# Patient Record
Sex: MALE | Race: WHITE | NOT HISPANIC OR LATINO
[De-identification: names, ages, dates, MRNs, and addresses within clinical notes are randomized per-mention and may not be internally consistent; named-entity substitution may affect disease eponyms.]

---

## 2017-01-09 ENCOUNTER — APPOINTMENT (OUTPATIENT)
Dept: CARDIOLOGY | Facility: CLINIC | Age: 67
End: 2017-01-09

## 2017-01-09 VITALS
SYSTOLIC BLOOD PRESSURE: 138 MMHG | HEIGHT: 67 IN | DIASTOLIC BLOOD PRESSURE: 70 MMHG | BODY MASS INDEX: 33.12 KG/M2 | HEART RATE: 50 BPM | WEIGHT: 211 LBS

## 2017-01-09 DIAGNOSIS — G47.33 OBSTRUCTIVE SLEEP APNEA (ADULT) (PEDIATRIC): ICD-10-CM

## 2017-01-09 RX ORDER — DOCUSATE SODIUM 100 MG/1
100 CAPSULE, LIQUID FILLED ORAL TWICE DAILY
Refills: 0 | Status: ACTIVE | COMMUNITY

## 2017-01-09 RX ORDER — PANTOPRAZOLE 40 MG/1
40 TABLET, DELAYED RELEASE ORAL
Qty: 30 | Refills: 0 | Status: ACTIVE | COMMUNITY
Start: 2016-08-26

## 2017-01-12 ENCOUNTER — APPOINTMENT (OUTPATIENT)
Dept: CARDIOLOGY | Facility: CLINIC | Age: 67
End: 2017-01-12

## 2017-03-03 ENCOUNTER — APPOINTMENT (OUTPATIENT)
Dept: CARDIOLOGY | Facility: CLINIC | Age: 67
End: 2017-03-03

## 2017-04-13 ENCOUNTER — INPATIENT (INPATIENT)
Facility: HOSPITAL | Age: 67
LOS: 13 days | Discharge: HOME | End: 2017-04-27
Attending: INTERNAL MEDICINE | Admitting: INTERNAL MEDICINE

## 2017-04-25 ENCOUNTER — APPOINTMENT (OUTPATIENT)
Dept: VASCULAR SURGERY | Facility: HOSPITAL | Age: 67
End: 2017-04-25

## 2017-05-10 ENCOUNTER — APPOINTMENT (OUTPATIENT)
Dept: VASCULAR SURGERY | Facility: CLINIC | Age: 67
End: 2017-05-10

## 2017-05-10 VITALS
DIASTOLIC BLOOD PRESSURE: 62 MMHG | SYSTOLIC BLOOD PRESSURE: 124 MMHG | WEIGHT: 194 LBS | HEIGHT: 67 IN | BODY MASS INDEX: 30.45 KG/M2

## 2017-06-07 ENCOUNTER — APPOINTMENT (OUTPATIENT)
Dept: VASCULAR SURGERY | Facility: CLINIC | Age: 67
End: 2017-06-07

## 2017-06-14 ENCOUNTER — APPOINTMENT (OUTPATIENT)
Dept: VASCULAR SURGERY | Facility: CLINIC | Age: 67
End: 2017-06-14

## 2017-06-14 VITALS
HEIGHT: 67 IN | BODY MASS INDEX: 32.02 KG/M2 | DIASTOLIC BLOOD PRESSURE: 70 MMHG | WEIGHT: 204 LBS | SYSTOLIC BLOOD PRESSURE: 120 MMHG

## 2017-06-15 ENCOUNTER — INPATIENT (INPATIENT)
Facility: HOSPITAL | Age: 67
LOS: 7 days | Discharge: HOME | End: 2017-06-23
Attending: INTERNAL MEDICINE | Admitting: INTERNAL MEDICINE

## 2017-06-15 DIAGNOSIS — I13.10 HYPERTENSIVE HEART AND CHRONIC KIDNEY DISEASE WITHOUT HEART FAILURE, WITH STAGE 1 THROUGH STAGE 4 CHRONIC KIDNEY DISEASE, OR UNSPECIFIED CHRONIC KIDNEY DISEASE: ICD-10-CM

## 2017-06-15 DIAGNOSIS — I10 ESSENTIAL (PRIMARY) HYPERTENSION: ICD-10-CM

## 2017-06-15 DIAGNOSIS — I31.3 PERICARDIAL EFFUSION (NONINFLAMMATORY): ICD-10-CM

## 2017-06-15 DIAGNOSIS — N18.4 CHRONIC KIDNEY DISEASE, STAGE 4 (SEVERE): ICD-10-CM

## 2017-06-15 DIAGNOSIS — I24.9 ACUTE ISCHEMIC HEART DISEASE, UNSPECIFIED: ICD-10-CM

## 2017-06-15 DIAGNOSIS — E11.9 TYPE 2 DIABETES MELLITUS WITHOUT COMPLICATIONS: ICD-10-CM

## 2017-06-15 DIAGNOSIS — I50.9 HEART FAILURE, UNSPECIFIED: ICD-10-CM

## 2017-06-15 DIAGNOSIS — J90 PLEURAL EFFUSION, NOT ELSEWHERE CLASSIFIED: ICD-10-CM

## 2017-06-15 DIAGNOSIS — R00.1 BRADYCARDIA, UNSPECIFIED: ICD-10-CM

## 2017-06-27 ENCOUNTER — APPOINTMENT (OUTPATIENT)
Dept: UROLOGY | Facility: CLINIC | Age: 67
End: 2017-06-27

## 2017-06-27 VITALS
BODY MASS INDEX: 32.33 KG/M2 | DIASTOLIC BLOOD PRESSURE: 70 MMHG | WEIGHT: 206 LBS | HEIGHT: 67 IN | SYSTOLIC BLOOD PRESSURE: 133 MMHG | HEART RATE: 68 BPM

## 2017-06-28 DIAGNOSIS — I50.21 ACUTE SYSTOLIC (CONGESTIVE) HEART FAILURE: ICD-10-CM

## 2017-06-28 DIAGNOSIS — E78.5 HYPERLIPIDEMIA, UNSPECIFIED: ICD-10-CM

## 2017-06-28 DIAGNOSIS — I25.10 ATHEROSCLEROTIC HEART DISEASE OF NATIVE CORONARY ARTERY WITHOUT ANGINA PECTORIS: ICD-10-CM

## 2017-06-28 DIAGNOSIS — Z79.4 LONG TERM (CURRENT) USE OF INSULIN: ICD-10-CM

## 2017-06-28 DIAGNOSIS — N30.80 OTHER CYSTITIS WITHOUT HEMATURIA: ICD-10-CM

## 2017-06-28 DIAGNOSIS — N18.4 CHRONIC KIDNEY DISEASE, STAGE 4 (SEVERE): ICD-10-CM

## 2017-06-28 DIAGNOSIS — F17.210 NICOTINE DEPENDENCE, CIGARETTES, UNCOMPLICATED: ICD-10-CM

## 2017-06-28 DIAGNOSIS — G47.33 OBSTRUCTIVE SLEEP APNEA (ADULT) (PEDIATRIC): ICD-10-CM

## 2017-06-28 DIAGNOSIS — I13.0 HYPERTENSIVE HEART AND CHRONIC KIDNEY DISEASE WITH HEART FAILURE AND STAGE 1 THROUGH STAGE 4 CHRONIC KIDNEY DISEASE, OR UNSPECIFIED CHRONIC KIDNEY DISEASE: ICD-10-CM

## 2017-06-28 DIAGNOSIS — N40.0 BENIGN PROSTATIC HYPERPLASIA WITHOUT LOWER URINARY TRACT SYMPTOMS: ICD-10-CM

## 2017-06-28 DIAGNOSIS — N31.9 NEUROMUSCULAR DYSFUNCTION OF BLADDER, UNSPECIFIED: ICD-10-CM

## 2017-06-28 DIAGNOSIS — E87.6 HYPOKALEMIA: ICD-10-CM

## 2017-06-28 DIAGNOSIS — E83.41 HYPERMAGNESEMIA: ICD-10-CM

## 2017-06-28 DIAGNOSIS — E11.22 TYPE 2 DIABETES MELLITUS WITH DIABETIC CHRONIC KIDNEY DISEASE: ICD-10-CM

## 2017-07-05 ENCOUNTER — INPATIENT (INPATIENT)
Facility: HOSPITAL | Age: 67
LOS: 13 days | Discharge: HOME | End: 2017-07-19
Attending: INTERNAL MEDICINE | Admitting: INTERNAL MEDICINE

## 2017-07-05 DIAGNOSIS — I13.10 HYPERTENSIVE HEART AND CHRONIC KIDNEY DISEASE WITHOUT HEART FAILURE, WITH STAGE 1 THROUGH STAGE 4 CHRONIC KIDNEY DISEASE, OR UNSPECIFIED CHRONIC KIDNEY DISEASE: ICD-10-CM

## 2017-07-05 DIAGNOSIS — I24.9 ACUTE ISCHEMIC HEART DISEASE, UNSPECIFIED: ICD-10-CM

## 2017-07-05 DIAGNOSIS — I10 ESSENTIAL (PRIMARY) HYPERTENSION: ICD-10-CM

## 2017-07-05 DIAGNOSIS — I50.9 HEART FAILURE, UNSPECIFIED: ICD-10-CM

## 2017-07-05 DIAGNOSIS — N18.4 CHRONIC KIDNEY DISEASE, STAGE 4 (SEVERE): ICD-10-CM

## 2017-07-05 DIAGNOSIS — I31.3 PERICARDIAL EFFUSION (NONINFLAMMATORY): ICD-10-CM

## 2017-07-05 DIAGNOSIS — E11.9 TYPE 2 DIABETES MELLITUS WITHOUT COMPLICATIONS: ICD-10-CM

## 2017-07-05 DIAGNOSIS — J90 PLEURAL EFFUSION, NOT ELSEWHERE CLASSIFIED: ICD-10-CM

## 2017-07-05 DIAGNOSIS — R00.1 BRADYCARDIA, UNSPECIFIED: ICD-10-CM

## 2017-07-24 DIAGNOSIS — I25.10 ATHEROSCLEROTIC HEART DISEASE OF NATIVE CORONARY ARTERY WITHOUT ANGINA PECTORIS: ICD-10-CM

## 2017-07-24 DIAGNOSIS — I10 ESSENTIAL (PRIMARY) HYPERTENSION: ICD-10-CM

## 2017-07-24 DIAGNOSIS — E11.9 TYPE 2 DIABETES MELLITUS WITHOUT COMPLICATIONS: ICD-10-CM

## 2017-07-24 DIAGNOSIS — E78.5 HYPERLIPIDEMIA, UNSPECIFIED: ICD-10-CM

## 2017-07-24 DIAGNOSIS — N17.9 ACUTE KIDNEY FAILURE, UNSPECIFIED: ICD-10-CM

## 2017-07-24 DIAGNOSIS — N18.4 CHRONIC KIDNEY DISEASE, STAGE 4 (SEVERE): ICD-10-CM

## 2017-07-24 DIAGNOSIS — E87.6 HYPOKALEMIA: ICD-10-CM

## 2017-07-24 DIAGNOSIS — I13.0 HYPERTENSIVE HEART AND CHRONIC KIDNEY DISEASE WITH HEART FAILURE AND STAGE 1 THROUGH STAGE 4 CHRONIC KIDNEY DISEASE, OR UNSPECIFIED CHRONIC KIDNEY DISEASE: ICD-10-CM

## 2017-07-24 DIAGNOSIS — N40.0 BENIGN PROSTATIC HYPERPLASIA WITHOUT LOWER URINARY TRACT SYMPTOMS: ICD-10-CM

## 2017-07-24 DIAGNOSIS — N31.9 NEUROMUSCULAR DYSFUNCTION OF BLADDER, UNSPECIFIED: ICD-10-CM

## 2017-07-24 DIAGNOSIS — Z87.891 PERSONAL HISTORY OF NICOTINE DEPENDENCE: ICD-10-CM

## 2017-07-24 DIAGNOSIS — E11.22 TYPE 2 DIABETES MELLITUS WITH DIABETIC CHRONIC KIDNEY DISEASE: ICD-10-CM

## 2017-07-24 DIAGNOSIS — I25.5 ISCHEMIC CARDIOMYOPATHY: ICD-10-CM

## 2017-07-24 DIAGNOSIS — I51.7 CARDIOMEGALY: ICD-10-CM

## 2017-07-24 DIAGNOSIS — I50.21 ACUTE SYSTOLIC (CONGESTIVE) HEART FAILURE: ICD-10-CM

## 2017-07-25 DIAGNOSIS — M13.861 OTHER SPECIFIED ARTHRITIS, RIGHT KNEE: ICD-10-CM

## 2017-07-25 DIAGNOSIS — G47.33 OBSTRUCTIVE SLEEP APNEA (ADULT) (PEDIATRIC): ICD-10-CM

## 2017-07-25 DIAGNOSIS — I50.23 ACUTE ON CHRONIC SYSTOLIC (CONGESTIVE) HEART FAILURE: ICD-10-CM

## 2017-07-25 DIAGNOSIS — M79.89 OTHER SPECIFIED SOFT TISSUE DISORDERS: ICD-10-CM

## 2017-07-25 DIAGNOSIS — Z99.2 DEPENDENCE ON RENAL DIALYSIS: ICD-10-CM

## 2017-07-25 DIAGNOSIS — M13.862 OTHER SPECIFIED ARTHRITIS, LEFT KNEE: ICD-10-CM

## 2017-07-25 DIAGNOSIS — Z95.5 PRESENCE OF CORONARY ANGIOPLASTY IMPLANT AND GRAFT: ICD-10-CM

## 2017-07-26 ENCOUNTER — OUTPATIENT (OUTPATIENT)
Dept: OUTPATIENT SERVICES | Facility: HOSPITAL | Age: 67
LOS: 1 days | Discharge: HOME | End: 2017-07-26

## 2017-07-26 DIAGNOSIS — I13.10 HYPERTENSIVE HEART AND CHRONIC KIDNEY DISEASE WITHOUT HEART FAILURE, WITH STAGE 1 THROUGH STAGE 4 CHRONIC KIDNEY DISEASE, OR UNSPECIFIED CHRONIC KIDNEY DISEASE: ICD-10-CM

## 2017-07-26 DIAGNOSIS — N18.6 END STAGE RENAL DISEASE: ICD-10-CM

## 2017-07-26 DIAGNOSIS — N18.4 CHRONIC KIDNEY DISEASE, STAGE 4 (SEVERE): ICD-10-CM

## 2017-07-26 DIAGNOSIS — J90 PLEURAL EFFUSION, NOT ELSEWHERE CLASSIFIED: ICD-10-CM

## 2017-07-26 DIAGNOSIS — I50.9 HEART FAILURE, UNSPECIFIED: ICD-10-CM

## 2017-07-26 DIAGNOSIS — I10 ESSENTIAL (PRIMARY) HYPERTENSION: ICD-10-CM

## 2017-07-26 DIAGNOSIS — I24.9 ACUTE ISCHEMIC HEART DISEASE, UNSPECIFIED: ICD-10-CM

## 2017-07-26 DIAGNOSIS — E11.9 TYPE 2 DIABETES MELLITUS WITHOUT COMPLICATIONS: ICD-10-CM

## 2017-07-26 DIAGNOSIS — R00.1 BRADYCARDIA, UNSPECIFIED: ICD-10-CM

## 2017-07-26 DIAGNOSIS — I31.3 PERICARDIAL EFFUSION (NONINFLAMMATORY): ICD-10-CM

## 2017-07-27 DIAGNOSIS — I13.0 HYPERTENSIVE HEART AND CHRONIC KIDNEY DISEASE WITH HEART FAILURE AND STAGE 1 THROUGH STAGE 4 CHRONIC KIDNEY DISEASE, OR UNSPECIFIED CHRONIC KIDNEY DISEASE: ICD-10-CM

## 2017-07-31 ENCOUNTER — APPOINTMENT (OUTPATIENT)
Dept: CARDIOLOGY | Facility: CLINIC | Age: 67
End: 2017-07-31

## 2017-08-30 ENCOUNTER — OTHER (OUTPATIENT)
Age: 67
End: 2017-08-30

## 2017-08-31 ENCOUNTER — OUTPATIENT (OUTPATIENT)
Dept: OUTPATIENT SERVICES | Facility: HOSPITAL | Age: 67
LOS: 1 days | Discharge: HOME | End: 2017-08-31

## 2017-08-31 DIAGNOSIS — I24.9 ACUTE ISCHEMIC HEART DISEASE, UNSPECIFIED: ICD-10-CM

## 2017-08-31 DIAGNOSIS — I10 ESSENTIAL (PRIMARY) HYPERTENSION: ICD-10-CM

## 2017-08-31 DIAGNOSIS — J90 PLEURAL EFFUSION, NOT ELSEWHERE CLASSIFIED: ICD-10-CM

## 2017-08-31 DIAGNOSIS — E11.9 TYPE 2 DIABETES MELLITUS WITHOUT COMPLICATIONS: ICD-10-CM

## 2017-08-31 DIAGNOSIS — N18.4 CHRONIC KIDNEY DISEASE, STAGE 4 (SEVERE): ICD-10-CM

## 2017-08-31 DIAGNOSIS — I13.10 HYPERTENSIVE HEART AND CHRONIC KIDNEY DISEASE WITHOUT HEART FAILURE, WITH STAGE 1 THROUGH STAGE 4 CHRONIC KIDNEY DISEASE, OR UNSPECIFIED CHRONIC KIDNEY DISEASE: ICD-10-CM

## 2017-08-31 DIAGNOSIS — I31.3 PERICARDIAL EFFUSION (NONINFLAMMATORY): ICD-10-CM

## 2017-08-31 DIAGNOSIS — R00.1 BRADYCARDIA, UNSPECIFIED: ICD-10-CM

## 2017-08-31 DIAGNOSIS — I50.9 HEART FAILURE, UNSPECIFIED: ICD-10-CM

## 2017-09-07 DIAGNOSIS — N18.6 END STAGE RENAL DISEASE: ICD-10-CM

## 2017-09-07 DIAGNOSIS — Z98.61 CORONARY ANGIOPLASTY STATUS: ICD-10-CM

## 2017-09-07 DIAGNOSIS — I50.9 HEART FAILURE, UNSPECIFIED: ICD-10-CM

## 2017-09-07 DIAGNOSIS — I12.0 HYPERTENSIVE CHRONIC KIDNEY DISEASE WITH STAGE 5 CHRONIC KIDNEY DISEASE OR END STAGE RENAL DISEASE: ICD-10-CM

## 2017-09-07 DIAGNOSIS — E11.9 TYPE 2 DIABETES MELLITUS WITHOUT COMPLICATIONS: ICD-10-CM

## 2017-09-07 DIAGNOSIS — I25.2 OLD MYOCARDIAL INFARCTION: ICD-10-CM

## 2017-09-07 DIAGNOSIS — Z99.2 DEPENDENCE ON RENAL DIALYSIS: ICD-10-CM

## 2017-09-07 DIAGNOSIS — Z79.4 LONG TERM (CURRENT) USE OF INSULIN: ICD-10-CM

## 2017-09-07 DIAGNOSIS — E78.4 OTHER HYPERLIPIDEMIA: ICD-10-CM

## 2017-09-07 DIAGNOSIS — I25.82 CHRONIC TOTAL OCCLUSION OF CORONARY ARTERY: ICD-10-CM

## 2017-09-07 DIAGNOSIS — R06.02 SHORTNESS OF BREATH: ICD-10-CM

## 2017-09-07 DIAGNOSIS — I25.10 ATHEROSCLEROTIC HEART DISEASE OF NATIVE CORONARY ARTERY WITHOUT ANGINA PECTORIS: ICD-10-CM

## 2017-09-25 ENCOUNTER — APPOINTMENT (OUTPATIENT)
Dept: CARDIOLOGY | Facility: CLINIC | Age: 67
End: 2017-09-25

## 2017-09-25 VITALS
DIASTOLIC BLOOD PRESSURE: 64 MMHG | WEIGHT: 195 LBS | BODY MASS INDEX: 30.61 KG/M2 | HEIGHT: 67 IN | SYSTOLIC BLOOD PRESSURE: 110 MMHG | HEART RATE: 59 BPM

## 2017-09-25 DIAGNOSIS — Z87.09 PERSONAL HISTORY OF OTHER DISEASES OF THE RESPIRATORY SYSTEM: ICD-10-CM

## 2017-09-27 ENCOUNTER — APPOINTMENT (OUTPATIENT)
Dept: VASCULAR SURGERY | Facility: CLINIC | Age: 67
End: 2017-09-27
Payer: MEDICARE

## 2017-09-27 PROCEDURE — 99213 OFFICE O/P EST LOW 20 MIN: CPT

## 2017-09-27 PROCEDURE — 93990 DOPPLER FLOW TESTING: CPT

## 2017-10-02 ENCOUNTER — OUTPATIENT (OUTPATIENT)
Dept: OUTPATIENT SERVICES | Facility: HOSPITAL | Age: 67
LOS: 1 days | Discharge: HOME | End: 2017-10-02

## 2017-10-02 DIAGNOSIS — Z01.818 ENCOUNTER FOR OTHER PREPROCEDURAL EXAMINATION: ICD-10-CM

## 2017-10-02 DIAGNOSIS — I24.9 ACUTE ISCHEMIC HEART DISEASE, UNSPECIFIED: ICD-10-CM

## 2017-10-02 DIAGNOSIS — N18.9 CHRONIC KIDNEY DISEASE, UNSPECIFIED: ICD-10-CM

## 2017-10-02 DIAGNOSIS — N18.4 CHRONIC KIDNEY DISEASE, STAGE 4 (SEVERE): ICD-10-CM

## 2017-10-02 DIAGNOSIS — I10 ESSENTIAL (PRIMARY) HYPERTENSION: ICD-10-CM

## 2017-10-02 DIAGNOSIS — I50.9 HEART FAILURE, UNSPECIFIED: ICD-10-CM

## 2017-10-02 DIAGNOSIS — J90 PLEURAL EFFUSION, NOT ELSEWHERE CLASSIFIED: ICD-10-CM

## 2017-10-02 DIAGNOSIS — I13.10 HYPERTENSIVE HEART AND CHRONIC KIDNEY DISEASE WITHOUT HEART FAILURE, WITH STAGE 1 THROUGH STAGE 4 CHRONIC KIDNEY DISEASE, OR UNSPECIFIED CHRONIC KIDNEY DISEASE: ICD-10-CM

## 2017-10-02 DIAGNOSIS — E11.9 TYPE 2 DIABETES MELLITUS WITHOUT COMPLICATIONS: ICD-10-CM

## 2017-10-02 DIAGNOSIS — I31.3 PERICARDIAL EFFUSION (NONINFLAMMATORY): ICD-10-CM

## 2017-10-02 DIAGNOSIS — R00.1 BRADYCARDIA, UNSPECIFIED: ICD-10-CM

## 2017-10-03 DIAGNOSIS — E11.9 TYPE 2 DIABETES MELLITUS WITHOUT COMPLICATIONS: ICD-10-CM

## 2017-10-03 DIAGNOSIS — I10 ESSENTIAL (PRIMARY) HYPERTENSION: ICD-10-CM

## 2017-10-03 DIAGNOSIS — I25.10 ATHEROSCLEROTIC HEART DISEASE OF NATIVE CORONARY ARTERY WITHOUT ANGINA PECTORIS: ICD-10-CM

## 2017-10-05 ENCOUNTER — OUTPATIENT (OUTPATIENT)
Dept: OUTPATIENT SERVICES | Facility: HOSPITAL | Age: 67
LOS: 1 days | Discharge: HOME | End: 2017-10-05

## 2017-10-05 ENCOUNTER — APPOINTMENT (OUTPATIENT)
Dept: VASCULAR SURGERY | Facility: HOSPITAL | Age: 67
End: 2017-10-05
Payer: MEDICARE

## 2017-10-05 DIAGNOSIS — N18.4 CHRONIC KIDNEY DISEASE, STAGE 4 (SEVERE): ICD-10-CM

## 2017-10-05 DIAGNOSIS — I31.3 PERICARDIAL EFFUSION (NONINFLAMMATORY): ICD-10-CM

## 2017-10-05 DIAGNOSIS — J90 PLEURAL EFFUSION, NOT ELSEWHERE CLASSIFIED: ICD-10-CM

## 2017-10-05 DIAGNOSIS — E11.9 TYPE 2 DIABETES MELLITUS WITHOUT COMPLICATIONS: ICD-10-CM

## 2017-10-05 DIAGNOSIS — I50.9 HEART FAILURE, UNSPECIFIED: ICD-10-CM

## 2017-10-05 DIAGNOSIS — I13.10 HYPERTENSIVE HEART AND CHRONIC KIDNEY DISEASE WITHOUT HEART FAILURE, WITH STAGE 1 THROUGH STAGE 4 CHRONIC KIDNEY DISEASE, OR UNSPECIFIED CHRONIC KIDNEY DISEASE: ICD-10-CM

## 2017-10-05 DIAGNOSIS — I10 ESSENTIAL (PRIMARY) HYPERTENSION: ICD-10-CM

## 2017-10-05 DIAGNOSIS — I24.9 ACUTE ISCHEMIC HEART DISEASE, UNSPECIFIED: ICD-10-CM

## 2017-10-05 DIAGNOSIS — R00.1 BRADYCARDIA, UNSPECIFIED: ICD-10-CM

## 2017-10-05 PROCEDURE — 36903 INTRO CATH DIALYSIS CIRCUIT: CPT

## 2017-10-16 DIAGNOSIS — T82.858A STENOSIS OF OTHER VASCULAR PROSTHETIC DEVICES, IMPLANTS AND GRAFTS, INITIAL ENCOUNTER: ICD-10-CM

## 2017-10-16 DIAGNOSIS — E11.9 TYPE 2 DIABETES MELLITUS WITHOUT COMPLICATIONS: ICD-10-CM
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
## 2017-10-16 DIAGNOSIS — Z99.2 DEPENDENCE ON RENAL DIALYSIS: ICD-10-CM
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
## 2017-10-16 DIAGNOSIS — N18.6 END STAGE RENAL DISEASE: ICD-10-CM

## 2017-10-16 DIAGNOSIS — I12.0 HYPERTENSIVE CHRONIC KIDNEY DISEASE WITH STAGE 5 CHRONIC KIDNEY DISEASE OR END STAGE RENAL DISEASE: ICD-10-CM

## 2017-10-16 DIAGNOSIS — Z79.4 LONG TERM (CURRENT) USE OF INSULIN: ICD-10-CM

## 2017-10-19 DIAGNOSIS — I25.2 OLD MYOCARDIAL INFARCTION: ICD-10-CM

## 2017-10-19 DIAGNOSIS — I21.4 NON-ST ELEVATION (NSTEMI) MYOCARDIAL INFARCTION: ICD-10-CM

## 2017-10-19 DIAGNOSIS — E11.22 TYPE 2 DIABETES MELLITUS WITH DIABETIC CHRONIC KIDNEY DISEASE: ICD-10-CM

## 2017-10-19 DIAGNOSIS — I25.10 ATHEROSCLEROTIC HEART DISEASE OF NATIVE CORONARY ARTERY WITHOUT ANGINA PECTORIS: ICD-10-CM

## 2017-10-19 DIAGNOSIS — Z87.891 PERSONAL HISTORY OF NICOTINE DEPENDENCE: ICD-10-CM

## 2017-10-19 DIAGNOSIS — Z79.84 LONG TERM (CURRENT) USE OF ORAL HYPOGLYCEMIC DRUGS: ICD-10-CM

## 2017-10-19 DIAGNOSIS — E78.5 HYPERLIPIDEMIA, UNSPECIFIED: ICD-10-CM

## 2017-10-19 DIAGNOSIS — G47.33 OBSTRUCTIVE SLEEP APNEA (ADULT) (PEDIATRIC): ICD-10-CM

## 2017-10-19 DIAGNOSIS — B96.1 KLEBSIELLA PNEUMONIAE [K. PNEUMONIAE] AS THE CAUSE OF DISEASES CLASSIFIED ELSEWHERE: ICD-10-CM

## 2017-10-19 DIAGNOSIS — N17.9 ACUTE KIDNEY FAILURE, UNSPECIFIED: ICD-10-CM

## 2017-10-19 DIAGNOSIS — I50.23 ACUTE ON CHRONIC SYSTOLIC (CONGESTIVE) HEART FAILURE: ICD-10-CM

## 2017-10-19 DIAGNOSIS — I72.4 ANEURYSM OF ARTERY OF LOWER EXTREMITY: ICD-10-CM

## 2017-10-19 DIAGNOSIS — N18.6 END STAGE RENAL DISEASE: ICD-10-CM

## 2017-10-19 DIAGNOSIS — N17.0 ACUTE KIDNEY FAILURE WITH TUBULAR NECROSIS: ICD-10-CM

## 2017-10-19 DIAGNOSIS — I44.0 ATRIOVENTRICULAR BLOCK, FIRST DEGREE: ICD-10-CM

## 2017-10-19 DIAGNOSIS — E87.5 HYPERKALEMIA: ICD-10-CM

## 2017-10-19 DIAGNOSIS — Z95.5 PRESENCE OF CORONARY ANGIOPLASTY IMPLANT AND GRAFT: ICD-10-CM

## 2017-10-19 DIAGNOSIS — I13.2 HYPERTENSIVE HEART AND CHRONIC KIDNEY DISEASE WITH HEART FAILURE AND WITH STAGE 5 CHRONIC KIDNEY DISEASE, OR END STAGE RENAL DISEASE: ICD-10-CM

## 2017-10-19 DIAGNOSIS — N39.0 URINARY TRACT INFECTION, SITE NOT SPECIFIED: ICD-10-CM

## 2017-10-19 DIAGNOSIS — N40.0 BENIGN PROSTATIC HYPERPLASIA WITHOUT LOWER URINARY TRACT SYMPTOMS: ICD-10-CM

## 2017-10-25 ENCOUNTER — APPOINTMENT (OUTPATIENT)
Dept: VASCULAR SURGERY | Facility: CLINIC | Age: 67
End: 2017-10-25
Payer: MEDICARE

## 2017-10-25 VITALS
DIASTOLIC BLOOD PRESSURE: 75 MMHG | HEIGHT: 67 IN | HEART RATE: 78 BPM | BODY MASS INDEX: 13.94 KG/M2 | SYSTOLIC BLOOD PRESSURE: 135 MMHG | WEIGHT: 88.8 LBS | OXYGEN SATURATION: 95 %

## 2017-10-25 PROCEDURE — 93990 DOPPLER FLOW TESTING: CPT

## 2017-10-25 PROCEDURE — 99213 OFFICE O/P EST LOW 20 MIN: CPT

## 2017-10-25 RX ORDER — CEPHALEXIN 500 MG/1
500 CAPSULE ORAL EVERY 8 HOURS
Qty: 21 | Refills: 0 | Status: DISCONTINUED | COMMUNITY
Start: 2017-09-27 | End: 2017-10-25

## 2017-11-20 ENCOUNTER — APPOINTMENT (OUTPATIENT)
Dept: CARDIOLOGY | Facility: CLINIC | Age: 67
End: 2017-11-20

## 2017-11-20 VITALS
HEART RATE: 64 BPM | BODY MASS INDEX: 31.86 KG/M2 | HEIGHT: 67 IN | SYSTOLIC BLOOD PRESSURE: 100 MMHG | WEIGHT: 203 LBS | DIASTOLIC BLOOD PRESSURE: 62 MMHG

## 2018-01-24 ENCOUNTER — APPOINTMENT (OUTPATIENT)
Dept: VASCULAR SURGERY | Facility: CLINIC | Age: 68
End: 2018-01-24
Payer: MEDICARE

## 2018-01-24 VITALS
OXYGEN SATURATION: 96 % | SYSTOLIC BLOOD PRESSURE: 130 MMHG | HEART RATE: 67 BPM | WEIGHT: 205.94 LBS | DIASTOLIC BLOOD PRESSURE: 70 MMHG | BODY MASS INDEX: 32.32 KG/M2 | HEIGHT: 67 IN

## 2018-01-24 PROCEDURE — 99213 OFFICE O/P EST LOW 20 MIN: CPT

## 2018-01-24 PROCEDURE — 93970 EXTREMITY STUDY: CPT

## 2018-01-25 ENCOUNTER — APPOINTMENT (OUTPATIENT)
Dept: UROLOGY | Facility: CLINIC | Age: 68
End: 2018-01-25
Payer: MEDICARE

## 2018-01-25 VITALS
SYSTOLIC BLOOD PRESSURE: 119 MMHG | BODY MASS INDEX: 32.18 KG/M2 | DIASTOLIC BLOOD PRESSURE: 69 MMHG | HEIGHT: 67 IN | WEIGHT: 205 LBS | HEART RATE: 65 BPM

## 2018-01-25 PROCEDURE — 99213 OFFICE O/P EST LOW 20 MIN: CPT

## 2018-02-17 ENCOUNTER — OUTPATIENT (OUTPATIENT)
Dept: OUTPATIENT SERVICES | Facility: HOSPITAL | Age: 68
LOS: 1 days | Discharge: HOME | End: 2018-02-17

## 2018-02-17 DIAGNOSIS — N31.9 NEUROMUSCULAR DYSFUNCTION OF BLADDER, UNSPECIFIED: ICD-10-CM

## 2018-02-17 DIAGNOSIS — N39.0 URINARY TRACT INFECTION, SITE NOT SPECIFIED: ICD-10-CM

## 2018-02-21 ENCOUNTER — APPOINTMENT (OUTPATIENT)
Dept: CARDIOLOGY | Facility: CLINIC | Age: 68
End: 2018-02-21

## 2018-03-08 ENCOUNTER — APPOINTMENT (OUTPATIENT)
Dept: UROLOGY | Facility: CLINIC | Age: 68
End: 2018-03-08
Payer: MEDICARE

## 2018-03-08 VITALS
WEIGHT: 205.03 LBS | SYSTOLIC BLOOD PRESSURE: 108 MMHG | BODY MASS INDEX: 32.18 KG/M2 | HEART RATE: 56 BPM | HEIGHT: 67 IN | DIASTOLIC BLOOD PRESSURE: 54 MMHG

## 2018-03-08 DIAGNOSIS — N31.9 NEUROMUSCULAR DYSFUNCTION OF BLADDER, UNSPECIFIED: ICD-10-CM

## 2018-03-08 PROCEDURE — 99213 OFFICE O/P EST LOW 20 MIN: CPT

## 2018-03-08 NOTE — PHYSICAL EXAM
[General Appearance - Well Developed] : well developed [General Appearance - Well Nourished] : well nourished [Normal Appearance] : normal appearance [Well Groomed] : well groomed [General Appearance - In No Acute Distress] : no acute distress [Abdomen Soft] : soft [Abdomen Tenderness] : non-tender [Costovertebral Angle Tenderness] : no ~M costovertebral angle tenderness [Edema] : no peripheral edema [] : no respiratory distress [Respiration, Rhythm And Depth] : normal respiratory rhythm and effort [Exaggerated Use Of Accessory Muscles For Inspiration] : no accessory muscle use [Oriented To Time, Place, And Person] : oriented to person, place, and time [Affect] : the affect was normal [Mood] : the mood was normal [Not Anxious] : not anxious [No Focal Deficits] : no focal deficits [No Palpable Adenopathy] : no palpable adenopathy [FreeTextEntry1] : ambulates with cane

## 2018-03-08 NOTE — LETTER BODY
[Dear  ___] : Dear  [unfilled], [I had the pleasure of evaluating your patient, [unfilled]. Thank you for referring this patient for consultation for ___] : I had the pleasure of evaluating your patient, [unfilled]. Thank you for referring this patient for consultation for [unfilled]. [Attached please find my note.] : Attached please find my note. [Thank you very much for allowing me to participate in the care of this patient. If you have any questions, please do not hesitate to contact me] : Thank you very much for allowing me to participate in the care of this patient. If you have any questions, please do not hesitate to contact me. [FreeTextEntry2] : Dr. Alcazar

## 2018-03-08 NOTE — ASSESSMENT
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

## 2018-03-08 NOTE — LETTER HEADER
[Ivan Foote MD] : Ivan Foote MD [Director of Urologic Oncology] : Director of Urologic Oncology [Cancer Services] : Cancer Services [Tel [phone]] : Tel [phone] [Fax [phone]] : Fax [phone]

## 2018-03-08 NOTE — HISTORY OF PRESENT ILLNESS
[Currently Experiencing ___] :  [unfilled] [Urinary Retention] : urinary retention [None] : None [FreeTextEntry1] : ADRIANNA GAINES is a 67 year old male with a past medical history of urinary retention. Presents to the office today for a follow up, last seen on 1/25/2018.  The patient performs intermittent self catheterizations 1-2 a day for the last year.Patient is a dialysis patient 3 x a week and has chronic bilateral lower extremity edema. Denies urological symptoms, including dysuria, hematuria,and flank pain. LAst UTI in 1/2018. \par \par US 2/17/2018.\par -bilateral paratesticular fluid collection, Right 236 cc, Left 25 cc (spermatocele vs hydrocele)\par -left varicocele 0.32 valsalva in upright position [Dysuria] : no dysuria [Hematuria - Gross] : no gross hematuria

## 2018-03-15 ENCOUNTER — APPOINTMENT (OUTPATIENT)
Dept: CARDIOLOGY | Facility: CLINIC | Age: 68
End: 2018-03-15

## 2018-03-15 VITALS
SYSTOLIC BLOOD PRESSURE: 126 MMHG | WEIGHT: 207 LBS | DIASTOLIC BLOOD PRESSURE: 64 MMHG | BODY MASS INDEX: 32.49 KG/M2 | HEIGHT: 67 IN | HEART RATE: 77 BPM

## 2018-03-29 ENCOUNTER — APPOINTMENT (OUTPATIENT)
Dept: UROLOGY | Facility: CLINIC | Age: 68
End: 2018-03-29

## 2018-05-15 ENCOUNTER — APPOINTMENT (OUTPATIENT)
Dept: CARDIOLOGY | Facility: CLINIC | Age: 68
End: 2018-05-15

## 2018-06-07 ENCOUNTER — APPOINTMENT (OUTPATIENT)
Dept: CARDIOLOGY | Facility: CLINIC | Age: 68
End: 2018-06-07

## 2018-09-10 ENCOUNTER — APPOINTMENT (OUTPATIENT)
Dept: CARDIOLOGY | Facility: CLINIC | Age: 68
End: 2018-09-10

## 2018-09-10 VITALS
DIASTOLIC BLOOD PRESSURE: 54 MMHG | BODY MASS INDEX: 31.23 KG/M2 | SYSTOLIC BLOOD PRESSURE: 120 MMHG | HEIGHT: 67 IN | HEART RATE: 71 BPM | WEIGHT: 199 LBS

## 2018-09-13 ENCOUNTER — APPOINTMENT (OUTPATIENT)
Dept: UROLOGY | Facility: CLINIC | Age: 68
End: 2018-09-13
Payer: MEDICARE

## 2018-09-13 VITALS
HEIGHT: 67 IN | SYSTOLIC BLOOD PRESSURE: 108 MMHG | BODY MASS INDEX: 31.23 KG/M2 | WEIGHT: 199 LBS | DIASTOLIC BLOOD PRESSURE: 54 MMHG | HEART RATE: 55 BPM

## 2018-09-13 PROCEDURE — 99214 OFFICE O/P EST MOD 30 MIN: CPT

## 2018-09-13 NOTE — LETTER BODY
[Dear  ___] : Dear  [unfilled], [Courtesy Letter:] : I had the pleasure of seeing your patient, [unfilled], in my office today. [Please see my note below.] : Please see my note below. [Consult Closing:] : Thank you very much for allowing me to participate in the care of this patient.  If you have any questions, please do not hesitate to contact me. [Sincerely,] : Sincerely, [FreeTextEntry2] : Dr. Isidoro Alcazar

## 2018-09-13 NOTE — END OF VISIT
[>50% of Time Spent on Counseling for ____] : Greater than 50% of the encounter time was spent on counseling for [unfilled] [FreeTextEntry3] : I have seen and Evaluated the patient with NP Bertha Pablo\par I agree with the content of her progress note and the plan of care outlined\par  [Time Spent: ___ minutes] : I have spent [unfilled] minutes of face to face time with the patient

## 2018-09-13 NOTE — LETTER HEADER
[FreeTextEntry3] : Ivan Foote MD \par Director of Urologic Oncology \par Cancer Services \par Tel [phone] \par Fax [phone] \par  \par

## 2018-09-13 NOTE — HISTORY OF PRESENT ILLNESS
[Currently Experiencing ___] :  [unfilled] [Urinary Retention] : urinary retention [None] : None [FreeTextEntry1] : ADRIANNA GAINES is a 67 year old male with a past medical history of urinary retention. Presents to the office today for a follow up, last seen on 3/8/2018. The patient performs intermittent self catheterizations 1 x a day for the last year.Patient is a dialysis patient 3 x a week and has chronic bilateral lower extremity edema. Denies urological symptoms, including dysuria, hematuria,and flank pain. LAst UTI in 1/2018.

## 2018-09-13 NOTE — ASSESSMENT
[FreeTextEntry1] : 69 y/o with history ESRD and urinary retention\par \par -CIC 1 x a day with output of 50 cc\par -No issues at this time\par -F/U in 6 months, instructed to call if any issue arise before scheduled appointment

## 2018-10-12 ENCOUNTER — INPATIENT (INPATIENT)
Facility: HOSPITAL | Age: 68
LOS: 10 days | Discharge: HOME HEALTH | End: 2018-10-23
Attending: INTERNAL MEDICINE | Admitting: INTERNAL MEDICINE

## 2018-10-12 VITALS
SYSTOLIC BLOOD PRESSURE: 113 MMHG | RESPIRATION RATE: 18 BRPM | HEART RATE: 41 BPM | DIASTOLIC BLOOD PRESSURE: 52 MMHG | TEMPERATURE: 99 F | OXYGEN SATURATION: 94 %

## 2018-10-12 DIAGNOSIS — D64.9 ANEMIA, UNSPECIFIED: ICD-10-CM

## 2018-10-12 DIAGNOSIS — I44.2 ATRIOVENTRICULAR BLOCK, COMPLETE: ICD-10-CM

## 2018-10-12 DIAGNOSIS — E78.5 HYPERLIPIDEMIA, UNSPECIFIED: ICD-10-CM

## 2018-10-12 DIAGNOSIS — E11.22 TYPE 2 DIABETES MELLITUS WITH DIABETIC CHRONIC KIDNEY DISEASE: ICD-10-CM

## 2018-10-12 DIAGNOSIS — Z99.2 DEPENDENCE ON RENAL DIALYSIS: ICD-10-CM

## 2018-10-12 DIAGNOSIS — K59.00 CONSTIPATION, UNSPECIFIED: ICD-10-CM

## 2018-10-12 DIAGNOSIS — Z95.5 PRESENCE OF CORONARY ANGIOPLASTY IMPLANT AND GRAFT: ICD-10-CM

## 2018-10-12 DIAGNOSIS — Z79.82 LONG TERM (CURRENT) USE OF ASPIRIN: ICD-10-CM

## 2018-10-12 DIAGNOSIS — I25.10 ATHEROSCLEROTIC HEART DISEASE OF NATIVE CORONARY ARTERY WITHOUT ANGINA PECTORIS: ICD-10-CM

## 2018-10-12 DIAGNOSIS — I13.2 HYPERTENSIVE HEART AND CHRONIC KIDNEY DISEASE WITH HEART FAILURE AND WITH STAGE 5 CHRONIC KIDNEY DISEASE, OR END STAGE RENAL DISEASE: ICD-10-CM

## 2018-10-12 DIAGNOSIS — R31.9 HEMATURIA, UNSPECIFIED: ICD-10-CM

## 2018-10-12 DIAGNOSIS — R14.0 ABDOMINAL DISTENSION (GASEOUS): ICD-10-CM

## 2018-10-12 DIAGNOSIS — E83.39 OTHER DISORDERS OF PHOSPHORUS METABOLISM: ICD-10-CM

## 2018-10-12 DIAGNOSIS — I50.20 UNSPECIFIED SYSTOLIC (CONGESTIVE) HEART FAILURE: ICD-10-CM

## 2018-10-12 DIAGNOSIS — N39.0 URINARY TRACT INFECTION, SITE NOT SPECIFIED: ICD-10-CM

## 2018-10-12 DIAGNOSIS — N18.6 END STAGE RENAL DISEASE: ICD-10-CM

## 2018-10-12 DIAGNOSIS — B96.20 UNSPECIFIED ESCHERICHIA COLI [E. COLI] AS THE CAUSE OF DISEASES CLASSIFIED ELSEWHERE: ICD-10-CM

## 2018-10-12 DIAGNOSIS — N40.0 BENIGN PROSTATIC HYPERPLASIA WITHOUT LOWER URINARY TRACT SYMPTOMS: ICD-10-CM

## 2018-10-12 LAB
ALBUMIN SERPL ELPH-MCNC: 4.1 G/DL — SIGNIFICANT CHANGE UP (ref 3.5–5.2)
ALP SERPL-CCNC: 140 U/L — HIGH (ref 30–115)
ALT FLD-CCNC: 23 U/L — SIGNIFICANT CHANGE UP (ref 0–41)
ANION GAP SERPL CALC-SCNC: 18 MMOL/L — HIGH (ref 7–14)
AST SERPL-CCNC: 20 U/L — SIGNIFICANT CHANGE UP (ref 0–41)
BASE EXCESS BLDV CALC-SCNC: 7.4 MMOL/L — HIGH (ref -2–2)
BILIRUB SERPL-MCNC: 0.4 MG/DL — SIGNIFICANT CHANGE UP (ref 0.2–1.2)
BUN SERPL-MCNC: 34 MG/DL — HIGH (ref 10–20)
CA-I SERPL-SCNC: 1.14 MMOL/L — SIGNIFICANT CHANGE UP (ref 1.12–1.3)
CALCIUM SERPL-MCNC: 9.6 MG/DL — SIGNIFICANT CHANGE UP (ref 8.5–10.1)
CHLORIDE SERPL-SCNC: 96 MMOL/L — LOW (ref 98–110)
CO2 SERPL-SCNC: 28 MMOL/L — SIGNIFICANT CHANGE UP (ref 17–32)
CREAT SERPL-MCNC: 3.3 MG/DL — HIGH (ref 0.7–1.5)
GAS PNL BLDV: 140 MMOL/L — SIGNIFICANT CHANGE UP (ref 136–145)
GAS PNL BLDV: SIGNIFICANT CHANGE UP
GLUCOSE BLDC GLUCOMTR-MCNC: 244 MG/DL — HIGH (ref 70–99)
GLUCOSE SERPL-MCNC: 178 MG/DL — HIGH (ref 70–99)
HCO3 BLDV-SCNC: 32 MMOL/L — HIGH (ref 22–29)
HCT VFR BLD CALC: 33.3 % — LOW (ref 42–52)
HCT VFR BLDA CALC: 37.3 % — SIGNIFICANT CHANGE UP (ref 34–44)
HGB BLD CALC-MCNC: 12.2 G/DL — LOW (ref 14–18)
HGB BLD-MCNC: 10.6 G/DL — LOW (ref 14–18)
INR BLD: 1.26 RATIO — SIGNIFICANT CHANGE UP (ref 0.65–1.3)
LACTATE BLDV-MCNC: 1.5 MMOL/L — SIGNIFICANT CHANGE UP (ref 0.5–1.6)
MAGNESIUM SERPL-MCNC: 2 MG/DL — SIGNIFICANT CHANGE UP (ref 1.8–2.4)
MCHC RBC-ENTMCNC: 29.9 PG — SIGNIFICANT CHANGE UP (ref 27–31)
MCHC RBC-ENTMCNC: 31.8 G/DL — LOW (ref 32–37)
MCV RBC AUTO: 93.8 FL — SIGNIFICANT CHANGE UP (ref 80–94)
NRBC # BLD: 0 /100 WBCS — SIGNIFICANT CHANGE UP (ref 0–0)
NT-PROBNP SERPL-SCNC: HIGH PG/ML (ref 0–300)
PCO2 BLDV: 45 MMHG — SIGNIFICANT CHANGE UP (ref 41–51)
PH BLDV: 7.46 — HIGH (ref 7.26–7.43)
PLATELET # BLD AUTO: 135 K/UL — SIGNIFICANT CHANGE UP (ref 130–400)
PO2 BLDV: 51 MMHG — HIGH (ref 20–40)
POTASSIUM BLDV-SCNC: 3.6 MMOL/L — SIGNIFICANT CHANGE UP (ref 3.3–5.6)
POTASSIUM SERPL-MCNC: 4 MMOL/L — SIGNIFICANT CHANGE UP (ref 3.5–5)
POTASSIUM SERPL-SCNC: 4 MMOL/L — SIGNIFICANT CHANGE UP (ref 3.5–5)
PROT SERPL-MCNC: 7.2 G/DL — SIGNIFICANT CHANGE UP (ref 6–8)
PROTHROM AB SERPL-ACNC: 13.5 SEC — HIGH (ref 9.95–12.87)
RBC # BLD: 3.55 M/UL — LOW (ref 4.7–6.1)
RBC # FLD: 14.7 % — HIGH (ref 11.5–14.5)
SAO2 % BLDV: 85 % — SIGNIFICANT CHANGE UP
SODIUM SERPL-SCNC: 142 MMOL/L — SIGNIFICANT CHANGE UP (ref 135–146)
TROPONIN T SERPL-MCNC: 0.11 NG/ML — CRITICAL HIGH
WBC # BLD: 5.85 K/UL — SIGNIFICANT CHANGE UP (ref 4.8–10.8)
WBC # FLD AUTO: 5.85 K/UL — SIGNIFICANT CHANGE UP (ref 4.8–10.8)

## 2018-10-12 RX ORDER — INSULIN LISPRO 100/ML
VIAL (ML) SUBCUTANEOUS
Qty: 0 | Refills: 0 | Status: DISCONTINUED | OUTPATIENT
Start: 2018-10-12 | End: 2018-10-23

## 2018-10-12 RX ORDER — DOCUSATE SODIUM 100 MG
100 CAPSULE ORAL
Qty: 0 | Refills: 0 | Status: DISCONTINUED | OUTPATIENT
Start: 2018-10-12 | End: 2018-10-23

## 2018-10-12 RX ORDER — ATORVASTATIN CALCIUM 80 MG/1
80 TABLET, FILM COATED ORAL AT BEDTIME
Qty: 0 | Refills: 0 | Status: DISCONTINUED | OUTPATIENT
Start: 2018-10-12 | End: 2018-10-23

## 2018-10-12 RX ORDER — CLOPIDOGREL BISULFATE 75 MG/1
75 TABLET, FILM COATED ORAL DAILY
Qty: 0 | Refills: 0 | Status: DISCONTINUED | OUTPATIENT
Start: 2018-10-12 | End: 2018-10-14

## 2018-10-12 RX ORDER — FINASTERIDE 5 MG/1
5 TABLET, FILM COATED ORAL DAILY
Qty: 0 | Refills: 0 | Status: DISCONTINUED | OUTPATIENT
Start: 2018-10-12 | End: 2018-10-23

## 2018-10-12 RX ORDER — ISOSORBIDE MONONITRATE 60 MG/1
30 TABLET, EXTENDED RELEASE ORAL DAILY
Qty: 0 | Refills: 0 | Status: DISCONTINUED | OUTPATIENT
Start: 2018-10-12 | End: 2018-10-23

## 2018-10-12 RX ORDER — INFLUENZA VIRUS VACCINE 15; 15; 15; 15 UG/.5ML; UG/.5ML; UG/.5ML; UG/.5ML
0.5 SUSPENSION INTRAMUSCULAR ONCE
Qty: 0 | Refills: 0 | Status: DISCONTINUED | OUTPATIENT
Start: 2018-10-12 | End: 2018-10-23

## 2018-10-12 RX ORDER — ASPIRIN/CALCIUM CARB/MAGNESIUM 324 MG
81 TABLET ORAL DAILY
Qty: 0 | Refills: 0 | Status: DISCONTINUED | OUTPATIENT
Start: 2018-10-12 | End: 2018-10-23

## 2018-10-12 RX ORDER — CALCIUM GLUCONATE 100 MG/ML
1 VIAL (ML) INTRAVENOUS ONCE
Qty: 0 | Refills: 0 | Status: COMPLETED | OUTPATIENT
Start: 2018-10-12 | End: 2018-10-12

## 2018-10-12 RX ORDER — MAGNESIUM SULFATE 500 MG/ML
2 VIAL (ML) INJECTION ONCE
Qty: 0 | Refills: 0 | Status: COMPLETED | OUTPATIENT
Start: 2018-10-12 | End: 2018-10-12

## 2018-10-12 RX ORDER — INSULIN GLARGINE 100 [IU]/ML
16 INJECTION, SOLUTION SUBCUTANEOUS AT BEDTIME
Qty: 0 | Refills: 0 | Status: DISCONTINUED | OUTPATIENT
Start: 2018-10-12 | End: 2018-10-23

## 2018-10-12 RX ORDER — ATROPINE SULFATE 0.1 MG/ML
0.5 SYRINGE (ML) INJECTION ONCE
Qty: 0 | Refills: 0 | Status: COMPLETED | OUTPATIENT
Start: 2018-10-12 | End: 2018-10-12

## 2018-10-12 RX ORDER — GLUCAGON INJECTION, SOLUTION 0.5 MG/.1ML
1 INJECTION, SOLUTION SUBCUTANEOUS ONCE
Qty: 0 | Refills: 0 | Status: DISCONTINUED | OUTPATIENT
Start: 2018-10-12 | End: 2018-10-23

## 2018-10-12 RX ORDER — DEXTROSE 50 % IN WATER 50 %
25 SYRINGE (ML) INTRAVENOUS ONCE
Qty: 0 | Refills: 0 | Status: DISCONTINUED | OUTPATIENT
Start: 2018-10-12 | End: 2018-10-23

## 2018-10-12 RX ORDER — TAMSULOSIN HYDROCHLORIDE 0.4 MG/1
0.4 CAPSULE ORAL AT BEDTIME
Qty: 0 | Refills: 0 | Status: DISCONTINUED | OUTPATIENT
Start: 2018-10-12 | End: 2018-10-23

## 2018-10-12 RX ORDER — PANTOPRAZOLE SODIUM 20 MG/1
40 TABLET, DELAYED RELEASE ORAL
Qty: 0 | Refills: 0 | Status: DISCONTINUED | OUTPATIENT
Start: 2018-10-12 | End: 2018-10-23

## 2018-10-12 RX ORDER — HEPARIN SODIUM 5000 [USP'U]/ML
5000 INJECTION INTRAVENOUS; SUBCUTANEOUS EVERY 8 HOURS
Qty: 0 | Refills: 0 | Status: COMPLETED | OUTPATIENT
Start: 2018-10-12 | End: 2018-10-14

## 2018-10-12 RX ORDER — INSULIN LISPRO 100/ML
6 VIAL (ML) SUBCUTANEOUS
Qty: 0 | Refills: 0 | Status: DISCONTINUED | OUTPATIENT
Start: 2018-10-12 | End: 2018-10-23

## 2018-10-12 RX ORDER — METOPROLOL TARTRATE 50 MG
25 TABLET ORAL
Qty: 0 | Refills: 0 | Status: DISCONTINUED | OUTPATIENT
Start: 2018-10-12 | End: 2018-10-13

## 2018-10-12 RX ORDER — FUROSEMIDE 40 MG
80 TABLET ORAL EVERY 12 HOURS
Qty: 0 | Refills: 0 | Status: DISCONTINUED | OUTPATIENT
Start: 2018-10-12 | End: 2018-10-23

## 2018-10-12 RX ADMIN — Medication 200 GRAM(S): at 18:30

## 2018-10-12 RX ADMIN — INSULIN GLARGINE 16 UNIT(S): 100 INJECTION, SOLUTION SUBCUTANEOUS at 22:13

## 2018-10-12 RX ADMIN — Medication 50 GRAM(S): at 16:48

## 2018-10-12 RX ADMIN — Medication 0.5 MILLIGRAM(S): at 16:48

## 2018-10-12 RX ADMIN — Medication 30 MILLILITER(S): at 23:55

## 2018-10-12 RX ADMIN — ATORVASTATIN CALCIUM 80 MILLIGRAM(S): 80 TABLET, FILM COATED ORAL at 22:13

## 2018-10-12 RX ADMIN — TAMSULOSIN HYDROCHLORIDE 0.4 MILLIGRAM(S): 0.4 CAPSULE ORAL at 22:13

## 2018-10-12 RX ADMIN — HEPARIN SODIUM 5000 UNIT(S): 5000 INJECTION INTRAVENOUS; SUBCUTANEOUS at 22:15

## 2018-10-12 NOTE — ED PROVIDER NOTE - PROGRESS NOTE DETAILS
Case s/o to Dr Mitchell.  WIll dw cardio and admit. Pt remains asx. Patient will be admitted to CCU approved by Dr. Vallejo Spoke with Dr. Ordonez will be admitting to his service

## 2018-10-12 NOTE — H&P ADULT - HISTORY OF PRESENT ILLNESS
67 y/o M with PMH of ESRD on HD, CAD s/p pci with stent in LAD and RCA, Heart failure with reduced EF (unknown %), DLD, DM II, and BPH was sent in from hemodialysis after he was noted to be bradycardic. During dialysis he had no symptoms and he completed dialysis without issue. He was told however, that his heart rate was "very low" (exact rate not known), and that he should go to the hospital immediately. Last night he had an episode of dizziness after standing up from the dinner table associated with a feeling of warmth and some diaphoresis. He had a similar episode approximately one week ago, but otherwise has not had these symptoms before. He denies chest pain, SOB, headache, blurry vision, lightheadedness, and abdominal pain. No recent illnesses or sick contacts.

## 2018-10-12 NOTE — ED ADULT NURSE NOTE - NSIMPLEMENTINTERV_GEN_ALL_ED
Implemented All Fall Risk Interventions:  [place] to call system. Call bell, personal items and telephone within reach. Instruct patient to call for assistance. Room bathroom lighting operational. Non-slip footwear when patient is off stretcher. Physically safe environment: no spills, clutter or unnecessary equipment. Stretcher in lowest position, wheels locked, appropriate side rails in place. Provide visual cue, wrist band, yellow gown, etc. Monitor gait and stability. Monitor for mental status changes and reorient to person, place, and time. Review medications for side effects contributing to fall risk. Reinforce activity limits and safety measures with patient and family.

## 2018-10-12 NOTE — ED PROVIDER NOTE - MEDICAL DECISION MAKING DETAILS
I personally evaluated the patient. I reviewed the Resident’s or Physician Assistant’s note (as assigned above), and agree with the findings and plan except as documented in my note. Transvenus pacemaker placed with DR. Vallejo of cardiology bedside, patient doing well, no cp/sob, symptoms much improved, accepted to the CCU.

## 2018-10-12 NOTE — ED ADULT TRIAGE NOTE - CHIEF COMPLAINT QUOTE
patient sent in from dialysis for martin cardia. patient reports intermittent dizziness - on metoprolol

## 2018-10-12 NOTE — ED PROVIDER NOTE - OBJECTIVE STATEMENT
68 year old male with a pmh of htn dm esrd on hd m/w/f was sent in from hd after completing a full course because patient was found to be bradycardic. Patient notes yesterday he was having dizziness but denies any symptoms today. Patient currently asymptomatic denies fever chills n/v abdominal pain.

## 2018-10-12 NOTE — PATIENT PROFILE ADULT - PRIMARY SOURCE OF SUPPORT/COMFORT
Next appt None  Last appt 3/16/18    Refill request for  Last refilled info;   Disp Refills Start End    testosterone cypionate (DEPO-TESTOSTERONE) 200 MG/ML injectable solution 10 mL 5 8/29/2017 8/29/2018    Sig: Inject 0.20 mL's in the muscle once a week.    Class: Normal    Notes to Pharmacy: Refill called in on 8/29/17 to [facility].    Cosign for Ordering: Accepted by Shazia Crawford MD on 8/29/2017 12:02 PM        Refill unable to be completed per standing protocol due to; appointment ? Next lab  Orders pended, and routed to provider for approval.   child(jennifer)

## 2018-10-12 NOTE — CONSULT NOTE ADULT - SUBJECTIVE AND OBJECTIVE BOX
Chief complaint:  Bradycardia    HPI:  67 yo M h/o CAD s/p PCI to LAD, RCA, HFrEF, ESRD on HD, was sent to ER after HD episode and found to be bradycardic. Pt has been c/o dizziness last night soon after his dinner, since this morning he was feeling tired, denies any syncopal epsidoe. on arrival to ER he was found to be in high degree AV block.     ROS:  Constitutional: No fever, chill, sweats  Eye: No recent visual problem  ENMT: No ear pain, nasal congestion, throat pain  Respiratoty: No SOB, cough  Cardiovascular: No chest pain, palpitation syncope  Gastrointestinal: No nausea, vomitting, diarhea  Genitourinary: No dysuria, hematuria  Heam/Lymp: No brusing tendency, no swollen glands  Endocrine: Negative for excessive hunger, thirst  Musculoskeletal: No neck pain, back pain, joint pain  Intergumentory: No rash, skin lesions  Neurologic: alert and oriented    PAST MEDICAL & SURGICAL HISTORY  CAD  ESRD on HD    FAMILY HISTORY:  FAMILY HISTORY:  NC    SOCIAL HISTORY:  Denies smoking, alcohol    ALLERGIES:  No Known Allergies    MEDICATIONS:  MEDICATIONS  (STANDING):    MEDICATIONS  (PRN):      HOME MEDICATIONS:  Home Medications:      VITALS:   T(F): 98.8 (10-12 @ 15:07), Max: 98.8 (10-12 @ 15:07)  HR: 40 (10-12 @ 16:18) (40 - 41)  BP: 108/54 (10-12 @ 16:18) (108/54 - 113/52)  BP(mean): --  RR: 20 (10-12 @ 16:18) (18 - 20)  SpO2: 98% (10-12 @ 16:18) (94% - 98%)      PHYSICAL EXAM:  GEN: Alert and oriented X 3, Well nourished, No acute distress  NECK: Supple, non tender, NO JVD, No carotid bruit,   LUNGS: Clear to auscultation bilaterally, non labored respiration  CARDIOVASCULAR: S1/S2 present, bradycardic , no murmus or rubs,   ABD: Soft, non-tender, non-distended,   EXT: No Lower extremity edema, no tenderness  NEURO: Non focal  SKIN: Intact    LABS:                        10.6   5.85  )-----------( 135      ( 12 Oct 2018 15:52 )             33.3     10-12    142  |  96<L>  |  34<H>  ----------------------------<  178<H>  4.0   |  28  |  3.3<H>    Ca    9.6      12 Oct 2018 15:52  Mg     2.0     10-12    TPro  7.2  /  Alb  4.1  /  TBili  0.4  /  DBili  x   /  AST  20  /  ALT  23  /  AlkPhos  140<H>  10-12    Troponin T, Serum: 0.11 ng/mL <HH> (10-12-18 @ 15:52)    CARDIAC MARKERS ( 12 Oct 2018 15:52 )  x     / 0.11 ng/mL / x     / x     / x        Serum Pro-Brain Natriuretic Peptide: 71017 pg/mL (10-12-18 @ 15:52)    RADIOLOGY:  -CXR:  -TTE:  -CCTA:  -STRESS TEST:  -CATHETERIZATION    ECG:  3rd degree AV block    TELEMETRY EVENTS:

## 2018-10-12 NOTE — ED PROVIDER NOTE - CRITICAL CARE PROVIDED
direct patient care (not related to procedure)/consultation with other physicians/interpretation of diagnostic studies/additional history taking/documentation/consult w/ pt's family directly relating to pts condition

## 2018-10-12 NOTE — H&P ADULT - PMH
BPH (benign prostatic hyperplasia)    CAD (coronary artery disease)    Dyslipidemia    End stage renal disease    Heart failure with reduced ejection fraction    Hypertension    Type 2 diabetes mellitus

## 2018-10-12 NOTE — ED PROVIDER NOTE - NS ED ROS FT
Constitutional: See HPI.  Eyes: No visual changes  ENMT: No neck pain or stiffness.   Cardiac: No cp  Respiratory: No cough or respiratory distress.  GI: No nausea, vomiting, diarrhea or abdominal pain.  MS: No myalgia, muscle weakness, joint pain or back pain.  Psych: No suicidal or homicidal ideations.  Neuro: h/o dizziness yesterday. No loc  Skin: No skin rash.

## 2018-10-12 NOTE — H&P ADULT - NSHPLABSRESULTS_GEN_ALL_CORE
CBC Full  -  ( 12 Oct 2018 15:52 )  WBC Count : 5.85 K/uL  Hemoglobin : 10.6 g/dL  Hematocrit : 33.3 %  Platelet Count - Automated : 135 K/uL  Mean Cell Volume : 93.8 fL  Mean Cell Hemoglobin : 29.9 pg  Mean Cell Hemoglobin Concentration : 31.8 g/dL    BMP: 10-12-18 @ 15:52  142 | 96 | 34   -----------------< 178  4.0  | 28 | 3.3  eGFR(AA): 21, eGFR (non-AA): 18  Ca 9.6, Mg 2.0, P --    LFTs: 10-12-18 @ 15:52  TP  7.2  | 4.1 Alb   ---------------  TB  0.4  | --  DB   ---------------  ALT 23  | 20  AST            ^          140 ALK    PT/INR/PTT: 10-12-18 @ 15:52  13.50 | --        ^      1.26    Cardiac Enzymes: 10-12-18 @ 15:52  Trop T: 0.11<HH>    Serum Pro-Brain Natriuretic Peptide (10.12.18 @ 15:52)    Serum Pro-Brain Natriuretic Peptide: 57831 pg/mL    Blood Gas Venous - Lactate (10.12.18 @ 15:57)    Blood Gas Venous - Lactate: 1.5 mmoL/L    ECG: 3rd degree heart block

## 2018-10-12 NOTE — ED PROVIDER NOTE - INTERPRETATION
sinus martin; first deg block. RBBB, t wave changes,unchanged from prior
I will STOP taking the medications listed below when I get home from the hospital:  None

## 2018-10-12 NOTE — CONSULT NOTE ADULT - ATTENDING COMMENTS
Patient seen and examined.     67 yo M with history of CAD s/p PCI of RCA and LAD  (2017), last EF 40% by echo (June 2018). Patient admitted after he was noted to have bradycardia at dialysis on Friday. He states he was not feeling well for two days. In ER, patient noted to have high grade AV block. TVP placed.     No cardiovascular complaints. No recent trips, travel, or tick bites.     Tele reviewed and TVP checked at 30 bpm, patient with high grade AV block.      Plan  - Check 2D Echo  - Check TSH    - Keep pt NPO after midnight on Sun and hold all anticoagulation including SQ Heparin Sun night. Patient seen and examined.     69 yo M with history of CAD s/p PCI of RCA and LAD  (2017), last EF 40% by echo (June 2018). Patient admitted after he was noted to have bradycardia at dialysis on Friday. He states he was not feeling well for two days. No chest pain, palpitations, or history of syncope. In ER, patient noted to have high grade AV block. TVP placed.     No cardiovascular complaints. No recent trips, travel, or tick bites. Left sided fistula intact.     Tele reviewed and TVP checked at 30 bpm, patient with high grade AV block.      Plan  - Check 2D Echo  - Check TSH    - Keep pt NPO after midnight on Sun and hold all anticoagulation including SQ Heparin Sun night.  - Please schedule patient for dialysis at 6AM on Mon.  - Discussed procedure, risks, benefits, and answered all questions to his satisfaction. Discussed the possibility of a BiV ICD depending on EF vs a BiV PPM with the patient.

## 2018-10-12 NOTE — H&P ADULT - NSHPPHYSICALEXAM_GEN_ALL_CORE
T(F): 98.8 (10-12-18 @ 15:07), Max: 98.8 (10-12-18 @ 15:07)  HR: 40 (10-12-18 @ 16:18) (40 - 41)  BP: 108/54 (10-12-18 @ 16:18) (108/54 - 113/52)  RR: 20 (10-12-18 @ 16:18) (18 - 20)  SpO2: 98% (10-12-18 @ 16:18) (94% - 98%) T(F): 98.8 (10-12-18 @ 15:07), Max: 98.8 (10-12-18 @ 15:07)  HR: 40 (10-12-18 @ 16:18) (40 - 41)  BP: 108/54 (10-12-18 @ 16:18) (108/54 - 113/52)  RR: 20 (10-12-18 @ 16:18) (18 - 20)  SpO2: 98% (10-12-18 @ 16:18) (94% - 98%)    Physical Exam:  General: Not in distress.   HEENT: Moist mucus membranes. PERRLA.  Cardio: Regular rate and rhythm, S1, S2, no murmur, rub, or gallop.  Pulm: Clear to auscultation bilaterally. No wheezing, rales, or rhonchi.  Abdomen: Soft, non-tender, non-distended. Normoactive bowel sounds.  Extremities: No cyanosis bilaterally. No calf tenderness to palpation. Bilateral LE chronic venous stasis changes. 1+ pitting edema bilaterally to knee.  Neuro: A&O x3. No focal deficits. CN II - XII grossly intact.

## 2018-10-12 NOTE — ED PROVIDER NOTE - PHYSICAL EXAMINATION
CONSTITUTIONAL: WA / WN / NAD  HEAD: NCAT  EYES: PERRL; EOMI; anicteric.  ENT: Normal pharynx; mucous membranes pink/moist, no erythema.  NECK: Supple; no meningeal signs  CARD: bradtycardic nl S1/S2; no M/R/G  RESP: Respiratory rate and effort are normal; breath sounds clear and equal bilaterally.  ABD: Soft, NT ND  MSK/EXT: No gross deformities; full range of motion.  SKIN: Warm and dry;   NEURO: AAOx3,  PSYCH: Memory Intact, Normal Affect

## 2018-10-12 NOTE — ED ADULT NURSE NOTE - OBJECTIVE STATEMENT
pt presents to er was sent in from dialysis for bradycardia during dialysis. pt denies chest pain or discomfort, pt denies weakness, dizziness, n/v. at dialysis they removed 3 liters. pt a&o x3. pt on continious cardiac monitor. pacing pads placed on patient.

## 2018-10-12 NOTE — ED PROVIDER NOTE - ATTENDING CONTRIBUTION TO CARE
67 yo M with medical hx as above reports an episode of dizziness last night that resolved, today felt well, had his full HD but after HD the RN there noted the pt to have a slow HR. Pt is asx in the ED. Pacer pads on, HR 40. On repeat EKG and review of initial there is concern for 3rd deg heart block.  WIll call cardio and admit.

## 2018-10-12 NOTE — H&P ADULT - ASSESSMENT
69 y/o M with PMH of ESRD on HD, CAD s/p pci with stent in LAD and RCA, Heart failure with reduced EF (unknown %), DLD, DM II, and BPH was sent in from hemodialysis after he was noted to be bradycardic. Found to be complete heart block.    1.) 3rd Degree Heart Block:    - Admit to CCU.    - s/p transvenous pacemaker placement in the ED.    - Continue telemetry monitoring.    - F/u TTE.    - Trend cardiac enzymes.    - f/u TSH.    - Cardiology is following.    2.) Heart Failure with reduced ejection fraction / CAD / DLD:    - Continue aspirin, plavix, atorvastatin, imdur, lasix, and metoprolol.    - f/u TTE.    3.) ESRD on HD:    - s/p HD today.    - Nephrology consult pending.    - Continue lasix and metolazone.    - Renal diet.    - Monitor BMP, Mg, and P.    4.) DM II:    - Continue basal / bolus insulin.    - Carbohydrate consistent diet.    - Check hemoglobin A1c.    - Monitor blood glucose.    5.) BPH:    - Continue tamsulosin and finasteride.    6.) GI / DVT PPx: protonix / heparin    7.) Disposition:    - Full Code 67 y/o M with PMH of ESRD on HD, CAD s/p pci with stent in LAD and RCA, Heart failure with reduced EF (unknown %), DLD, DM II, and BPH was sent in from hemodialysis after he was noted to be bradycardic. Found to be complete heart block.    1.) 3rd Degree Heart Block:    - Admit to CCU.    - s/p transvenous pacemaker placement in the ED.    - Continue telemetry monitoring.    - F/u TTE.    - Trend cardiac enzymes.    - f/u TSH.    - Electrophysiology is following.    - Cardiology consult pending.    2.) Heart Failure with reduced ejection fraction / CAD / DLD:    - Continue aspirin, plavix, atorvastatin, imdur, lasix, and metoprolol.    - f/u TTE.    3.) ESRD on HD:    - s/p HD today.    - Nephrology consult pending.    - Continue lasix and metolazone.    - Renal diet.    - Monitor BMP, Mg, and P.    4.) DM II:    - Continue basal / bolus insulin.    - Carbohydrate consistent diet.    - Check hemoglobin A1c.    - Monitor blood glucose.    5.) BPH:    - Continue tamsulosin and finasteride.    6.) GI / DVT PPx: protonix / heparin    7.) Disposition:    - Full Code

## 2018-10-12 NOTE — H&P ADULT - ATTENDING COMMENTS
Pt seen and examined independently of resident, agree with above history, physical exam, assessment and plan    Addendum    Third degree heart block  s/p transvenous pacemaker  admit to CCU  EP/Cardio eval  hold Metoprolol    2- ESRD on HD  cont HD as scheduled  f/u with renal    3- CAD/CHF  stable  cont Home meds    4- DM  Insulin coverage    5- DVT/GI prophylaxis  s/c heparin/PPIs

## 2018-10-13 LAB
ANION GAP SERPL CALC-SCNC: 14 MMOL/L — SIGNIFICANT CHANGE UP (ref 7–14)
BUN SERPL-MCNC: 46 MG/DL — HIGH (ref 10–20)
CALCIUM SERPL-MCNC: 9.2 MG/DL — SIGNIFICANT CHANGE UP (ref 8.5–10.1)
CHLORIDE SERPL-SCNC: 98 MMOL/L — SIGNIFICANT CHANGE UP (ref 98–110)
CK MB CFR SERPL CALC: 2.9 NG/ML — SIGNIFICANT CHANGE UP (ref 0.6–6.3)
CK SERPL-CCNC: 47 U/L — SIGNIFICANT CHANGE UP (ref 0–225)
CO2 SERPL-SCNC: 30 MMOL/L — SIGNIFICANT CHANGE UP (ref 17–32)
CREAT SERPL-MCNC: 4.2 MG/DL — CRITICAL HIGH (ref 0.7–1.5)
ESTIMATED AVERAGE GLUCOSE: 157 MG/DL — HIGH (ref 68–114)
GLUCOSE BLDC GLUCOMTR-MCNC: 143 MG/DL — HIGH (ref 70–99)
GLUCOSE BLDC GLUCOMTR-MCNC: 161 MG/DL — HIGH (ref 70–99)
GLUCOSE BLDC GLUCOMTR-MCNC: 162 MG/DL — HIGH (ref 70–99)
GLUCOSE BLDC GLUCOMTR-MCNC: 172 MG/DL — HIGH (ref 70–99)
GLUCOSE SERPL-MCNC: 161 MG/DL — HIGH (ref 70–99)
HBA1C BLD-MCNC: 7.1 % — HIGH (ref 4–5.6)
MAGNESIUM SERPL-MCNC: 2.6 MG/DL — HIGH (ref 1.8–2.4)
PHOSPHATE SERPL-MCNC: 4.3 MG/DL — SIGNIFICANT CHANGE UP (ref 2.1–4.9)
POTASSIUM SERPL-MCNC: 4.1 MMOL/L — SIGNIFICANT CHANGE UP (ref 3.5–5)
POTASSIUM SERPL-SCNC: 4.1 MMOL/L — SIGNIFICANT CHANGE UP (ref 3.5–5)
SODIUM SERPL-SCNC: 142 MMOL/L — SIGNIFICANT CHANGE UP (ref 135–146)
TROPONIN T SERPL-MCNC: 0.15 NG/ML — CRITICAL HIGH
TSH SERPL-MCNC: 1.69 UIU/ML — SIGNIFICANT CHANGE UP (ref 0.27–4.2)

## 2018-10-13 RX ADMIN — Medication 81 MILLIGRAM(S): at 12:18

## 2018-10-13 RX ADMIN — HEPARIN SODIUM 5000 UNIT(S): 5000 INJECTION INTRAVENOUS; SUBCUTANEOUS at 05:50

## 2018-10-13 RX ADMIN — Medication 25 MILLIGRAM(S): at 05:49

## 2018-10-13 RX ADMIN — Medication 1: at 08:36

## 2018-10-13 RX ADMIN — Medication 80 MILLIGRAM(S): at 17:12

## 2018-10-13 RX ADMIN — INSULIN GLARGINE 16 UNIT(S): 100 INJECTION, SOLUTION SUBCUTANEOUS at 21:54

## 2018-10-13 RX ADMIN — Medication 6 UNIT(S): at 17:05

## 2018-10-13 RX ADMIN — TAMSULOSIN HYDROCHLORIDE 0.4 MILLIGRAM(S): 0.4 CAPSULE ORAL at 21:55

## 2018-10-13 RX ADMIN — Medication 100 MILLIGRAM(S): at 05:49

## 2018-10-13 RX ADMIN — CLOPIDOGREL BISULFATE 75 MILLIGRAM(S): 75 TABLET, FILM COATED ORAL at 12:18

## 2018-10-13 RX ADMIN — Medication 1: at 12:11

## 2018-10-13 RX ADMIN — Medication 6 UNIT(S): at 12:11

## 2018-10-13 RX ADMIN — PANTOPRAZOLE SODIUM 40 MILLIGRAM(S): 20 TABLET, DELAYED RELEASE ORAL at 07:11

## 2018-10-13 RX ADMIN — ISOSORBIDE MONONITRATE 30 MILLIGRAM(S): 60 TABLET, EXTENDED RELEASE ORAL at 12:18

## 2018-10-13 RX ADMIN — FINASTERIDE 5 MILLIGRAM(S): 5 TABLET, FILM COATED ORAL at 12:18

## 2018-10-13 RX ADMIN — HEPARIN SODIUM 5000 UNIT(S): 5000 INJECTION INTRAVENOUS; SUBCUTANEOUS at 13:50

## 2018-10-13 RX ADMIN — ATORVASTATIN CALCIUM 80 MILLIGRAM(S): 80 TABLET, FILM COATED ORAL at 21:54

## 2018-10-13 RX ADMIN — HEPARIN SODIUM 5000 UNIT(S): 5000 INJECTION INTRAVENOUS; SUBCUTANEOUS at 21:54

## 2018-10-13 RX ADMIN — Medication 100 MILLIGRAM(S): at 17:12

## 2018-10-13 RX ADMIN — Medication 6 UNIT(S): at 08:36

## 2018-10-13 RX ADMIN — Medication 80 MILLIGRAM(S): at 05:50

## 2018-10-13 NOTE — PROGRESS NOTE ADULT - ASSESSMENT
69 y/o M with PMH of ESRD on HD, CAD s/p pci with stent in LAD and RCA, Heart failure with reduced EF (unknown %), DLD, DM II, and BPH was sent in from hemodialysis after he was noted to be bradycardic. Found to be complete heart block.    1.) 3rd Degree Heart Block:    - Admit to CCU.    - s/p transvenous pacemaker placement in the ED.    - Continue telemetry monitoring.    - F/u TTE.    - Trend cardiac enzymes.    - f/u TSH.    - Electrophysiology note appreciated --> Keep pt NPO after midnight on Sun and hold all anticoagulation including SQ Heparin Sun night. Pacemaker to be put on monday    - Cardiology consult pending.    2.) Heart Failure with reduced ejection fraction / CAD / DLD:    - Continue aspirin, plavix, atorvastatin, imdur, lasix, and metoprolol.    - f/u TTE.    3.) ESRD on HD:    - s/p HD today. For HD on Monday scheduled at 6 AM    - Nephrology consult appreciated    - Continue lasix and metolazone.    - Renal diet.    - Monitor BMP, Mg, and P.    4.) DM II:    - Continue basal / bolus insulin.    - Carbohydrate consistent diet.    - Check hemoglobin A1c.    - Monitor blood glucose.    5.) BPH:    - Continue tamsulosin and finasteride.    6.) GI / DVT PPx: protonix / heparin    7.) Disposition:    - Full Code

## 2018-10-13 NOTE — PROGRESS NOTE ADULT - SUBJECTIVE AND OBJECTIVE BOX
SUBJECTIVE:    Patient is a 68y old Male who presents with a chief complaint of Dizziness and bradycardia (13 Oct 2018 10:36)    Currently admitted to medicine with the primary diagnosis of Third degree AV block     Today is hospital day 1d. This morning he is resting comfortably in bed and reports no new issues or overnight events.     PAST MEDICAL & SURGICAL HISTORY  Heart failure with reduced ejection fraction  CAD (coronary artery disease)  BPH (benign prostatic hyperplasia)  Type 2 diabetes mellitus  End stage renal disease  Dyslipidemia  Hypertension    SOCIAL HISTORY:  Negative for smoking/alcohol/drug use.     ALLERGIES:  No Known Allergies    MEDICATIONS:  STANDING MEDICATIONS  aspirin  chewable 81 milliGRAM(s) Oral daily  atorvastatin 80 milliGRAM(s) Oral at bedtime  clopidogrel Tablet 75 milliGRAM(s) Oral daily  dextrose 50% Injectable 25 Gram(s) IV Push once  dextrose 50% Injectable 25 Gram(s) IV Push once  docusate sodium 100 milliGRAM(s) Oral two times a day  finasteride 5 milliGRAM(s) Oral daily  furosemide    Tablet 80 milliGRAM(s) Oral every 12 hours  heparin  Injectable 5000 Unit(s) SubCutaneous every 8 hours  influenza   Vaccine 0.5 milliLiter(s) IntraMuscular once  insulin glargine Injectable (LANTUS) 16 Unit(s) SubCutaneous at bedtime  insulin lispro (HumaLOG) corrective regimen sliding scale   SubCutaneous three times a day before meals  insulin lispro Injectable (HumaLOG) 6 Unit(s) SubCutaneous three times a day before meals  isosorbide   mononitrate ER Tablet (IMDUR) 30 milliGRAM(s) Oral daily  metolazone 5 milliGRAM(s) Oral daily  pantoprazole    Tablet 40 milliGRAM(s) Oral before breakfast  tamsulosin 0.4 milliGRAM(s) Oral at bedtime    PRN MEDICATIONS  aluminum hydroxide/magnesium hydroxide/simethicone Suspension 30 milliLiter(s) Oral every 6 hours PRN  glucagon  Injectable 1 milliGRAM(s) IntraMuscular once PRN    VITALS:   T(F): 98  HR: 80  BP: 117/58  RR: 21  SpO2: 97%    LABS:                        10.6   5.85  )-----------( 135      ( 12 Oct 2018 15:52 )             33.3     10-13    142  |  98  |  46<H>  ----------------------------<  161<H>  4.1   |  30  |  4.2<HH>    Ca    9.2      13 Oct 2018 05:46  Phos  4.3     10-13  Mg     2.6     10-13    TPro  7.2  /  Alb  4.1  /  TBili  0.4  /  DBili  x   /  AST  20  /  ALT  23  /  AlkPhos  140<H>  10-12    PT/INR - ( 12 Oct 2018 15:52 )   PT: 13.50 sec;   INR: 1.26 ratio               Creatine Kinase, Serum: 47 U/L (10-13-18 @ 05:46)  Troponin T, Serum: 0.15 ng/mL <HH> (10-13-18 @ 05:46)  Troponin T, Serum: 0.11 ng/mL <HH> (10-12-18 @ 15:52)      CARDIAC MARKERS ( 13 Oct 2018 05:46 )  x     / 0.15 ng/mL / 47 U/L / x     / 2.9 ng/mL  CARDIAC MARKERS ( 12 Oct 2018 15:52 )  x     / 0.11 ng/mL / x     / x     / x          RADIOLOGY:    PHYSICAL EXAM:  GEN: No acute distress  LUNGS: Clear to auscultation bilaterally   HEART: S1/S2 present. RRR.   ABD: Soft, non-tender, non-distended. Bowel sounds present  EXT: NC/NC/NE/2+PP/BENNETT/Skin Intact.   NEURO: AAOX3    Intravenous access:   NG tube:   Guillen Catheter:

## 2018-10-14 LAB
ALBUMIN SERPL ELPH-MCNC: 3.9 G/DL — SIGNIFICANT CHANGE UP (ref 3.5–5.2)
ALP SERPL-CCNC: 138 U/L — HIGH (ref 30–115)
ALT FLD-CCNC: 21 U/L — SIGNIFICANT CHANGE UP (ref 0–41)
ANION GAP SERPL CALC-SCNC: 16 MMOL/L — HIGH (ref 7–14)
AST SERPL-CCNC: 13 U/L — SIGNIFICANT CHANGE UP (ref 0–41)
BASOPHILS # BLD AUTO: 0.02 K/UL — SIGNIFICANT CHANGE UP (ref 0–0.2)
BASOPHILS NFR BLD AUTO: 0.3 % — SIGNIFICANT CHANGE UP (ref 0–1)
BILIRUB SERPL-MCNC: 0.6 MG/DL — SIGNIFICANT CHANGE UP (ref 0.2–1.2)
BUN SERPL-MCNC: 67 MG/DL — CRITICAL HIGH (ref 10–20)
CALCIUM SERPL-MCNC: 9.4 MG/DL — SIGNIFICANT CHANGE UP (ref 8.5–10.1)
CHLORIDE SERPL-SCNC: 95 MMOL/L — LOW (ref 98–110)
CK SERPL-CCNC: 45 U/L — SIGNIFICANT CHANGE UP (ref 0–225)
CO2 SERPL-SCNC: 29 MMOL/L — SIGNIFICANT CHANGE UP (ref 17–32)
CREAT SERPL-MCNC: 5.5 MG/DL — CRITICAL HIGH (ref 0.7–1.5)
EOSINOPHIL # BLD AUTO: 0.42 K/UL — SIGNIFICANT CHANGE UP (ref 0–0.7)
EOSINOPHIL NFR BLD AUTO: 6.1 % — SIGNIFICANT CHANGE UP (ref 0–8)
GLUCOSE BLDC GLUCOMTR-MCNC: 164 MG/DL — HIGH (ref 70–99)
GLUCOSE BLDC GLUCOMTR-MCNC: 187 MG/DL — HIGH (ref 70–99)
GLUCOSE BLDC GLUCOMTR-MCNC: 204 MG/DL — HIGH (ref 70–99)
GLUCOSE BLDC GLUCOMTR-MCNC: 205 MG/DL — HIGH (ref 70–99)
GLUCOSE SERPL-MCNC: 145 MG/DL — HIGH (ref 70–99)
HCT VFR BLD CALC: 33.1 % — LOW (ref 42–52)
HGB BLD-MCNC: 10.4 G/DL — LOW (ref 14–18)
IMM GRANULOCYTES NFR BLD AUTO: 0.3 % — SIGNIFICANT CHANGE UP (ref 0.1–0.3)
LYMPHOCYTES # BLD AUTO: 0.82 K/UL — LOW (ref 1.2–3.4)
LYMPHOCYTES # BLD AUTO: 11.9 % — LOW (ref 20.5–51.1)
MAGNESIUM SERPL-MCNC: 2.4 MG/DL — SIGNIFICANT CHANGE UP (ref 1.8–2.4)
MCHC RBC-ENTMCNC: 30.2 PG — SIGNIFICANT CHANGE UP (ref 27–31)
MCHC RBC-ENTMCNC: 31.4 G/DL — LOW (ref 32–37)
MCV RBC AUTO: 96.2 FL — HIGH (ref 80–94)
MONOCYTES # BLD AUTO: 0.53 K/UL — SIGNIFICANT CHANGE UP (ref 0.1–0.6)
MONOCYTES NFR BLD AUTO: 7.7 % — SIGNIFICANT CHANGE UP (ref 1.7–9.3)
NEUTROPHILS # BLD AUTO: 5.09 K/UL — SIGNIFICANT CHANGE UP (ref 1.4–6.5)
NEUTROPHILS NFR BLD AUTO: 73.7 % — SIGNIFICANT CHANGE UP (ref 42.2–75.2)
NRBC # BLD: 0 /100 WBCS — SIGNIFICANT CHANGE UP (ref 0–0)
PHOSPHATE SERPL-MCNC: 5 MG/DL — HIGH (ref 2.1–4.9)
PLATELET # BLD AUTO: 119 K/UL — LOW (ref 130–400)
POTASSIUM SERPL-MCNC: 4.5 MMOL/L — SIGNIFICANT CHANGE UP (ref 3.5–5)
POTASSIUM SERPL-SCNC: 4.5 MMOL/L — SIGNIFICANT CHANGE UP (ref 3.5–5)
PROT SERPL-MCNC: 7 G/DL — SIGNIFICANT CHANGE UP (ref 6–8)
RBC # BLD: 3.44 M/UL — LOW (ref 4.7–6.1)
RBC # FLD: 14.6 % — HIGH (ref 11.5–14.5)
SODIUM SERPL-SCNC: 140 MMOL/L — SIGNIFICANT CHANGE UP (ref 135–146)
WBC # BLD: 6.9 K/UL — SIGNIFICANT CHANGE UP (ref 4.8–10.8)
WBC # FLD AUTO: 6.9 K/UL — SIGNIFICANT CHANGE UP (ref 4.8–10.8)

## 2018-10-14 RX ADMIN — Medication 6 UNIT(S): at 16:49

## 2018-10-14 RX ADMIN — INSULIN GLARGINE 16 UNIT(S): 100 INJECTION, SOLUTION SUBCUTANEOUS at 21:08

## 2018-10-14 RX ADMIN — ISOSORBIDE MONONITRATE 30 MILLIGRAM(S): 60 TABLET, EXTENDED RELEASE ORAL at 12:27

## 2018-10-14 RX ADMIN — Medication 100 MILLIGRAM(S): at 17:09

## 2018-10-14 RX ADMIN — Medication 80 MILLIGRAM(S): at 05:31

## 2018-10-14 RX ADMIN — HEPARIN SODIUM 5000 UNIT(S): 5000 INJECTION INTRAVENOUS; SUBCUTANEOUS at 05:31

## 2018-10-14 RX ADMIN — Medication 2: at 07:45

## 2018-10-14 RX ADMIN — HEPARIN SODIUM 5000 UNIT(S): 5000 INJECTION INTRAVENOUS; SUBCUTANEOUS at 13:00

## 2018-10-14 RX ADMIN — Medication 81 MILLIGRAM(S): at 12:27

## 2018-10-14 RX ADMIN — Medication 1: at 16:49

## 2018-10-14 RX ADMIN — FINASTERIDE 5 MILLIGRAM(S): 5 TABLET, FILM COATED ORAL at 12:27

## 2018-10-14 RX ADMIN — PANTOPRAZOLE SODIUM 40 MILLIGRAM(S): 20 TABLET, DELAYED RELEASE ORAL at 06:08

## 2018-10-14 RX ADMIN — Medication 6 UNIT(S): at 07:45

## 2018-10-14 RX ADMIN — TAMSULOSIN HYDROCHLORIDE 0.4 MILLIGRAM(S): 0.4 CAPSULE ORAL at 21:12

## 2018-10-14 RX ADMIN — Medication 100 MILLIGRAM(S): at 05:31

## 2018-10-14 RX ADMIN — Medication 80 MILLIGRAM(S): at 17:09

## 2018-10-14 RX ADMIN — Medication 6 UNIT(S): at 12:23

## 2018-10-14 RX ADMIN — ATORVASTATIN CALCIUM 80 MILLIGRAM(S): 80 TABLET, FILM COATED ORAL at 21:12

## 2018-10-14 RX ADMIN — Medication 2: at 12:23

## 2018-10-14 NOTE — PROGRESS NOTE ADULT - SUBJECTIVE AND OBJECTIVE BOX
INTERVAL HPI/OVERNIGHT EVENTS:  HPI  67 y/o M with PMH of ESRD on HD, CAD s/p pci with stent in LAD and RCA, Heart failure with reduced EF (unknown %), DLD, DM II, and BPH was sent in from hemodialysis after he was noted to be bradycardic. During dialysis he had no symptoms and he completed dialysis without issue. He was told however, that his heart rate was "very low" (exact rate not known), and that he should go to the hospital immediately. found to have third degree heart block    CC  Pt seen and examined today for follow up of Heart block  s/p transvenous pacemaker  denies chest pain    REVIEW OF SYSTEMS:  CONSTITUTIONAL: No fever, weight loss, or fatigue  EYES: No eye pain, visual disturbances, or discharge  ENMT:  No difficulty hearing, tinnitus, vertigo; No sinus or throat pain  NECK: No pain or stiffness  BREASTS: No pain, masses, or nipple discharge  RESPIRATORY: No cough, wheezing, chills or hemoptysis; No shortness of breath  CARDIOVASCULAR: edema  GASTROINTESTINAL: No abdominal or epigastric pain.   GENITOURINARY: No dysuria, frequency, hematuria, or incontinence  NEUROLOGICAL: No headaches, memory loss, loss of strength, numbness, or tremors  SKIN: No itching, burning, rashes, or lesions   LYMPH NODES: No enlarged glands  ENDOCRINE: No heat or cold intolerance; No hair loss  MUSCULOSKELETAL: No joint pain or swelling; No muscle, back, or extremity pain  PSYCHIATRIC: No depression, anxiety, mood swings, or difficulty sleeping  HEME/LYMPH: No easy bruising, or bleeding gums  ALLERY AND IMMUNOLOGIC: No hives or eczema    VITALS:  T(F): 98.1, Max: 98.1 (10-13-18 @ 16:00)  HR: 80  BP: 116/60  RR: 18  SpO2: 98%    PHYSICAL EXAM:  GENERAL: NAD, well-groomed, well-developed  HEAD:  Atraumatic, Normocephalic  EYES: EOMI, PERRLA, conjunctiva and sclera clear  ENMT: No tonsillar erythema, exudates, or enlargement; Moist mucous membranes, Good dentition, No lesions  NECK: Supple, No JVD, Normal thyroid  NERVOUS SYSTEM:  Alert & Oriented X3,   CHEST/LUNG: decrease breath sounds  HEART: Regular rate and rhythm; No murmurs, rubs, or gallops  ABDOMEN: Soft, Nontender, Nondistended; Bowel sounds present  EXTREMITIES:  edema bilateral  LYMPH: No lymphadenopathy noted  SKIN: No rashes or lesions      LABS:    10-14    140  |  95<L>  |  67<HH>  ----------------------------<  145<H>  4.5   |  29  |  5.5<HH>    Ca    9.4      14 Oct 2018 04:51  Phos  5.0     10-14  Mg     2.4     10-14    TPro  7.0  /  Alb  3.9  /  TBili  0.6  /  DBili  x   /  AST  13  /  ALT  21  /  AlkPhos  138<H>  10-14                          10.4   6.90  )-----------( 119      ( 14 Oct 2018 04:51 )             33.1           RADIOLOGY & ADDITIONAL TESTS:    Imaging Personally Reviewed:  [ ] YES  [ ] NO    MEDICATIONS:     MEDICATIONS  (STANDING):  aspirin  chewable 81 milliGRAM(s) Oral daily  atorvastatin 80 milliGRAM(s) Oral at bedtime  dextrose 50% Injectable 25 Gram(s) IV Push once  dextrose 50% Injectable 25 Gram(s) IV Push once  docusate sodium 100 milliGRAM(s) Oral two times a day  finasteride 5 milliGRAM(s) Oral daily  furosemide    Tablet 80 milliGRAM(s) Oral every 12 hours  heparin  Injectable 5000 Unit(s) SubCutaneous every 8 hours  influenza   Vaccine 0.5 milliLiter(s) IntraMuscular once  insulin glargine Injectable (LANTUS) 16 Unit(s) SubCutaneous at bedtime  insulin lispro (HumaLOG) corrective regimen sliding scale   SubCutaneous three times a day before meals  insulin lispro Injectable (HumaLOG) 6 Unit(s) SubCutaneous three times a day before meals  isosorbide   mononitrate ER Tablet (IMDUR) 30 milliGRAM(s) Oral daily  metolazone 5 milliGRAM(s) Oral daily  pantoprazole    Tablet 40 milliGRAM(s) Oral before breakfast  tamsulosin 0.4 milliGRAM(s) Oral at bedtime    MEDICATIONS  (PRN):  aluminum hydroxide/magnesium hydroxide/simethicone Suspension 30 milliLiter(s) Oral every 6 hours PRN Dyspepsia  glucagon  Injectable 1 milliGRAM(s) IntraMuscular once PRN Glucose LESS THAN 70 milligrams/deciliter

## 2018-10-14 NOTE — PROGRESS NOTE ADULT - SUBJECTIVE AND OBJECTIVE BOX
Nephrology progress note    Patient is seen and examined, events over the last 24 h noted .  denied any chest pain no lethargy     Allergies:  No Known Allergies    Hospital Medications:   MEDICATIONS  (STANDING):  aspirin  chewable 81 milliGRAM(s) Oral daily  atorvastatin 80 milliGRAM(s) Oral at bedtime  docusate sodium 100 milliGRAM(s) Oral two times a day  finasteride 5 milliGRAM(s) Oral daily  furosemide    Tablet 80 milliGRAM(s) Oral every 12 hours  heparin  Injectable 5000 Unit(s) SubCutaneous every 8 hours  influenza   Vaccine 0.5 milliLiter(s) IntraMuscular once  insulin glargine Injectable (LANTUS) 16 Unit(s) SubCutaneous at bedtime  insulin lispro (HumaLOG) corrective regimen sliding scale   SubCutaneous three times a day before meals  insulin lispro Injectable (HumaLOG) 6 Unit(s) SubCutaneous three times a day before meals  isosorbide   mononitrate ER Tablet (IMDUR) 30 milliGRAM(s) Oral daily  metolazone 5 milliGRAM(s) Oral daily  pantoprazole    Tablet 40 milliGRAM(s) Oral before breakfast  tamsulosin 0.4 milliGRAM(s) Oral at bedtime        VITALS:  T(F): 98.1 (10-14-18 @ 08:00), Max: 98.1 (10-13-18 @ 16:00)  HR: 68 (10-14-18 @ 10:00)  BP: 118/58 (10-14-18 @ 10:00)  RR: 17 (10-14-18 @ 10:00)  SpO2: 93% (10-14-18 @ 10:00)  Wt(kg): --    10-13 @ 07:01  -  10-14 @ 07:00  --------------------------------------------------------  IN: 1080 mL / OUT: 875 mL / NET: 205 mL    10-14 @ 07:01  -  10-14 @ 10:39  --------------------------------------------------------  IN: 0 mL / OUT: 100 mL / NET: -100 mL          PHYSICAL EXAM:  Constitutional: NAD  HEENT: anicteric sclera, oropharynx clear, MMM  Neck: No JVD  Respiratory: CTAB, no wheezes, rales or rhonchi  Cardiovascular: S1, S2, RRR  Gastrointestinal: BS+, soft, NT/ND  Extremities: No cyanosis or clubbing. No peripheral edema  :  No crook.   Skin: No rashes    LABS:  10-14    140  |  95<L>  |  67<HH>  ----------------------------<  145<H>  4.5   |  29  |  5.5<HH>    Ca    9.4      14 Oct 2018 04:51  Phos  5.0     10-14  Mg     2.4     10-14    TPro  7.0  /  Alb  3.9  /  TBili  0.6  /  DBili      /  AST  13  /  ALT  21  /  AlkPhos  138<H>  10-14                          10.4   6.90  )-----------( 119      ( 14 Oct 2018 04:51 )             33.1       Urine Studies:      RADIOLOGY & ADDITIONAL STUDIES:

## 2018-10-14 NOTE — PROGRESS NOTE ADULT - ASSESSMENT
67 y/o M with PMH of ESRD on HD, CAD s/p pci with stent in LAD and RCA, Heart failure with reduced EF (unknown %), DLD, DM II, and BPH was sent in from hemodialysis after he was noted to be bradycardic. Found to be complete heart block.    1.) 3rd Degree Heart Block:    - Admitted to CCU.    - s/p transvenous pacemaker placement in the ED.    - Continue telemetry monitoring.    - F/u TTE.    - Trend cardiac enzymes.    - f/u TSH.    - Electrophysiology is following.      Pacemaker tomorrow    2.) Heart Failure with reduced ejection fraction / CAD / DLD:    - Continue aspirin, plavix, atorvastatin, imdur, lasix,    - f/u TTE.    3.) ESRD on HD:    - on HD  MWF    - Continue lasix and metolazone.     - Renal diet.    - Monitor BMP, Mg, and P.       HD in am    4.) DM II:    - Continue basal / bolus insulin.    - Carbohydrate consistent diet.    - Check hemoglobin A1c.    - Monitor blood glucose.    5.) BPH:    - Continue tamsulosin and finasteride.    6.) GI / DVT PPx: protonix / heparin    Discussed with EP  Discussed with his daughter    Discussed with [facility] staff  resident note reviewed

## 2018-10-14 NOTE — PROGRESS NOTE ADULT - SUBJECTIVE AND OBJECTIVE BOX
INTERVAL HPI/OVERNIGHT EVENTS: No acute overnight events. Patient denies any complaints.     67 yo M h/o CAD s/p PCI to LAD, RCA, HFrEF, ESRD on HD, was sent to ER after HD episode and found to be bradycardic. Patient felt tired for 2 days prior to arrival and was found to be in high degree AV block.       MEDICATIONS  (STANDING):  aspirin  chewable 81 milliGRAM(s) Oral daily  atorvastatin 80 milliGRAM(s) Oral at bedtime  dextrose 50% Injectable 25 Gram(s) IV Push once  dextrose 50% Injectable 25 Gram(s) IV Push once  docusate sodium 100 milliGRAM(s) Oral two times a day  finasteride 5 milliGRAM(s) Oral daily  furosemide    Tablet 80 milliGRAM(s) Oral every 12 hours  heparin  Injectable 5000 Unit(s) SubCutaneous every 8 hours  influenza   Vaccine 0.5 milliLiter(s) IntraMuscular once  insulin glargine Injectable (LANTUS) 16 Unit(s) SubCutaneous at bedtime  insulin lispro (HumaLOG) corrective regimen sliding scale   SubCutaneous three times a day before meals  insulin lispro Injectable (HumaLOG) 6 Unit(s) SubCutaneous three times a day before meals  isosorbide   mononitrate ER Tablet (IMDUR) 30 milliGRAM(s) Oral daily  metolazone 5 milliGRAM(s) Oral daily  pantoprazole    Tablet 40 milliGRAM(s) Oral before breakfast  tamsulosin 0.4 milliGRAM(s) Oral at bedtime    MEDICATIONS  (PRN):  aluminum hydroxide/magnesium hydroxide/simethicone Suspension 30 milliLiter(s) Oral every 6 hours PRN Dyspepsia  glucagon  Injectable 1 milliGRAM(s) IntraMuscular once PRN Glucose LESS THAN 70 milligrams/deciliter      Allergies  No Known Allergies  Intolerances    Vital Signs Last 24 Hrs  T(C): 36.7 (14 Oct 2018 08:00), Max: 36.7 (13 Oct 2018 16:00)  T(F): 98.1 (14 Oct 2018 08:00), Max: 98.1 (13 Oct 2018 16:00)  HR: 80 (14 Oct 2018 08:00) (78 - 80)  BP: 116/60 (14 Oct 2018 08:00) (100/53 - 169/77)  BP(mean): 86 (14 Oct 2018 08:00) (65 - 118)  RR: 18 (14 Oct 2018 08:00) (18 - 31)  SpO2: 98% (14 Oct 2018 08:00) (92% - 100%)    10-13-18 @ 07:01  -  10-14-18 @ 07:00  --------------------------------------------------------  IN: 1080 mL / OUT: 875 mL / NET: 205 mL      TELE: Paced 80 bpm with competing sinus rhythm at times    Physical Exam  GENERAL: In no apparent distress, well nourished, and hydrated. Comfortably sitting in chair.   HEAD:  Atraumatic, Normocephalic  EYES: EOMI, PERRLA, conjunctiva and sclera clear  ENMT: Moist mucous membranes  NECK: R IJ TVP in place  HEART: Regular rate and rhythm; No murmurs, rubs, or gallops.  PULMONARY: Clear to auscultation and perfusion.  Bibasilar crackles  ABDOMEN: Soft, Nontender, Nondistended; Bowel sounds present  EXTREMITIES:  Left arm fistula in place. 2+ Peripheral Pulses,   LYMPH: No lymphadenopathy noted  NEUROLOGICAL: Grossly nonfocal    LABS:                        10.4   6.90  )-----------( 119      ( 14 Oct 2018 04:51 )             33.1     10-14    140  |  95<L>  |  67<HH>  ----------------------------<  145<H>  4.5   |  29  |  5.5<HH>    Ca    9.4      14 Oct 2018 04:51  Phos  5.0     10-14  Mg     2.4     10-14    TPro  7.0  /  Alb  3.9  /  TBili  0.6  /  DBili  x   /  AST  13  /  ALT  21  /  AlkPhos  138<H>  10-14    PT/INR - ( 12 Oct 2018 15:52 )   PT: 13.50 sec;   INR: 1.26 ratio         RADIOLOGY & ADDITIONAL TESTS:    ECHO June 2018: EF 35-40%  Cath: 2017

## 2018-10-14 NOTE — PROGRESS NOTE ADULT - ASSESSMENT
ADRIANNA GAINES 68y Male  MRN#: [id number]   CODE STATUS: Full code      SUBJECTIVE    Patient is a 68y old Male who presents with a chief complaint of Dizziness and bradycardia (14 Oct 2018 10:39).  Currently admitted to medicine with the primary diagnosis of Third degree AV block.    Interval History: Today is hospital day 2d. Overnight there were no events. Today he is doing well; no complaints.    HPI:   HPI:  67 y/o M with PMH of ESRD on HD, CAD s/p pci with stent in LAD and RCA, Heart failure with reduced EF (unknown %), DLD, DM II, and BPH was sent in from hemodialysis after he was noted to be bradycardic. During dialysis he had no symptoms and he completed dialysis without issue. He was told however, that his heart rate was "very low" (exact rate not known), and that he should go to the hospital immediately. Last night he had an episode of dizziness after standing up from the dinner table associated with a feeling of warmth and some diaphoresis. He had a similar episode approximately one week ago, but otherwise has not had these symptoms before. He denies chest pain, SOB, headache, blurry vision, lightheadedness, and abdominal pain. No recent illnesses or sick contacts. (12 Oct 2018 20:38)    Hospital Course:       PAST MEDICAL & SURGICAL HISTORY  Heart failure with reduced ejection fraction  CAD (coronary artery disease)  BPH (benign prostatic hyperplasia)  Type 2 diabetes mellitus  End stage renal disease  Dyslipidemia  Hypertension      ALLERGIES:  No Known Allergies      MEDICATIONS:  STANDING MEDICATIONS  aspirin  chewable 81 milliGRAM(s) Oral daily  atorvastatin 80 milliGRAM(s) Oral at bedtime  dextrose 50% Injectable 25 Gram(s) IV Push once  dextrose 50% Injectable 25 Gram(s) IV Push once  docusate sodium 100 milliGRAM(s) Oral two times a day  finasteride 5 milliGRAM(s) Oral daily  furosemide    Tablet 80 milliGRAM(s) Oral every 12 hours  influenza   Vaccine 0.5 milliLiter(s) IntraMuscular once  insulin glargine Injectable (LANTUS) 16 Unit(s) SubCutaneous at bedtime  insulin lispro (HumaLOG) corrective regimen sliding scale   SubCutaneous three times a day before meals  insulin lispro Injectable (HumaLOG) 6 Unit(s) SubCutaneous three times a day before meals  isosorbide   mononitrate ER Tablet (IMDUR) 30 milliGRAM(s) Oral daily  metolazone 5 milliGRAM(s) Oral daily  pantoprazole    Tablet 40 milliGRAM(s) Oral before breakfast  tamsulosin 0.4 milliGRAM(s) Oral at bedtime    PRN MEDICATIONS  aluminum hydroxide/magnesium hydroxide/simethicone Suspension 30 milliLiter(s) Oral every 6 hours PRN  glucagon  Injectable 1 milliGRAM(s) IntraMuscular once PRN          OBJECTIVE    VITAL SIGNS: Last 24 Hours  T(C): 36.7 (14 Oct 2018 20:00), Max: 36.7 (14 Oct 2018 08:00)  T(F): 98 (14 Oct 2018 20:00), Max: 98.1 (14 Oct 2018 08:00)  HR: 72 (14 Oct 2018 20:00) (64 - 80)  BP: 133/62 (14 Oct 2018 20:00) (112/56 - 169/77)  BP(mean): 88 (14 Oct 2018 20:00) (75 - 118)  RR: 26 (14 Oct 2018 20:00) (15 - 29)  SpO2: 99% (14 Oct 2018 20:00) (93% - 100%)      PHYSICAL EXAM:    GENERAL: NAD, well-developed, AAOx3  PULMONARY: Clear to auscultation bilaterally,  CARDIOVASCULAR: Bradycardia. No murmurs, rubs, or gallops/  GASTROINTESTINAL: Soft, Nontender, Nondistended; Bowel sounds present  MUSCULOSKELETAL:  2+ Peripheral Pulses, No clubbing, cyanosis, or edema    LABS:                        10.4   6.90  )-----------( 119      ( 14 Oct 2018 04:51 )             33.1     10-14    140  |  95<L>  |  67<HH>  ----------------------------<  145<H>  4.5   |  29  |  5.5<HH>    Ca    9.4      14 Oct 2018 04:51  Phos  5.0     10-14  Mg     2.4     10-14    TPro  7.0  /  Alb  3.9  /  TBili  0.6  /  DBili  x   /  AST  13  /  ALT  21  /  AlkPhos  138<H>  10-14          Creatine Kinase, Serum: 45 U/L (10-14-18 @ 04:51)      CARDIAC MARKERS ( 14 Oct 2018 04:51 )  x     / x     / 45 U/L / x     / x      CARDIAC MARKERS ( 13 Oct 2018 05:46 )  x     / 0.15 ng/mL / 47 U/L / x     / 2.9 ng/mL      RADIOLOGY:    < from: Transthoracic Echocardiogram (10.14.18 @ 06:43) >   1. Severely decreased global left ventricular systolic function.   2. Multiple left ventricular regional wall motion abnormalities exist.   See wall motion findings.   3. LV Ejection Fraction by Otero's Method with a biplane EF of 26 %.   4. Dilated cardiomyopathy.   5. Mildly increased LV wall thickness.   6. There is mild concentric left ventricular hypertrophy.   7. The mean global longitudinal strain by speckle tracking is -8.4%   which is reduced.   8. Mild tricuspid regurgitation.   9. Pulmonic valve regurgitation.  10. Dilatation of the ascending aorta.  11. Estimated pulmonary artery systolic pressure is 44.1 mmHg assuming a   right atrial pressure of 8 mmHg, which is consistent with mild pulmonary   hypertension.  12. LA volume Index is 45.3 ml/m² ml/m2.    < end of copied text >        ASSESSMENT AND PLAN    67 y/o M with PMH of ESRD on HD, CAD s/p pci with stent in LAD and RCA, Heart failure with reduced EF (unknown %), DLD, DM II, and BPH was sent in from hemodialysis after he was noted to be bradycardic. Found to be complete heart block.    1.) 3rd Degree Heart Block:    - Admit to CCU.    - s/p transvenous pacemaker placement in the ED.    - Continue telemetry monitoring.    - Trend cardiac enzymes.    - f/u TSH.    - For PPM tomorrow - NPO after midnight and hold subQ heparin    - Cardiology consult pending.    2.) Heart Failure with reduced ejection fraction / CAD / DLD:  - EF 26% by TTE    - Continue aspirin, plavix, atorvastatin, imdur, lasix, and metoprolol.    3.) ESRD on HD:    - For HD on Monday scheduled at 6 AM    - Nephrology consult appreciated    - Continue lasix and metolazone.    - Renal diet.    - Monitor BMP, Mg, and P.    4.) DM II:    - Continue basal / bolus insulin.    - Carbohydrate consistent diet.    - Check hemoglobin A1c.    - Monitor blood glucose.    5.) BPH:    - Continue tamsulosin and finasteride.    6.) GI / DVT PPx: protonix / heparin

## 2018-10-15 LAB
ALBUMIN SERPL ELPH-MCNC: 4.1 G/DL — SIGNIFICANT CHANGE UP (ref 3.5–5.2)
ALP SERPL-CCNC: 153 U/L — HIGH (ref 30–115)
ALT FLD-CCNC: 21 U/L — SIGNIFICANT CHANGE UP (ref 0–41)
ANION GAP SERPL CALC-SCNC: 17 MMOL/L — HIGH (ref 7–14)
APPEARANCE UR: ABNORMAL
AST SERPL-CCNC: 13 U/L — SIGNIFICANT CHANGE UP (ref 0–41)
B BURGDOR C6 AB SER-ACNC: NEGATIVE — SIGNIFICANT CHANGE UP
B BURGDOR IGG+IGM SER-ACNC: 0.26 INDEX — SIGNIFICANT CHANGE UP (ref 0.01–0.89)
BACTERIA # UR AUTO: ABNORMAL /HPF
BILIRUB SERPL-MCNC: 0.6 MG/DL — SIGNIFICANT CHANGE UP (ref 0.2–1.2)
BILIRUB UR-MCNC: NEGATIVE — SIGNIFICANT CHANGE UP
BUN SERPL-MCNC: 83 MG/DL — CRITICAL HIGH (ref 10–20)
CALCIUM SERPL-MCNC: 9 MG/DL — SIGNIFICANT CHANGE UP (ref 8.5–10.1)
CHLORIDE SERPL-SCNC: 94 MMOL/L — LOW (ref 98–110)
CO2 SERPL-SCNC: 26 MMOL/L — SIGNIFICANT CHANGE UP (ref 17–32)
COLOR SPEC: ABNORMAL
CREAT SERPL-MCNC: 5.8 MG/DL — CRITICAL HIGH (ref 0.7–1.5)
DIFF PNL FLD: ABNORMAL
EPI CELLS # UR: ABNORMAL /HPF
GLUCOSE BLDC GLUCOMTR-MCNC: 146 MG/DL — HIGH (ref 70–99)
GLUCOSE BLDC GLUCOMTR-MCNC: 254 MG/DL — HIGH (ref 70–99)
GLUCOSE SERPL-MCNC: 143 MG/DL — HIGH (ref 70–99)
GLUCOSE UR QL: NEGATIVE MG/DL — SIGNIFICANT CHANGE UP
HCT VFR BLD CALC: 33.2 % — LOW (ref 42–52)
HGB BLD-MCNC: 10.5 G/DL — LOW (ref 14–18)
KETONES UR-MCNC: NEGATIVE — SIGNIFICANT CHANGE UP
LEUKOCYTE ESTERASE UR-ACNC: ABNORMAL
MAGNESIUM SERPL-MCNC: 2.5 MG/DL — HIGH (ref 1.8–2.4)
MCHC RBC-ENTMCNC: 29.9 PG — SIGNIFICANT CHANGE UP (ref 27–31)
MCHC RBC-ENTMCNC: 31.6 G/DL — LOW (ref 32–37)
MCV RBC AUTO: 94.6 FL — HIGH (ref 80–94)
NITRITE UR-MCNC: NEGATIVE — SIGNIFICANT CHANGE UP
NRBC # BLD: 0 /100 WBCS — SIGNIFICANT CHANGE UP (ref 0–0)
PH UR: 8 — SIGNIFICANT CHANGE UP (ref 5–8)
PLATELET # BLD AUTO: 113 K/UL — LOW (ref 130–400)
POTASSIUM SERPL-MCNC: 4.5 MMOL/L — SIGNIFICANT CHANGE UP (ref 3.5–5)
POTASSIUM SERPL-SCNC: 4.5 MMOL/L — SIGNIFICANT CHANGE UP (ref 3.5–5)
PROT SERPL-MCNC: 7.1 G/DL — SIGNIFICANT CHANGE UP (ref 6–8)
PROT UR-MCNC: >=300 MG/DL
RBC # BLD: 3.51 M/UL — LOW (ref 4.7–6.1)
RBC # FLD: 14.6 % — HIGH (ref 11.5–14.5)
RBC CASTS # UR COMP ASSIST: ABNORMAL /HPF
SODIUM SERPL-SCNC: 137 MMOL/L — SIGNIFICANT CHANGE UP (ref 135–146)
SP GR SPEC: 1.02 — SIGNIFICANT CHANGE UP (ref 1.01–1.03)
TRI-PHOS CRY UR QL COMP ASSIST: ABNORMAL /HPF
TSH SERPL-MCNC: 1.47 UIU/ML — SIGNIFICANT CHANGE UP (ref 0.27–4.2)
UROBILINOGEN FLD QL: 0.2 MG/DL — SIGNIFICANT CHANGE UP (ref 0.2–0.2)
WBC # BLD: 6.32 K/UL — SIGNIFICANT CHANGE UP (ref 4.8–10.8)
WBC # FLD AUTO: 6.32 K/UL — SIGNIFICANT CHANGE UP (ref 4.8–10.8)
WBC UR QL: >50 /HPF

## 2018-10-15 RX ORDER — VANCOMYCIN HCL 1 G
500 VIAL (EA) INTRAVENOUS ONCE
Qty: 0 | Refills: 0 | Status: COMPLETED | OUTPATIENT
Start: 2018-10-15 | End: 2018-10-15

## 2018-10-15 RX ORDER — MORPHINE SULFATE 50 MG/1
4 CAPSULE, EXTENDED RELEASE ORAL
Qty: 0 | Refills: 0 | Status: DISCONTINUED | OUTPATIENT
Start: 2018-10-15 | End: 2018-10-15

## 2018-10-15 RX ORDER — VANCOMYCIN HCL 1 G
1000 VIAL (EA) INTRAVENOUS ONCE
Qty: 0 | Refills: 0 | Status: DISCONTINUED | OUTPATIENT
Start: 2018-10-15 | End: 2018-10-15

## 2018-10-15 RX ORDER — MORPHINE SULFATE 50 MG/1
2 CAPSULE, EXTENDED RELEASE ORAL
Qty: 0 | Refills: 0 | Status: DISCONTINUED | OUTPATIENT
Start: 2018-10-15 | End: 2018-10-15

## 2018-10-15 RX ORDER — CHLORHEXIDINE GLUCONATE 213 G/1000ML
1 SOLUTION TOPICAL
Qty: 0 | Refills: 0 | Status: DISCONTINUED | OUTPATIENT
Start: 2018-10-15 | End: 2018-10-23

## 2018-10-15 RX ORDER — SODIUM CHLORIDE 9 MG/ML
1000 INJECTION INTRAMUSCULAR; INTRAVENOUS; SUBCUTANEOUS
Qty: 0 | Refills: 0 | Status: DISCONTINUED | OUTPATIENT
Start: 2018-10-15 | End: 2018-10-16

## 2018-10-15 RX ADMIN — Medication 80 MILLIGRAM(S): at 22:03

## 2018-10-15 RX ADMIN — FINASTERIDE 5 MILLIGRAM(S): 5 TABLET, FILM COATED ORAL at 22:09

## 2018-10-15 RX ADMIN — TAMSULOSIN HYDROCHLORIDE 0.4 MILLIGRAM(S): 0.4 CAPSULE ORAL at 22:03

## 2018-10-15 RX ADMIN — Medication 100 MILLIGRAM(S): at 22:17

## 2018-10-15 RX ADMIN — PANTOPRAZOLE SODIUM 40 MILLIGRAM(S): 20 TABLET, DELAYED RELEASE ORAL at 06:23

## 2018-10-15 RX ADMIN — ATORVASTATIN CALCIUM 80 MILLIGRAM(S): 80 TABLET, FILM COATED ORAL at 22:04

## 2018-10-15 RX ADMIN — INSULIN GLARGINE 16 UNIT(S): 100 INJECTION, SOLUTION SUBCUTANEOUS at 22:17

## 2018-10-15 RX ADMIN — Medication 80 MILLIGRAM(S): at 06:22

## 2018-10-15 RX ADMIN — ISOSORBIDE MONONITRATE 30 MILLIGRAM(S): 60 TABLET, EXTENDED RELEASE ORAL at 22:04

## 2018-10-15 RX ADMIN — Medication 250 MILLIGRAM(S): at 13:54

## 2018-10-15 RX ADMIN — Medication 81 MILLIGRAM(S): at 22:04

## 2018-10-15 RX ADMIN — Medication 100 MILLIGRAM(S): at 06:01

## 2018-10-15 RX ADMIN — Medication 100 MILLIGRAM(S): at 22:04

## 2018-10-15 NOTE — PROGRESS NOTE ADULT - SUBJECTIVE AND OBJECTIVE BOX
Electrophysiology Brief Post-Op Note    I have personally seen and examined the patient.  I agree with the history and physical which I have reviewed and noted any changes below.  \    PRE-OP DIAGNOSIS: Sinus Node Dysfunction, High Grade AV Block    POST-OP DIAGNOSIS: Sinus Node Dysfunction, high grade AV Block    PROCEDURE: DC ICD Implant (attempted BiV ICD)    Physician: Kerry Landrum MD  Assistant: None    ESTIMATED BLOOD LOSS:   10 mL    ANESTHESIA TYPE:  [ X ]General Anesthesia  [  ] Sedation  [ X ] Local/Regional    CONDITION  [  ] Critical  [  ] Serious  [  ]Fair  [ X ]Good      SPECIMENS REMOVED (IF APPLICABLE): R IJ TVP removed    IMPLANTS (IF APPLICABLE): [employer] DC ICD Implant      FINDINGS  PLAN OF CARE  - Check Vanco level in the morning and dose accordingly if < 15  - Chest X-ray PA now and PA/Lat tomorrow am  - Device interrogation tomorrow in am  - NO HEPARIN products or lovenox  - No anticoagulation unless recommended by EP attending  - Can resume Plavix on 10/17  - Please check UA & UCx       Kerry Landrum MD   Electrophysiology Attending Electrophysiology Brief Post-Op Note    I have personally seen and examined the patient.  I agree with the history and physical which I have reviewed and noted any changes below.  \    PRE-OP DIAGNOSIS: Sinus Node Dysfunction, High Grade AV Block    POST-OP DIAGNOSIS: Sinus Node Dysfunction, high grade AV Block    PROCEDURE: DC ICD Implant (attempted BiV ICD)    Physician: Kerry Landrum MD  Assistant: Chau Cortes MD    ESTIMATED BLOOD LOSS:   10 mL    ANESTHESIA TYPE:  [ X ]General Anesthesia  [  ] Sedation  [ X ] Local/Regional    CONDITION  [  ] Critical  [  ] Serious  [  ]Fair  [ X ]Good      SPECIMENS REMOVED (IF APPLICABLE): R IJ TVP removed    IMPLANTS (IF APPLICABLE): [employer] DC ICD Implant      FINDINGS  PLAN OF CARE  - Check Vanco level in the morning and dose accordingly if < 15  - Chest X-ray PA now and PA/Lat tomorrow am  - Device interrogation tomorrow in am  - NO HEPARIN products or lovenox  - No anticoagulation unless recommended by EP attending  - Can resume Plavix on 10/17  - Please check UA & UCx       Kerry Landrum MD   Electrophysiology Attending

## 2018-10-15 NOTE — PROGRESS NOTE ADULT - ASSESSMENT
67 y/o M with PMH of ESRD on HD, CAD s/p pci with stent in LAD and RCA, Heart failure with reduced EF (unknown %), DLD, DM II, and BPH was sent in from hemodialysis after he was noted to be bradycardic. Found to be complete heart block.    1.) 3rd Degree Heart Block:    - Admitted to CCU.    - s/p transvenous pacemaker placement in the ED.    - Continue telemetry monitoring.    - Trend cardiac enzymes.    - f/u TSH.    - Electrophysiology is following.     going for PPM today    2.) Heart Failure with reduced ejection fraction / CAD / DLD:    - Continue aspirin, plavix, atorvastatin, imdur, lasix,    - f/u TTE.    3.) ESRD on HD:    - on HD  MWF    - Continue lasix and metolazone.     - Renal diet.    - Monitor BMP, Mg, and P.       HD today    4.) DM II:    - Continue basal / bolus insulin.    - Carbohydrate consistent diet.    - Check hemoglobin A1c.    - Monitor blood glucose.    5.) BPH:    - Continue tamsulosin and finasteride.    6.) GI / DVT PPx: protonix / heparin    Discussed with EP  Discussed with his daughter    Discussed with [facility] staff  resident note reviewed

## 2018-10-15 NOTE — PROGRESS NOTE ADULT - ASSESSMENT
69 yo man with PMH of CAD/ CHF ESRD on HD (MWF) DM presenting with Complete heart block sp transvenous PM   ·	ESRD on HD 3 hrs 2 L UF as aubrie 2 K bath  ·	on lasix , metolazone   ·	complete heart block sp transvenous PM   on beta blocker and IMdur planned for PPM this AM  will follow     Anemia  - hgb at goal no need for YASMINE now

## 2018-10-15 NOTE — PROGRESS NOTE ADULT - SUBJECTIVE AND OBJECTIVE BOX
Nephrology progress note    Patient was seen and examined on HD, events over the last 24 h noted , planned for ppm today    Allergies:  No Known Allergies    Hospital Medications:   MEDICATIONS  (STANDING):  aspirin  chewable 81 milliGRAM(s) Oral daily  atorvastatin 80 milliGRAM(s) Oral at bedtime  dextrose 50% Injectable 25 Gram(s) IV Push once  dextrose 50% Injectable 25 Gram(s) IV Push once  docusate sodium 100 milliGRAM(s) Oral two times a day  finasteride 5 milliGRAM(s) Oral daily  furosemide    Tablet 80 milliGRAM(s) Oral every 12 hours  influenza   Vaccine 0.5 milliLiter(s) IntraMuscular once  insulin glargine Injectable (LANTUS) 16 Unit(s) SubCutaneous at bedtime  insulin lispro (HumaLOG) corrective regimen sliding scale   SubCutaneous three times a day before meals  insulin lispro Injectable (HumaLOG) 6 Unit(s) SubCutaneous three times a day before meals  isosorbide   mononitrate ER Tablet (IMDUR) 30 milliGRAM(s) Oral daily  metolazone 5 milliGRAM(s) Oral daily  pantoprazole    Tablet 40 milliGRAM(s) Oral before breakfast  tamsulosin 0.4 milliGRAM(s) Oral at bedtime        VITALS:  T(F): 97.5 (10-15-18 @ 04:00), Max: 98 (10-14-18 @ 20:00)  HR: 73 (10-15-18 @ 07:00)  BP: 128/70 (10-15-18 @ 07:00)  RR: 21 (10-15-18 @ 07:00)  SpO2: 99% (10-15-18 @ 06:00)  Wt(kg): --    10-13 @ 07:01  -  10-14 @ 07:00  --------------------------------------------------------  IN: 1080 mL / OUT: 875 mL / NET: 205 mL    10-14 @ 07:01  -  10-15 @ 07:00  --------------------------------------------------------  IN: 1140 mL / OUT: 1125 mL / NET: 15 mL          PHYSICAL EXAM:  Constitutional: NAD  HEENT: anicteric sclera, oropharynx clear, MMM  Neck: No JVD  Respiratory: CTAB, no wheezes, rales or rhonchi  Cardiovascular: S1, S2, RRR  Gastrointestinal: BS+, soft, NT/ND  Extremities: No cyanosis or clubbing. No peripheral edema  :  No crook.   Skin: No rashes  LAVF  RT TVP in place    LABS:  10-15    137  |  94<L>  |  83<HH>  ----------------------------<  143<H>  4.5   |  26  |  5.8<HH>    Ca    9.0      15 Oct 2018 04:20  Phos  5.0     10-14  Mg     2.5     10-15    TPro  7.1  /  Alb  4.1  /  TBili  0.6  /  DBili      /  AST  13  /  ALT  21  /  AlkPhos  153<H>  10-15                          10.5   6.32  )-----------( 113      ( 15 Oct 2018 04:20 )             33.2       Urine Studies:      RADIOLOGY & ADDITIONAL STUDIES:

## 2018-10-15 NOTE — PROGRESS NOTE ADULT - ASSESSMENT
ADRIANNA GAINES 68y Male  MRN#: [id number]   CODE STATUS: Full code      SUBJECTIVE    Patient is a 68y old Male who presents with a chief complaint of Dizziness and bradycardia (15 Oct 2018 14:49).  Currently admitted to medicine with the primary diagnosis of Third degree AV block.    Interval History: Today is hospital day 3d. Overnight . Today .    HPI:   HPI:  67 y/o M with PMH of ESRD on HD, CAD s/p pci with stent in LAD and RCA, Heart failure with reduced EF (unknown %), DLD, DM II, and BPH was sent in from hemodialysis after he was noted to be bradycardic. During dialysis he had no symptoms and he completed dialysis without issue. He was told however, that his heart rate was "very low" (exact rate not known), and that he should go to the hospital immediately. Last night he had an episode of dizziness after standing up from the dinner table associated with a feeling of warmth and some diaphoresis. He had a similar episode approximately one week ago, but otherwise has not had these symptoms before. He denies chest pain, SOB, headache, blurry vision, lightheadedness, and abdominal pain. No recent illnesses or sick contacts. (12 Oct 2018 20:38)    Hospital Course:       PAST MEDICAL & SURGICAL HISTORY  Heart failure with reduced ejection fraction  CAD (coronary artery disease)  BPH (benign prostatic hyperplasia)  Type 2 diabetes mellitus  End stage renal disease  Dyslipidemia  Hypertension      ALLERGIES:  No Known Allergies      MEDICATIONS:  STANDING MEDICATIONS  aspirin  chewable 81 milliGRAM(s) Oral daily  atorvastatin 80 milliGRAM(s) Oral at bedtime  chlorhexidine 4% Liquid 1 Application(s) Topical <User Schedule>  dextrose 50% Injectable 25 Gram(s) IV Push once  dextrose 50% Injectable 25 Gram(s) IV Push once  docusate sodium 100 milliGRAM(s) Oral two times a day  finasteride 5 milliGRAM(s) Oral daily  furosemide    Tablet 80 milliGRAM(s) Oral every 12 hours  influenza   Vaccine 0.5 milliLiter(s) IntraMuscular once  insulin glargine Injectable (LANTUS) 16 Unit(s) SubCutaneous at bedtime  insulin lispro (HumaLOG) corrective regimen sliding scale   SubCutaneous three times a day before meals  insulin lispro Injectable (HumaLOG) 6 Unit(s) SubCutaneous three times a day before meals  isosorbide   mononitrate ER Tablet (IMDUR) 30 milliGRAM(s) Oral daily  metolazone 5 milliGRAM(s) Oral daily  pantoprazole    Tablet 40 milliGRAM(s) Oral before breakfast  tamsulosin 0.4 milliGRAM(s) Oral at bedtime  vancomycin  IVPB 1000 milliGRAM(s) IV Intermittent once  vancomycin  IVPB 500 milliGRAM(s) IV Intermittent once  vancomycin  IVPB 1000 milliGRAM(s) IV Intermittent once    PRN MEDICATIONS  aluminum hydroxide/magnesium hydroxide/simethicone Suspension 30 milliLiter(s) Oral every 6 hours PRN  glucagon  Injectable 1 milliGRAM(s) IntraMuscular once PRN          OBJECTIVE    VITAL SIGNS: Last 24 Hours  T(C): 36.4 (15 Oct 2018 04:00), Max: 36.7 (14 Oct 2018 20:00)  T(F): 97.5 (15 Oct 2018 04:00), Max: 98 (14 Oct 2018 20:00)  HR: 69 (15 Oct 2018 12:42) (68 - 80)  BP: 129/57 (15 Oct 2018 12:42) (114/52 - 142/70)  BP(mean): 90 (15 Oct 2018 07:00) (78 - 96)  RR: 21 (15 Oct 2018 12:42) (16 - 29)  SpO2: 99% (15 Oct 2018 06:00) (98% - 99%)      PHYSICAL EXAM:    GENERAL: NAD, well-developed, AAOx3  HEENT:  Atraumatic, Normocephalic. EOMI, PERRLA, conjunctiva and sclera clear, No JVD  PULMONARY: Clear to auscultation bilaterally; No wheeze  CARDIOVASCULAR: Regular rate and rhythm; No murmurs, rubs, or gallops  GASTROINTESTINAL: Soft, Nontender, Nondistended; Bowel sounds present  MUSCULOSKELETAL:  2+ Peripheral Pulses, No clubbing, cyanosis, or edema  NEUROLOGY: non-focal  SKIN: No rashes or lesions      LABS:                        10.5   6.32  )-----------( 113      ( 15 Oct 2018 04:20 )             33.2     10-15    137  |  94<L>  |  83<HH>  ----------------------------<  143<H>  4.5   |  26  |  5.8<HH>    Ca    9.0      15 Oct 2018 04:20  Phos  5.0     10-14  Mg     2.5     10-15    TPro  7.1  /  Alb  4.1  /  TBili  0.6  /  DBili  x   /  AST  13  /  ALT  21  /  AlkPhos  153<H>  10-15    CARDIAC MARKERS ( 14 Oct 2018 04:51 )  x     / x     / 45 U/L / x     / x          RADIOLOGY:    < from: Transthoracic Echocardiogram (10.14.18 @ 06:43) >   1. Severely decreased global left ventricular systolic function.   2. Multiple left ventricular regional wall motion abnormalities exist.   See wall motion findings.   3. LV Ejection Fraction by Otero's Method with a biplane EF of 26 %.   4. Dilated cardiomyopathy.   5. Mildly increased LV wall thickness.   6. There is mild concentric left ventricular hypertrophy.   7. The mean global longitudinal strain by speckle tracking is -8.4%   which is reduced.   8. Mild tricuspid regurgitation.   9. Pulmonic valve regurgitation.  10. Dilatation of the ascending aorta.  11. Estimated pulmonary artery systolic pressure is 44.1 mmHg assuming a   right atrial pressure of 8 mmHg, which is consistent with mild pulmonary   hypertension.  12. LA volume Index is 45.3 ml/m² ml/m2.    < end of copied text >        ASSESSMENT AND PLAN    67 y/o M with PMH of ESRD on HD, CAD s/p pci with stent in LAD and RCA, Heart failure with reduced EF (unknown %), DLD, DM II, and BPH was sent in from hemodialysis after he was noted to be bradycardic. Found to be complete heart block.    3rd Degree Heart Block  - s/p transvenous pacemaker placement in the ED.  - For PPM placement today after HD    Heart Failure with reduced ejection fraction / CAD / DLD:  - Continue aspirin, plavix, atorvastatin, imdur, lasix, and metoprolol.  - f/u TTE.    ESRD on HD:  - For HD this morning prior to PPM placement  - Continue lasix and metolazone.  - Monitor BMP, Mg, and P.    DM II  - Continue basal / bolus insulin.  - Check hemoglobin A1c.  - Monitor blood glucose.    BPH    - Continue tamsulosin and finasteride.    DVT ppx  - Heparin subQ    GI ppx  - Protonix

## 2018-10-15 NOTE — PROGRESS NOTE ADULT - SUBJECTIVE AND OBJECTIVE BOX
INTERVAL HPI/OVERNIGHT EVENTS:    HPI  69 y/o M with PMH of ESRD on HD, CAD s/p pci with stent in LAD and RCA, Heart failure with reduced EF (unknown %), DLD, DM II, and BPH was sent in from hemodialysis after he was noted to be bradycardic. During dialysis he had no symptoms and he completed dialysis without issue. He was told however, that his heart rate was "very low" (exact rate not known), and that he should go to the hospital immediately. found to have third degree heart block    CC  Pt seen and examined today for follow up of Heart block  s/p transvenous pacemaker  denies chest pain/sob  going for PPM today    REVIEW OF SYSTEMS:  CONSTITUTIONAL: No fever, weight loss, or fatigue  EYES: No eye pain, visual disturbances, or discharge  ENMT:  No difficulty hearing, tinnitus, vertigo; No sinus or throat pain  NECK: No pain or stiffness  BREASTS: No pain, masses, or nipple discharge  RESPIRATORY: No cough, wheezing, chills or hemoptysis; No shortness of breath  CARDIOVASCULAR: edema  GASTROINTESTINAL: No abdominal or epigastric pain.   GENITOURINARY: No dysuria, frequency, hematuria, or incontinence  NEUROLOGICAL: No headaches, memory loss, loss of strength, numbness, or tremors  SKIN: No itching, burning, rashes, or lesions   LYMPH NODES: No enlarged glands  ENDOCRINE: No heat or cold intolerance; No hair loss  MUSCULOSKELETAL: No joint pain or swelling; No muscle, back, or extremity pain  PSYCHIATRIC: No depression, anxiety, mood swings, or difficulty sleeping  HEME/LYMPH: No easy bruising, or bleeding gums  ALLERY AND IMMUNOLOGIC: No hives or eczema    VITALS:  T(F): 97.5, Max: 98 (10-14-18 @ 20:00)  HR: 69  BP: 129/57  RR: 21  SpO2: 99%    PHYSICAL EXAM:  GENERAL: NAD, well-groomed, well-developed  HEAD:  Atraumatic, Normocephalic  EYES: EOMI, PERRLA, conjunctiva and sclera clear  ENMT: No tonsillar erythema, exudates, or enlargement; Moist mucous membranes, Good dentition, No lesions  NECK: Supple, No JVD, Normal thyroid  NERVOUS SYSTEM:  Alert & Oriented X3,  CHEST/LUNG: decrease breath sounds   HEART: Regular rate and rhythm; No murmurs, rubs, or gallops  ABDOMEN: Soft, Nontender, Nondistended; Bowel sounds present  EXTREMITIES:  Positive edema  LYMPH: No lymphadenopathy noted  SKIN: No rashes or lesions      LABS:    10-15    137  |  94<L>  |  83<HH>  ----------------------------<  143<H>  4.5   |  26  |  5.8<HH>    Ca    9.0      15 Oct 2018 04:20  Phos  5.0     10-14  Mg     2.5     10-15    TPro  7.1  /  Alb  4.1  /  TBili  0.6  /  DBili  x   /  AST  13  /  ALT  21  /  AlkPhos  153<H>  10-15                          10.5   6.32  )-----------( 113      ( 15 Oct 2018 04:20 )             33.2           RADIOLOGY & ADDITIONAL TESTS:    Imaging Personally Reviewed:  [ ] YES  [ ] NO    MEDICATIONS:     MEDICATIONS  (STANDING):  aspirin  chewable 81 milliGRAM(s) Oral daily  atorvastatin 80 milliGRAM(s) Oral at bedtime  chlorhexidine 4% Liquid 1 Application(s) Topical <User Schedule>  dextrose 50% Injectable 25 Gram(s) IV Push once  dextrose 50% Injectable 25 Gram(s) IV Push once  docusate sodium 100 milliGRAM(s) Oral two times a day  finasteride 5 milliGRAM(s) Oral daily  furosemide    Tablet 80 milliGRAM(s) Oral every 12 hours  influenza   Vaccine 0.5 milliLiter(s) IntraMuscular once  insulin glargine Injectable (LANTUS) 16 Unit(s) SubCutaneous at bedtime  insulin lispro (HumaLOG) corrective regimen sliding scale   SubCutaneous three times a day before meals  insulin lispro Injectable (HumaLOG) 6 Unit(s) SubCutaneous three times a day before meals  isosorbide   mononitrate ER Tablet (IMDUR) 30 milliGRAM(s) Oral daily  metolazone 5 milliGRAM(s) Oral daily  pantoprazole    Tablet 40 milliGRAM(s) Oral before breakfast  tamsulosin 0.4 milliGRAM(s) Oral at bedtime  vancomycin  IVPB 1000 milliGRAM(s) IV Intermittent once  vancomycin  IVPB 500 milliGRAM(s) IV Intermittent once  vancomycin  IVPB 1000 milliGRAM(s) IV Intermittent once    MEDICATIONS  (PRN):  aluminum hydroxide/magnesium hydroxide/simethicone Suspension 30 milliLiter(s) Oral every 6 hours PRN Dyspepsia  glucagon  Injectable 1 milliGRAM(s) IntraMuscular once PRN Glucose LESS THAN 70 milligrams/deciliter

## 2018-10-15 NOTE — CHART NOTE - NSCHARTNOTEFT_GEN_A_CORE
PACU ANESTHESIA ADMISSION NOTE      Procedure:   Post op diagnosis:      ____  Intubated  TV:______       Rate: ______      FiO2: __10L Nonrebreather                ____    _x___  Patent Airway    _x___  Full return of protective reflexes    _x___  Full recovery from anesthesia / back to baseline status    Vitals:  T(C): 36.4 (10-15-18 @ 04:00), Max: 36.7 (10-14-18 @ 20:00)  HR: 69 (10-15-18 @ 12:42) (69 - 80)  BP: 129/57 (10-15-18 @ 12:42) (125/71 - 142/70)  RR: 21 (10-15-18 @ 12:42) (16 - 26)  SpO2: 99% (10-15-18 @ 06:00) (98% - 99%)    Mental Status:  _x___ Awake   _____ Alert   _____ Drowsy   _____ Sedated    Nausea/Vomiting:  ___  NO       _____x_Yes,   See Post - Op Orders         Pain Scale (0-10):  __0___    Treatment: ____ None    _x___ See Post - Op/PCA Orders    Post - Operative Fluids:   ___ Oral   ___x_ See Post - Op Orders    Plan: Discharge:   _x___Home       _____Floor     _____Critical Care    __x___  Other:_________________    Comments:  No anesthesia issues or complications noted.  Discharge when criteria met.

## 2018-10-16 LAB
ALBUMIN SERPL ELPH-MCNC: 3.8 G/DL — SIGNIFICANT CHANGE UP (ref 3.5–5.2)
ALP SERPL-CCNC: 150 U/L — HIGH (ref 30–115)
ALT FLD-CCNC: 20 U/L — SIGNIFICANT CHANGE UP (ref 0–41)
ANION GAP SERPL CALC-SCNC: 17 MMOL/L — HIGH (ref 7–14)
AST SERPL-CCNC: 16 U/L — SIGNIFICANT CHANGE UP (ref 0–41)
BILIRUB SERPL-MCNC: 0.7 MG/DL — SIGNIFICANT CHANGE UP (ref 0.2–1.2)
BUN SERPL-MCNC: 69 MG/DL — CRITICAL HIGH (ref 10–20)
CALCIUM SERPL-MCNC: 8.8 MG/DL — SIGNIFICANT CHANGE UP (ref 8.5–10.1)
CHLORIDE SERPL-SCNC: 97 MMOL/L — LOW (ref 98–110)
CO2 SERPL-SCNC: 28 MMOL/L — SIGNIFICANT CHANGE UP (ref 17–32)
CREAT SERPL-MCNC: 5.5 MG/DL — CRITICAL HIGH (ref 0.7–1.5)
GLUCOSE BLDC GLUCOMTR-MCNC: 137 MG/DL — HIGH (ref 70–99)
GLUCOSE BLDC GLUCOMTR-MCNC: 137 MG/DL — HIGH (ref 70–99)
GLUCOSE BLDC GLUCOMTR-MCNC: 225 MG/DL — HIGH (ref 70–99)
GLUCOSE BLDC GLUCOMTR-MCNC: 238 MG/DL — HIGH (ref 70–99)
GLUCOSE SERPL-MCNC: 162 MG/DL — HIGH (ref 70–99)
HCT VFR BLD CALC: 31.9 % — LOW (ref 42–52)
HGB BLD-MCNC: 10 G/DL — LOW (ref 14–18)
MAGNESIUM SERPL-MCNC: 2.4 MG/DL — SIGNIFICANT CHANGE UP (ref 1.8–2.4)
MCHC RBC-ENTMCNC: 30.1 PG — SIGNIFICANT CHANGE UP (ref 27–31)
MCHC RBC-ENTMCNC: 31.3 G/DL — LOW (ref 32–37)
MCV RBC AUTO: 96.1 FL — HIGH (ref 80–94)
NRBC # BLD: 0 /100 WBCS — SIGNIFICANT CHANGE UP (ref 0–0)
PHOSPHATE SERPL-MCNC: 6.4 MG/DL — HIGH (ref 2.1–4.9)
PLATELET # BLD AUTO: 129 K/UL — LOW (ref 130–400)
POTASSIUM SERPL-MCNC: 4.7 MMOL/L — SIGNIFICANT CHANGE UP (ref 3.5–5)
POTASSIUM SERPL-SCNC: 4.7 MMOL/L — SIGNIFICANT CHANGE UP (ref 3.5–5)
PROT SERPL-MCNC: 6.9 G/DL — SIGNIFICANT CHANGE UP (ref 6–8)
RBC # BLD: 3.32 M/UL — LOW (ref 4.7–6.1)
RBC # FLD: 14.7 % — HIGH (ref 11.5–14.5)
SODIUM SERPL-SCNC: 142 MMOL/L — SIGNIFICANT CHANGE UP (ref 135–146)
VANCOMYCIN FLD-MCNC: 15.4 UG/ML — HIGH (ref 5–10)
WBC # BLD: 8.51 K/UL — SIGNIFICANT CHANGE UP (ref 4.8–10.8)
WBC # FLD AUTO: 8.51 K/UL — SIGNIFICANT CHANGE UP (ref 4.8–10.8)

## 2018-10-16 RX ORDER — ACETAMINOPHEN 500 MG
650 TABLET ORAL EVERY 6 HOURS
Qty: 0 | Refills: 0 | Status: DISCONTINUED | OUTPATIENT
Start: 2018-10-16 | End: 2018-10-17

## 2018-10-16 RX ORDER — SENNA PLUS 8.6 MG/1
5 TABLET ORAL
Qty: 0 | Refills: 0 | Status: DISCONTINUED | OUTPATIENT
Start: 2018-10-16 | End: 2018-10-23

## 2018-10-16 RX ORDER — CEFTRIAXONE 500 MG/1
1 INJECTION, POWDER, FOR SOLUTION INTRAMUSCULAR; INTRAVENOUS EVERY 24 HOURS
Qty: 0 | Refills: 0 | Status: DISCONTINUED | OUTPATIENT
Start: 2018-10-16 | End: 2018-10-19

## 2018-10-16 RX ORDER — VANCOMYCIN HCL 1 G
1500 VIAL (EA) INTRAVENOUS ONCE
Qty: 0 | Refills: 0 | Status: DISCONTINUED | OUTPATIENT
Start: 2018-10-16 | End: 2018-10-16

## 2018-10-16 RX ORDER — VANCOMYCIN HCL 1 G
1500 VIAL (EA) INTRAVENOUS
Qty: 0 | Refills: 0 | Status: DISCONTINUED | OUTPATIENT
Start: 2018-10-16 | End: 2018-10-16

## 2018-10-16 RX ADMIN — Medication 2: at 09:10

## 2018-10-16 RX ADMIN — FINASTERIDE 5 MILLIGRAM(S): 5 TABLET, FILM COATED ORAL at 12:58

## 2018-10-16 RX ADMIN — Medication 6 UNIT(S): at 09:12

## 2018-10-16 RX ADMIN — PANTOPRAZOLE SODIUM 40 MILLIGRAM(S): 20 TABLET, DELAYED RELEASE ORAL at 06:06

## 2018-10-16 RX ADMIN — Medication 80 MILLIGRAM(S): at 06:06

## 2018-10-16 RX ADMIN — ISOSORBIDE MONONITRATE 30 MILLIGRAM(S): 60 TABLET, EXTENDED RELEASE ORAL at 12:58

## 2018-10-16 RX ADMIN — ATORVASTATIN CALCIUM 80 MILLIGRAM(S): 80 TABLET, FILM COATED ORAL at 21:32

## 2018-10-16 RX ADMIN — Medication 80 MILLIGRAM(S): at 20:35

## 2018-10-16 RX ADMIN — Medication 6 UNIT(S): at 12:30

## 2018-10-16 RX ADMIN — Medication 100 MILLIGRAM(S): at 06:06

## 2018-10-16 RX ADMIN — CEFTRIAXONE 100 GRAM(S): 500 INJECTION, POWDER, FOR SOLUTION INTRAMUSCULAR; INTRAVENOUS at 22:08

## 2018-10-16 RX ADMIN — Medication 650 MILLIGRAM(S): at 21:32

## 2018-10-16 RX ADMIN — Medication 100 MILLIGRAM(S): at 20:34

## 2018-10-16 RX ADMIN — INSULIN GLARGINE 16 UNIT(S): 100 INJECTION, SOLUTION SUBCUTANEOUS at 22:09

## 2018-10-16 RX ADMIN — TAMSULOSIN HYDROCHLORIDE 0.4 MILLIGRAM(S): 0.4 CAPSULE ORAL at 21:32

## 2018-10-16 RX ADMIN — SENNA PLUS 5 MILLILITER(S): 8.6 TABLET ORAL at 20:34

## 2018-10-16 RX ADMIN — Medication 6 UNIT(S): at 19:46

## 2018-10-16 RX ADMIN — Medication 81 MILLIGRAM(S): at 13:10

## 2018-10-16 NOTE — PROGRESS NOTE ADULT - ASSESSMENT
67 yo M with history of CAD s/p PCI of RCA and LAD  (2017), last EF 40% by echo (June 2018). Patient admitted after he was noted to have bradycardia at dialysis on Friday. No chest pain, palpitations, or history of syncope. In ER, patient noted to have high grade AV block. TVP placed in ER. He underwent an attempt at a BiV ICD, which was unsuccessful due to anatomy of CS. He received a DC ICD on 10/15 and R IJ TVP was removed.     IMPRESSION  3rd degree Heart Block - s/p AICD placement     1.) 3rd Degree Heart Block: S/P DC ICD 10/15/18 Placement   - EP outpatient follow up Dr. Landrum OP recs appreciated      -> DC on Discharge home with ABx (Keflex x 5 days)           Follow up with me in the office in 4 week           Vanc Post HD 1.5mg  - Resume Plavix 10/17/18 as per EP   - F/u Dr Acosta office in 2-3 weeks.    2.) Heart Failure with reduced ejection fraction / CAD / DLD:    - Continue aspirin, atorvastatin, imdur, lasix,---> holding  Plavix        3.) ESRD on HD: SOB CXR shows Vascular congestion.     - on HD  MWF    - Continue lasix and metolazone.     - Renal diet.    - Monitor BMP, Mg, and P.       HD today    4.) DM II:    - Continue basal / bolus insulin.    - Carbohydrate consistent diet.    - Check hemoglobin A1c.    - Monitor blood glucose.    5.) BPH:    - Continue tamsulosin and finasteride.    6.) GI / DVT PPx: protonix / heparin    Discussed with EP cleared for DC   Discussed with PCP Dr. José Luis NIELSEN tomorrow DG tele    Discussed with [facility] staff  resident note reviewed

## 2018-10-16 NOTE — PROGRESS NOTE ADULT - SUBJECTIVE AND OBJECTIVE BOX
INTERVAL HPI/OVERNIGHT EVENTS: No acute overnight events. Mild pain at right side where incision was.     MEDICATIONS  (STANDING):  aspirin  chewable 81 milliGRAM(s) Oral daily  atorvastatin 80 milliGRAM(s) Oral at bedtime  chlorhexidine 4% Liquid 1 Application(s) Topical <User Schedule>  dextrose 50% Injectable 25 Gram(s) IV Push once  dextrose 50% Injectable 25 Gram(s) IV Push once  docusate sodium 100 milliGRAM(s) Oral two times a day  finasteride 5 milliGRAM(s) Oral daily  furosemide    Tablet 80 milliGRAM(s) Oral every 12 hours  influenza   Vaccine 0.5 milliLiter(s) IntraMuscular once  insulin glargine Injectable (LANTUS) 16 Unit(s) SubCutaneous at bedtime  insulin lispro (HumaLOG) corrective regimen sliding scale   SubCutaneous three times a day before meals  insulin lispro Injectable (HumaLOG) 6 Unit(s) SubCutaneous three times a day before meals  isosorbide   mononitrate ER Tablet (IMDUR) 30 milliGRAM(s) Oral daily  metolazone 5 milliGRAM(s) Oral daily  pantoprazole    Tablet 40 milliGRAM(s) Oral before breakfast  tamsulosin 0.4 milliGRAM(s) Oral at bedtime    MEDICATIONS  (PRN):  aluminum hydroxide/magnesium hydroxide/simethicone Suspension 30 milliLiter(s) Oral every 6 hours PRN Dyspepsia  glucagon  Injectable 1 milliGRAM(s) IntraMuscular once PRN Glucose LESS THAN 70 milligrams/deciliter  morphine  - Injectable 4 milliGRAM(s) IV Push every 10 minutes PRN Severe Pain (7 - 10)  morphine  - Injectable 2 milliGRAM(s) IV Push every 10 minutes PRN Moderate Pain (4 - 6)      Allergies  No Known Allergies  Intolerances        Vital Signs Last 24 Hrs  T(C): 36.7 (16 Oct 2018 09:00), Max: 37.7 (15 Oct 2018 20:07)  T(F): 98 (16 Oct 2018 09:00), Max: 99.9 (15 Oct 2018 20:07)  HR: 69 (16 Oct 2018 12:00) (60 - 82)  BP: 123/64 (16 Oct 2018 12:00) (112/56 - 128/57)  BP(mean): 83 (16 Oct 2018 12:00) (72 - 84)  RR: 23 (16 Oct 2018 12:00) (0 - 29)  SpO2: 95% (16 Oct 2018 12:00) (94% - 98%)    10-15-18 @ 07:01  -  10-16-18 @ 07:00  --------------------------------------------------------  IN: 940 mL / OUT: 1225 mL / NET: -285 mL        Physical Exam  GENERAL: In no apparent distress, well nourished, and hydrated. Comfortably sitting in chair.   HEAD:  Atraumatic, Normocephalic  EYES: EOMI, PERRLA, conjunctiva and sclera clear  ENMT: Moist mucous membranes  NECK: No JVD  HEART: Regular rate and rhythm; No murmurs, rubs, or gallops.  PULMONARY: Bibasilar crackles, otherwise CTA  ABDOMEN: Soft, Nontender, Nondistended; Bowel sounds present  EXTREMITIES:  Left arm fistula in place. 2+ Peripheral Pulses,   NEUROLOGICAL: Grossly nonfocal  SKIN: pressure dressing removed. Steri-strips c/d/i, no bleeding, mild erythema, but no warmth or discharge     LABS:                        10.0   8.51  )-----------( 129      ( 16 Oct 2018 04:25 )             31.9     10-16    142  |  97<L>  |  69<HH>  ----------------------------<  162<H>  4.7   |  28  |  5.5<HH>    Ca    8.8      16 Oct 2018 04:25  Phos  6.4     10-16  Mg     2.4     10-16    TPro  6.9  /  Alb  3.8  /  TBili  0.7  /  DBili  x   /  AST  16  /  ALT  20  /  AlkPhos  150<H>  10-16      Urinalysis Basic - ( 15 Oct 2018 18:35 )    Color: Red / Appearance: Turbid / S.020 / pH: x  Gluc: x / Ketone: Negative  / Bili: Negative / Urobili: 0.2 mg/dL   Blood: x / Protein: >=300 mg/dL / Nitrite: Negative   Leuk Esterase: Large / RBC: 10-25 /HPF / WBC >50 /HPF   Sq Epi: x / Non Sq Epi: Moderate /HPF / Bacteria: Moderate /HPF    RADIOLOGY & ADDITIONAL TESTS:

## 2018-10-16 NOTE — PROGRESS NOTE ADULT - ASSESSMENT
C/w medical therapy.  C/w ASA. Restart Plavix when cleared by the EP.  EP f/u appreciated.  D/c planning.  Renal f/u for RRT.  See me in the office in 2-3 weeks.

## 2018-10-16 NOTE — PROGRESS NOTE ADULT - SUBJECTIVE AND OBJECTIVE BOX
INTERVAL HPI/OVERNIGHT EVENTS:    HPI  69 y/o M with PMH of ESRD on HD, CAD s/p pci with stent in LAD and RCA, Heart failure with reduced EF (unknown %), DLD, DM II, and BPH was sent in from hemodialysis after he was noted to be bradycardic. During dialysis he had no symptoms and he completed dialysis without issue. He was told however, that his heart rate was "very low" (exact rate not known), and that he should go to the hospital immediately. found to have third degree heart block    CC  Pt seen and examined today for follow up of Heart block  s/p AICD placement  was short of breath today    REVIEW OF SYSTEMS:  CONSTITUTIONAL: No fever, weight loss, or fatigue  EYES: No eye pain, visual disturbances, or discharge  ENMT:  No difficulty hearing, tinnitus, vertigo; No sinus or throat pain  NECK: No pain or stiffness  BREASTS: No pain, masses, or nipple discharge  RESPIRATORY:  shortness of breath  CARDIOVASCULAR: No chest pain, palpitations, dizziness, or leg swelling  GASTROINTESTINAL: No abdominal or epigastric pain.   GENITOURINARY: No dysuria, frequency, hematuria, or incontinence  NEUROLOGICAL: No headaches, memory loss, loss of strength, numbness, or tremors  SKIN: No itching, burning, rashes, or lesions   LYMPH NODES: No enlarged glands  ENDOCRINE: No heat or cold intolerance; No hair loss  MUSCULOSKELETAL: No joint pain or swelling; No muscle, back, or extremity pain  PSYCHIATRIC: No depression, anxiety, mood swings, or difficulty sleeping  HEME/LYMPH: No easy bruising, or bleeding gums  ALLERY AND IMMUNOLOGIC: No hives or eczema    VITALS:  T(F): 98, Max: 99.9 (10-15-18 @ 20:07)  HR: 75  BP: 129/64  RR: 24  SpO2: 96%    PHYSICAL EXAM:  GENERAL: NAD, well-groomed, well-developed  HEAD:  Atraumatic, Normocephalic  EYES: EOMI, PERRLA, conjunctiva and sclera clear  ENMT: No tonsillar erythema, exudates, or enlargement; Moist mucous membranes, Good dentition, No lesions  NECK: Supple, No JVD, Normal thyroid  NERVOUS SYSTEM:  Alert & Oriented X3  CHEST/LUNG :crackles at bases  HEART: Regular rate and rhythm; No murmurs, rubs, or gallops  ABDOMEN: Soft, Nontender, Nondistended; Bowel sounds present  EXTREMITIES:  edema  LYMPH: No lymphadenopathy noted  SKIN: No rashes or lesions      LABS:  Urinalysis Basic - ( 15 Oct 2018 18:35 )    Color: Red / Appearance: Turbid / S.020 / pH: x  Gluc: x / Ketone: Negative  / Bili: Negative / Urobili: 0.2 mg/dL   Blood: x / Protein: >=300 mg/dL / Nitrite: Negative   Leuk Esterase: Large / RBC: 10-25 /HPF / WBC >50 /HPF   Sq Epi: x / Non Sq Epi: Moderate /HPF / Bacteria: Moderate /HPF      10-16    142  |  97<L>  |  69<HH>  ----------------------------<  162<H>  4.7   |  28  |  5.5<HH>    Ca    8.8      16 Oct 2018 04:25  Phos  6.4     10-  Mg     2.4     10-    TPro  6.9  /  Alb  3.8  /  TBili  0.7  /  DBili  x   /  AST  16  /  ALT  20  /  AlkPhos  150<H>  10-16                          10.0   8.51  )-----------( 129      ( 16 Oct 2018 04:25 )             31.9           RADIOLOGY & ADDITIONAL TESTS:    Imaging Personally Reviewed:  [ ] YES  [ ] NO    MEDICATIONS:     MEDICATIONS  (STANDING):  aspirin  chewable 81 milliGRAM(s) Oral daily  atorvastatin 80 milliGRAM(s) Oral at bedtime  cefTRIAXone   IVPB 1 Gram(s) IV Intermittent every 24 hours  chlorhexidine 4% Liquid 1 Application(s) Topical <User Schedule>  dextrose 50% Injectable 25 Gram(s) IV Push once  dextrose 50% Injectable 25 Gram(s) IV Push once  docusate sodium 100 milliGRAM(s) Oral two times a day  finasteride 5 milliGRAM(s) Oral daily  furosemide    Tablet 80 milliGRAM(s) Oral every 12 hours  influenza   Vaccine 0.5 milliLiter(s) IntraMuscular once  insulin glargine Injectable (LANTUS) 16 Unit(s) SubCutaneous at bedtime  insulin lispro (HumaLOG) corrective regimen sliding scale   SubCutaneous three times a day before meals  insulin lispro Injectable (HumaLOG) 6 Unit(s) SubCutaneous three times a day before meals  isosorbide   mononitrate ER Tablet (IMDUR) 30 milliGRAM(s) Oral daily  metolazone 5 milliGRAM(s) Oral daily  pantoprazole    Tablet 40 milliGRAM(s) Oral before breakfast  senna Syrup 5 milliLiter(s) Oral two times a day  tamsulosin 0.4 milliGRAM(s) Oral at bedtime    MEDICATIONS  (PRN):  aluminum hydroxide/magnesium hydroxide/simethicone Suspension 30 milliLiter(s) Oral every 6 hours PRN Dyspepsia  glucagon  Injectable 1 milliGRAM(s) IntraMuscular once PRN Glucose LESS THAN 70 milligrams/deciliter  morphine  - Injectable 4 milliGRAM(s) IV Push every 10 minutes PRN Severe Pain (7 - 10)  morphine  - Injectable 2 milliGRAM(s) IV Push every 10 minutes PRN Moderate Pain (4 - 6)

## 2018-10-16 NOTE — PROGRESS NOTE ADULT - ASSESSMENT
· Assessment		  69 yo man with PMH of CAD/ CHF ESRD on HD (MW) DM presenting with Complete heart block:  # s/p hd yesterday, hd in am  # s/p AICD placement followed by cardio/EP  #ph noted, add phoslo 2 tablets q 8 with meals  #h/h at goal , no YASMINE  # on metolazone , lasix, will keep if non anuric  # will follow

## 2018-10-16 NOTE — PROGRESS NOTE ADULT - SUBJECTIVE AND OBJECTIVE BOX
Patient is a 68y old  Male who presents with a chief complaint of Dizziness and bradycardia (15 Oct 2018 18:35)      SUBJ:  Patient seen and examined. S/p AICD. No CP. No complaints.      MEDICATIONS  (STANDING):  aspirin  chewable 81 milliGRAM(s) Oral daily  atorvastatin 80 milliGRAM(s) Oral at bedtime  chlorhexidine 4% Liquid 1 Application(s) Topical <User Schedule>  dextrose 50% Injectable 25 Gram(s) IV Push once  dextrose 50% Injectable 25 Gram(s) IV Push once  docusate sodium 100 milliGRAM(s) Oral two times a day  finasteride 5 milliGRAM(s) Oral daily  furosemide    Tablet 80 milliGRAM(s) Oral every 12 hours  influenza   Vaccine 0.5 milliLiter(s) IntraMuscular once  insulin glargine Injectable (LANTUS) 16 Unit(s) SubCutaneous at bedtime  insulin lispro (HumaLOG) corrective regimen sliding scale   SubCutaneous three times a day before meals  insulin lispro Injectable (HumaLOG) 6 Unit(s) SubCutaneous three times a day before meals  isosorbide   mononitrate ER Tablet (IMDUR) 30 milliGRAM(s) Oral daily  metolazone 5 milliGRAM(s) Oral daily  pantoprazole    Tablet 40 milliGRAM(s) Oral before breakfast  tamsulosin 0.4 milliGRAM(s) Oral at bedtime    MEDICATIONS  (PRN):  aluminum hydroxide/magnesium hydroxide/simethicone Suspension 30 milliLiter(s) Oral every 6 hours PRN Dyspepsia  glucagon  Injectable 1 milliGRAM(s) IntraMuscular once PRN Glucose LESS THAN 70 milligrams/deciliter  morphine  - Injectable 4 milliGRAM(s) IV Push every 10 minutes PRN Severe Pain (7 - 10)  morphine  - Injectable 2 milliGRAM(s) IV Push every 10 minutes PRN Moderate Pain (4 - 6)            Vital Signs Last 24 Hrs  T(C): 36.6 (16 Oct 2018 04:00), Max: 37.7 (15 Oct 2018 20:07)  T(F): 97.8 (16 Oct 2018 04:00), Max: 99.9 (15 Oct 2018 20:07)  HR: 70 (16 Oct 2018 06:00) (60 - 82)  BP: 113/59 (16 Oct 2018 06:00) (112/56 - 129/57)  BP(mean): 78 (16 Oct 2018 06:00) (72 - 84)  RR: 20 (16 Oct 2018 06:00) (0 - 29)  SpO2: 96% (16 Oct 2018 06:00) (94% - 98%)      PHYSICAL EXAM:    GEN: AAO x 3, NAD  HEENT: NC/AT,  Neck: No JVD  CV: Reg, S1-S2  Lungs: CTAB  Abd: Soft, non-tender  Ext: Trace edema      I&O's Summary    15 Oct 2018 07:01  -  16 Oct 2018 07:00  --------------------------------------------------------  IN: 940 mL / OUT: 1225 mL / NET: -285 mL    	      LABS:                        10.0   8.51  )-----------( 129      ( 16 Oct 2018 04:25 )             31.9     10-16    142  |  97<L>  |  69<HH>  ----------------------------<  162<H>  4.7   |  28  |  5.5<HH>    Ca    8.8      16 Oct 2018 04:25  Phos  6.4     10-16  Mg     2.4     10-16    TPro  6.9  /  Alb  3.8  /  TBili  0.7  /  DBili  x   /  AST  16  /  ALT  20  /  AlkPhos  150<H>  10-16              BNP  RADIOLOGY & ADDITIONAL STUDIES:      IMPRESSION AND PLAN:

## 2018-10-16 NOTE — CHART NOTE - NSCHARTNOTEFT_GEN_A_CORE
ICU DOWNGRADE NOTE:    68y Male transferred to floor from ICU    Patient is a 68y old Male who presents with a chief complaint of Dizziness and bradycardia (16 Oct 2018 13:53)    The patient is currently admitted for the primary diagnosis of Third degree AV block    The patient was admitted to the unit for () Days.    The patient (was/was never) intubated for (days), and (was/was never) on pressors for (days).    Indwelling vascular catheters:    Urinary Catheter: NONE    Disposition: DC Planing for DC tomorrow     Code Status:FULL     ICU COURSE OF EVENTS:  -------------------------------------------------------------------------------------------  67 yo M with history of CAD s/p PCI of RCA and LAD  (2017), last EF 40% by echo (June 2018). Patient admitted after he was noted to have bradycardia at dialysis on Friday. No chest pain, palpitations, or history of syncope. In ER, patient noted to have high grade AV block. TVP placed in ER. He underwent an attempt at a BiV ICD, which was unsuccessful due to anatomy of CS. He received a DC ICD on 10/15 and R IJ TVP was removed.       -------------------------------------------------------------------------------------------    Current workup in progress:      IMPRESSION  3rd degree Heart Block - s/p AICD placement     1.) 3rd Degree Heart Block: S/P DC ICD 10/15/18 Placement   - EP outpatient follow up Dr. Diallo KHAN recs appreciated      -> DC on Discharge home with ABx (Keflex x 5 days)           Follow up with me in the office in 4 week           Vanc Post HD 1.5mg  - Resume Plavix 10/17/18 as per EP   - F/u Dr Acosta office in 2-3 weeks.    2.) Heart Failure with reduced ejection fraction / CAD / DLD:    - Continue aspirin, atorvastatin, imdur, lasix,---> holding  Plavix     # UTI symptomatic   - F/u Cultures   - Rocephin q24       3.) ESRD on HD: SOB CXR shows Vascular congestion.     - on HD  MWF    - Continue lasix and metolazone.     - Renal diet.    - Monitor BMP, Mg, and P.       HD today    4.) DM II:    - Continue basal / bolus insulin.    - Carbohydrate consistent diet.    - Check hemoglobin A1c.    - Monitor blood glucose.    5.) BPH:    - Continue tamsulosin and finasteride.    6.) GI / DVT PPx: protonix / heparin    Discussed with EP cleared for DC   Discussed with PCP Dr. José Luis NIELSEN tomorrow DG tele    Discussed with [facility] House staff  resident note reviewed        SIGN OUT AT 10-16-18 @ 14:21 GIVEN TO: ICU DOWNGRADE NOTE:    68y Male transferred to floor from ICU    Patient is a 68y old Male who presents with a chief complaint of Dizziness and bradycardia (16 Oct 2018 13:53)    The patient is currently admitted for the primary diagnosis of Third degree AV block    The patient was admitted to the unit for () Days.    The patient (was/was never) intubated for (days), and (was/was never) on pressors for (days).    Indwelling vascular catheters:    Urinary Catheter: NONE    Disposition: DC Planing for DC tomorrow     Code Status:FULL     ICU COURSE OF EVENTS:  -------------------------------------------------------------------------------------------  69 yo M with history of CAD s/p PCI of RCA and LAD  (2017), last EF 40% by echo (June 2018). Patient admitted after he was noted to have bradycardia at dialysis on Friday. No chest pain, palpitations, or history of syncope. In ER, patient noted to have high grade AV block. TVP placed in ER. He underwent an attempt at a BiV ICD, which was unsuccessful due to anatomy of CS. He received a DC ICD on 10/15 and R IJ TVP was removed.       -------------------------------------------------------------------------------------------    Current workup in progress:      IMPRESSION  3rd degree Heart Block - s/p AICD placement     1.) 3rd Degree Heart Block: S/P DC ICD 10/15/18 Placement   - EP outpatient follow up Dr. Diallo KHAN recs appreciated      -> DC on Discharge home with ABx (Keflex x 5 days)           Follow up with me in the office in 4 week           Vanc Post HD 1.5mg after vanc level  - Resume Plavix 10/17/18 as per EP   - F/u Dr Acosta office in 2-3 weeks.    2.) Heart Failure with reduced ejection fraction / CAD / DLD:    - Continue aspirin, atorvastatin, imdur, lasix,---> holding  Plavix     # UTI symptomatic   - F/u Cultures   - Rocephin q24       3.) ESRD on HD: SOB CXR shows Vascular congestion.     - on HD  MWF    - Continue lasix and metolazone.     - Renal diet.    - Monitor BMP, Mg, and P.       HD today    4.) DM II:    - Continue basal / bolus insulin.    - Carbohydrate consistent diet.    - Check hemoglobin A1c.    - Monitor blood glucose.    5.) BPH:    - Continue tamsulosin and finasteride.    6.) GI / DVT PPx: protonix / heparin    Discussed with EP cleared for DC   Discussed with PCP Dr. José Luis NIELSEN tomorrow DG tele    Discussed with [facility] House staff  resident note reviewed        SIGN OUT AT 10-16-18 @ 14:21 GIVEN TO:

## 2018-10-16 NOTE — PROGRESS NOTE ADULT - SUBJECTIVE AND OBJECTIVE BOX
SUBJECTIVE:    Patient is a 68y old Male who presents with a chief complaint of Dizziness and bradycardia (16 Oct 2018 12:58)    Overnight Eevents:  Patient was seen at bed side this morning no acute events over night. HE still complains of SOB with movement. He denies fevers chill nausea, chest pain but complains of burning during  urination.    PAST MEDICAL & SURGICAL HISTORY  Heart failure with reduced ejection fraction  CAD (coronary artery disease)  BPH (benign prostatic hyperplasia)  Type 2 diabetes mellitus  End stage renal disease  Dyslipidemia  Hypertension    SOCIAL HISTORY:  Negative for smoking/alcohol/drug use.     ALLERGIES:  No Known Allergies    MEDICATIONS:  STANDING MEDICATIONS  aspirin  chewable 81 milliGRAM(s) Oral daily  atorvastatin 80 milliGRAM(s) Oral at bedtime  chlorhexidine 4% Liquid 1 Application(s) Topical <User Schedule>  dextrose 50% Injectable 25 Gram(s) IV Push once  dextrose 50% Injectable 25 Gram(s) IV Push once  docusate sodium 100 milliGRAM(s) Oral two times a day  finasteride 5 milliGRAM(s) Oral daily  furosemide    Tablet 80 milliGRAM(s) Oral every 12 hours  influenza   Vaccine 0.5 milliLiter(s) IntraMuscular once  insulin glargine Injectable (LANTUS) 16 Unit(s) SubCutaneous at bedtime  insulin lispro (HumaLOG) corrective regimen sliding scale   SubCutaneous three times a day before meals  insulin lispro Injectable (HumaLOG) 6 Unit(s) SubCutaneous three times a day before meals  isosorbide   mononitrate ER Tablet (IMDUR) 30 milliGRAM(s) Oral daily  metolazone 5 milliGRAM(s) Oral daily  pantoprazole    Tablet 40 milliGRAM(s) Oral before breakfast  senna Syrup 5 milliLiter(s) Oral two times a day  tamsulosin 0.4 milliGRAM(s) Oral at bedtime    PRN MEDICATIONS  aluminum hydroxide/magnesium hydroxide/simethicone Suspension 30 milliLiter(s) Oral every 6 hours PRN  glucagon  Injectable 1 milliGRAM(s) IntraMuscular once PRN  morphine  - Injectable 4 milliGRAM(s) IV Push every 10 minutes PRN  morphine  - Injectable 2 milliGRAM(s) IV Push every 10 minutes PRN    VITALS:   T(F): 98  HR: 69  BP: 123/64  RR: 23  SpO2: 95%    PHYSICAL EXAM:  . General: NAD  . HEENT  · Respiratory: Breath Sounds equal & with b/l basilar crackles   auscultation, no accessory muscle use  · Cardiovascular: Regular rate & rhythm, normal S1, S2; no murmurs, gallops or rubs; no S3, S4  · Gastrointestinal Soft, non-tender, mildly distended , normal bowel sounds  · Extremities:	No cyanosis, clubbing or edema  · Skin no macular rashs, no lacerations.   . Neuro: non focal     LABS:                        10.0   8.51  )-----------( 129      ( 16 Oct 2018 04:25 )             31.9     10    142  |  97<L>  |  69<HH>  ----------------------------<  162<H>  4.7   |  28  |  5.5<HH>    Ca    8.8      16 Oct 2018 04:25  Phos  6.4     10-16  Mg     2.4     10-16    TPro  6.9  /  Alb  3.8  /  TBili  0.7  /  DBili  x   /  AST  16  /  ALT  20  /  AlkPhos  150<H>  10-16      Urinalysis Basic - ( 15 Oct 2018 18:35 )    Color: Red / Appearance: Turbid / S.020 / pH: x  Gluc: x / Ketone: Negative  / Bili: Negative / Urobili: 0.2 mg/dL   Blood: x / Protein: >=300 mg/dL / Nitrite: Negative   Leuk Esterase: Large / RBC: 10-25 /HPF / WBC >50 /HPF   Sq Epi: x / Non Sq Epi: Moderate /HPF / Bacteria: Moderate /HPF                    10-15-18 @ 07:01  -  10-16-18 @ 07:00  --------------------------------------------------------  IN: 940 mL / OUT: 1225 mL / NET: -285 mL          Radiology:    CXR 10/16/18 --> Basilar B/L vascular congestion

## 2018-10-16 NOTE — PROGRESS NOTE ADULT - ASSESSMENT
67 y/o M with PMH of ESRD on HD, CAD s/p pci with stent in LAD and RCA, Heart failure with reduced EF (unknown %), DLD, DM II, and BPH was sent in from hemodialysis after he was noted to be bradycardic. Found to be complete heart block.    1.) 3rd Degree Heart Block:    - s/p transvenous pacemaker placement in the ED.   now s/p AICd by EP    2.) Heart Failure with reduced ejection fraction / CAD / DLD:  got AICD    - Continue aspirin, plavix, atorvastatin, imdur, lasix,      3.) ESRD on HD:    - on HD  MWF  was short of breath today  CXR showing fluid  will talk to renal for extra treatment today    - Continue lasix and metolazone.     - Renal diet.    - Monitor BMP, Mg, and P.       HD today    4.) DM II:    - Continue basal / bolus insulin.    - Carbohydrate consistent diet.    - Check hemoglobin A1c.    - Monitor blood glucose.    5.) BPH:    - Continue tamsulosin and finasteride.    6.) GI / DVT PPx: protonix / heparin    Discussed with EP  Discussed with his daughter    Discussed with [facility] staff  resident note reviewed

## 2018-10-16 NOTE — PROGRESS NOTE ADULT - SUBJECTIVE AND OBJECTIVE BOX
seen and examined  sitting on his chair  s/p AICD  s/p hd yesterday       Standing Inpatient Medications  aspirin  chewable 81 milliGRAM(s) Oral daily  atorvastatin 80 milliGRAM(s) Oral at bedtime  chlorhexidine 4% Liquid 1 Application(s) Topical <User Schedule>  dextrose 50% Injectable 25 Gram(s) IV Push once  dextrose 50% Injectable 25 Gram(s) IV Push once  docusate sodium 100 milliGRAM(s) Oral two times a day  finasteride 5 milliGRAM(s) Oral daily  furosemide    Tablet 80 milliGRAM(s) Oral every 12 hours  influenza   Vaccine 0.5 milliLiter(s) IntraMuscular once  insulin glargine Injectable (LANTUS) 16 Unit(s) SubCutaneous at bedtime  insulin lispro (HumaLOG) corrective regimen sliding scale   SubCutaneous three times a day before meals  insulin lispro Injectable (HumaLOG) 6 Unit(s) SubCutaneous three times a day before meals  isosorbide   mononitrate ER Tablet (IMDUR) 30 milliGRAM(s) Oral daily  metolazone 5 milliGRAM(s) Oral daily  pantoprazole    Tablet 40 milliGRAM(s) Oral before breakfast  tamsulosin 0.4 milliGRAM(s) Oral at bedtime        VITALS/PHYSICAL EXAM  --------------------------------------------------------------------------------  T(C): 36.6 (10-16-18 @ 04:00), Max: 37.7 (10-15-18 @ 20:07)  HR: 70 (10-16-18 @ 06:00) (60 - 82)  BP: 113/59 (10-16-18 @ 06:00) (112/56 - 129/57)  RR: 20 (10-16-18 @ 06:00) (0 - 29)  SpO2: 96% (10-16-18 @ 06:00) (94% - 98%)  Wt(kg): --  Height (cm): 180.34 (10-16-18 @ 07:41)  Weight (kg): 91.5 (10-16-18 @ 07:41)  BMI (kg/m2): 28.1 (10-16-18 @ 07:41)  BSA (m2): 2.12 (10-16-18 @ 07:41)      10-15-18 @ 07:01  -  10-16-18 @ 07:00  --------------------------------------------------------  IN: 940 mL / OUT: 1225 mL / NET: -285 mL      Physical Exam:  	Gen: NAD  	Pulm: decrease BS B/L  	CV: S1S2; no rub  	Abd: +distended  	LE:  edema  	Vascular access: av fistula     LABS/STUDIES  --------------------------------------------------------------------------------              10.0   8.51  >-----------<  129      [10-16-18 @ 04:25]              31.9     142  |  97  |  69  ----------------------------<  162      [10-16-18 @ 04:25]  4.7   |  28  |  5.5        Ca     8.8     [10-16-18 @ 04:25]      Mg     2.4     [10-16-18 @ 04:25]      Phos  6.4     [10-16-18 @ 04:25]    TPro  6.9  /  Alb  3.8  /  TBili  0.7  /  DBili  x   /  AST  16  /  ALT  20  /  AlkPhos  150  [10-16-18 @ 04:25]          Creatinine Trend:  SCr 5.5 [10-16 @ 04:25]  SCr 5.8 [10-15 @ 04:20]  SCr 5.5 [10-14 @ 04:51]  SCr 4.2 [10-13 @ 05:46]  SCr 3.3 [10-12 @ 15:52]    Urinalysis - [10-15-18 @ 18:35]      Color Red / Appearance Turbid / SG 1.020 / pH 8.0      Gluc Negative / Ketone Negative  / Bili Negative / Urobili 0.2       Blood Small / Protein >=300 / Leuk Est Large / Nitrite Negative      RBC 10-25 / WBC >50 / Hyaline  / Gran  / Sq Epi  / Non Sq Epi Moderate / Bacteria Moderate      HbA1c 7.1      [10-13-18 @ 05:46]  TSH 1.47      [10-14-18 @ 04:51]

## 2018-10-17 LAB
ALBUMIN SERPL ELPH-MCNC: 3.9 G/DL — SIGNIFICANT CHANGE UP (ref 3.5–5.2)
ALP SERPL-CCNC: 149 U/L — HIGH (ref 30–115)
ALT FLD-CCNC: 18 U/L — SIGNIFICANT CHANGE UP (ref 0–41)
ANION GAP SERPL CALC-SCNC: 17 MMOL/L — HIGH (ref 7–14)
AST SERPL-CCNC: 13 U/L — SIGNIFICANT CHANGE UP (ref 0–41)
BILIRUB SERPL-MCNC: 0.5 MG/DL — SIGNIFICANT CHANGE UP (ref 0.2–1.2)
BUN SERPL-MCNC: 57 MG/DL — HIGH (ref 10–20)
CALCIUM SERPL-MCNC: 9 MG/DL — SIGNIFICANT CHANGE UP (ref 8.5–10.1)
CHLORIDE SERPL-SCNC: 96 MMOL/L — LOW (ref 98–110)
CO2 SERPL-SCNC: 29 MMOL/L — SIGNIFICANT CHANGE UP (ref 17–32)
CREAT SERPL-MCNC: 4.6 MG/DL — CRITICAL HIGH (ref 0.7–1.5)
GLUCOSE BLDC GLUCOMTR-MCNC: 124 MG/DL — HIGH (ref 70–99)
GLUCOSE BLDC GLUCOMTR-MCNC: 137 MG/DL — HIGH (ref 70–99)
GLUCOSE BLDC GLUCOMTR-MCNC: 155 MG/DL — HIGH (ref 70–99)
GLUCOSE BLDC GLUCOMTR-MCNC: 157 MG/DL — HIGH (ref 70–99)
GLUCOSE BLDC GLUCOMTR-MCNC: 203 MG/DL — HIGH (ref 70–99)
GLUCOSE BLDC GLUCOMTR-MCNC: 229 MG/DL — HIGH (ref 70–99)
GLUCOSE SERPL-MCNC: 134 MG/DL — HIGH (ref 70–99)
HCT VFR BLD CALC: 32.3 % — LOW (ref 42–52)
HGB BLD-MCNC: 10.1 G/DL — LOW (ref 14–18)
MAGNESIUM SERPL-MCNC: 2.2 MG/DL — SIGNIFICANT CHANGE UP (ref 1.8–2.4)
MCHC RBC-ENTMCNC: 29.9 PG — SIGNIFICANT CHANGE UP (ref 27–31)
MCHC RBC-ENTMCNC: 31.3 G/DL — LOW (ref 32–37)
MCV RBC AUTO: 95.6 FL — HIGH (ref 80–94)
NRBC # BLD: 0 /100 WBCS — SIGNIFICANT CHANGE UP (ref 0–0)
PHOSPHATE SERPL-MCNC: 4.9 MG/DL — SIGNIFICANT CHANGE UP (ref 2.1–4.9)
PLATELET # BLD AUTO: 126 K/UL — LOW (ref 130–400)
POTASSIUM SERPL-MCNC: 4.1 MMOL/L — SIGNIFICANT CHANGE UP (ref 3.5–5)
POTASSIUM SERPL-SCNC: 4.1 MMOL/L — SIGNIFICANT CHANGE UP (ref 3.5–5)
PROT SERPL-MCNC: 6.9 G/DL — SIGNIFICANT CHANGE UP (ref 6–8)
RBC # BLD: 3.38 M/UL — LOW (ref 4.7–6.1)
RBC # FLD: 14.4 % — SIGNIFICANT CHANGE UP (ref 11.5–14.5)
SODIUM SERPL-SCNC: 142 MMOL/L — SIGNIFICANT CHANGE UP (ref 135–146)
WBC # BLD: 7.69 K/UL — SIGNIFICANT CHANGE UP (ref 4.8–10.8)
WBC # FLD AUTO: 7.69 K/UL — SIGNIFICANT CHANGE UP (ref 4.8–10.8)

## 2018-10-17 RX ORDER — HEPARIN SODIUM 5000 [USP'U]/ML
5000 INJECTION INTRAVENOUS; SUBCUTANEOUS EVERY 12 HOURS
Qty: 0 | Refills: 0 | Status: DISCONTINUED | OUTPATIENT
Start: 2018-10-17 | End: 2018-10-23

## 2018-10-17 RX ORDER — METOPROLOL TARTRATE 50 MG
25 TABLET ORAL
Qty: 0 | Refills: 0 | Status: DISCONTINUED | OUTPATIENT
Start: 2018-10-17 | End: 2018-10-23

## 2018-10-17 RX ORDER — ACETAMINOPHEN 500 MG
650 TABLET ORAL EVERY 6 HOURS
Qty: 0 | Refills: 0 | Status: DISCONTINUED | OUTPATIENT
Start: 2018-10-17 | End: 2018-10-23

## 2018-10-17 RX ADMIN — Medication 6 UNIT(S): at 17:50

## 2018-10-17 RX ADMIN — PANTOPRAZOLE SODIUM 40 MILLIGRAM(S): 20 TABLET, DELAYED RELEASE ORAL at 06:34

## 2018-10-17 RX ADMIN — Medication 25 MILLIGRAM(S): at 22:09

## 2018-10-17 RX ADMIN — Medication 650 MILLIGRAM(S): at 03:13

## 2018-10-17 RX ADMIN — Medication 100 MILLIGRAM(S): at 06:34

## 2018-10-17 RX ADMIN — Medication 81 MILLIGRAM(S): at 14:22

## 2018-10-17 RX ADMIN — Medication 6 UNIT(S): at 14:24

## 2018-10-17 RX ADMIN — ISOSORBIDE MONONITRATE 30 MILLIGRAM(S): 60 TABLET, EXTENDED RELEASE ORAL at 14:22

## 2018-10-17 RX ADMIN — Medication 80 MILLIGRAM(S): at 06:34

## 2018-10-17 RX ADMIN — HEPARIN SODIUM 5000 UNIT(S): 5000 INJECTION INTRAVENOUS; SUBCUTANEOUS at 17:51

## 2018-10-17 RX ADMIN — Medication 6 UNIT(S): at 08:44

## 2018-10-17 RX ADMIN — TAMSULOSIN HYDROCHLORIDE 0.4 MILLIGRAM(S): 0.4 CAPSULE ORAL at 22:10

## 2018-10-17 RX ADMIN — FINASTERIDE 5 MILLIGRAM(S): 5 TABLET, FILM COATED ORAL at 14:22

## 2018-10-17 RX ADMIN — CEFTRIAXONE 100 GRAM(S): 500 INJECTION, POWDER, FOR SOLUTION INTRAMUSCULAR; INTRAVENOUS at 22:13

## 2018-10-17 RX ADMIN — SENNA PLUS 5 MILLILITER(S): 8.6 TABLET ORAL at 17:48

## 2018-10-17 RX ADMIN — Medication 650 MILLIGRAM(S): at 17:51

## 2018-10-17 RX ADMIN — Medication 2: at 14:23

## 2018-10-17 RX ADMIN — Medication 80 MILLIGRAM(S): at 17:49

## 2018-10-17 RX ADMIN — ATORVASTATIN CALCIUM 80 MILLIGRAM(S): 80 TABLET, FILM COATED ORAL at 22:09

## 2018-10-17 RX ADMIN — SENNA PLUS 5 MILLILITER(S): 8.6 TABLET ORAL at 06:34

## 2018-10-17 RX ADMIN — Medication 100 MILLIGRAM(S): at 17:49

## 2018-10-17 RX ADMIN — INSULIN GLARGINE 16 UNIT(S): 100 INJECTION, SOLUTION SUBCUTANEOUS at 22:09

## 2018-10-17 NOTE — PROGRESS NOTE ADULT - ASSESSMENT
S/p AICD for AV block.  AS -   C/w medical therapy for CAD, CHF  Restart clopidogrel if Ok with EP.  Restart BB and ARB if BP tolerates.  DM control.  RRT as per nephrology.  D/c planning.

## 2018-10-17 NOTE — PROGRESS NOTE ADULT - ASSESSMENT
69 y/o M with PMH of ESRD on HD, CAD s/p pci with stent in LAD and RCA, Heart failure with reduced EF (unknown %), DLD, DM II, and BPH was sent in from hemodialysis after he was noted to be bradycardic. Found to be complete heart block.    1.) 3rd Degree Heart Block:    - s/p transvenous pacemaker placement in the ED.   now s/p AICd by EP  doing good post procedure    2.) Heart Failure with reduced ejection fraction / CAD / DLD:      got AICD    - Continue aspirin, plavix, atorvastatin, imdur, lasix,      3.) ESRD on HD:    - on HD  MWF  s/p HD today    - Continue lasix and metolazone.     - Renal diet.    - Monitor BMP, Mg, and P.    4.) DM II:    - Continue basal / bolus insulin.    - Carbohydrate consistent diet.    - Check hemoglobin A1c.    - Monitor blood glucose.    5.) BPH:    - Continue tamsulosin and finasteride.    6)   Dysuria  positive urine  start Rocephin daily  f/u cultures    6.) GI / DVT PPx: protonix / heparin    Discussed with his daughter    Discussed with House staff  resident note reviewed

## 2018-10-17 NOTE — PROGRESS NOTE ADULT - SUBJECTIVE AND OBJECTIVE BOX
69 yo M with history of CAD s/p PCI of RCA and LAD  (2017), last EF 40% by echo (2018). Patient admitted after he was noted to have bradycardia at dialysis on Friday. No chest pain, palpitations, or history of syncope. In ER, patient noted to have high grade AV block. TVP placed in ER. He underwent an attempt at a BiV ICD, which was unsuccessful due to anatomy of CS. He received a DC ICD on 10/15 and R IJ TVP was removed.   patient was transferred to telemetry for discharge planning.    10/17/18  hospital day 5  As per sign out patient was to be discharged today. On initial interview he reported no pain, no symptoms, and nothing bothering him today. he then refused exam so he could eat breakfast before dialysis. later called to bedside because patient had pain in right arm upon movement and attempt to weight bear a little when transferring from chair to bed to go to dialysis. OT consult ordered,  to evaluate need for rehab/pt (patient refused services).   However, on later interview he spoke about continued symptomatic UA: with increased urgency, frequency, and burning when urine builds up for the last week and a half. Culture is still pending, so patient will wait for the result so that he can be sent home on an appropriate, verified antibiotic regimen. Family aware and agree with the plan. Ot was ordered this am as patient had difficulty ambulating and balancing without use of his right arm (instructed not to place pressure on it or stretch it for at least three weeks, also painful when he bears weight on it). patient refused service, adn said he would do all right at home with help from wife and daughter.    PAST MEDICAL & SURGICAL HISTORY  Heart failure with reduced ejection fraction  CAD (coronary artery disease)  BPH (benign prostatic hyperplasia)  Type 2 diabetes mellitus  End stage renal disease  Dyslipidemia  Hypertension    SOCIAL HISTORY:  Negative for smoking/alcohol/drug use.     ALLERGIES:  No Known Allergies    MEDICATIONS:  STANDING MEDICATIONS  aspirin  chewable 81 milliGRAM(s) Oral daily  atorvastatin 80 milliGRAM(s) Oral at bedtime  cefTRIAXone   IVPB 1 Gram(s) IV Intermittent every 24 hours  chlorhexidine 4% Liquid 1 Application(s) Topical <User Schedule>  dextrose 50% Injectable 25 Gram(s) IV Push once  dextrose 50% Injectable 25 Gram(s) IV Push once  docusate sodium 100 milliGRAM(s) Oral two times a day  finasteride 5 milliGRAM(s) Oral daily  furosemide    Tablet 80 milliGRAM(s) Oral every 12 hours  heparin  Injectable 5000 Unit(s) SubCutaneous every 12 hours  influenza   Vaccine 0.5 milliLiter(s) IntraMuscular once  insulin glargine Injectable (LANTUS) 16 Unit(s) SubCutaneous at bedtime  insulin lispro (HumaLOG) corrective regimen sliding scale   SubCutaneous three times a day before meals  insulin lispro Injectable (HumaLOG) 6 Unit(s) SubCutaneous three times a day before meals  isosorbide   mononitrate ER Tablet (IMDUR) 30 milliGRAM(s) Oral daily  metolazone 5 milliGRAM(s) Oral daily  pantoprazole    Tablet 40 milliGRAM(s) Oral before breakfast  senna Syrup 5 milliLiter(s) Oral two times a day  tamsulosin 0.4 milliGRAM(s) Oral at bedtime    PRN MEDICATIONS  acetaminophen   Tablet .. 650 milliGRAM(s) Oral every 6 hours PRN  aluminum hydroxide/magnesium hydroxide/simethicone Suspension 30 milliLiter(s) Oral every 6 hours PRN  glucagon  Injectable 1 milliGRAM(s) IntraMuscular once PRN  morphine  - Injectable 4 milliGRAM(s) IV Push every 10 minutes PRN  morphine  - Injectable 2 milliGRAM(s) IV Push every 10 minutes PRN    VITALS:   T(F): 97.7  HR: 81  BP: 136/60  RR: 18  SpO2: 97%    LABS:                        10.1   7.69  )-----------( 126      ( 17 Oct 2018 06:29 )             32.3     10-    142  |  96<L>  |  57<H>  ----------------------------<  134<H>  4.1   |  29  |  4.6<HH>    Ca    9.0      17 Oct 2018 06:29  Phos  4.9     10-  Mg     2.2     10-    TPro  6.9  /  Alb  3.9  /  TBili  0.5  /  DBili  x   /  AST  13  /  ALT  18  /  AlkPhos  149<H>  10-      Urinalysis Basic - ( 15 Oct 2018 18:35 )    Color: Red / Appearance: Turbid / S.020 / pH: x  Gluc: x / Ketone: Negative  / Bili: Negative / Urobili: 0.2 mg/dL   Blood: x / Protein: >=300 mg/dL / Nitrite: Negative   Leuk Esterase: Large / RBC: 10-25 /HPF / WBC >50 /HPF   Sq Epi: x / Non Sq Epi: Moderate /HPF / Bacteria: Moderate /HPF                    RADIOLOGY:    PHYSICAL EXAM:  After interview, patient refused exam this am because he wanted to eat breakfast before dialysis. he was then at dialysis.   resident vela dinto the room to observe when patient trying to transfer to bed, patient had pain in right arm, unable to weight bear with it, uses a cane at home.

## 2018-10-17 NOTE — PROGRESS NOTE ADULT - ASSESSMENT
67 y/o M with PMH of ESRD on HD, CAD s/p pci with stent in LAD and RCA, Heart failure with reduced EF (unknown %), DLD, DM II, and BPH was sent in from hemodialysis after he was noted to be bradycardic. Found to be complete heart block.    3rd Degree Heart Block:  - s/p transvenous pacemaker placement in the ED.  -now s/p AICd by EP  -doing well, has pain in the right arm.      Heart Failure with reduced ejection fraction / CAD / DLD:  -AICd placed  -Continue aspirin, atorvastatin, imdur, lasix,  -as per cardiology metoprolol restarted. If BP tolerates restart ARB as well.   -f/u EP in am if ok to restart daily plavix    ARM pain s/p AICD:  -no weight bearing/stress for 3 weeks post AICD  -patient refused ot or pt, saying his wife and daughter would help him at home.    ESRD on HD:   - on HD  MWF  -s/p HD today   - Continue lasix and metolazone.    - Renal diet.   - Monitor BMP, Mg, and P.    DM II:   - Continue basal / bolus insulin. (16, 6 TId, sliding scale)   - Carbohydrate consistent diet.   - hemoglobin A1c 7.1   - Monitor blood glucose.    BPH:    - Continue tamsulosin and finasteride.    UTI -  -patient reports symptoms on repeat interview  -positive UA  -Rocephin daily  -f/u urine culture, no prelim available yet    GI / DVT PPx: protonix / heparin  DIET: renal restriction    DISPO: pending urine culture result and antibiotic regimen for outpatient use.   Family and patient aware of plan    Discussed with House staff  resident note reviewed

## 2018-10-17 NOTE — PROGRESS NOTE ADULT - SUBJECTIVE AND OBJECTIVE BOX
Nephrology progress note    Patient is seen and examined, events over the last 24 h noted.  No new complaints except constipation for 2 days    Allergies:  No Known Allergies    Hospital Medications:   MEDICATIONS  (STANDING):  aspirin  chewable 81 milliGRAM(s) Oral daily  atorvastatin 80 milliGRAM(s) Oral at bedtime  cefTRIAXone   IVPB 1 Gram(s) IV Intermittent every 24 hours  chlorhexidine 4% Liquid 1 Application(s) Topical <User Schedule>  docusate sodium 100 milliGRAM(s) Oral two times a day  finasteride 5 milliGRAM(s) Oral daily  furosemide    Tablet 80 milliGRAM(s) Oral every 12 hours  influenza   Vaccine 0.5 milliLiter(s) IntraMuscular once  insulin glargine Injectable (LANTUS) 16 Unit(s) SubCutaneous at bedtime  insulin lispro (HumaLOG) corrective regimen sliding scale   SubCutaneous three times a day before meals  insulin lispro Injectable (HumaLOG) 6 Unit(s) SubCutaneous three times a day before meals  isosorbide   mononitrate ER Tablet (IMDUR) 30 milliGRAM(s) Oral daily  metolazone 5 milliGRAM(s) Oral daily  pantoprazole    Tablet 40 milliGRAM(s) Oral before breakfast  senna Syrup 5 milliLiter(s) Oral two times a day  tamsulosin 0.4 milliGRAM(s) Oral at bedtime        VITALS:  T(F): 97.7 (10-17-18 @ 05:40), Max: 100 (10-16-18 @ 20:26)  HR: 81 (10-17-18 @ 05:40)  BP: 137/63 (10-17-18 @ 05:40)  RR: 18 (10-17-18 @ 05:40)  SpO2: 96% (10-16-18 @ 15:40)    10-15 @ 07:01  -  10-16 @ 07:00  --------------------------------------------------------  IN: 940 mL / OUT: 1225 mL / NET: -285 mL    10-16 @ 07:01  -  10-17 @ 07:00  --------------------------------------------------------  IN: 0 mL / OUT: 1500 mL / NET: -1500 mL      Height (cm): 180.34 (10-16 @ 19:16)    PHYSICAL EXAM:  Constitutional: NAD  Respiratory: CTAB, no wheezes, rales or rhonchi  Cardiovascular: S1, S2, RRR  Gastrointestinal: BS+, soft, NT/ND  Extremities: No peripheral edema  :  No crook.     LABS:  10-16    142  |  97<L>  |  69<HH>  ----------------------------<  162<H>  4.7   |  28  |  5.5<HH>    Ca    8.8      16 Oct 2018 04:25  Phos  6.4     10-16  Mg     2.4     10-16    TPro  6.9  /  Alb  3.8  /  TBili  0.7  /  DBili      /  AST  16  /  ALT  20  /  AlkPhos  150<H>  10-16                          10.0   8.51  )-----------( 129      ( 16 Oct 2018 04:25 )             31.9       Urine Studies:  Urinalysis Basic - ( 15 Oct 2018 18:35 )    Color: Red / Appearance: Turbid / S.020 / pH:   Gluc:  / Ketone: Negative  / Bili: Negative / Urobili: 0.2 mg/dL   Blood:  / Protein: >=300 mg/dL / Nitrite: Negative   Leuk Esterase: Large / RBC: 10-25 /HPF / WBC >50 /HPF   Sq Epi:  / Non Sq Epi: Moderate /HPF / Bacteria: Moderate /HPF        RADIOLOGY & ADDITIONAL STUDIES:

## 2018-10-17 NOTE — PROGRESS NOTE ADULT - ASSESSMENT
69 yo man with PMH of CAD/ CHF ESRD on HD (MW) DM. presenting with Complete heart block:    # s/p hd yesterday 2 hr cycle, HD today with 3hr cycle, 2K bath, UF 2L as tolerated.  # s/p AICD placement followed by cardio/EP  # ph noted, add phoslo 2 tablets q 8 with meals  # h/h at goal , no YASMINE  # on metolazone , lasix, will keep if non anuric  # plan for d/c today 69 yo man with PMH of CAD/ CHF ESRD on HD (MW) DM. presenting with Complete heart block:    # s/p hd monday  2 hr cycle, HD today with 3hr cycle, 2K bath, UF 2L as tolerated.  # s/p AICD placement followed by cardio/EP  # ph noted, add phoslo 2 tablets q 8 with meals  # h/h at goal , no YASMINE  # on metolazone , lasix, will keep if non anuric  # plan for d/c today

## 2018-10-17 NOTE — PROGRESS NOTE ADULT - SUBJECTIVE AND OBJECTIVE BOX
Patient is a 68y old  Male who presents with a chief complaint of Dizziness and bradycardia (17 Oct 2018 09:00)      SUBJ:  Patient seen and examined. No chest pain. + tenderness at the ICD site. Improving. For HD today.      MEDICATIONS  (STANDING):  aspirin  chewable 81 milliGRAM(s) Oral daily  atorvastatin 80 milliGRAM(s) Oral at bedtime  cefTRIAXone   IVPB 1 Gram(s) IV Intermittent every 24 hours  chlorhexidine 4% Liquid 1 Application(s) Topical <User Schedule>  dextrose 50% Injectable 25 Gram(s) IV Push once  dextrose 50% Injectable 25 Gram(s) IV Push once  docusate sodium 100 milliGRAM(s) Oral two times a day  finasteride 5 milliGRAM(s) Oral daily  furosemide    Tablet 80 milliGRAM(s) Oral every 12 hours  influenza   Vaccine 0.5 milliLiter(s) IntraMuscular once  insulin glargine Injectable (LANTUS) 16 Unit(s) SubCutaneous at bedtime  insulin lispro (HumaLOG) corrective regimen sliding scale   SubCutaneous three times a day before meals  insulin lispro Injectable (HumaLOG) 6 Unit(s) SubCutaneous three times a day before meals  isosorbide   mononitrate ER Tablet (IMDUR) 30 milliGRAM(s) Oral daily  metolazone 5 milliGRAM(s) Oral daily  pantoprazole    Tablet 40 milliGRAM(s) Oral before breakfast  senna Syrup 5 milliLiter(s) Oral two times a day  tamsulosin 0.4 milliGRAM(s) Oral at bedtime    MEDICATIONS  (PRN):  acetaminophen   Tablet .. 650 milliGRAM(s) Oral every 6 hours PRN Temp greater or equal to 38C (100.4F), Mild Pain (1 - 3)  aluminum hydroxide/magnesium hydroxide/simethicone Suspension 30 milliLiter(s) Oral every 6 hours PRN Dyspepsia  glucagon  Injectable 1 milliGRAM(s) IntraMuscular once PRN Glucose LESS THAN 70 milligrams/deciliter  morphine  - Injectable 4 milliGRAM(s) IV Push every 10 minutes PRN Severe Pain (7 - 10)  morphine  - Injectable 2 milliGRAM(s) IV Push every 10 minutes PRN Moderate Pain (4 - 6)            Vital Signs Last 24 Hrs  T(C): 36.5 (17 Oct 2018 05:40), Max: 37.8 (16 Oct 2018 20:26)  T(F): 97.7 (17 Oct 2018 05:40), Max: 100 (16 Oct 2018 20:26)  HR: 79 (17 Oct 2018 12:38) (75 - 87)  BP: 127/63 (17 Oct 2018 12:38) (127/63 - 147/66)  BP(mean): 90 (16 Oct 2018 14:00) (90 - 90)  RR: 18 (17 Oct 2018 12:38) (17 - 24)  SpO2: 98% (17 Oct 2018 12:38) (96% - 98%)      PHYSICAL EXAM:    GEN: AAO x 3, NAD  HEENT: NC/AT  Neck: No JVD  CV: Reg, S1-S2  Lungs: CTAB  Abd: Soft, non-tender  Ext: trace edema      I&O's Summary    16 Oct 2018 07:01  -  17 Oct 2018 07:00  --------------------------------------------------------  IN: 0 mL / OUT: 1500 mL / NET: -1500 mL    17 Oct 2018 07:01  -  17 Oct 2018 13:08  --------------------------------------------------------  IN: 0 mL / OUT: 2300 mL / NET: -2300 mL    	        LABS:                        10.1   7.69  )-----------( 126      ( 17 Oct 2018 06:29 )             32.3     10-17    142  |  96<L>  |  57<H>  ----------------------------<  134<H>  4.1   |  29  |  4.6<HH>    Ca    9.0      17 Oct 2018 06:29  Phos  4.9     10-17  Mg     2.2     10-17    TPro  6.9  /  Alb  3.9  /  TBili  0.5  /  DBili  x   /  AST  13  /  ALT  18  /  AlkPhos  149<H>  10-17              BNP  RADIOLOGY & ADDITIONAL STUDIES:      IMPRESSION AND PLAN:

## 2018-10-17 NOTE — PROGRESS NOTE ADULT - ATTENDING COMMENTS
69 yo M with history of CAD s/p PCI of RCA and LAD  (2017), last EF 40% by echo (June 2018). Patient admitted after he was noted to have bradycardia at dialysis on Friday. He states he was not feeling well for two days. No chest pain, palpitations, or history of syncope. In ER, patient noted to have high grade AV block. TVP placed in ER.     No cardiovascular complaints. No recent trips, travel, or tick bites. Left sided fistula intact.     Tele reviewed and TVP checked at 30 bpm. When lowered, patient with high grade AV block with a junctional escape in the 40s. However, he is having periods of sinus rhythm at 75 bpm.      Plan  - Check 2D Echo  - TSH normal  - Keep pt NPO after midnight tonigh and hold all anticoagulation including SQ Heparin & Plavix Sun night.  - Please schedule patient for dialysis at 6AM on Mon.  - Discussed procedure, risks, benefits, and answered all questions to his satisfaction. Discussed in detail with the patient's daughter as well as Dr. Acosta. Discussed the possibility of an ICD vs PPM depending on EF on echo as well as possible BiV device depending on tele overnight. If he is not requiring significant pacing, may need a BiV device.
67 yo M with history of CAD s/p PCI of RCA and LAD  (2017), last EF 40% by echo (June 2018). Patient admitted after he was noted to have bradycardia at dialysis on Friday. He states he was not feeling well for two days. No chest pain, palpitations, or history of syncope. In ER, patient noted to have high grade AV block. TVP placed in ER. He underwent an attempt at a BiV ICD, which was unsuccessful due to anatomy of CS. He received a DC ICD on 10/15 and R IJ TVP was removed.     Tele: No events    s/p device interrogation (1 episode marked as AT/AF but appears to be sensing the V and is not consistent with AT/AF)  Can resume Plavix tomorrow  No shower until Thurs  Follow up UCx as UA shows signs of infection  Patient to get dialysis today  Give Vanc with dialysis today (1.5 g)  Discharge home with ABx (Keflex x 5 days)  Follow up with me in the office in 4 weeks
I was Physically Present for the key portions of the evaluation   I agree with the above History  , Physical examination Assessment and plan   I have Reviewed , Modified or appended where appropriate.  Please check A and P as above   1- ESRD on HD for HD today 3h opti 160 2k UF 3l   2- Bradycardia sp PPM   3- hyperphosphatemia start Phoslo 2/2/2    DC planning?

## 2018-10-17 NOTE — PROGRESS NOTE ADULT - SUBJECTIVE AND OBJECTIVE BOX
INTERVAL HPI/OVERNIGHT EVENTS:    HPI  69 y/o M with PMH of ESRD on HD, CAD s/p pci with stent in LAD and RCA, Heart failure with reduced EF (unknown %), DLD, DM II, and BPH was sent in from hemodialysis after he was noted to be bradycardic. During dialysis he had no symptoms and he completed dialysis without issue. He was told however, that his heart rate was "very low" (exact rate not known), and that he should go to the hospital immediately. found to have third degree heart block    CC  Pt seen and examined today for follow up of Heart block  s/p AICD placement  c/o burning with urination    REVIEW OF SYSTEMS:  CONSTITUTIONAL: No fever, weight loss, or fatigue  EYES: No eye pain, visual disturbances, or discharge  ENMT:  No difficulty hearing, tinnitus, vertigo; No sinus or throat pain  NECK: No pain or stiffness  BREASTS: No pain, masses, or nipple discharge  RESPIRATORY: No cough, wheezing, chills or hemoptysis; No shortness of breath  CARDIOVASCULAR: No chest pain, palpitations, dizziness, or leg swelling  GASTROINTESTINAL: No abdominal or epigastric pain.   GENITOURINARY: dysuria  NEUROLOGICAL: No headaches, memory loss, loss of strength, numbness, or tremors  SKIN: No itching, burning, rashes, or lesions   LYMPH NODES: No enlarged glands  ENDOCRINE: No heat or cold intolerance; No hair loss  MUSCULOSKELETAL: No joint pain or swelling; No muscle, back, or extremity pain  PSYCHIATRIC: No depression, anxiety, mood swings, or difficulty sleeping  HEME/LYMPH: No easy bruising, or bleeding gums  ALLERY AND IMMUNOLOGIC: No hives or eczema    VITALS:  T(F): 97.7, Max: 100 (10-16-18 @ 20:26)  HR: 83  BP: 151/63  RR: 18  SpO2: 98%    PHYSICAL EXAM:  GENERAL: NAD,   HEAD:  Atraumatic, Normocephalic  EYES: EOMI, PERRLA, conjunctiva and sclera clear  ENMT: No tonsillar erythema, exudates, or enlargement; Moist mucous membranes, Good dentition, No lesions  NECK: Supple, No JVD, Normal thyroid  NERVOUS SYSTEM:  Alert & Oriented X3,   CHEST/LUNG: basal crackles  HEART: Regular rate and rhythm; No murmurs, rubs, or gallops  ABDOMEN: Soft, Nontender, Nondistended; Bowel sounds present  EXTREMITIES:  edema  LYMPH: No lymphadenopathy noted  SKIN: No rashes or lesions      LABS:  Urinalysis Basic - ( 15 Oct 2018 18:35 )    Color: Red / Appearance: Turbid / S.020 / pH: x  Gluc: x / Ketone: Negative  / Bili: Negative / Urobili: 0.2 mg/dL   Blood: x / Protein: >=300 mg/dL / Nitrite: Negative   Leuk Esterase: Large / RBC: 10-25 /HPF / WBC >50 /HPF   Sq Epi: x / Non Sq Epi: Moderate /HPF / Bacteria: Moderate /HPF      10-17    142  |  96<L>  |  57<H>  ----------------------------<  134<H>  4.1   |  29  |  4.6<HH>    Ca    9.0      17 Oct 2018 06:29  Phos  4.9     10-17  Mg     2.2     10-17    TPro  6.9  /  Alb  3.9  /  TBili  0.5  /  DBili  x   /  AST  13  /  ALT  18  /  AlkPhos  149<H>  10-17                          10.1   7.69  )-----------( 126      ( 17 Oct 2018 06:29 )             32.3           RADIOLOGY & ADDITIONAL TESTS:    Imaging Personally Reviewed:  [ ] YES  [ ] NO    MEDICATIONS:     MEDICATIONS  (STANDING):  aspirin  chewable 81 milliGRAM(s) Oral daily  atorvastatin 80 milliGRAM(s) Oral at bedtime  cefTRIAXone   IVPB 1 Gram(s) IV Intermittent every 24 hours  chlorhexidine 4% Liquid 1 Application(s) Topical <User Schedule>  dextrose 50% Injectable 25 Gram(s) IV Push once  dextrose 50% Injectable 25 Gram(s) IV Push once  docusate sodium 100 milliGRAM(s) Oral two times a day  finasteride 5 milliGRAM(s) Oral daily  furosemide    Tablet 80 milliGRAM(s) Oral every 12 hours  influenza   Vaccine 0.5 milliLiter(s) IntraMuscular once  insulin glargine Injectable (LANTUS) 16 Unit(s) SubCutaneous at bedtime  insulin lispro (HumaLOG) corrective regimen sliding scale   SubCutaneous three times a day before meals  insulin lispro Injectable (HumaLOG) 6 Unit(s) SubCutaneous three times a day before meals  isosorbide   mononitrate ER Tablet (IMDUR) 30 milliGRAM(s) Oral daily  metolazone 5 milliGRAM(s) Oral daily  pantoprazole    Tablet 40 milliGRAM(s) Oral before breakfast  senna Syrup 5 milliLiter(s) Oral two times a day  tamsulosin 0.4 milliGRAM(s) Oral at bedtime    MEDICATIONS  (PRN):  acetaminophen   Tablet .. 650 milliGRAM(s) Oral every 6 hours PRN Temp greater or equal to 38C (100.4F), Mild Pain (1 - 3)  aluminum hydroxide/magnesium hydroxide/simethicone Suspension 30 milliLiter(s) Oral every 6 hours PRN Dyspepsia  glucagon  Injectable 1 milliGRAM(s) IntraMuscular once PRN Glucose LESS THAN 70 milligrams/deciliter  morphine  - Injectable 4 milliGRAM(s) IV Push every 10 minutes PRN Severe Pain (7 - 10)  morphine  - Injectable 2 milliGRAM(s) IV Push every 10 minutes PRN Moderate Pain (4 - 6)

## 2018-10-18 ENCOUNTER — TRANSCRIPTION ENCOUNTER (OUTPATIENT)
Age: 68
End: 2018-10-18

## 2018-10-18 LAB
-  AMIKACIN: SIGNIFICANT CHANGE UP
-  AMOXICILLIN/CLAVULANIC ACID: SIGNIFICANT CHANGE UP
-  AMPICILLIN/SULBACTAM: SIGNIFICANT CHANGE UP
-  AMPICILLIN: SIGNIFICANT CHANGE UP
-  AZTREONAM: SIGNIFICANT CHANGE UP
-  CEFAZOLIN: SIGNIFICANT CHANGE UP
-  CEFEPIME: SIGNIFICANT CHANGE UP
-  CEFOXITIN: SIGNIFICANT CHANGE UP
-  CEFTRIAXONE: SIGNIFICANT CHANGE UP
-  CIPROFLOXACIN: SIGNIFICANT CHANGE UP
-  ERTAPENEM: SIGNIFICANT CHANGE UP
-  GENTAMICIN: SIGNIFICANT CHANGE UP
-  IMIPENEM: SIGNIFICANT CHANGE UP
-  LEVOFLOXACIN: SIGNIFICANT CHANGE UP
-  MEROPENEM: SIGNIFICANT CHANGE UP
-  NITROFURANTOIN: SIGNIFICANT CHANGE UP
-  PIPERACILLIN/TAZOBACTAM: SIGNIFICANT CHANGE UP
-  TIGECYCLINE: SIGNIFICANT CHANGE UP
-  TOBRAMYCIN: SIGNIFICANT CHANGE UP
-  TRIMETHOPRIM/SULFAMETHOXAZOLE: SIGNIFICANT CHANGE UP
ANION GAP SERPL CALC-SCNC: 18 MMOL/L — HIGH (ref 7–14)
BUN SERPL-MCNC: 45 MG/DL — HIGH (ref 10–20)
CALCIUM SERPL-MCNC: 9.1 MG/DL — SIGNIFICANT CHANGE UP (ref 8.5–10.1)
CHLORIDE SERPL-SCNC: 93 MMOL/L — LOW (ref 98–110)
CO2 SERPL-SCNC: 28 MMOL/L — SIGNIFICANT CHANGE UP (ref 17–32)
CREAT SERPL-MCNC: 4.8 MG/DL — CRITICAL HIGH (ref 0.7–1.5)
CULTURE RESULTS: SIGNIFICANT CHANGE UP
GLUCOSE BLDC GLUCOMTR-MCNC: 169 MG/DL — HIGH (ref 70–99)
GLUCOSE BLDC GLUCOMTR-MCNC: 203 MG/DL — HIGH (ref 70–99)
GLUCOSE BLDC GLUCOMTR-MCNC: 213 MG/DL — HIGH (ref 70–99)
GLUCOSE BLDC GLUCOMTR-MCNC: 253 MG/DL — HIGH (ref 70–99)
GLUCOSE SERPL-MCNC: 176 MG/DL — HIGH (ref 70–99)
HCT VFR BLD CALC: 32 % — LOW (ref 42–52)
HGB BLD-MCNC: 10.1 G/DL — LOW (ref 14–18)
MAGNESIUM SERPL-MCNC: 2 MG/DL — SIGNIFICANT CHANGE UP (ref 1.8–2.4)
MCHC RBC-ENTMCNC: 30 PG — SIGNIFICANT CHANGE UP (ref 27–31)
MCHC RBC-ENTMCNC: 31.6 G/DL — LOW (ref 32–37)
MCV RBC AUTO: 95 FL — HIGH (ref 80–94)
METHOD TYPE: SIGNIFICANT CHANGE UP
NRBC # BLD: 0 /100 WBCS — SIGNIFICANT CHANGE UP (ref 0–0)
ORGANISM # SPEC MICROSCOPIC CNT: SIGNIFICANT CHANGE UP
ORGANISM # SPEC MICROSCOPIC CNT: SIGNIFICANT CHANGE UP
PHOSPHATE SERPL-MCNC: 4.3 MG/DL — SIGNIFICANT CHANGE UP (ref 2.1–4.9)
PLATELET # BLD AUTO: 138 K/UL — SIGNIFICANT CHANGE UP (ref 130–400)
POTASSIUM SERPL-MCNC: 3.9 MMOL/L — SIGNIFICANT CHANGE UP (ref 3.5–5)
POTASSIUM SERPL-SCNC: 3.9 MMOL/L — SIGNIFICANT CHANGE UP (ref 3.5–5)
RBC # BLD: 3.37 M/UL — LOW (ref 4.7–6.1)
RBC # FLD: 14.3 % — SIGNIFICANT CHANGE UP (ref 11.5–14.5)
SODIUM SERPL-SCNC: 139 MMOL/L — SIGNIFICANT CHANGE UP (ref 135–146)
SPECIMEN SOURCE: SIGNIFICANT CHANGE UP
WBC # BLD: 6.86 K/UL — SIGNIFICANT CHANGE UP (ref 4.8–10.8)
WBC # FLD AUTO: 6.86 K/UL — SIGNIFICANT CHANGE UP (ref 4.8–10.8)

## 2018-10-18 RX ORDER — CLOPIDOGREL BISULFATE 75 MG/1
75 TABLET, FILM COATED ORAL DAILY
Qty: 0 | Refills: 0 | Status: DISCONTINUED | OUTPATIENT
Start: 2018-10-18 | End: 2018-10-23

## 2018-10-18 RX ORDER — CALCIUM ACETATE 667 MG
667 TABLET ORAL
Qty: 0 | Refills: 0 | Status: DISCONTINUED | OUTPATIENT
Start: 2018-10-18 | End: 2018-10-23

## 2018-10-18 RX ORDER — LOSARTAN POTASSIUM 100 MG/1
25 TABLET, FILM COATED ORAL DAILY
Qty: 0 | Refills: 0 | Status: DISCONTINUED | OUTPATIENT
Start: 2018-10-18 | End: 2018-10-23

## 2018-10-18 RX ADMIN — Medication 6 UNIT(S): at 19:02

## 2018-10-18 RX ADMIN — Medication 80 MILLIGRAM(S): at 19:02

## 2018-10-18 RX ADMIN — CEFTRIAXONE 100 GRAM(S): 500 INJECTION, POWDER, FOR SOLUTION INTRAMUSCULAR; INTRAVENOUS at 21:43

## 2018-10-18 RX ADMIN — CLOPIDOGREL BISULFATE 75 MILLIGRAM(S): 75 TABLET, FILM COATED ORAL at 12:21

## 2018-10-18 RX ADMIN — Medication 2: at 19:01

## 2018-10-18 RX ADMIN — Medication 81 MILLIGRAM(S): at 12:21

## 2018-10-18 RX ADMIN — Medication 6 UNIT(S): at 08:12

## 2018-10-18 RX ADMIN — Medication 100 MILLIGRAM(S): at 05:18

## 2018-10-18 RX ADMIN — TAMSULOSIN HYDROCHLORIDE 0.4 MILLIGRAM(S): 0.4 CAPSULE ORAL at 21:44

## 2018-10-18 RX ADMIN — HEPARIN SODIUM 5000 UNIT(S): 5000 INJECTION INTRAVENOUS; SUBCUTANEOUS at 19:03

## 2018-10-18 RX ADMIN — Medication 25 MILLIGRAM(S): at 05:18

## 2018-10-18 RX ADMIN — Medication 100 MILLIGRAM(S): at 19:03

## 2018-10-18 RX ADMIN — Medication 667 MILLIGRAM(S): at 19:03

## 2018-10-18 RX ADMIN — Medication 25 MILLIGRAM(S): at 19:05

## 2018-10-18 RX ADMIN — Medication 80 MILLIGRAM(S): at 05:18

## 2018-10-18 RX ADMIN — HEPARIN SODIUM 5000 UNIT(S): 5000 INJECTION INTRAVENOUS; SUBCUTANEOUS at 05:18

## 2018-10-18 RX ADMIN — SENNA PLUS 5 MILLILITER(S): 8.6 TABLET ORAL at 05:19

## 2018-10-18 RX ADMIN — INSULIN GLARGINE 16 UNIT(S): 100 INJECTION, SOLUTION SUBCUTANEOUS at 21:44

## 2018-10-18 RX ADMIN — PANTOPRAZOLE SODIUM 40 MILLIGRAM(S): 20 TABLET, DELAYED RELEASE ORAL at 05:24

## 2018-10-18 RX ADMIN — Medication 6 UNIT(S): at 12:22

## 2018-10-18 RX ADMIN — ISOSORBIDE MONONITRATE 30 MILLIGRAM(S): 60 TABLET, EXTENDED RELEASE ORAL at 12:21

## 2018-10-18 RX ADMIN — Medication 1: at 08:11

## 2018-10-18 RX ADMIN — FINASTERIDE 5 MILLIGRAM(S): 5 TABLET, FILM COATED ORAL at 12:21

## 2018-10-18 RX ADMIN — Medication 2: at 12:23

## 2018-10-18 RX ADMIN — ATORVASTATIN CALCIUM 80 MILLIGRAM(S): 80 TABLET, FILM COATED ORAL at 21:44

## 2018-10-18 NOTE — PROGRESS NOTE ADULT - SUBJECTIVE AND OBJECTIVE BOX
69 yo M with history of CAD s/p PCI of RCA and LAD  (2017), last EF 40% by echo (June 2018). Patient admitted after he was noted to have bradycardia at dialysis on Friday. No chest pain, palpitations, or history of syncope. In ER, patient noted to have high grade AV block. TVP placed in ER. He underwent an attempt at a BiV ICD, which was unsuccessful due to anatomy of CS. He received a DC ICD on 10/15 and R IJ TVP was removed.   patient was transferred to telemetry for discharge planning.    10/17/18  hospital day 5  As per sign out patient was to be discharged today. On initial interview he reported no pain, no symptoms, and nothing bothering him today. he then refused exam so he could eat breakfast before dialysis. later called to bedside because patient had pain in right arm upon movement and attempt to weight bear a little when transferring from chair to bed to go to dialysis. OT consult ordered,  to evaluate need for rehab/pt (patient refused services).   However, on later interview he spoke about continued symptomatic UA: with increased urgency, frequency, and burning when urine builds up for the last week and a half. Culture is still pending, so patient will wait for the result so that he can be sent home on an appropriate, verified antibiotic regimen. Family aware and agree with the plan. Ot was ordered this am as patient had difficulty ambulating and balancing without use of his right arm (instructed not to place pressure on it or stretch it for at least three weeks, also painful when he bears weight on it). patient refused service, adn said he would do all right at home with help from wife and daughter.    10/18/18  urine culture final result not back yet. Patient evaluated by Pt as unsafe to ambulate on his own and go home without aid/rehab.  attempting to reach the family for decision. Losartan started today as per cardiology attending. 69 yo M with history of CAD s/p PCI of RCA and LAD  (2017), last EF 40% by echo (June 2018). Patient admitted after he was noted to have bradycardia at dialysis on Friday. No chest pain, palpitations, or history of syncope. In ER, patient noted to have high grade AV block. TVP placed in ER. He underwent an attempt at a BiV ICD, which was unsuccessful due to anatomy of CS. He received a DC ICD on 10/15 and R IJ TVP was removed.   patient was transferred to telemetry for discharge planning.    10/17/18  hospital day 5  As per sign out patient was to be discharged today. On initial interview he reported no pain, no symptoms, and nothing bothering him today. he then refused exam so he could eat breakfast before dialysis. later called to bedside because patient had pain in right arm upon movement and attempt to weight bear a little when transferring from chair to bed to go to dialysis. OT consult ordered,  to evaluate need for rehab/pt (patient refused services).   However, on later interview he spoke about continued symptomatic UA: with increased urgency, frequency, and burning when urine builds up for the last week and a half. Culture is still pending, so patient will wait for the result so that he can be sent home on an appropriate, verified antibiotic regimen. Family aware and agree with the plan. Ot was ordered this am as patient had difficulty ambulating and balancing without use of his right arm (instructed not to place pressure on it or stretch it for at least three weeks, also painful when he bears weight on it). patient refused service, adn said he would do all right at home with help from wife and daughter.    10/18/18  hospital day 6  urine culture final result not back yet. Patient evaluated by Pt as unsafe to ambulate on his own and go home without aid/rehab.  attempting to reach the family for decision. Losartan started today as per cardiology attending.     PAST MEDICAL & SURGICAL HISTORY  Heart failure with reduced ejection fraction  CAD (coronary artery disease)  BPH (benign prostatic hyperplasia)  Type 2 diabetes mellitus  End stage renal disease  Dyslipidemia  Hypertension    SOCIAL HISTORY:  Negative for smoking/alcohol/drug use.     ALLERGIES:  No Known Allergies    MEDICATIONS:  STANDING MEDICATIONS  aspirin  chewable 81 milliGRAM(s) Oral daily  atorvastatin 80 milliGRAM(s) Oral at bedtime  calcium acetate 667 milliGRAM(s) Oral two times a day with meals  cefTRIAXone   IVPB 1 Gram(s) IV Intermittent every 24 hours  chlorhexidine 4% Liquid 1 Application(s) Topical <User Schedule>  clopidogrel Tablet 75 milliGRAM(s) Oral daily  dextrose 50% Injectable 25 Gram(s) IV Push once  dextrose 50% Injectable 25 Gram(s) IV Push once  docusate sodium 100 milliGRAM(s) Oral two times a day  finasteride 5 milliGRAM(s) Oral daily  furosemide    Tablet 80 milliGRAM(s) Oral every 12 hours  heparin  Injectable 5000 Unit(s) SubCutaneous every 12 hours  influenza   Vaccine 0.5 milliLiter(s) IntraMuscular once  insulin glargine Injectable (LANTUS) 16 Unit(s) SubCutaneous at bedtime  insulin lispro (HumaLOG) corrective regimen sliding scale   SubCutaneous three times a day before meals  insulin lispro Injectable (HumaLOG) 6 Unit(s) SubCutaneous three times a day before meals  isosorbide   mononitrate ER Tablet (IMDUR) 30 milliGRAM(s) Oral daily  losartan 25 milliGRAM(s) Oral daily  metolazone 5 milliGRAM(s) Oral daily  metoprolol tartrate 25 milliGRAM(s) Oral two times a day  pantoprazole    Tablet 40 milliGRAM(s) Oral before breakfast  senna Syrup 5 milliLiter(s) Oral two times a day  tamsulosin 0.4 milliGRAM(s) Oral at bedtime    PRN MEDICATIONS  acetaminophen   Tablet .. 650 milliGRAM(s) Oral every 6 hours PRN  aluminum hydroxide/magnesium hydroxide/simethicone Suspension 30 milliLiter(s) Oral every 6 hours PRN  glucagon  Injectable 1 milliGRAM(s) IntraMuscular once PRN  morphine  - Injectable 4 milliGRAM(s) IV Push every 10 minutes PRN  morphine  - Injectable 2 milliGRAM(s) IV Push every 10 minutes PRN    VITALS:   T(F): 97.9  HR: 78  BP: 136/61  RR: 18  SpO2: 96%    LABS:                        10.1   6.86  )-----------( 138      ( 18 Oct 2018 07:38 )             32.0     10-18    139  |  93<L>  |  45<H>  ----------------------------<  176<H>  3.9   |  28  |  4.8<HH>    Ca    9.1      18 Oct 2018 07:38  Phos  4.3     10-18  Mg     2.0     10-18    TPro  6.9  /  Alb  3.9  /  TBili  0.5  /  DBili  x   /  AST  13  /  ALT  18  /  AlkPhos  149<H>  10-17    Culture - Urine (collected 16 Oct 2018 05:00)  Source: .Urine Clean Catch (Midstream)  Preliminary Report (17 Oct 2018 22:55):    >100,000 CFU/ml Escherichia coli    RADIOLOGY:    PHYSICAL EXAM:  GEN: NAD, awake, OOB in chair.   LUNGS: Clear to auscultation bilaterally. no wheeze.   HEART: +s1s2 RRR.   ABD: NT, mildly distended and tympanitic (likely gas), Bs+  EXT: no c/c/e. 2+PP, BENNETT, chronic dermatitis/peeling of the skin on bilateral lower extremities, indurated, darkened skin. no erythema, swelling, warmth to the touch).  NEURO: AAOX3. No focal deficits

## 2018-10-18 NOTE — DIETITIAN INITIAL EVALUATION ADULT. - ENERGY NEEDS
Estimated Calorie Needs: 9444-3761 kcal/day (MSJ x 1.2-1.5 AF)--increased needs d/t ESRD on HD   Estimated Protein Needs:  gm/day (1.2-1.4 kcal/kg IBW)--same as mentioned above   Estimated Fluid Needs: 1000ml + urine output

## 2018-10-18 NOTE — DIETITIAN INITIAL EVALUATION ADULT. - PHYSICAL APPEARANCE
overweight/alert and oriented. BMI: 28.1, IBW: 172#, UBW: 91kg, Aureliano 19, surgical incision. 2+ edema to B/L foot

## 2018-10-18 NOTE — PROGRESS NOTE ADULT - ASSESSMENT
69 y/o M with PMH of ESRD on HD, CAD s/p pci with stent in LAD and RCA, Heart failure with reduced EF (unknown %), DLD, DM II, and BPH was sent in from hemodialysis after he was noted to be bradycardic. Found to be complete heart block.    1.) 3rd Degree Heart Block:    - s/p transvenous pacemaker placement in the ED.   now s/p AICD by EP  doing good post procedure    2.) Heart Failure with reduced ejection fraction / CAD / DLD:  sob better      got AICD  cont lasix, Metolazone    - Continue aspirin, plavix, atorvastatin, imdur,       3.) ESRD on HD:    - on HD  MWF  s/p HD yesterday    - Continue lasix and metolazone.     - Renal diet.    - Monitor BMP, Mg, and P.    4.) DM II:    - Continue basal / bolus insulin.    - Carbohydrate consistent diet.    - Check hemoglobin A1c.    - Monitor blood glucose.    5.) BPH:    - Continue tamsulosin and finasteride.    6)   Dysuria  positive urine  cont Abx  urine culture showing E coli  sensitivity pending  will follow    PT/rehab  recommended STR  pt will speak to daughter today than decide for STR    6.) GI / DVT PPx: protonix / heparin        Discussed with House staff  resident note reviewed

## 2018-10-18 NOTE — DIETITIAN INITIAL EVALUATION ADULT. - FACTORS AFF FOOD INTAKE
denies difficulty swallowing/chewing; pt was constipated, but says it has resolved. LBM 10/18/other (specify)

## 2018-10-18 NOTE — PROGRESS NOTE ADULT - ASSESSMENT
69 y/o M with PMH of ESRD on HD, CAD s/p pci with stent in LAD and RCA, Heart failure with reduced EF (unknown %), DLD, DM II, and BPH was sent in from hemodialysis after he was noted to be bradycardic. Found to be complete heart block.    3rd Degree Heart Block:  - s/p transvenous pacemaker placement in the ED.  -now s/p AICd by EP  -doing well, has pain in the right arm.      Heart Failure with reduced ejection fraction / CAD / DLD:  -AICd placed  -Continue aspirin, atorvastatin, imdur, lasix,  -as per cardiology metoprolol restarted, losartan started at 25 daily.  -plavix restarted this am    deconditioning/ARM pain s/p AICD:  -no weight bearing/stress for 3 weeks post AICD on 10/15/18  -patient initially refused ot or pt, saying his wife and daughter would help him at home.  -PT were able to see him today and deemed him unsafe to go home without aid or rehab.    ESRD on HD:   - on HD  MWF  -s/p HD today   - Continue lasix and metolazone.    - Renal diet.   - Monitor BMP, Mg, and P.    DM II:   - Continue basal / bolus insulin. (16, 6 TId, sliding scale)   - Carbohydrate consistent diet.   - hemoglobin A1c 7.1   - Monitor blood glucose.    BPH:    - Continue tamsulosin and finasteride.    UTI -  -patient reports symptoms-improving but present  -positive UA  -urine culture prelim >100,000 e-coli  -f/u final read  -if positive for ESBL call ID for duration of therapy, patient will need a PICC line.   -Rocephin daily    GI / DVT PPx: protonix / heparin  DIET: renal restriction    DISPO: pending urine culture result and antibiotic regimen. Pending family discussion and plan for safe discharge with aid or rehab.

## 2018-10-18 NOTE — OCCUPATIONAL THERAPY INITIAL EVALUATION ADULT - RANGE OF MOTION EXAMINATION, UPPER EXTREMITY
As per MD order, no ROM/stretch/wt bearing to RUE for 3 weeks s/p cath. Pt right hand and digits WFL/Left UE Active ROM was WFL (within functional limits)

## 2018-10-18 NOTE — DISCHARGE NOTE ADULT - CARE PLAN
Principal Discharge DX:	Third degree AV block  Goal:	manage and prevent complications  Assessment and plan of treatment:	AICD in place  Please follow up as directed.  please take your medications as directed.  Secondary Diagnosis:	AICD (automatic cardioverter/defibrillator) present  Goal:	manage and prevent complications  Assessment and plan of treatment:	Please follow up as directed.  please take your medications as directed.  Secondary Diagnosis:	UTI (urinary tract infection)  Goal:	manage and prevent complications  Assessment and plan of treatment:	Please follow up as directed.  please take your medications as directed.  Secondary Diagnosis:	Heart failure with reduced ejection fraction  Goal:	manage and prevent complications  Assessment and plan of treatment:	Please follow up as directed.  please take your medications as directed.  please maintain your daily weights.  please follow a low salt diet

## 2018-10-18 NOTE — PROGRESS NOTE ADULT - SUBJECTIVE AND OBJECTIVE BOX
Nephrology progress note    Patient is seen and examined, events over the last 24 h noted .  denied chest pain no SOB   Allergies:  No Known Allergies    Hospital Medications:   MEDICATIONS  (STANDING):  aspirin  chewable 81 milliGRAM(s) Oral daily  atorvastatin 80 milliGRAM(s) Oral at bedtime  cefTRIAXone   IVPB 1 Gram(s) IV Intermittent every 24 hours  chlorhexidine 4% Liquid 1 Application(s) Topical <User Schedule>  clopidogrel Tablet 75 milliGRAM(s) Oral daily  docusate sodium 100 milliGRAM(s) Oral two times a day  finasteride 5 milliGRAM(s) Oral daily  furosemide    Tablet 80 milliGRAM(s) Oral every 12 hours  heparin  Injectable 5000 Unit(s) SubCutaneous every 12 hours  influenza   Vaccine 0.5 milliLiter(s) IntraMuscular once  insulin glargine Injectable (LANTUS) 16 Unit(s) SubCutaneous at bedtime  insulin lispro (HumaLOG) corrective regimen sliding scale   SubCutaneous three times a day before meals  insulin lispro Injectable (HumaLOG) 6 Unit(s) SubCutaneous three times a day before meals  isosorbide   mononitrate ER Tablet (IMDUR) 30 milliGRAM(s) Oral daily  metolazone 5 milliGRAM(s) Oral daily  metoprolol tartrate 25 milliGRAM(s) Oral two times a day  pantoprazole    Tablet 40 milliGRAM(s) Oral before breakfast  senna Syrup 5 milliLiter(s) Oral two times a day  tamsulosin 0.4 milliGRAM(s) Oral at bedtime        VITALS:  T(F): 98.1 (10-18-18 @ 06:38), Max: 99.2 (10-17-18 @ 20:22)  HR: 72 (10-18-18 @ 06:38)  BP: 124/61 (10-18-18 @ 06:38)  RR: 18 (10-18-18 @ 06:38)  SpO2: 96% (10-18-18 @ 06:00)      10-16 @ 07:01  -  10-17 @ 07:00  --------------------------------------------------------  IN: 0 mL / OUT: 1500 mL / NET: -1500 mL    10-17 @ 07:01  -  10-18 @ 07:00  --------------------------------------------------------  IN: 0 mL / OUT: 2450 mL / NET: -2450 mL      Height (cm): 180.34 (10-17 @ 13:02)  Weight (kg): 91.5 (10-17 @ :02)  BMI (kg/m2): 28.1 (10-17 @ 13:)  BSA (m2): 2.12 (10-17 @ 13:)    PHYSICAL EXAM:  Constitutional: NAD  HEENT: anicteric sclera, oropharynx clear, MMM  Neck: No JVD  Respiratory: CTAB, no wheezes, rales or rhonchi  Cardiovascular: S1, S2, RRR  Gastrointestinal: BS+, soft, NT/ND  Extremities: No cyanosis or clubbing. No peripheral edema  :  No crook.   Skin: No rashes    LABS:  10-18    139  |  93<L>  |  45<H>  ----------------------------<  176<H>  3.9   |  28  |  4.8<HH>    Ca    9.1      18 Oct 2018 07:38  Phos  4.3     10-18  Mg     2.0     10-18    TPro  6.9  /  Alb  3.9  /  TBili  0.5  /  DBili      /  AST  13  /  ALT  18  /  AlkPhos  149<H>  10-17                          10.1   6.86  )-----------( 138      ( 18 Oct 2018 07:38 )             32.0       Urine Studies:  Urinalysis Basic - ( 15 Oct 2018 18:35 )    Color: Red / Appearance: Turbid / S.020 / pH:   Gluc:  / Ketone: Negative  / Bili: Negative / Urobili: 0.2 mg/dL   Blood:  / Protein: >=300 mg/dL / Nitrite: Negative   Leuk Esterase: Large / RBC: 10-25 /HPF / WBC >50 /HPF   Sq Epi:  / Non Sq Epi: Moderate /HPF / Bacteria: Moderate /HPF        RADIOLOGY & ADDITIONAL STUDIES:

## 2018-10-18 NOTE — PROGRESS NOTE ADULT - ASSESSMENT
67 yo man with PMH of CAD/ CHF ESRD on HD (MWF) DM. presenting with Complete heart block:    # s/p HD yesterday for HD in AM   # s/p AICD placement followed by cardio/EP  # ph noted, add phoslo 2 tablets q 8 with meals  # h/h at goal , no YASMINE  # on metolazone , lasix, will keep for now   # DC planning

## 2018-10-18 NOTE — PROGRESS NOTE ADULT - SUBJECTIVE AND OBJECTIVE BOX
INTERVAL HPI/OVERNIGHT EVENTS:    HPI  69 y/o M with PMH of ESRD on HD, CAD s/p pci with stent in LAD and RCA, Heart failure with reduced EF (unknown %), DLD, DM II, and BPH was sent in from hemodialysis after he was noted to be bradycardic. During dialysis he had no symptoms and he completed dialysis without issue. He was told however, that his heart rate was "very low" (exact rate not known), and that he should go to the hospital immediately. found to have third degree heart block    CC  Pt seen and examined today for follow up of Heart block/CHF/ESRD/UTI  s/p AICD placement  no overnight events  could not do well with Physical therapy  dysuria better      REVIEW OF SYSTEMS:  CONSTITUTIONAL: weakness  EYES: No eye pain, visual disturbances, or discharge  ENMT:  No difficulty hearing, tinnitus, vertigo; No sinus or throat pain  NECK: No pain or stiffness  BREASTS: No pain, masses, or nipple discharge  RESPIRATORY: No cough, wheezing, chills or hemoptysis; No shortness of breath  CARDIOVASCULAR: No chest pain, palpitations, dizziness, or leg swelling  GASTROINTESTINAL: No abdominal or epigastric pain.   GENITOURINARY: dysuria  NEUROLOGICAL: No headaches, memory loss, loss of strength, numbness, or tremors  SKIN: No itching, burning, rashes, or lesions   LYMPH NODES: No enlarged glands  ENDOCRINE: No heat or cold intolerance; No hair loss  MUSCULOSKELETAL: No joint pain or swelling; No muscle, back, or extremity pain  PSYCHIATRIC: No depression, anxiety, mood swings, or difficulty sleeping  HEME/LYMPH: No easy bruising, or bleeding gums  ALLERY AND IMMUNOLOGIC: No hives or eczema    VITALS:  T(F): 97.9, Max: 99.2 (10-17-18 @ 20:22)  HR: 78  BP: 136/61  RR: 18  SpO2: 96%    PHYSICAL EXAM:  GENERAL: NAD,  HEAD:  Atraumatic, Normocephalic  EYES: EOMI, PERRLA, conjunctiva and sclera clear  ENMT: No tonsillar erythema, exudates, or enlargement; Moist mucous membranes, Good dentition, No lesions  NECK: Supple, No JVD, Normal thyroid  NERVOUS SYSTEM:  Alert & Oriented X3,   CHEST/LUNG: decrease breath sounds  HEART: Regular rate and rhythm; No murmurs, rubs, or gallops  ABDOMEN: Soft, Nontender, Nondistended; Bowel sounds present  EXTREMITIES:  edema  LYMPH: No lymphadenopathy noted  SKIN: No rashes or lesions      LABS:    10-18    139  |  93<L>  |  45<H>  ----------------------------<  176<H>  3.9   |  28  |  4.8<HH>    Ca    9.1      18 Oct 2018 07:38  Phos  4.3     10-18  Mg     2.0     10-18    TPro  6.9  /  Alb  3.9  /  TBili  0.5  /  DBili  x   /  AST  13  /  ALT  18  /  AlkPhos  149<H>  10-17                          10.1   6.86  )-----------( 138      ( 18 Oct 2018 07:38 )             32.0       Culture - Urine (collected 16 Oct 2018 05:00)  Source: .Urine Clean Catch (Midstream)  Preliminary Report (17 Oct 2018 22:55):    >100,000 CFU/ml Escherichia coli          RADIOLOGY & ADDITIONAL TESTS:    Imaging Personally Reviewed:  [ ] YES  [ ] NO    MEDICATIONS:     MEDICATIONS  (STANDING):  aspirin  chewable 81 milliGRAM(s) Oral daily  atorvastatin 80 milliGRAM(s) Oral at bedtime  calcium acetate 667 milliGRAM(s) Oral two times a day with meals  cefTRIAXone   IVPB 1 Gram(s) IV Intermittent every 24 hours  chlorhexidine 4% Liquid 1 Application(s) Topical <User Schedule>  clopidogrel Tablet 75 milliGRAM(s) Oral daily  dextrose 50% Injectable 25 Gram(s) IV Push once  dextrose 50% Injectable 25 Gram(s) IV Push once  docusate sodium 100 milliGRAM(s) Oral two times a day  finasteride 5 milliGRAM(s) Oral daily  furosemide    Tablet 80 milliGRAM(s) Oral every 12 hours  heparin  Injectable 5000 Unit(s) SubCutaneous every 12 hours  influenza   Vaccine 0.5 milliLiter(s) IntraMuscular once  insulin glargine Injectable (LANTUS) 16 Unit(s) SubCutaneous at bedtime  insulin lispro (HumaLOG) corrective regimen sliding scale   SubCutaneous three times a day before meals  insulin lispro Injectable (HumaLOG) 6 Unit(s) SubCutaneous three times a day before meals  isosorbide   mononitrate ER Tablet (IMDUR) 30 milliGRAM(s) Oral daily  losartan 25 milliGRAM(s) Oral daily  metolazone 5 milliGRAM(s) Oral daily  metoprolol tartrate 25 milliGRAM(s) Oral two times a day  pantoprazole    Tablet 40 milliGRAM(s) Oral before breakfast  senna Syrup 5 milliLiter(s) Oral two times a day  tamsulosin 0.4 milliGRAM(s) Oral at bedtime    MEDICATIONS  (PRN):  acetaminophen   Tablet .. 650 milliGRAM(s) Oral every 6 hours PRN Temp greater or equal to 38C (100.4F), Mild Pain (1 - 3)  aluminum hydroxide/magnesium hydroxide/simethicone Suspension 30 milliLiter(s) Oral every 6 hours PRN Dyspepsia  glucagon  Injectable 1 milliGRAM(s) IntraMuscular once PRN Glucose LESS THAN 70 milligrams/deciliter  morphine  - Injectable 4 milliGRAM(s) IV Push every 10 minutes PRN Severe Pain (7 - 10)  morphine  - Injectable 2 milliGRAM(s) IV Push every 10 minutes PRN Moderate Pain (4 - 6)

## 2018-10-18 NOTE — DISCHARGE NOTE ADULT - CARE PROVIDER_API CALL
Isidoro Alcazar (DO), Medicine  [facility] [place] [address]  Phone: [phone]  Fax: [phone]    Alden Acosta (MD), Cardiovascular Disease; Internal Medicine; Interventional Cardiology  [address] [place] [address]  Phone: [phone]  Fax: [phone]    Kerry Landrum), Medicine  Physicians  [address] [place] [address]  Phone: [phone]  Fax: [phone]

## 2018-10-18 NOTE — DISCHARGE NOTE ADULT - PATIENT PORTAL LINK FT
You can access the [facility] Patient Portal, offered by [facility], by registering with the following website: [email or web address]

## 2018-10-18 NOTE — DIETITIAN INITIAL EVALUATION ADULT. - OTHER INFO
Pt was sent in from hemodialysis after he was noted to be bradycardic. Found to be complete heart block s/p transvenous pacemaker placement in the ED, now s/p AICd by EP. Pending urine culture result and antibiotic regimen for outpatient use. Reason for Assessment: LOS

## 2018-10-18 NOTE — DISCHARGE NOTE ADULT - HOSPITAL COURSE
67 yo M with history of CAD s/p PCI of RCA and LAD  (2017), last EF 40% by echo (June 2018). Patient admitted after he was noted to have bradycardia at dialysis on Friday. No chest pain, palpitations, or history of syncope. In ER, patient noted to have high grade AV block. TVP placed in ER. He underwent an attempt at a BiV ICD, which was unsuccessful due to anatomy of CS. He received a DC ICD on 10/15 and R IJ TVP was removed.   patient was transferred to telemetry for discharge planning.    10/17/18  hospital day 5  As per sign out patient was to be discharged today. On initial interview he reported no pain, no symptoms, and nothing bothering him today. he then refused exam so he could eat breakfast before dialysis. later called to bedside because patient had pain in right arm upon movement and attempt to weight bear a little when transferring from chair to bed to go to dialysis. OT consult ordered,  to evaluate need for rehab/pt (patient refused services).   However, on later interview he spoke about continued symptomatic UA: with increased urgency, frequency, and burning when urine builds up for the last week and a half. Culture is still pending, so patient will wait for the result so that he can be sent home on an appropriate, verified antibiotic regimen. Family aware and agree with the plan. Ot was ordered this am as patient had difficulty ambulating and balancing without use of his right arm (instructed not to place pressure on it or stretch it for at least three weeks, also painful when he bears weight on it). patient refused service, adn said he would do all right at home with help from wife and daughter.    10/18/18  hospital day 6  urine culture final result not back yet. Patient evaluated by Pt as unsafe to ambulate on his own and go home without aid/rehab.  attempting to reach the family for decision. Losartan started today as per cardiology attending. 69 yo M with history of CAD s/p PCI of RCA and LAD  (2017), last EF 40% by echo (June 2018). Patient admitted after he was noted to have bradycardia at dialysis on Friday. No chest pain, palpitations, or history of syncope. In ER, patient noted to have high grade AV block. TVP placed in ER. He underwent an attempt at a BiV ICD, which was unsuccessful due to anatomy of CS. He received a DC ICD on 10/15 and R IJ TVP was removed.   patient was transferred to telemetry for discharge planning.    10/17/18  hospital day 5  As per sign out patient was to be discharged today. On initial interview he reported no pain, no symptoms, and nothing bothering him today. he then refused exam so he could eat breakfast before dialysis. later called to bedside because patient had pain in right arm upon movement and attempt to weight bear a little when transferring from chair to bed to go to dialysis. OT consult ordered,  to evaluate need for rehab/pt (patient refused services).   However, on later interview he spoke about continued symptomatic UA: with increased urgency, frequency, and burning when urine builds up for the last week and a half. Culture is still pending, so patient will wait for the result so that he can be sent home on an appropriate, verified antibiotic regimen. Family aware and agree with the plan. Ot was ordered this am as patient had difficulty ambulating and balancing without use of his right arm (instructed not to place pressure on it or stretch it for at least three weeks, also painful when he bears weight on it). patient refused service, adn said he would do all right at home with help from wife and daughter.    10/18/18  hospital day 6  urine culture final result not back yet. Patient evaluated by Pt as unsafe to ambulate on his own and go home without aid/rehab.  attempting to reach the family for decision. Losartan started today as per cardiology attending.     10/19/18  hospital day 7  urine culture results returned, e coli is ceftriaxone sensitive, the patient will be discharged with oral regimen to finish course. patient went for hemodialysis today and at hemodialysis complained of abdominal pain, given simethicone. Complaining of some gas pain with bloating on exam this am (less bloated yesterday). Patient is passing gas but Dr. Bayron burkett requested a KUB to rule out partial or intermittent obstruction. He also mentioned possibly keeping the patient overnight. Attending contacted, but he said he was unavailable to discuss the case at this time. KUB ordered in the meantime. Patient reported no N/V or dizziness.    kub was negative, patient seen by physiatry, good candidate for 4a, patient is awaiting authorization. discharge prepared. stable for discharge per medical attending.

## 2018-10-18 NOTE — DISCHARGE NOTE ADULT - CARE PROVIDERS DIRECT ADDRESSES
,DirectAddress_Unknown,[email or web address],DirectAddress_Unknown

## 2018-10-18 NOTE — DISCHARGE NOTE ADULT - SECONDARY DIAGNOSIS.
Heart failure with reduced ejection fraction AICD (automatic cardioverter/defibrillator) present UTI (urinary tract infection)

## 2018-10-18 NOTE — DISCHARGE NOTE ADULT - ADDITIONAL INSTRUCTIONS
please follow up with [facility] primary care doctor 1 week after discharge  please follow up with your cardiologist 2-3 weeks after discharge.  please follow up with the electrophysiologist Dr. Landrum 3 weeks after discharge. please follow up with your primary care doctor 1 week after discharge  please follow up with your cardiologist 2-3 weeks after discharge.  please follow up with the electrophysiologist Dr. Landrum 3 weeks after discharge.  -if d/c home change antibiotics to cefpodoxime 100mg po 3x a week with dialysis for 1 week if course not completed.

## 2018-10-18 NOTE — PHYSICAL THERAPY INITIAL EVALUATION ADULT - GENERAL OBSERVATIONS, REHAB EVAL
PT IE 1:30-2pm. Pt sitting in NAD. Pt reported refusing chair alarm. PT spoke with daughter Rozina (via phone call) about unsafe discharge to home; other rehab options were discussed.

## 2018-10-18 NOTE — DIETITIAN INITIAL EVALUATION ADULT. - ORAL INTAKE PTA
Pt with excellent appetite/po intake and denies use of oral nutrition supplements. Pt tries to follow a renal diet at home and has NKFA./good

## 2018-10-18 NOTE — DIETITIAN INITIAL EVALUATION ADULT. - PERTINENT MEDS FT
plavix, heparin, abx, phoslo, lopressor, senna, maalox, lipitor, colace, finasteride, lasix, insulin, protonix, tamsulosin

## 2018-10-18 NOTE — DIETITIAN INITIAL EVALUATION ADULT. - FEEDING SKILL
Pt reports consuming all of his meal trays without any issues and doesn't want any oral supplements at this time./independent

## 2018-10-18 NOTE — DISCHARGE NOTE ADULT - MEDICATION SUMMARY - MEDICATIONS TO TAKE
I will START or STAY ON the medications listed below when I get home from the hospital:    finasteride 5 mg oral tablet  -- 1 tab(s) by mouth once a day  -- Indication: For BPH (benign prostatic hyperplasia)    aspirin 81 mg oral tablet, chewable  -- 1 tab(s) by mouth once a day  -- Indication: For CAD (coronary artery disease)    losartan 25 mg oral tablet  -- 1 tab(s) by mouth once a day  -- Indication: For CAD (coronary artery disease)    aluminum hydroxide-magnesium hydroxide 200 mg-200 mg/5 mL oral suspension  -- 30 milliliter(s) by mouth every 6 hours, As needed, Dyspepsia  -- Indication: For Constipation    alfuzosin 10 mg oral tablet, extended release  -- 1 tab(s) by mouth once a day  -- Indication: For BPH    isosorbide mononitrate 30 mg oral tablet, extended release  -- 1 tab(s) by mouth once a day  -- Indication: For Angina    atorvastatin 80 mg oral tablet  -- 1 tab(s) by mouth once a day (at bedtime)  -- Indication: For CAD (coronary artery disease)    clopidogrel 75 mg oral tablet  -- 1 tab(s) by mouth once a day  -- Indication: For CAD (coronary artery disease)    metoprolol tartrate 25 mg oral tablet  -- 1 tab(s) by mouth 2 times a day  -- Indication: For CAD (coronary artery disease)    cefpodoxime 100 mg oral tablet  -- 1 tab(s) by mouth every 48 hours after dialysis only for 1 week   -- Indication: For UTI (urinary tract infection)    metOLazone 5 mg oral tablet  -- 1 tab(s) by mouth once a day  -- Indication: For Chf    furosemide 80 mg oral tablet  -- 1 tab(s) by mouth every 12 hours  -- Indication: For Chf    docusate sodium 100 mg oral tablet  -- 1 tab(s) by mouth 2 times a day  -- Indication: For Constipation    pantoprazole 40 mg oral delayed release tablet  -- 1 tab(s) by mouth once a day  -- Indication: For gerd    Centrum Silver oral tablet  -- 1 tab(s) by mouth once a day  -- Indication: For NURTITION

## 2018-10-18 NOTE — OCCUPATIONAL THERAPY INITIAL EVALUATION ADULT - GENERAL OBSERVATIONS, REHAB EVAL
Pt seen 10:20-11:00 Pt encountered seated in b/s chair in [facility]. Pt agreeable to MEGAN valladares.

## 2018-10-19 LAB
GLUCOSE BLDC GLUCOMTR-MCNC: 133 MG/DL — HIGH (ref 70–99)
GLUCOSE BLDC GLUCOMTR-MCNC: 166 MG/DL — HIGH (ref 70–99)
GLUCOSE BLDC GLUCOMTR-MCNC: 182 MG/DL — HIGH (ref 70–99)
GLUCOSE BLDC GLUCOMTR-MCNC: 265 MG/DL — HIGH (ref 70–99)

## 2018-10-19 RX ORDER — METOPROLOL TARTRATE 50 MG
1 TABLET ORAL
Qty: 60 | Refills: 0
Start: 2018-10-19 | End: 2018-11-17

## 2018-10-19 RX ORDER — LOSARTAN POTASSIUM 100 MG/1
1 TABLET, FILM COATED ORAL
Qty: 30 | Refills: 0
Start: 2018-10-19 | End: 2018-11-17

## 2018-10-19 RX ORDER — SIMETHICONE 80 MG/1
80 TABLET, CHEWABLE ORAL ONCE
Qty: 0 | Refills: 0 | Status: COMPLETED | OUTPATIENT
Start: 2018-10-19 | End: 2018-10-19

## 2018-10-19 RX ORDER — CEFPODOXIME PROXETIL 100 MG
100 TABLET ORAL
Qty: 0 | Refills: 0 | Status: DISCONTINUED | OUTPATIENT
Start: 2018-10-19 | End: 2018-10-23

## 2018-10-19 RX ORDER — ONDANSETRON 8 MG/1
4 TABLET, FILM COATED ORAL ONCE
Qty: 0 | Refills: 0 | Status: DISCONTINUED | OUTPATIENT
Start: 2018-10-19 | End: 2018-10-23

## 2018-10-19 RX ORDER — ISOSORBIDE MONONITRATE 60 MG/1
1 TABLET, EXTENDED RELEASE ORAL
Qty: 0 | Refills: 0 | DISCHARGE
Start: 2018-10-19

## 2018-10-19 RX ORDER — CEFPODOXIME PROXETIL 100 MG
1 TABLET ORAL
Qty: 3 | Refills: 0
Start: 2018-10-19 | End: 2018-10-21

## 2018-10-19 RX ORDER — ATORVASTATIN CALCIUM 80 MG/1
1 TABLET, FILM COATED ORAL
Qty: 30 | Refills: 0
Start: 2018-10-19 | End: 2018-11-17

## 2018-10-19 RX ADMIN — Medication 3: at 17:08

## 2018-10-19 RX ADMIN — INSULIN GLARGINE 16 UNIT(S): 100 INJECTION, SOLUTION SUBCUTANEOUS at 21:06

## 2018-10-19 RX ADMIN — CLOPIDOGREL BISULFATE 75 MILLIGRAM(S): 75 TABLET, FILM COATED ORAL at 16:24

## 2018-10-19 RX ADMIN — Medication 25 MILLIGRAM(S): at 17:09

## 2018-10-19 RX ADMIN — HEPARIN SODIUM 5000 UNIT(S): 5000 INJECTION INTRAVENOUS; SUBCUTANEOUS at 06:12

## 2018-10-19 RX ADMIN — Medication 100 MILLIGRAM(S): at 17:09

## 2018-10-19 RX ADMIN — Medication 6 UNIT(S): at 17:08

## 2018-10-19 RX ADMIN — ATORVASTATIN CALCIUM 80 MILLIGRAM(S): 80 TABLET, FILM COATED ORAL at 21:00

## 2018-10-19 RX ADMIN — Medication 667 MILLIGRAM(S): at 17:09

## 2018-10-19 RX ADMIN — Medication 80 MILLIGRAM(S): at 06:12

## 2018-10-19 RX ADMIN — SENNA PLUS 5 MILLILITER(S): 8.6 TABLET ORAL at 18:02

## 2018-10-19 RX ADMIN — Medication 81 MILLIGRAM(S): at 16:24

## 2018-10-19 RX ADMIN — HEPARIN SODIUM 5000 UNIT(S): 5000 INJECTION INTRAVENOUS; SUBCUTANEOUS at 17:10

## 2018-10-19 RX ADMIN — LOSARTAN POTASSIUM 25 MILLIGRAM(S): 100 TABLET, FILM COATED ORAL at 06:12

## 2018-10-19 RX ADMIN — SIMETHICONE 80 MILLIGRAM(S): 80 TABLET, CHEWABLE ORAL at 10:36

## 2018-10-19 RX ADMIN — ISOSORBIDE MONONITRATE 30 MILLIGRAM(S): 60 TABLET, EXTENDED RELEASE ORAL at 16:24

## 2018-10-19 RX ADMIN — Medication 100 MILLIGRAM(S): at 06:12

## 2018-10-19 RX ADMIN — Medication 25 MILLIGRAM(S): at 06:12

## 2018-10-19 RX ADMIN — FINASTERIDE 5 MILLIGRAM(S): 5 TABLET, FILM COATED ORAL at 16:24

## 2018-10-19 RX ADMIN — Medication 80 MILLIGRAM(S): at 17:09

## 2018-10-19 RX ADMIN — TAMSULOSIN HYDROCHLORIDE 0.4 MILLIGRAM(S): 0.4 CAPSULE ORAL at 21:00

## 2018-10-19 NOTE — PROGRESS NOTE ADULT - SUBJECTIVE AND OBJECTIVE BOX
67 yo M with history of CAD s/p PCI of RCA and LAD  (2017), last EF 40% by echo (June 2018). Patient admitted after he was noted to have bradycardia at dialysis on Friday. No chest pain, palpitations, or history of syncope. In ER, patient noted to have high grade AV block. TVP placed in ER. He underwent an attempt at a BiV ICD, which was unsuccessful due to anatomy of CS. He received a DC ICD on 10/15 and R IJ TVP was removed.   patient was transferred to telemetry for discharge planning.    10/17/18  hospital day 5  As per sign out patient was to be discharged today. On initial interview he reported no pain, no symptoms, and nothing bothering him today. he then refused exam so he could eat breakfast before dialysis. later called to bedside because patient had pain in right arm upon movement and attempt to weight bear a little when transferring from chair to bed to go to dialysis. OT consult ordered,  to evaluate need for rehab/pt (patient refused services).   However, on later interview he spoke about continued symptomatic UA: with increased urgency, frequency, and burning when urine builds up for the last week and a half. Culture is still pending, so patient will wait for the result so that he can be sent home on an appropriate, verified antibiotic regimen. Family aware and agree with the plan. Ot was ordered this am as patient had difficulty ambulating and balancing without use of his right arm (instructed not to place pressure on it or stretch it for at least three weeks, also painful when he bears weight on it). patient refused service, adn said he would do all right at home with help from wife and daughter.    10/18/18  hospital day 6  urine culture final result not back yet. Patient evaluated by Pt as unsafe to ambulate on his own and go home without aid/rehab.  attempting to reach the family for decision. Losartan started today as per cardiology attending.     10/19/18  urine culture results returned, e coli is ceftriaxone sensitive, the patient will be discharged with oral regimen to finish course. patient went for hemodialysis today and at hemodialysis complained of abdominal pain, given simethicone. Complaining of some gas pain with bloating on exam this am (less bloated yesterday). Patient is passing gas but Dr. Bayron burkett requested a KUB to rule out partial or intermittent obstruction. He also mentioned possibly keeping the patient overnight. Attending contacted, but he said he was unavailable to discuss the case at this time. KUB ordered in the meantime. Patient reported no N/V or dizziness.    PE:  General: awake, OOB to chair, NAD.  HEAD: atraumatic, normacephalic  HEART: s1 and s2 intact, RRR  Lungs: CTAB  Abdomen: BS+ diffuse, minor tenderness, distension, and tympany noted (patient initially reported minor gas pain, has slept int he chair for two days, staff has not been walking him much, spoke to nurse about need for ambulation). Pain worsened in dialysis later in the morning.  Extremities: unchanged exam, chronic skin changes noted again. PP intact, BENNETT 69 yo M with history of CAD s/p PCI of RCA and LAD  (2017), last EF 40% by echo (June 2018). Patient admitted after he was noted to have bradycardia at dialysis on Friday. No chest pain, palpitations, or history of syncope. In ER, patient noted to have high grade AV block. TVP placed in ER. He underwent an attempt at a BiV ICD, which was unsuccessful due to anatomy of CS. He received a DC ICD on 10/15 and R IJ TVP was removed.   patient was transferred to telemetry for discharge planning.    10/17/18  hospital day 5  As per sign out patient was to be discharged today. On initial interview he reported no pain, no symptoms, and nothing bothering him today. he then refused exam so he could eat breakfast before dialysis. later called to bedside because patient had pain in right arm upon movement and attempt to weight bear a little when transferring from chair to bed to go to dialysis. OT consult ordered,  to evaluate need for rehab/pt (patient refused services).   However, on later interview he spoke about continued symptomatic UA: with increased urgency, frequency, and burning when urine builds up for the last week and a half. Culture is still pending, so patient will wait for the result so that he can be sent home on an appropriate, verified antibiotic regimen. Family aware and agree with the plan. Ot was ordered this am as patient had difficulty ambulating and balancing without use of his right arm (instructed not to place pressure on it or stretch it for at least three weeks, also painful when he bears weight on it). patient refused service, adn said he would do all right at home with help from wife and daughter.    10/18/18  hospital day 6  urine culture final result not back yet. Patient evaluated by Pt as unsafe to ambulate on his own and go home without aid/rehab.  attempting to reach the family for decision. Losartan started today as per cardiology attending.     10/19/18  hospital day 7  urine culture results returned, e coli is ceftriaxone sensitive, the patient will be discharged with oral regimen to finish course. patient went for hemodialysis today and at hemodialysis complained of abdominal pain, given simethicone. Complaining of some gas pain with bloating on exam this am (less bloated yesterday). Patient is passing gas but Dr. Bayron burkett requested a KUB to rule out partial or intermittent obstruction. He also mentioned possibly keeping the patient overnight. Attending contacted, but he said he was unavailable to discuss the case at this time. KUB ordered in the meantime. Patient reported no N/V or dizziness.    PAST MEDICAL & SURGICAL HISTORY  Heart failure with reduced ejection fraction  CAD (coronary artery disease)  BPH (benign prostatic hyperplasia)  Type 2 diabetes mellitus  End stage renal disease  Dyslipidemia  Hypertension    SOCIAL HISTORY:  Negative for smoking/alcohol/drug use.     ALLERGIES:  No Known Allergies    MEDICATIONS:  STANDING MEDICATIONS  aspirin  chewable 81 milliGRAM(s) Oral daily  atorvastatin 80 milliGRAM(s) Oral at bedtime  calcium acetate 667 milliGRAM(s) Oral two times a day with meals  cefTRIAXone   IVPB 1 Gram(s) IV Intermittent every 24 hours  chlorhexidine 4% Liquid 1 Application(s) Topical <User Schedule>  clopidogrel Tablet 75 milliGRAM(s) Oral daily  dextrose 50% Injectable 25 Gram(s) IV Push once  dextrose 50% Injectable 25 Gram(s) IV Push once  docusate sodium 100 milliGRAM(s) Oral two times a day  finasteride 5 milliGRAM(s) Oral daily  furosemide    Tablet 80 milliGRAM(s) Oral every 12 hours  heparin  Injectable 5000 Unit(s) SubCutaneous every 12 hours  influenza   Vaccine 0.5 milliLiter(s) IntraMuscular once  insulin glargine Injectable (LANTUS) 16 Unit(s) SubCutaneous at bedtime  insulin lispro (HumaLOG) corrective regimen sliding scale   SubCutaneous three times a day before meals  insulin lispro Injectable (HumaLOG) 6 Unit(s) SubCutaneous three times a day before meals  isosorbide   mononitrate ER Tablet (IMDUR) 30 milliGRAM(s) Oral daily  losartan 25 milliGRAM(s) Oral daily  metolazone 5 milliGRAM(s) Oral daily  metoprolol tartrate 25 milliGRAM(s) Oral two times a day  ondansetron  IVPB 4 milliGRAM(s) IV Intermittent once  pantoprazole    Tablet 40 milliGRAM(s) Oral before breakfast  senna Syrup 5 milliLiter(s) Oral two times a day  tamsulosin 0.4 milliGRAM(s) Oral at bedtime    PRN MEDICATIONS  acetaminophen   Tablet .. 650 milliGRAM(s) Oral every 6 hours PRN  aluminum hydroxide/magnesium hydroxide/simethicone Suspension 30 milliLiter(s) Oral every 6 hours PRN  glucagon  Injectable 1 milliGRAM(s) IntraMuscular once PRN  morphine  - Injectable 4 milliGRAM(s) IV Push every 10 minutes PRN  morphine  - Injectable 2 milliGRAM(s) IV Push every 10 minutes PRN    VITALS:   T(F): 98  HR: 76  BP: 112/48  RR: 18  SpO2: 98%    LABS:                        10.1   6.86  )-----------( 138      ( 18 Oct 2018 07:38 )             32.0     10-18    139  |  93<L>  |  45<H>  ----------------------------<  176<H>  3.9   |  28  |  4.8<HH>    Ca    9.1      18 Oct 2018 07:38  Phos  4.3     10-18  Mg     2.0     10-18    RADIOLOGY:    PHYSICAL EXAM:  General: awake, OOB to chair, NAD.  HEAD: atraumatic, normacephalic  HEART: s1 and s2 intact, RRR  Lungs: CTAB  Abdomen: BS+ diffuse, minor tenderness, distension, and tympany noted (patient initially reported minor gas pain, has slept int he chair for two days, staff has not been walking him much, spoke to nurse about need for ambulation). Pain worsened in dialysis later in the morning.  Extremities: unchanged exam, chronic skin changes noted again. PP intact, BENNETT

## 2018-10-19 NOTE — PROGRESS NOTE ADULT - SUBJECTIVE AND OBJECTIVE BOX
INTERVAL HPI/OVERNIGHT EVENTS:  HPI  69 y/o M with PMH of ESRD on HD, CAD s/p pci with stent in LAD and RCA, Heart failure with reduced EF (unknown %), DLD, DM II, and BPH was sent in from hemodialysis after he was noted to be bradycardic. During dialysis he had no symptoms and he completed dialysis without issue. He was told however, that his heart rate was "very low" (exact rate not known), and that he should go to the hospital immediately. found to have third degree heart block    CC  Pt seen and examined today for follow up of Heart block/CHF/ESRD/UTI  s/p AICD placement  no overnight events  dysuria better  had abd distension        REVIEW OF SYSTEMS:  CONSTITUTIONAL: No fever, weight loss, or fatigue  EYES: No eye pain, visual disturbances, or discharge  ENMT:  No difficulty hearing, tinnitus, vertigo; No sinus or throat pain  NECK: No pain or stiffness  BREASTS: No pain, masses, or nipple discharge  RESPIRATORY: No cough, wheezing, chills or hemoptysis; No shortness of breath  CARDIOVASCULAR: edema  GASTROINTESTINAL: No abdominal or epigastric pain. No nausea, vomiting, or hematemesis;   GENITOURINARY: No dysuria, frequency, hematuria, or incontinence  NEUROLOGICAL: No headaches, memory loss, loss of strength, numbness, or tremors  SKIN: No itching, burning, rashes, or lesions   LYMPH NODES: No enlarged glands  ENDOCRINE: No heat or cold intolerance; No hair loss  MUSCULOSKELETAL: No joint pain or swelling; No muscle, back, or extremity pain  PSYCHIATRIC: No depression, anxiety, mood swings, or difficulty sleeping  HEME/LYMPH: No easy bruising, or bleeding gums  ALLERY AND IMMUNOLOGIC: No hives or eczema    VITALS:  T(F): 98, Max: 98.3 (10-19-18 @ 00:00)  HR: 76  BP: 112/48  RR: 18  SpO2: 98%    PHYSICAL EXAM:  GENERAL: NAD,  HEAD:  Atraumatic, Normocephalic  EYES: EOMI, PERRLA, conjunctiva and sclera clear  ENMT: No tonsillar erythema, exudates, or enlargement; Moist mucous membranes, Good dentition, No lesions  NECK: Supple, No JVD, Normal thyroid  NERVOUS SYSTEM:  Alert & Oriented X3,   CHEST/LUNG: Clear to percussion bilaterally; No rales, rhonchi, wheezing, or rubs  HEART: Regular rate and rhythm; No murmurs, rubs, or gallops  ABDOMEN: Soft, mildly distended  EXTREMITIES: edema  LYMPH: No lymphadenopathy noted  SKIN: No rashes or lesions      LABS:    10-18    139  |  93<L>  |  45<H>  ----------------------------<  176<H>  3.9   |  28  |  4.8<HH>    Ca    9.1      18 Oct 2018 07:38  Phos  4.3     10-18  Mg     2.0     10-18                            10.1   6.86  )-----------( 138      ( 18 Oct 2018 07:38 )             32.0           RADIOLOGY & ADDITIONAL TESTS:    Imaging Personally Reviewed:  [ ] YES  [ ] NO    MEDICATIONS:     MEDICATIONS  (STANDING):  aspirin  chewable 81 milliGRAM(s) Oral daily  atorvastatin 80 milliGRAM(s) Oral at bedtime  calcium acetate 667 milliGRAM(s) Oral two times a day with meals  cefTRIAXone   IVPB 1 Gram(s) IV Intermittent every 24 hours  chlorhexidine 4% Liquid 1 Application(s) Topical <User Schedule>  clopidogrel Tablet 75 milliGRAM(s) Oral daily  dextrose 50% Injectable 25 Gram(s) IV Push once  dextrose 50% Injectable 25 Gram(s) IV Push once  docusate sodium 100 milliGRAM(s) Oral two times a day  finasteride 5 milliGRAM(s) Oral daily  furosemide    Tablet 80 milliGRAM(s) Oral every 12 hours  heparin  Injectable 5000 Unit(s) SubCutaneous every 12 hours  influenza   Vaccine 0.5 milliLiter(s) IntraMuscular once  insulin glargine Injectable (LANTUS) 16 Unit(s) SubCutaneous at bedtime  insulin lispro (HumaLOG) corrective regimen sliding scale   SubCutaneous three times a day before meals  insulin lispro Injectable (HumaLOG) 6 Unit(s) SubCutaneous three times a day before meals  isosorbide   mononitrate ER Tablet (IMDUR) 30 milliGRAM(s) Oral daily  losartan 25 milliGRAM(s) Oral daily  metolazone 5 milliGRAM(s) Oral daily  metoprolol tartrate 25 milliGRAM(s) Oral two times a day  ondansetron  IVPB 4 milliGRAM(s) IV Intermittent once  pantoprazole    Tablet 40 milliGRAM(s) Oral before breakfast  senna Syrup 5 milliLiter(s) Oral two times a day  tamsulosin 0.4 milliGRAM(s) Oral at bedtime    MEDICATIONS  (PRN):  acetaminophen   Tablet .. 650 milliGRAM(s) Oral every 6 hours PRN Temp greater or equal to 38C (100.4F), Mild Pain (1 - 3)  aluminum hydroxide/magnesium hydroxide/simethicone Suspension 30 milliLiter(s) Oral every 6 hours PRN Dyspepsia  glucagon  Injectable 1 milliGRAM(s) IntraMuscular once PRN Glucose LESS THAN 70 milligrams/deciliter  morphine  - Injectable 4 milliGRAM(s) IV Push every 10 minutes PRN Severe Pain (7 - 10)  morphine  - Injectable 2 milliGRAM(s) IV Push every 10 minutes PRN Moderate Pain (4 - 6)

## 2018-10-19 NOTE — CONSULT NOTE ADULT - SUBJECTIVE AND OBJECTIVE BOX
HPI:  69 y/o M with PMH of ESRD on HD, CAD s/p pci with stent in LAD and RCA, Heart failure with reduced EF (unknown %), DLD, DM II, and BPH was sent in from hemodialysis after he was noted to be bradycardic. During dialysis he had no symptoms and he completed dialysis without issue. He was told however, that his heart rate was "very low" (exact rate not known), and that he should go to the hospital immediately. Last night he had an episode of dizziness after standing up from the dinner table associated with a feeling of warmth and some diaphoresis. He had a similar episode approximately one week ago, but otherwise has not had these symptoms before. He denies chest pain, SOB, headache, blurry vision, lightheadedness, and abdominal pain. No recent illnesses or sick contacts. (12 Oct 2018 20:38). s/p AICD for heart block      PAST MEDICAL & SURGICAL HISTORY:  Heart failure with reduced ejection fraction  CAD (coronary artery disease)  BPH (benign prostatic hyperplasia)  Type 2 diabetes mellitus  End stage renal disease  Dyslipidemia  Hypertension      Hospital Course:    TODAY'S SUBJECTIVE & REVIEW OF SYMPTOMS:     Constitutional WNL   Cardio WNL   Resp WNL   GI WNL  Heme WNL  Endo WNL  Skin WNL  MSK Weakness  Neuro WNL  Cognitive WNL  Psych WNL      MEDICATIONS  (STANDING):  aspirin  chewable 81 milliGRAM(s) Oral daily  atorvastatin 80 milliGRAM(s) Oral at bedtime  calcium acetate 667 milliGRAM(s) Oral two times a day with meals  cefTRIAXone   IVPB 1 Gram(s) IV Intermittent every 24 hours  chlorhexidine 4% Liquid 1 Application(s) Topical <User Schedule>  clopidogrel Tablet 75 milliGRAM(s) Oral daily  dextrose 50% Injectable 25 Gram(s) IV Push once  dextrose 50% Injectable 25 Gram(s) IV Push once  docusate sodium 100 milliGRAM(s) Oral two times a day  finasteride 5 milliGRAM(s) Oral daily  furosemide    Tablet 80 milliGRAM(s) Oral every 12 hours  heparin  Injectable 5000 Unit(s) SubCutaneous every 12 hours  influenza   Vaccine 0.5 milliLiter(s) IntraMuscular once  insulin glargine Injectable (LANTUS) 16 Unit(s) SubCutaneous at bedtime  insulin lispro (HumaLOG) corrective regimen sliding scale   SubCutaneous three times a day before meals  insulin lispro Injectable (HumaLOG) 6 Unit(s) SubCutaneous three times a day before meals  isosorbide   mononitrate ER Tablet (IMDUR) 30 milliGRAM(s) Oral daily  losartan 25 milliGRAM(s) Oral daily  metolazone 5 milliGRAM(s) Oral daily  metoprolol tartrate 25 milliGRAM(s) Oral two times a day  ondansetron  IVPB 4 milliGRAM(s) IV Intermittent once  pantoprazole    Tablet 40 milliGRAM(s) Oral before breakfast  senna Syrup 5 milliLiter(s) Oral two times a day  tamsulosin 0.4 milliGRAM(s) Oral at bedtime    MEDICATIONS  (PRN):  acetaminophen   Tablet .. 650 milliGRAM(s) Oral every 6 hours PRN Temp greater or equal to 38C (100.4F), Mild Pain (1 - 3)  aluminum hydroxide/magnesium hydroxide/simethicone Suspension 30 milliLiter(s) Oral every 6 hours PRN Dyspepsia  glucagon  Injectable 1 milliGRAM(s) IntraMuscular once PRN Glucose LESS THAN 70 milligrams/deciliter  morphine  - Injectable 4 milliGRAM(s) IV Push every 10 minutes PRN Severe Pain (7 - 10)  morphine  - Injectable 2 milliGRAM(s) IV Push every 10 minutes PRN Moderate Pain (4 - 6)      FAMILY HISTORY:      Allergies    No Known Allergies    Intolerances        SOCIAL HISTORY:    [  ] Etoh  [  ] Smoking  [  ] Substance abuse     Home Environment:  [  ] Home Alone  [x  ] Lives with Family  [  ] Home Health Aid    Dwelling:  [  ] Apartment  [x  ] Private House  [  ] Adult Home  [  ] Skilled Nursing Facility      [  ] Short Term  [  ] Long Term  [x  ] Stairs       Elevator [  ]    FUNCTIONAL STATUS PTA: (Check all that apply)  Ambulation: [x   ]Independent    [  ] Dependent     [  ] Non-Ambulatory  Assistive Device: [  ] SA Cane  [  ]  Q Cane  [  ] Walker  [  ]  Wheelchair  ADL : [x  ] Independent  [  ]  Dependent       Vital Signs Last 24 Hrs  T(C): 36.7 (19 Oct 2018 12:58), Max: 36.8 (19 Oct 2018 00:00)  T(F): 98 (19 Oct 2018 12:58), Max: 98.3 (19 Oct 2018 00:00)  HR: 76 (19 Oct 2018 12:58) (76 - 82)  BP: 112/48 (19 Oct 2018 12:58) (112/48 - 146/69)  BP(mean): --  RR: 18 (19 Oct 2018 12:58) (18 - 20)  SpO2: 98% (19 Oct 2018 11:05) (98% - 99%)      PHYSICAL EXAM: Alert & Oriented X3  GENERAL: NAD, well-groomed, well-developed  HEAD:  Atraumatic, Normocephalic  CHEST/LUNG: Clear   HEART: S1S2+  ABDOMEN: Soft, Nontender  EXTREMITIES:  no calf tenderness    NERVOUS SYSTEM:  Cranial Nerves 2-12 intact [  ] Abnormal  [  ]  ROM: WFL all extremities [  ]  Abnormal [x  ]limited rue  Motor Strength: WFL all extremities  [  ]  Abnormal [x  ]limited rue  Sensation: intact to light touch [  ] Abnormal [  ]  Reflexes: Symmetric [  ]  Abnormal [  ]    FUNCTIONAL STATUS:  Bed Mobility: Independent [  ]  Supervision [ x ]  Needs Assistance [  ]  N/A [  ]  Transfers: Independent [  ]  Supervision [  ]  Needs Assistance [x  ]  N/A [  ]   Ambulation: Independent [  ]  Supervision [  ]  Needs Assistance [x  ]  N/A [  ]  ADL: Independent [  ] Requires Assistance [  ] N/A [  ]      LABS:                        10.1   6.86  )-----------( 138      ( 18 Oct 2018 07:38 )             32.0     10-18    139  |  93<L>  |  45<H>  ----------------------------<  176<H>  3.9   |  28  |  4.8<HH>    Ca    9.1      18 Oct 2018 07:38  Phos  4.3     10-18  Mg     2.0     10-18            RADIOLOGY & ADDITIONAL STUDIES:    Assesment:

## 2018-10-19 NOTE — PROGRESS NOTE ADULT - ASSESSMENT
67 yo man with PMH of CAD/ CHF ESRD on HD (MWF) DM. presenting with Complete heart block:  s/p AICD placement followed by cardio/EP    ESRd  -HD today 3 hrs 2 L 2 K bath  # on metolazone , lasix, will keep for now      MBD   phos noted, cont phoslo 2 tablets q 8 with meals    anemia   h/h at goal , no YASMINE     DC planning

## 2018-10-19 NOTE — PROGRESS NOTE ADULT - ASSESSMENT
69 y/o M with PMH of ESRD on HD, CAD s/p pci with stent in LAD and RCA, Heart failure with reduced EF (unknown %), DLD, DM II, and BPH was sent in from hemodialysis after he was noted to be bradycardic. Found to be complete heart block.    3rd Degree Heart Block:  - s/p transvenous pacemaker placement in the ED.  -now s/p AICd by EP  -doing well, has pain in the right arm.      Heart Failure with reduced ejection fraction / CAD / DLD:  -AICd placed  -Continue aspirin, atorvastatin, imdur, lasix,  -as per cardiology metoprolol restarted, losartan started at 25 daily.  -plavix restarted this am    deconditioning/ARM pain s/p AICD:  -no weight bearing/stress for 3 weeks post AICD on 10/15/18  -patient initially refused ot or pt, saying his wife and daughter would help him at home.  -PT were able to see him today and deemed him unsafe to go home without aid or rehab.  -Family contacted as per social work family only agreeable to acute rehab or home.  -Rehab consult placed yesterday.     ESRD on HD:   - on HD  MWF  -s/p HD today   - Continue lasix and metolazone.    - Renal diet.   - Monitor BMP, Mg, and P.    DM II:   - Continue basal / bolus insulin. (16, 6 TId, sliding scale)   - Carbohydrate consistent diet.   - hemoglobin A1c 7.1   - Monitor blood glucose.    BPH:    - Continue tamsulosin and finasteride.    UTI -  -patient reports symptoms-improving but present  -positive UA  -urine culture prelim >100,000 e-coli, sensitive to ceftriaxone  -Rocephin daily -duration of therapy to be discussed with attending.     GI / DVT PPx: protonix / heparin  DIET: renal restriction    DISPO:  Now pending review of KUB and monitoring of abdominal pain/distension and attending decision regarding stabel for discharge today or tomorrow. Pending family discussion and plan for safe discharge with aid or rehab. 69 y/o M with PMH of ESRD on HD, CAD s/p pci with stent in LAD and RCA, Heart failure with reduced EF (unknown %), DLD, DM II, and BPH was sent in from hemodialysis after he was noted to be bradycardic. Found to be complete heart block.    3rd Degree Heart Block:  - s/p transvenous pacemaker placement in the ED.  -now s/p AICd by EP  -doing well, has pain in the right arm.      Heart Failure with reduced ejection fraction / CAD / DLD:  -AICd placed  -Continue aspirin, atorvastatin, imdur, lasix,  -as per cardiology metoprolol restarted, losartan started at 25 daily.  -plavix restarted this am    deconditioning/ARM pain s/p AICD:  -no weight bearing/stress for 3 weeks post AICD on 10/15/18  -patient initially refused ot or pt, saying his wife and daughter would help him at home.  -PT were able to see him today and deemed him unsafe to go home without aid or rehab.  -Family contacted as per social work family only agreeable to acute rehab or home.  -Rehab consult placed yesterday.     ESRD on HD:   - on HD  MWF  -s/p HD today   - Continue lasix and metolazone.    - Renal diet.   - Monitor BMP, Mg, and P.    DM II:   - Continue basal / bolus insulin. (16, 6 TId, sliding scale)   - Carbohydrate consistent diet.   - hemoglobin A1c 7.1   - Monitor blood glucose.    BPH:    - Continue tamsulosin and finasteride.    UTI -  -patient reports symptoms-improving but present  -positive UA  -urine culture prelim >100,000 e-coli, sensitive to ceftriaxone 7 day course to end on 10/24/18  -if d/c home change antibiotics to cefpodoxime 100mg po 3x a week with dialysis for 1 week if course not completed.   -Rocephin daily -duration of therapy to be discussed with attending.     GI / DVT PPx: protonix / heparin  DIET: renal restriction    DISPO:  KUB negative patient passing gas good candidate for acute rehab. If approved and has a bed he can go over the weekend.

## 2018-10-19 NOTE — PROGRESS NOTE ADULT - SUBJECTIVE AND OBJECTIVE BOX
Nephrology progress note    Patient was seen and examined on HD , events over the last 24 h noted .    Allergies:  No Known Allergies    Hospital Medications:   MEDICATIONS  (STANDING):  aspirin  chewable 81 milliGRAM(s) Oral daily  atorvastatin 80 milliGRAM(s) Oral at bedtime  calcium acetate 667 milliGRAM(s) Oral two times a day with meals  cefTRIAXone   IVPB 1 Gram(s) IV Intermittent every 24 hours  chlorhexidine 4% Liquid 1 Application(s) Topical <User Schedule>  clopidogrel Tablet 75 milliGRAM(s) Oral daily  dextrose 50% Injectable 25 Gram(s) IV Push once  dextrose 50% Injectable 25 Gram(s) IV Push once  docusate sodium 100 milliGRAM(s) Oral two times a day  finasteride 5 milliGRAM(s) Oral daily  furosemide    Tablet 80 milliGRAM(s) Oral every 12 hours  heparin  Injectable 5000 Unit(s) SubCutaneous every 12 hours  influenza   Vaccine 0.5 milliLiter(s) IntraMuscular once  insulin glargine Injectable (LANTUS) 16 Unit(s) SubCutaneous at bedtime  insulin lispro (HumaLOG) corrective regimen sliding scale   SubCutaneous three times a day before meals  insulin lispro Injectable (HumaLOG) 6 Unit(s) SubCutaneous three times a day before meals  isosorbide   mononitrate ER Tablet (IMDUR) 30 milliGRAM(s) Oral daily  losartan 25 milliGRAM(s) Oral daily  metolazone 5 milliGRAM(s) Oral daily  metoprolol tartrate 25 milliGRAM(s) Oral two times a day  pantoprazole    Tablet 40 milliGRAM(s) Oral before breakfast  senna Syrup 5 milliLiter(s) Oral two times a day  tamsulosin 0.4 milliGRAM(s) Oral at bedtime        VITALS:  T(F): 98.3 (10-19-18 @ 00:00), Max: 98.3 (10-19-18 @ 00:00)  HR: 77 (10-19-18 @ 00:00)  BP: 131/62 (10-19-18 @ 00:00)  RR: 18 (10-19-18 @ 00:00)  SpO2: 99% (10-19-18 @ 00:00)  Wt(kg): --    10-17 @ 07:01  -  10-18 @ 07:00  --------------------------------------------------------  IN: 0 mL / OUT: 2450 mL / NET: -2450 mL      Height (cm): 180.34 (10-18 @ 11:27)    PHYSICAL EXAM:  Constitutional: NAD  HEENT: anicteric sclera, oropharynx clear, MMM  Neck: No JVD  Respiratory: CTAB, no wheezes, rales or rhonchi  Cardiovascular: S1, S2, RRR  Gastrointestinal: BS+, soft, NT/ND  Extremities: No cyanosis or clubbing. No peripheral edema  :  No crook.   Skin: No rashes    LABS:  10-18    139  |  93<L>  |  45<H>  ----------------------------<  176<H>  3.9   |  28  |  4.8<HH>    Ca    9.1      18 Oct 2018 07:38  Phos  4.3     10-18  Mg     2.0     10-18                            10.1   6.86  )-----------( 138      ( 18 Oct 2018 07:38 )             32.0       Urine Studies:  Urinalysis Basic - ( 15 Oct 2018 18:35 )    Color: Red / Appearance: Turbid / S.020 / pH:   Gluc:  / Ketone: Negative  / Bili: Negative / Urobili: 0.2 mg/dL   Blood:  / Protein: >=300 mg/dL / Nitrite: Negative   Leuk Esterase: Large / RBC: 10-25 /HPF / WBC >50 /HPF   Sq Epi:  / Non Sq Epi: Moderate /HPF / Bacteria: Moderate /HPF        RADIOLOGY & ADDITIONAL STUDIES:

## 2018-10-19 NOTE — PROGRESS NOTE ADULT - ASSESSMENT
69 y/o M with PMH of ESRD on HD, CAD s/p pci with stent in LAD and RCA, Heart failure with reduced EF (unknown %), DLD, DM II, and BPH was sent in from hemodialysis after he was noted to be bradycardic. Found to be complete heart block.    1.) 3rd Degree Heart Block:    - s/p transvenous pacemaker placement in the ED.   now s/p AICD by EP  doing good post procedure    2.) Heart Failure with reduced ejection fraction / CAD / DLD:  sob better      got AICD  cont lasix, Metolazone    - Continue aspirin, plavix, atorvastatin, imdur,       3.) ESRD on HD:    - on HD  MWF  s/p HD yesterday    - Continue lasix and metolazone.     - Renal diet.    - Monitor BMP, Mg, and P.    4.) DM II:    - Continue basal / bolus insulin.    - Carbohydrate consistent diet.    - Check hemoglobin A1c.    - Monitor blood glucose.    5.) BPH:    - Continue tamsulosin and finasteride.    6)   Dysuria  urine culture E coli  sensitive to rocephin  getting IV rocephin  switch to Po cefpodoxime 100 mg q 48 hourd post HD for a week on discharge      Abd distension  X ray done negative  Pt moving bowels, no nausea, vomiting      PT/rehab done  Pt wants to go to  only for rehab  otherwise home with services    Discussed with her daughter in detail    6.) GI / DVT PPx: protonix / heparin        Discussed with House staff  resident note reviewed

## 2018-10-19 NOTE — CONSULT NOTE ADULT - ASSESSMENT
67 yo M h/o CAD s/p PCI, ESRD on HD, HFrEF here with symptomatic bradycardia    Symptomatic high degree AV block s/p TVP  CAD s/p PCI  ESRD on HD  HFrEF  DM/DLD    CCU monitoring  2D echo  Serial cardiac enzymes  Check TSH  will d/w attending
IMPRESSION: Rehab of Debilitation / s/p AICD implant of heart block    PRECAUTIONS: [  ] Cardiac  [  ] Respiratory  [  ] Seizures [  ] Contact Isolation  [  ] Droplet Isolation  [  ] Other    Weight Bearing Status:     RECOMMENDATION:    Out of Bed to Chair     DVT/Decubiti Prophylaxis    REHAB PLAN:     [ x  ] Bedside P/T 3-5 times a week   [ x  ]   Bedside O/T  2-3 times a week             [   ] No Rehab Therapy Indicated                   [   ]  Speech Therapy   Conditioning/ROM                                    ADL  Bed Mobility                                               Conditioning/ROM  Transfers                                                     Bed Mobility  Sitting /Standing Balance                         Transfers                                        Gait Training                                               Sitting/Standing Balance  Stair Training [   ]Applicable                    Home equipment Eval                                                                        Splinting  [   ] Only      GOALS:   ADL   [xx   ]   Independent                    Transfers  [x   ] Independent                          Ambulation  [ x  ] Independent     [  x  ] With device                            [   ]  CG                                                         [   ]  CG                                                                  [   ] CG                            [    ] Min A                                                   [   ] Min A                                                              [   ] Min  A          DISCHARGE PLAN:   [ x  ]  Good candidate for Intensive Rehabilitation/Hospital based                                             Will tolerate 3hrs Intensive Rehab Daily                                       [    ]  Short Term Rehab in Skilled Nursing Facility                                       [    ]  Home with Outpatient or  services                                         [    ]  Possible Candidate for Intensive Hospital based Rehab
67 yo man with PMH of CAD/ CHF ESRD on HD ([facility]) DM presenting with Complete heart block sp transvenous PM   ·	ESRD on HD for HD on Monday  ·	no need for urgent HD now   ·	on lasix metolazone   ·	complete heart block sp transvenous PM   ·	on beta blocker and IMdur / meds as per cardio    will follow

## 2018-10-20 LAB
GLUCOSE BLDC GLUCOMTR-MCNC: 175 MG/DL — HIGH (ref 70–99)
GLUCOSE BLDC GLUCOMTR-MCNC: 199 MG/DL — HIGH (ref 70–99)
GLUCOSE BLDC GLUCOMTR-MCNC: 233 MG/DL — HIGH (ref 70–99)
GLUCOSE BLDC GLUCOMTR-MCNC: 82 MG/DL — SIGNIFICANT CHANGE UP (ref 70–99)

## 2018-10-20 RX ADMIN — Medication 80 MILLIGRAM(S): at 18:33

## 2018-10-20 RX ADMIN — LOSARTAN POTASSIUM 25 MILLIGRAM(S): 100 TABLET, FILM COATED ORAL at 05:48

## 2018-10-20 RX ADMIN — CLOPIDOGREL BISULFATE 75 MILLIGRAM(S): 75 TABLET, FILM COATED ORAL at 12:55

## 2018-10-20 RX ADMIN — HEPARIN SODIUM 5000 UNIT(S): 5000 INJECTION INTRAVENOUS; SUBCUTANEOUS at 05:48

## 2018-10-20 RX ADMIN — Medication 667 MILLIGRAM(S): at 08:27

## 2018-10-20 RX ADMIN — PANTOPRAZOLE SODIUM 40 MILLIGRAM(S): 20 TABLET, DELAYED RELEASE ORAL at 08:27

## 2018-10-20 RX ADMIN — TAMSULOSIN HYDROCHLORIDE 0.4 MILLIGRAM(S): 0.4 CAPSULE ORAL at 21:17

## 2018-10-20 RX ADMIN — Medication 81 MILLIGRAM(S): at 12:54

## 2018-10-20 RX ADMIN — SENNA PLUS 5 MILLILITER(S): 8.6 TABLET ORAL at 06:33

## 2018-10-20 RX ADMIN — ISOSORBIDE MONONITRATE 30 MILLIGRAM(S): 60 TABLET, EXTENDED RELEASE ORAL at 12:54

## 2018-10-20 RX ADMIN — Medication 2: at 18:31

## 2018-10-20 RX ADMIN — Medication 25 MILLIGRAM(S): at 18:33

## 2018-10-20 RX ADMIN — Medication 1: at 12:55

## 2018-10-20 RX ADMIN — HEPARIN SODIUM 5000 UNIT(S): 5000 INJECTION INTRAVENOUS; SUBCUTANEOUS at 18:34

## 2018-10-20 RX ADMIN — Medication 667 MILLIGRAM(S): at 18:33

## 2018-10-20 RX ADMIN — FINASTERIDE 5 MILLIGRAM(S): 5 TABLET, FILM COATED ORAL at 12:54

## 2018-10-20 RX ADMIN — Medication 6 UNIT(S): at 12:55

## 2018-10-20 RX ADMIN — Medication 25 MILLIGRAM(S): at 05:48

## 2018-10-20 RX ADMIN — Medication 100 MILLIGRAM(S): at 05:48

## 2018-10-20 RX ADMIN — INSULIN GLARGINE 16 UNIT(S): 100 INJECTION, SOLUTION SUBCUTANEOUS at 21:17

## 2018-10-20 RX ADMIN — ATORVASTATIN CALCIUM 80 MILLIGRAM(S): 80 TABLET, FILM COATED ORAL at 21:17

## 2018-10-20 RX ADMIN — Medication 100 MILLIGRAM(S): at 18:33

## 2018-10-20 RX ADMIN — Medication 80 MILLIGRAM(S): at 05:48

## 2018-10-20 RX ADMIN — Medication 6 UNIT(S): at 18:30

## 2018-10-20 NOTE — PROGRESS NOTE ADULT - SUBJECTIVE AND OBJECTIVE BOX
INTERVAL HPI/OVERNIGHT EVENTS:    HPI  67 y/o M with PMH of ESRD on HD, CAD s/p pci with stent in LAD and RCA, Heart failure with reduced EF (unknown %), DLD, DM II, and BPH was sent in from hemodialysis after he was noted to be bradycardic. During dialysis he had no symptoms and he completed dialysis without issue. He was told however, that his heart rate was "very low" (exact rate not known), and that he should go to the hospital immediately. found to have third degree heart block    CC  Pt seen and examined today for follow up of Heart block/CHF/ESRD/UTI  s/p AICD placement  no overnight events  dysuria better      REVIEW OF SYSTEMS:  CONSTITUTIONAL: No fever, weight loss, or fatigue  EYES: No eye pain, visual disturbances, or discharge  ENMT:  No difficulty hearing, tinnitus, vertigo; No sinus or throat pain  NECK: No pain or stiffness  BREASTS: No pain, masses, or nipple discharge  RESPIRATORY: No cough, wheezing, chills or hemoptysis; No shortness of breath  CARDIOVASCULAR: edema  GASTROINTESTINAL: No abdominal or epigastric pain.   GENITOURINARY: No dysuria, frequency, hematuria, or incontinence  NEUROLOGICAL: No headaches, memory loss, loss of strength, numbness, or tremors  SKIN: No itching, burning, rashes, or lesions   LYMPH NODES: No enlarged glands  ENDOCRINE: No heat or cold intolerance; No hair loss  MUSCULOSKELETAL: No joint pain or swelling; No muscle, back, or extremity pain  PSYCHIATRIC: No depression, anxiety, mood swings, or difficulty sleeping  HEME/LYMPH: No easy bruising, or bleeding gums  ALLERY AND IMMUNOLOGIC: No hives or eczema    VITALS:  T(F): 98.5, Max: 99 (10-19-18 @ 21:13)  HR: 68  BP: 129/36  RR: 18  SpO2: --    PHYSICAL EXAM:  GENERAL: NAD, well-groomed, well-developed  HEAD:  Atraumatic, Normocephalic  EYES: EOMI, PERRLA, conjunctiva and sclera clear  ENMT: No tonsillar erythema, exudates, or enlargement; Moist mucous membranes, Good dentition, No lesions  NECK: Supple, No JVD, Normal thyroid  NERVOUS SYSTEM:  Alert & Oriented X3,   CHEST/LUNG: decrease breath sounds  HEART: Regular rate and rhythm; No murmurs, rubs, or gallops  ABDOMEN: Soft, Nontender, Nondistended; Bowel sounds present  EXTREMITIES:  edema  LYMPH: No lymphadenopathy noted  SKIN: No rashes or lesions      LABS:                  RADIOLOGY & ADDITIONAL TESTS:    Imaging Personally Reviewed:  [ ] YES  [ ] NO    MEDICATIONS:     MEDICATIONS  (STANDING):  aspirin  chewable 81 milliGRAM(s) Oral daily  atorvastatin 80 milliGRAM(s) Oral at bedtime  calcium acetate 667 milliGRAM(s) Oral two times a day with meals  cefpodoxime 100 milliGRAM(s) Oral every 48 hours  chlorhexidine 4% Liquid 1 Application(s) Topical <User Schedule>  clopidogrel Tablet 75 milliGRAM(s) Oral daily  dextrose 50% Injectable 25 Gram(s) IV Push once  dextrose 50% Injectable 25 Gram(s) IV Push once  docusate sodium 100 milliGRAM(s) Oral two times a day  finasteride 5 milliGRAM(s) Oral daily  furosemide    Tablet 80 milliGRAM(s) Oral every 12 hours  heparin  Injectable 5000 Unit(s) SubCutaneous every 12 hours  influenza   Vaccine 0.5 milliLiter(s) IntraMuscular once  insulin glargine Injectable (LANTUS) 16 Unit(s) SubCutaneous at bedtime  insulin lispro (HumaLOG) corrective regimen sliding scale   SubCutaneous three times a day before meals  insulin lispro Injectable (HumaLOG) 6 Unit(s) SubCutaneous three times a day before meals  isosorbide   mononitrate ER Tablet (IMDUR) 30 milliGRAM(s) Oral daily  losartan 25 milliGRAM(s) Oral daily  metolazone 5 milliGRAM(s) Oral daily  metoprolol tartrate 25 milliGRAM(s) Oral two times a day  ondansetron  IVPB 4 milliGRAM(s) IV Intermittent once  pantoprazole    Tablet 40 milliGRAM(s) Oral before breakfast  senna Syrup 5 milliLiter(s) Oral two times a day  tamsulosin 0.4 milliGRAM(s) Oral at bedtime    MEDICATIONS  (PRN):  acetaminophen   Tablet .. 650 milliGRAM(s) Oral every 6 hours PRN Temp greater or equal to 38C (100.4F), Mild Pain (1 - 3)  aluminum hydroxide/magnesium hydroxide/simethicone Suspension 30 milliLiter(s) Oral every 6 hours PRN Dyspepsia  glucagon  Injectable 1 milliGRAM(s) IntraMuscular once PRN Glucose LESS THAN 70 milligrams/deciliter  morphine  - Injectable 4 milliGRAM(s) IV Push every 10 minutes PRN Severe Pain (7 - 10)  morphine  - Injectable 2 milliGRAM(s) IV Push every 10 minutes PRN Moderate Pain (4 - 6)

## 2018-10-20 NOTE — PROGRESS NOTE ADULT - ASSESSMENT
69 y/o M with PMH of ESRD on HD, CAD s/p pci with stent in LAD and RCA, Heart failure with reduced EF (unknown %), DLD, DM II, and BPH was sent in from hemodialysis after he was noted to be bradycardic. Found to be complete heart block.    1.) 3rd Degree Heart Block:    - s/p transvenous pacemaker placement in the ED.   now s/p AICD by EP  doing good post procedure    2.) Heart Failure with reduced ejection fraction / CAD / DLD:  sob better      got AICD  cont lasix, Metolazone    - Continue aspirin, plavix, atorvastatin, imdur,       3.) ESRD on HD:    - on HD  MWF  s/p HD yesterday    - Continue lasix and metolazone.     - Renal diet.    - Monitor BMP, Mg, and P.    4.) DM II:    - Continue basal / bolus insulin.    - Carbohydrate consistent diet.    - Check hemoglobin A1c.    - Monitor blood glucose.    5.) BPH:    - Continue tamsulosin and finasteride.    6)   Dysuria  urine culture E coli  sensitive to rocephin  getting IV rocephin  switch to Po cefpodoxime 100 mg q 48 hourd post HD for a week on discharge      7) Abd distension  X ray done negative  Pt moving bowels, no nausea, vomiting      PT/rehab done  Pt wants to go to  only for rehab  otherwise home with services   working on discharge    Discussed with her daughter in detail    6.) GI / DVT PPx: protonix / heparin        Discussed with House staff  resident note reviewed

## 2018-10-21 LAB
GLUCOSE BLDC GLUCOMTR-MCNC: 136 MG/DL — HIGH (ref 70–99)
GLUCOSE BLDC GLUCOMTR-MCNC: 183 MG/DL — HIGH (ref 70–99)
GLUCOSE BLDC GLUCOMTR-MCNC: 185 MG/DL — HIGH (ref 70–99)
GLUCOSE BLDC GLUCOMTR-MCNC: 235 MG/DL — HIGH (ref 70–99)

## 2018-10-21 RX ADMIN — HEPARIN SODIUM 5000 UNIT(S): 5000 INJECTION INTRAVENOUS; SUBCUTANEOUS at 18:37

## 2018-10-21 RX ADMIN — Medication 100 MILLIGRAM(S): at 18:34

## 2018-10-21 RX ADMIN — Medication 81 MILLIGRAM(S): at 12:02

## 2018-10-21 RX ADMIN — PANTOPRAZOLE SODIUM 40 MILLIGRAM(S): 20 TABLET, DELAYED RELEASE ORAL at 06:30

## 2018-10-21 RX ADMIN — FINASTERIDE 5 MILLIGRAM(S): 5 TABLET, FILM COATED ORAL at 12:02

## 2018-10-21 RX ADMIN — Medication 6 UNIT(S): at 08:01

## 2018-10-21 RX ADMIN — Medication 25 MILLIGRAM(S): at 18:34

## 2018-10-21 RX ADMIN — INSULIN GLARGINE 16 UNIT(S): 100 INJECTION, SOLUTION SUBCUTANEOUS at 21:36

## 2018-10-21 RX ADMIN — Medication 100 MILLIGRAM(S): at 06:30

## 2018-10-21 RX ADMIN — HEPARIN SODIUM 5000 UNIT(S): 5000 INJECTION INTRAVENOUS; SUBCUTANEOUS at 06:29

## 2018-10-21 RX ADMIN — LOSARTAN POTASSIUM 25 MILLIGRAM(S): 100 TABLET, FILM COATED ORAL at 06:30

## 2018-10-21 RX ADMIN — Medication 667 MILLIGRAM(S): at 08:01

## 2018-10-21 RX ADMIN — Medication 667 MILLIGRAM(S): at 18:34

## 2018-10-21 RX ADMIN — ISOSORBIDE MONONITRATE 30 MILLIGRAM(S): 60 TABLET, EXTENDED RELEASE ORAL at 12:02

## 2018-10-21 RX ADMIN — Medication 1: at 18:36

## 2018-10-21 RX ADMIN — CLOPIDOGREL BISULFATE 75 MILLIGRAM(S): 75 TABLET, FILM COATED ORAL at 12:02

## 2018-10-21 RX ADMIN — Medication 2: at 12:02

## 2018-10-21 RX ADMIN — TAMSULOSIN HYDROCHLORIDE 0.4 MILLIGRAM(S): 0.4 CAPSULE ORAL at 21:37

## 2018-10-21 RX ADMIN — Medication 6 UNIT(S): at 18:35

## 2018-10-21 RX ADMIN — Medication 80 MILLIGRAM(S): at 18:34

## 2018-10-21 RX ADMIN — Medication 25 MILLIGRAM(S): at 06:30

## 2018-10-21 RX ADMIN — Medication 6 UNIT(S): at 12:02

## 2018-10-21 RX ADMIN — Medication 80 MILLIGRAM(S): at 06:30

## 2018-10-21 RX ADMIN — ATORVASTATIN CALCIUM 80 MILLIGRAM(S): 80 TABLET, FILM COATED ORAL at 21:37

## 2018-10-21 NOTE — PROGRESS NOTE ADULT - ASSESSMENT
69 y/o M with PMH of ESRD on HD, CAD s/p pci with stent in LAD and RCA, Heart failure with reduced EF (unknown %), DLD, DM II, and BPH was sent in from hemodialysis after he was noted to be bradycardic. Found to be complete heart block.    3rd Degree Heart Block:  - s/p transvenous pacemaker placement in the ED.  -now s/p AICd by EP  -doing well, has pain in the right arm.      Heart Failure with reduced ejection fraction / CAD / DLD:  -AICd placed  -Continue aspirin, atorvastatin, imdur, lasix,  -as per cardiology metoprolol restarted, losartan started at 25 daily.  -plavix restarted     deconditioning/ARM pain s/p AICD:  -no weight bearing/stress for 3 weeks post AICD on 10/15/18  -patient initially refused ot or pt, saying his wife and daughter would help him at home.  -PT were able to see him and deemed him unsafe to go home without aid or rehab.  -Family contacted as per social work family only agreeable to acute rehab or home.  -Rehab consult appreciated    ESRD on HD:   - on HD  MWF  -s/p HD today   - Continue lasix and metolazone.    - Renal diet.   - Monitor BMP, Mg, and P.    DM II:   - Continue basal / bolus insulin. (16, 6 TId, sliding scale)   - Carbohydrate consistent diet.   - hemoglobin A1c 7.1   - Monitor blood glucose.    BPH:    - Continue tamsulosin and finasteride.    UTI -  -patient reports symptoms-improving but present  -positive UA  -urine culture prelim >100,000 e-coli, sensitive to ceftriaxone 7 day course to end on 10/24/18  -if d/c home change antibiotics to cefpodoxime 100mg po 3x a week with dialysis for 1 week if course not completed.   -Rocephin daily -duration of therapy to be discussed with attending.     GI / DVT PPx: protonix / heparin  DIET: renal restriction    DISPO:  patient evaluated by physiatry as good candidate for acute rehab. If approved and has a bed he can go, if insurance issues discharge on monday with any needed assistive equipment per case management. attending can sign scripts when he rounds with resident tita 1-3 pm. discharge prepared except for update regarding home or 4a.

## 2018-10-21 NOTE — PROGRESS NOTE ADULT - SUBJECTIVE AND OBJECTIVE BOX
Nephrology progress note    Patient was seen and examined, events over the last 24 h noted .  HD friday     Allergies:  No Known Allergies    Hospital Medications:   MEDICATIONS  (STANDING):  aspirin  chewable 81 milliGRAM(s) Oral daily  atorvastatin 80 milliGRAM(s) Oral at bedtime  calcium acetate 667 milliGRAM(s) Oral two times a day with meals  cefpodoxime 100 milliGRAM(s) Oral every 48 hours  chlorhexidine 4% Liquid 1 Application(s) Topical <User Schedule>  clopidogrel Tablet 75 milliGRAM(s) Oral daily  dextrose 50% Injectable 25 Gram(s) IV Push once  dextrose 50% Injectable 25 Gram(s) IV Push once  docusate sodium 100 milliGRAM(s) Oral two times a day  finasteride 5 milliGRAM(s) Oral daily  furosemide    Tablet 80 milliGRAM(s) Oral every 12 hours  heparin  Injectable 5000 Unit(s) SubCutaneous every 12 hours  influenza   Vaccine 0.5 milliLiter(s) IntraMuscular once  insulin glargine Injectable (LANTUS) 16 Unit(s) SubCutaneous at bedtime  insulin lispro (HumaLOG) corrective regimen sliding scale   SubCutaneous three times a day before meals  insulin lispro Injectable (HumaLOG) 6 Unit(s) SubCutaneous three times a day before meals  isosorbide   mononitrate ER Tablet (IMDUR) 30 milliGRAM(s) Oral daily  losartan 25 milliGRAM(s) Oral daily  metolazone 5 milliGRAM(s) Oral daily  metoprolol tartrate 25 milliGRAM(s) Oral two times a day  ondansetron  IVPB 4 milliGRAM(s) IV Intermittent once  pantoprazole    Tablet 40 milliGRAM(s) Oral before breakfast  senna Syrup 5 milliLiter(s) Oral two times a day  tamsulosin 0.4 milliGRAM(s) Oral at bedtime        VITALS:  T(F): 97.6 (10-21-18 @ 12:55), Max: 98.5 (10-20-18 @ 20:32)  HR: 68 (10-21-18 @ 12:55)  BP: 120/58 (10-21-18 @ 12:55)  RR: 18 (10-21-18 @ 12:55)  SpO2: --  Wt(kg): --    10-19 @ 07:01  -  10-20 @ 07:00  --------------------------------------------------------  IN: 0 mL / OUT: 1300 mL / NET: -1300 mL    10-20 @ 07:01  -  10-21 @ 07:00  --------------------------------------------------------  IN: 80 mL / OUT: 0 mL / NET: 80 mL    10-21 @ 07:01  -  10-21 @ 14:52  --------------------------------------------------------  IN: 410 mL / OUT: 800 mL / NET: -390 mL          PHYSICAL EXAM:  Constitutional: NAD  HEENT: anicteric sclera, oropharynx clear, MMM  Neck: No JVD  Respiratory: CTAB, no wheezes, rales or rhonchi  Cardiovascular: S1, S2, RRR  Gastrointestinal: BS+, soft, NT/ND  Extremities: No cyanosis or clubbing. No peripheral edema  :  No crook.   Skin: No rashes    LABS:            Urine Studies:  Urinalysis Basic - ( 15 Oct 2018 18:35 )    Color: Red / Appearance: Turbid / S.020 / pH:   Gluc:  / Ketone: Negative  / Bili: Negative / Urobili: 0.2 mg/dL   Blood:  / Protein: >=300 mg/dL / Nitrite: Negative   Leuk Esterase: Large / RBC: 10-25 /HPF / WBC >50 /HPF   Sq Epi:  / Non Sq Epi: Moderate /HPF / Bacteria: Moderate /HPF        RADIOLOGY & ADDITIONAL STUDIES:

## 2018-10-21 NOTE — PROGRESS NOTE ADULT - SUBJECTIVE AND OBJECTIVE BOX
INTERVAL HPI/OVERNIGHT EVENTS:    HPI  67 y/o M with PMH of ESRD on HD, CAD s/p pci with stent in LAD and RCA, Heart failure with reduced EF (unknown %), DLD, DM II, and BPH was sent in from hemodialysis after he was noted to be bradycardic. During dialysis he had no symptoms and he completed dialysis without issue. He was told however, that his heart rate was "very low" (exact rate not known), and that he should go to the hospital immediately. found to have third degree heart block    CC  Pt seen and examined today for follow up of Heart block/CHF/ESRD/UTI  s/p AICD placement  has mild hematuria today        REVIEW OF SYSTEMS:  CONSTITUTIONAL: No fever, weight loss, or fatigue  EYES: No eye pain, visual disturbances, or discharge  ENMT:  No difficulty hearing, tinnitus, vertigo; No sinus or throat pain  NECK: No pain or stiffness  BREASTS: No pain, masses, or nipple discharge  RESPIRATORY: No cough, wheezing, chills or hemoptysis; No shortness of breath  CARDIOVASCULAR: No chest pain, palpitations, dizziness, or leg swelling  GASTROINTESTINAL: No abdominal or epigastric pain. No nausea, vomiting, or hematemesis; No diarrhea or constipation. No melena or hematochezia.  GENITOURINARY: hematuria  NEUROLOGICAL: No headaches, memory loss, loss of strength, numbness, or tremors  SKIN: No itching, burning, rashes, or lesions   LYMPH NODES: No enlarged glands  ENDOCRINE: No heat or cold intolerance; No hair loss  MUSCULOSKELETAL: No joint pain or swelling; No muscle, back, or extremity pain  PSYCHIATRIC: No depression, anxiety, mood swings, or difficulty sleeping  HEME/LYMPH: No easy bruising, or bleeding gums  ALLERY AND IMMUNOLOGIC: No hives or eczema    VITALS:  T(F): 98, Max: 99.1 (10-20-18 @ 13:19)  HR: 66  BP: 141/61  RR: 19  SpO2: --    PHYSICAL EXAM:  GENERAL: NAD, well-groomed, well-developed  HEAD:  Atraumatic, Normocephalic  EYES: EOMI, PERRLA, conjunctiva and sclera clear  ENMT: No tonsillar erythema, exudates, or enlargement; Moist mucous membranes, Good dentition, No lesions  NECK: Supple, No JVD, Normal thyroid  NERVOUS SYSTEM:  Alert & Oriented X3,   CHEST/LUNG: Clear to percussion bilaterally; No rales, rhonchi, wheezing, or rubs  HEART: Regular rate and rhythm; No murmurs, rubs, or gallops  ABDOMEN: Soft, Nontender, Nondistended; Bowel sounds present  EXTREMITIES:  mild edema  LYMPH: No lymphadenopathy noted  SKIN: No rashes or lesions      LABS:                  RADIOLOGY & ADDITIONAL TESTS:    Imaging Personally Reviewed:  [ ] YES  [ ] NO    MEDICATIONS:     MEDICATIONS  (STANDING):  aspirin  chewable 81 milliGRAM(s) Oral daily  atorvastatin 80 milliGRAM(s) Oral at bedtime  calcium acetate 667 milliGRAM(s) Oral two times a day with meals  cefpodoxime 100 milliGRAM(s) Oral every 48 hours  chlorhexidine 4% Liquid 1 Application(s) Topical <User Schedule>  clopidogrel Tablet 75 milliGRAM(s) Oral daily  dextrose 50% Injectable 25 Gram(s) IV Push once  dextrose 50% Injectable 25 Gram(s) IV Push once  docusate sodium 100 milliGRAM(s) Oral two times a day  finasteride 5 milliGRAM(s) Oral daily  furosemide    Tablet 80 milliGRAM(s) Oral every 12 hours  heparin  Injectable 5000 Unit(s) SubCutaneous every 12 hours  influenza   Vaccine 0.5 milliLiter(s) IntraMuscular once  insulin glargine Injectable (LANTUS) 16 Unit(s) SubCutaneous at bedtime  insulin lispro (HumaLOG) corrective regimen sliding scale   SubCutaneous three times a day before meals  insulin lispro Injectable (HumaLOG) 6 Unit(s) SubCutaneous three times a day before meals  isosorbide   mononitrate ER Tablet (IMDUR) 30 milliGRAM(s) Oral daily  losartan 25 milliGRAM(s) Oral daily  metolazone 5 milliGRAM(s) Oral daily  metoprolol tartrate 25 milliGRAM(s) Oral two times a day  ondansetron  IVPB 4 milliGRAM(s) IV Intermittent once  pantoprazole    Tablet 40 milliGRAM(s) Oral before breakfast  senna Syrup 5 milliLiter(s) Oral two times a day  tamsulosin 0.4 milliGRAM(s) Oral at bedtime    MEDICATIONS  (PRN):  acetaminophen   Tablet .. 650 milliGRAM(s) Oral every 6 hours PRN Temp greater or equal to 38C (100.4F), Mild Pain (1 - 3)  aluminum hydroxide/magnesium hydroxide/simethicone Suspension 30 milliLiter(s) Oral every 6 hours PRN Dyspepsia  glucagon  Injectable 1 milliGRAM(s) IntraMuscular once PRN Glucose LESS THAN 70 milligrams/deciliter  morphine  - Injectable 4 milliGRAM(s) IV Push every 10 minutes PRN Severe Pain (7 - 10)  morphine  - Injectable 2 milliGRAM(s) IV Push every 10 minutes PRN Moderate Pain (4 - 6)

## 2018-10-21 NOTE — PROGRESS NOTE ADULT - ASSESSMENT
67 yo man with PMH of CAD/ CHF ESRD on HD (MWF) DM. presenting with Complete heart block:  s/p AICD placement followed by cardio/EP    ESRd  -HD Friday, no indication today, expect next tx tomorrow   - on metolazone , lasix, will keep for now      MBD   phos noted, cont phoslo 2 tablets q 8 with meals    anemia   h/h at goal , no YASMINE     DC planning

## 2018-10-21 NOTE — PROGRESS NOTE ADULT - SUBJECTIVE AND OBJECTIVE BOX
67 yo M with history of CAD s/p PCI of RCA and LAD  (2017), last EF 40% by echo (June 2018). Patient admitted after he was noted to have bradycardia at dialysis on Friday. No chest pain, palpitations, or history of syncope. In ER, patient noted to have high grade AV block. TVP placed in ER. He underwent an attempt at a BiV ICD, which was unsuccessful due to anatomy of CS. He received a DC ICD on 10/15 and R IJ TVP was removed.   patient was transferred to telemetry for discharge planning.    10/17/18  hospital day 5  As per sign out patient was to be discharged today. On initial interview he reported no pain, no symptoms, and nothing bothering him today. he then refused exam so he could eat breakfast before dialysis. later called to bedside because patient had pain in right arm upon movement and attempt to weight bear a little when transferring from chair to bed to go to dialysis. OT consult ordered,  to evaluate need for rehab/pt (patient refused services).   However, on later interview he spoke about continued symptomatic UA: with increased urgency, frequency, and burning when urine builds up for the last week and a half. Culture is still pending, so patient will wait for the result so that he can be sent home on an appropriate, verified antibiotic regimen. Family aware and agree with the plan. Ot was ordered this am as patient had difficulty ambulating and balancing without use of his right arm (instructed not to place pressure on it or stretch it for at least three weeks, also painful when he bears weight on it). patient refused service, adn said he would do all right at home with help from wife and daughter.    10/18/18  hospital day 6  urine culture final result not back yet. Patient evaluated by Pt as unsafe to ambulate on his own and go home without aid/rehab.  attempting to reach the family for decision. Losartan started today as per cardiology attending.     10/19/18  hospital day 7  urine culture results returned, e coli is ceftriaxone sensitive, the patient will be discharged with oral regimen to finish course. patient went for hemodialysis today and at hemodialysis complained of abdominal pain, given simethicone. Complaining of some gas pain with bloating on exam this am (less bloated yesterday). Patient is passing gas but Dr. Bayron burkett requested a KUB to rule out partial or intermittent obstruction. He also mentioned possibly keeping the patient overnight. Attending contacted, but he said he was unavailable to discuss the case at this time. KUB ordered in the meantime. Patient reported no N/V or dizziness.    10/20-21/18  the patient remained medically stable for discharge. As per attending the aptient may not be able to go to [facility] (although physiatry recommends it).  due to his insurance. If this is the case on monday he will need scripts for assistive devices depending on case management Dr. Ordonez can sign scripts when he rounds with resident between 1 and 3 pm. 69 yo M with history of CAD s/p PCI of RCA and LAD  (2017), last EF 40% by echo (June 2018). Patient admitted after he was noted to have bradycardia at dialysis on Friday. No chest pain, palpitations, or history of syncope. In ER, patient noted to have high grade AV block. TVP placed in ER. He underwent an attempt at a BiV ICD, which was unsuccessful due to anatomy of CS. He received a DC ICD on 10/15 and R IJ TVP was removed.   patient was transferred to telemetry for discharge planning.    10/17/18  hospital day 5  As per sign out patient was to be discharged today. On initial interview he reported no pain, no symptoms, and nothing bothering him today. he then refused exam so he could eat breakfast before dialysis. later called to bedside because patient had pain in right arm upon movement and attempt to weight bear a little when transferring from chair to bed to go to dialysis. OT consult ordered,  to evaluate need for rehab/pt (patient refused services).   However, on later interview he spoke about continued symptomatic UA: with increased urgency, frequency, and burning when urine builds up for the last week and a half. Culture is still pending, so patient will wait for the result so that he can be sent home on an appropriate, verified antibiotic regimen. Family aware and agree with the plan. Ot was ordered this am as patient had difficulty ambulating and balancing without use of his right arm (instructed not to place pressure on it or stretch it for at least three weeks, also painful when he bears weight on it). patient refused service, adn said he would do all right at home with help from wife and daughter.    10/18/18  hospital day 6  urine culture final result not back yet. Patient evaluated by Pt as unsafe to ambulate on his own and go home without aid/rehab.  attempting to reach the family for decision. Losartan started today as per cardiology attending.     10/19/18  hospital day 7  urine culture results returned, e coli is ceftriaxone sensitive, the patient will be discharged with oral regimen to finish course. patient went for hemodialysis today and at hemodialysis complained of abdominal pain, given simethicone. Complaining of some gas pain with bloating on exam this am (less bloated yesterday). Patient is passing gas but Dr. Bayron burkett requested a KUB to rule out partial or intermittent obstruction. He also mentioned possibly keeping the patient overnight. Attending contacted, but he said he was unavailable to discuss the case at this time. KUB ordered in the meantime. Patient reported no N/V or dizziness.    10/20-21/18  the patient remained medically stable for discharge. As per attending the aptient may not be able to go to  (although physiatry recommends it).  due to his insurance. If this is the case on monday he will need scripts for assistive devices depending on case management Dr. Ordonez can sign scripts when he rounds with resident between 1 and 3 pm.       SUBJECTIVE:    Patient is a 68y old Male who presents with a chief complaint of Dizziness and bradycardia (21 Oct 2018 14:52)    Currently admitted to medicine with the primary diagnosis of Third degree AV block     Today is hospital day 9d. This morning he is resting comfortably in bed and reports no new issues or overnight events.     PAST MEDICAL & SURGICAL HISTORY  Heart failure with reduced ejection fraction  CAD (coronary artery disease)  BPH (benign prostatic hyperplasia)  Type 2 diabetes mellitus  End stage renal disease  Dyslipidemia  Hypertension    SOCIAL HISTORY:  Negative for smoking/alcohol/drug use.     ALLERGIES:  No Known Allergies    MEDICATIONS:  STANDING MEDICATIONS  aspirin  chewable 81 milliGRAM(s) Oral daily  atorvastatin 80 milliGRAM(s) Oral at bedtime  calcium acetate 667 milliGRAM(s) Oral two times a day with meals  cefpodoxime 100 milliGRAM(s) Oral every 48 hours  chlorhexidine 4% Liquid 1 Application(s) Topical <User Schedule>  clopidogrel Tablet 75 milliGRAM(s) Oral daily  dextrose 50% Injectable 25 Gram(s) IV Push once  dextrose 50% Injectable 25 Gram(s) IV Push once  docusate sodium 100 milliGRAM(s) Oral two times a day  finasteride 5 milliGRAM(s) Oral daily  furosemide    Tablet 80 milliGRAM(s) Oral every 12 hours  heparin  Injectable 5000 Unit(s) SubCutaneous every 12 hours  influenza   Vaccine 0.5 milliLiter(s) IntraMuscular once  insulin glargine Injectable (LANTUS) 16 Unit(s) SubCutaneous at bedtime  insulin lispro (HumaLOG) corrective regimen sliding scale   SubCutaneous three times a day before meals  insulin lispro Injectable (HumaLOG) 6 Unit(s) SubCutaneous three times a day before meals  isosorbide   mononitrate ER Tablet (IMDUR) 30 milliGRAM(s) Oral daily  losartan 25 milliGRAM(s) Oral daily  metolazone 5 milliGRAM(s) Oral daily  metoprolol tartrate 25 milliGRAM(s) Oral two times a day  ondansetron  IVPB 4 milliGRAM(s) IV Intermittent once  pantoprazole    Tablet 40 milliGRAM(s) Oral before breakfast  senna Syrup 5 milliLiter(s) Oral two times a day  tamsulosin 0.4 milliGRAM(s) Oral at bedtime    PRN MEDICATIONS  acetaminophen   Tablet .. 650 milliGRAM(s) Oral every 6 hours PRN  aluminum hydroxide/magnesium hydroxide/simethicone Suspension 30 milliLiter(s) Oral every 6 hours PRN  glucagon  Injectable 1 milliGRAM(s) IntraMuscular once PRN  morphine  - Injectable 4 milliGRAM(s) IV Push every 10 minutes PRN  morphine  - Injectable 2 milliGRAM(s) IV Push every 10 minutes PRN    VITALS:   T(F): 97.6  HR: 68  BP: 120/58  RR: 18  SpO2: --    LABS:    RADIOLOGY:    PHYSICAL EXAM:  GEN: NAD, asleep, arouses to verbal stimulation. OOB in chair, wife at bedside.  LUNGS: Clear to auscultation bilaterally. No wheezing  HEART: +s1s2 RRR.   ABD: Soft, NT/ND. (+) bs -improved  EXT: no c/c/e. 2+PP, BENNETT unchanged chronic skin changes bilaterally.   NEURO: AAOX3. No focal deficits noted at this time. 69 yo M with history of CAD s/p PCI of RCA and LAD  (2017), last EF 40% by echo (June 2018). Patient admitted after he was noted to have bradycardia at dialysis on Friday. No chest pain, palpitations, or history of syncope. In ER, patient noted to have high grade AV block. TVP placed in ER. He underwent an attempt at a BiV ICD, which was unsuccessful due to anatomy of CS. He received a DC ICD on 10/15 and R IJ TVP was removed.   patient was transferred to telemetry for discharge planning.    10/17/18  hospital day 5  As per sign out patient was to be discharged today. On initial interview he reported no pain, no symptoms, and nothing bothering him today. he then refused exam so he could eat breakfast before dialysis. later called to bedside because patient had pain in right arm upon movement and attempt to weight bear a little when transferring from chair to bed to go to dialysis. OT consult ordered,  to evaluate need for rehab/pt (patient refused services).   However, on later interview he spoke about continued symptomatic UA: with increased urgency, frequency, and burning when urine builds up for the last week and a half. Culture is still pending, so patient will wait for the result so that he can be sent home on an appropriate, verified antibiotic regimen. Family aware and agree with the plan. Ot was ordered this am as patient had difficulty ambulating and balancing without use of his right arm (instructed not to place pressure on it or stretch it for at least three weeks, also painful when he bears weight on it). patient refused service, adn said he would do all right at home with help from wife and daughter.    10/18/18  hospital day 6  urine culture final result not back yet. Patient evaluated by Pt as unsafe to ambulate on his own and go home without aid/rehab.  attempting to reach the family for decision. Losartan started today as per cardiology attending.     10/19/18  hospital day 7  urine culture results returned, e coli is ceftriaxone sensitive, the patient will be discharged with oral regimen to finish course. patient went for hemodialysis today and at hemodialysis complained of abdominal pain, given simethicone. Complaining of some gas pain with bloating on exam this am (less bloated yesterday). Patient is passing gas but Dr. Bayron burkett requested a KUB to rule out partial or intermittent obstruction. He also mentioned possibly keeping the patient overnight. Attending contacted, but he said he was unavailable to discuss the case at this time. KUB ordered in the meantime. Patient reported no N/V or dizziness.    10/20-21/18  hospital day 8-9  the patient remained medically stable for discharge. As per attending the aptient may not be able to go to  (although physiatry recommends it).  due to his insurance. If this is the case on monday he will need scripts for assistive devices depending on case management Dr. Ordonez can sign scripts when he rounds with resident between 1 and 3 pm.     PAST MEDICAL & SURGICAL HISTORY  Heart failure with reduced ejection fraction  CAD (coronary artery disease)  BPH (benign prostatic hyperplasia)  Type 2 diabetes mellitus  End stage renal disease  Dyslipidemia  Hypertension    SOCIAL HISTORY:  Negative for smoking/alcohol/drug use.     ALLERGIES:  No Known Allergies    MEDICATIONS:  STANDING MEDICATIONS  aspirin  chewable 81 milliGRAM(s) Oral daily  atorvastatin 80 milliGRAM(s) Oral at bedtime  calcium acetate 667 milliGRAM(s) Oral two times a day with meals  cefpodoxime 100 milliGRAM(s) Oral every 48 hours  chlorhexidine 4% Liquid 1 Application(s) Topical <User Schedule>  clopidogrel Tablet 75 milliGRAM(s) Oral daily  dextrose 50% Injectable 25 Gram(s) IV Push once  dextrose 50% Injectable 25 Gram(s) IV Push once  docusate sodium 100 milliGRAM(s) Oral two times a day  finasteride 5 milliGRAM(s) Oral daily  furosemide    Tablet 80 milliGRAM(s) Oral every 12 hours  heparin  Injectable 5000 Unit(s) SubCutaneous every 12 hours  influenza   Vaccine 0.5 milliLiter(s) IntraMuscular once  insulin glargine Injectable (LANTUS) 16 Unit(s) SubCutaneous at bedtime  insulin lispro (HumaLOG) corrective regimen sliding scale   SubCutaneous three times a day before meals  insulin lispro Injectable (HumaLOG) 6 Unit(s) SubCutaneous three times a day before meals  isosorbide   mononitrate ER Tablet (IMDUR) 30 milliGRAM(s) Oral daily  losartan 25 milliGRAM(s) Oral daily  metolazone 5 milliGRAM(s) Oral daily  metoprolol tartrate 25 milliGRAM(s) Oral two times a day  ondansetron  IVPB 4 milliGRAM(s) IV Intermittent once  pantoprazole    Tablet 40 milliGRAM(s) Oral before breakfast  senna Syrup 5 milliLiter(s) Oral two times a day  tamsulosin 0.4 milliGRAM(s) Oral at bedtime    PRN MEDICATIONS  acetaminophen   Tablet .. 650 milliGRAM(s) Oral every 6 hours PRN  aluminum hydroxide/magnesium hydroxide/simethicone Suspension 30 milliLiter(s) Oral every 6 hours PRN  glucagon  Injectable 1 milliGRAM(s) IntraMuscular once PRN  morphine  - Injectable 4 milliGRAM(s) IV Push every 10 minutes PRN  morphine  - Injectable 2 milliGRAM(s) IV Push every 10 minutes PRN    VITALS:   T(F): 97.6  HR: 68  BP: 120/58  RR: 18  SpO2: --    LABS:    RADIOLOGY:    PHYSICAL EXAM:  GEN: NAD, asleep, arouses to verbal stimulation. OOB in chair, wife at bedside.  LUNGS: Clear to auscultation bilaterally. No wheezing  HEART: +s1s2 RRR.   ABD: Soft, NT/ND. (+) bs -improved  EXT: no c/c/e. 2+PP, BENNETT unchanged chronic skin changes bilaterally.   NEURO: AAOX3. No focal deficits noted at this time.

## 2018-10-21 NOTE — PROGRESS NOTE ADULT - ASSESSMENT
69 y/o M with PMH of ESRD on HD, CAD s/p pci with stent in LAD and RCA, Heart failure with reduced EF (unknown %), DLD, DM II, and BPH was sent in from hemodialysis after he was noted to be bradycardic. Found to be complete heart block.    1.) 3rd Degree Heart Block:    - s/p transvenous pacemaker placement in the ED.   now s/p AICD by EP  doing good post procedure    2.) Heart Failure with reduced ejection fraction / CAD / DLD:  sob better      got AICD  cont lasix, Metolazone    - Continue aspirin, plavix, atorvastatin, imdur,       3.) ESRD on HD:    - on HD  MWF  s/p HD yesterday    - Continue lasix and metolazone.     - Renal diet.    - Monitor BMP, Mg, and P.    4.) DM II:    - Continue basal / bolus insulin.    - Carbohydrate consistent diet.    - Check hemoglobin A1c.    - Monitor blood glucose.    5.) BPH:    - Continue tamsulosin and finasteride.    6)  Hematuria  on Abx for UTI  on ASA, Plavix  cont to monitor and see if clears  if not than urology eval    PT/rehab done  Pt wants to go to  only for rehab  otherwise home with services   working on discharge    Discussed with her daughter in detail    6.) GI / DVT PPx: protonix / heparin        Discussed with House staff  resident note reviewed

## 2018-10-22 LAB
ANION GAP SERPL CALC-SCNC: 21 MMOL/L — HIGH (ref 7–14)
BUN SERPL-MCNC: 95 MG/DL — CRITICAL HIGH (ref 10–20)
CALCIUM SERPL-MCNC: 8.8 MG/DL — SIGNIFICANT CHANGE UP (ref 8.5–10.1)
CHLORIDE SERPL-SCNC: 92 MMOL/L — LOW (ref 98–110)
CO2 SERPL-SCNC: 21 MMOL/L — SIGNIFICANT CHANGE UP (ref 17–32)
CREAT SERPL-MCNC: 6.4 MG/DL — CRITICAL HIGH (ref 0.7–1.5)
GLUCOSE BLDC GLUCOMTR-MCNC: 156 MG/DL — HIGH (ref 70–99)
GLUCOSE BLDC GLUCOMTR-MCNC: 198 MG/DL — HIGH (ref 70–99)
GLUCOSE BLDC GLUCOMTR-MCNC: 214 MG/DL — HIGH (ref 70–99)
GLUCOSE BLDC GLUCOMTR-MCNC: 223 MG/DL — HIGH (ref 70–99)
GLUCOSE SERPL-MCNC: 160 MG/DL — HIGH (ref 70–99)
HCT VFR BLD CALC: 27.9 % — LOW (ref 42–52)
HGB BLD-MCNC: 8.9 G/DL — LOW (ref 14–18)
MAGNESIUM SERPL-MCNC: 1.9 MG/DL — SIGNIFICANT CHANGE UP (ref 1.8–2.4)
MCHC RBC-ENTMCNC: 30 PG — SIGNIFICANT CHANGE UP (ref 27–31)
MCHC RBC-ENTMCNC: 31.9 G/DL — LOW (ref 32–37)
MCV RBC AUTO: 93.9 FL — SIGNIFICANT CHANGE UP (ref 80–94)
NRBC # BLD: 0 /100 WBCS — SIGNIFICANT CHANGE UP (ref 0–0)
PLATELET # BLD AUTO: 154 K/UL — SIGNIFICANT CHANGE UP (ref 130–400)
POTASSIUM SERPL-MCNC: 3.8 MMOL/L — SIGNIFICANT CHANGE UP (ref 3.5–5)
POTASSIUM SERPL-SCNC: 3.8 MMOL/L — SIGNIFICANT CHANGE UP (ref 3.5–5)
RBC # BLD: 2.97 M/UL — LOW (ref 4.7–6.1)
RBC # FLD: 14.3 % — SIGNIFICANT CHANGE UP (ref 11.5–14.5)
SODIUM SERPL-SCNC: 134 MMOL/L — LOW (ref 135–146)
WBC # BLD: 5.9 K/UL — SIGNIFICANT CHANGE UP (ref 4.8–10.8)
WBC # FLD AUTO: 5.9 K/UL — SIGNIFICANT CHANGE UP (ref 4.8–10.8)

## 2018-10-22 RX ADMIN — LOSARTAN POTASSIUM 25 MILLIGRAM(S): 100 TABLET, FILM COATED ORAL at 05:23

## 2018-10-22 RX ADMIN — Medication 81 MILLIGRAM(S): at 12:20

## 2018-10-22 RX ADMIN — Medication 100 MILLIGRAM(S): at 12:12

## 2018-10-22 RX ADMIN — Medication 80 MILLIGRAM(S): at 05:23

## 2018-10-22 RX ADMIN — Medication 25 MILLIGRAM(S): at 17:37

## 2018-10-22 RX ADMIN — CLOPIDOGREL BISULFATE 75 MILLIGRAM(S): 75 TABLET, FILM COATED ORAL at 12:12

## 2018-10-22 RX ADMIN — Medication 100 MILLIGRAM(S): at 05:23

## 2018-10-22 RX ADMIN — Medication 6 UNIT(S): at 12:14

## 2018-10-22 RX ADMIN — Medication 667 MILLIGRAM(S): at 07:58

## 2018-10-22 RX ADMIN — Medication 1: at 07:58

## 2018-10-22 RX ADMIN — Medication 667 MILLIGRAM(S): at 17:38

## 2018-10-22 RX ADMIN — SENNA PLUS 5 MILLILITER(S): 8.6 TABLET ORAL at 17:45

## 2018-10-22 RX ADMIN — Medication 6 UNIT(S): at 17:40

## 2018-10-22 RX ADMIN — Medication 100 MILLIGRAM(S): at 17:46

## 2018-10-22 RX ADMIN — ATORVASTATIN CALCIUM 80 MILLIGRAM(S): 80 TABLET, FILM COATED ORAL at 21:21

## 2018-10-22 RX ADMIN — Medication 2: at 17:40

## 2018-10-22 RX ADMIN — Medication 25 MILLIGRAM(S): at 05:23

## 2018-10-22 RX ADMIN — INSULIN GLARGINE 16 UNIT(S): 100 INJECTION, SOLUTION SUBCUTANEOUS at 21:21

## 2018-10-22 RX ADMIN — FINASTERIDE 5 MILLIGRAM(S): 5 TABLET, FILM COATED ORAL at 12:13

## 2018-10-22 RX ADMIN — HEPARIN SODIUM 5000 UNIT(S): 5000 INJECTION INTRAVENOUS; SUBCUTANEOUS at 05:23

## 2018-10-22 RX ADMIN — Medication 80 MILLIGRAM(S): at 17:38

## 2018-10-22 RX ADMIN — Medication 2: at 12:15

## 2018-10-22 RX ADMIN — PANTOPRAZOLE SODIUM 40 MILLIGRAM(S): 20 TABLET, DELAYED RELEASE ORAL at 06:47

## 2018-10-22 RX ADMIN — Medication 6 UNIT(S): at 07:58

## 2018-10-22 RX ADMIN — TAMSULOSIN HYDROCHLORIDE 0.4 MILLIGRAM(S): 0.4 CAPSULE ORAL at 21:21

## 2018-10-22 RX ADMIN — ISOSORBIDE MONONITRATE 30 MILLIGRAM(S): 60 TABLET, EXTENDED RELEASE ORAL at 12:12

## 2018-10-22 RX ADMIN — HEPARIN SODIUM 5000 UNIT(S): 5000 INJECTION INTRAVENOUS; SUBCUTANEOUS at 17:39

## 2018-10-22 NOTE — PROGRESS NOTE ADULT - SUBJECTIVE AND OBJECTIVE BOX
seen and examined   no new complaints     Standing Inpatient Medications  aspirin  chewable 81 milliGRAM(s) Oral daily  atorvastatin 80 milliGRAM(s) Oral at bedtime  calcium acetate 667 milliGRAM(s) Oral two times a day with meals  cefpodoxime 100 milliGRAM(s) Oral every 48 hours  chlorhexidine 4% Liquid 1 Application(s) Topical <User Schedule>  clopidogrel Tablet 75 milliGRAM(s) Oral daily  dextrose 50% Injectable 25 Gram(s) IV Push once  dextrose 50% Injectable 25 Gram(s) IV Push once  docusate sodium 100 milliGRAM(s) Oral two times a day  finasteride 5 milliGRAM(s) Oral daily  furosemide    Tablet 80 milliGRAM(s) Oral every 12 hours  heparin  Injectable 5000 Unit(s) SubCutaneous every 12 hours  influenza   Vaccine 0.5 milliLiter(s) IntraMuscular once  insulin glargine Injectable (LANTUS) 16 Unit(s) SubCutaneous at bedtime  insulin lispro (HumaLOG) corrective regimen sliding scale   SubCutaneous three times a day before meals  insulin lispro Injectable (HumaLOG) 6 Unit(s) SubCutaneous three times a day before meals  isosorbide   mononitrate ER Tablet (IMDUR) 30 milliGRAM(s) Oral daily  losartan 25 milliGRAM(s) Oral daily  metolazone 5 milliGRAM(s) Oral daily  metoprolol tartrate 25 milliGRAM(s) Oral two times a day  ondansetron  IVPB 4 milliGRAM(s) IV Intermittent once  pantoprazole    Tablet 40 milliGRAM(s) Oral before breakfast  senna Syrup 5 milliLiter(s) Oral two times a day  tamsulosin 0.4 milliGRAM(s) Oral at bedtime          VITALS/PHYSICAL EXAM  --------------------------------------------------------------------------------  T(C): 36.8 (10-22-18 @ 05:00), Max: 36.9 (10-21-18 @ 20:10)  HR: 60 (10-22-18 @ 08:35) (60 - 68)  BP: 111/61 (10-22-18 @ 08:35) (111/61 - 125/60)  RR: 18 (10-22-18 @ 08:35) (18 - 19)  SpO2: 96% (10-22-18 @ 07:06) (96% - 96%)  Wt(kg): --        10-21-18 @ 07:01  -  10-22-18 @ 07:00  --------------------------------------------------------  IN: 410 mL / OUT: 1200 mL / NET: -790 mL      Physical Exam:  	Gen: NAD, well-appearing  	Pulm:decrease BS  B/L  	CV: AICD   	Abd: +distended  	LE:  edema  	Vascular access: AV FISTULA     LABS/STUDIES  --------------------------------------------------------------------------------              8.9    5.90  >-----------<  154      [10-22-18 @ 05:49]              27.9     134  |  92  |  95  ----------------------------<  160      [10-22-18 @ 05:49]  3.8   |  21  |  6.4        Ca     8.8     [10-22-18 @ 05:49]      Mg     1.9     [10-22-18 @ 05:49]            Creatinine Trend:  SCr 6.4 [10-22 @ 05:49]  SCr 4.8 [10-18 @ 07:38]  SCr 4.6 [10-17 @ 06:29]  SCr 5.5 [10-16 @ 04:25]  SCr 5.8 [10-15 @ 04:20]    Urinalysis - [10-15-18 @ 18:35]      Color Red / Appearance Turbid / SG 1.020 / pH 8.0      Gluc Negative / Ketone Negative  / Bili Negative / Urobili 0.2       Blood Small / Protein >=300 / Leuk Est Large / Nitrite Negative      RBC 10-25 / WBC >50 / Hyaline  / Gran  / Sq Epi  / Non Sq Epi Moderate / Bacteria Moderate      HbA1c 7.1      [10-13-18 @ 05:46]  TSH 1.47      [10-14-18 @ 04:51]

## 2018-10-22 NOTE — PROGRESS NOTE ADULT - ASSESSMENT
67 yo man with PMH of CAD/ CHF ESRD on HD (MWF) DM. presenting with Complete heart block:  s/p AICD placement followed by cardio/EP    ESRd  -HD today, standard bath, uf 2 liters as tolerated    - on metolazone , lasix, will keep for now if non oliguric      MBD   phos noted, cont phoslo 2 tablets q 8 with meals    anemia   h/h noted , check iron stores, start aransep 20 with HD weekly      DC planning

## 2018-10-22 NOTE — PROGRESS NOTE ADULT - ASSESSMENT
69 y/o M with PMH of ESRD on HD, CAD s/p pci with stent in LAD and RCA, Heart failure with reduced EF (unknown %), DLD, DM II, and BPH was sent in from hemodialysis after he was noted to be bradycardic. Found to be complete heart block.    1. 3rd Degree Heart Block:  - s/p transvenous pacemaker placement in the ED.  - now s/p AICD by EP  -doing well, has pain in the right arm.     2. Heart Failure with reduced ejection fraction / CAD / DLD:  -AICD placed  -Continue aspirin, atorvastatin, Imdur Lasix  -as per cardiology metoprolol restarted, losartan started at 25 daily.  - Plavix restarted     deconditioning/ARM pain s/p AICD:  -no weight bearing/stress for 3 weeks post AICD on 10/15/18  -patient initially refused PT/OT, saying his wife and daughter would help him at home.  -PT were able to see him and deemed him unsafe to go home without aid or rehab.  -Family contacted as per social work family only agreeable to acute rehab or home.  -Rehab consult appreciated    ESRD on HD:   - on HD  MWF  - s/p HD today   - Continue Lasix and metolazone.    - Renal diet.   - Monitor BMP, Mg, and P.    Anemia:  - f/u iron studies  - Start Aranesp 20 mcg weekly post HD    DM II:   - Continue basal / bolus insulin. (16, 6 TId, sliding scale)   - Carbohydrate consistent diet.   - hemoglobin A1c 7.1   - Monitor blood glucose.    BPH:    - Continue tamsulosin and finasteride.    UTI -  -patient reports symptoms-improving but present  -positive UA  -urine culture prelim >100,000 e-coli, sensitive to ceftriaxone 7 day course to end on 10/24/18, changed antibiotics to cefpodoxime 100mg po 3x a week with dialysis for 1 week.    GI / DVT PPx: protonix / heparin  DIET: renal restriction    DISPO: Pending 4A placement. If no bed by tomorrow, family wants to take him home.    Medical necessity for Wheelchair:  Wheelchair -This pt has a mobility limitation that significantly impairs his ability to participate in one or more MRADLs, such as toileting, eating, dressing and bathing in customary locations in the home. The pts home provides adequate access between rooms for the use of the manual wheelchair. The wheelchair will significantly improve the pts ability to participate in MRADLs and will be used on a regular basis in the home. The patient's mobility limitation cannot be resolved by the use of a walker or can. The pt does not have sufficient upper extremity function to safely propel the manual wheelchair in the home during a typical day. The patient has a caregiver that is willing and able to to propel the manual wheelchair at anytime during the day 67 y/o M with PMH of ESRD on HD, CAD s/p pci with stent in LAD and RCA, Heart failure with reduced EF (unknown %), DLD, DM II, and BPH was sent in from hemodialysis after he was noted to be bradycardic. Found to be complete heart block.    1. 3rd Degree Heart Block:  - s/p transvenous pacemaker placement in the ED.  - now s/p AICD by EP  -doing well, has pain in the right arm.     2. Heart Failure with reduced ejection fraction / CAD / DLD:  -AICD placed  -Continue aspirin, atorvastatin, Imdur Lasix  -as per cardiology metoprolol restarted, losartan started at 25 daily.  - Plavix restarted     deconditioning/ARM pain s/p AICD:  -no weight bearing/stress for 3 weeks post AICD on 10/15/18  -patient initially refused PT/OT, saying his wife and daughter would help him at home.  -PT were able to see him and deemed him unsafe to go home without aid or rehab.  -Family contacted as per social work family only agreeable to acute rehab or home.  -Rehab consult appreciated    ESRD on HD:   - on HD  MWF  - s/p HD today   - Continue Lasix and metolazone.    - Renal diet.   - Monitor BMP, Mg, and P.    Anemia:  - f/u iron studies  - Start Aranesp 20 mcg weekly post HD    DM II:   - Continue basal / bolus insulin. (16, 6 TId, sliding scale)   - Carbohydrate consistent diet.   - hemoglobin A1c 7.1   - Monitor blood glucose.    BPH:    - Continue tamsulosin and finasteride.    UTI -  -patient reports symptoms-improving but present  -positive UA  -urine culture prelim >100,000 e-coli, sensitive to ceftriaxone 7 day course to end on 10/24/18, changed antibiotics to cefpodoxime 100mg po 3x a week with dialysis for 1 week.    GI / DVT PPx: protonix / heparin  DIET: renal restriction    DISPO: Pending 4A placement. If no bed by tomorrow, family wants to take him home.    Medical necessity for Wheelchair:  68 year old male admitted with CHF, ESRD, CAD. The above conditions have caused the patient to have a mobility limitation that significantly impairs his ability to participate in one or more MRADLs, such as toileting, eating, dressing and bathing in customary locations in the home. The pts home provides adequate access between rooms for the use of the manual wheelchair. The wheelchair will significantly improve the pts ability to participate in MRADLs and will be used on a regular basis in the home. The patient's mobility limitation cannot be resolved by the use of a walker or can. The pt does not have sufficient upper extremity function to safely propel the manual wheelchair in the home during a typical day. The patient has a caregiver that is willing and able to propel the manual wheelchair at anytime during the day. The pt has agreed to use the manual wheelchair that is provided in the home. It is medically necessary that this patient received a manual standard wheelchair. Patient will also need a commode to be used at bedside.

## 2018-10-22 NOTE — PROGRESS NOTE ADULT - SUBJECTIVE AND OBJECTIVE BOX
SUBJECTIVE:    Patient is a 68y old Male who presents with a chief complaint of Dizziness and bradycardia (22 Oct 2018 10:11)    Currently admitted to medicine with the primary diagnosis of Third degree AV block     Today is hospital day 10d. This morning he is resting comfortably in bed and reports no new issues or overnight events. HD today. He is awaiting 4A vs home.    PAST MEDICAL & SURGICAL HISTORY  Heart failure with reduced ejection fraction  CAD (coronary artery disease)  BPH (benign prostatic hyperplasia)  Type 2 diabetes mellitus  End stage renal disease  Dyslipidemia  Hypertension    SOCIAL HISTORY:  Negative for smoking/alcohol/drug use.     ALLERGIES:  No Known Allergies    MEDICATIONS:  STANDING MEDICATIONS  aspirin  chewable 81 milliGRAM(s) Oral daily  atorvastatin 80 milliGRAM(s) Oral at bedtime  calcium acetate 667 milliGRAM(s) Oral two times a day with meals  cefpodoxime 100 milliGRAM(s) Oral every 48 hours  chlorhexidine 4% Liquid 1 Application(s) Topical <User Schedule>  clopidogrel Tablet 75 milliGRAM(s) Oral daily  dextrose 50% Injectable 25 Gram(s) IV Push once  dextrose 50% Injectable 25 Gram(s) IV Push once  docusate sodium 100 milliGRAM(s) Oral two times a day  finasteride 5 milliGRAM(s) Oral daily  furosemide    Tablet 80 milliGRAM(s) Oral every 12 hours  heparin  Injectable 5000 Unit(s) SubCutaneous every 12 hours  influenza   Vaccine 0.5 milliLiter(s) IntraMuscular once  insulin glargine Injectable (LANTUS) 16 Unit(s) SubCutaneous at bedtime  insulin lispro (HumaLOG) corrective regimen sliding scale   SubCutaneous three times a day before meals  insulin lispro Injectable (HumaLOG) 6 Unit(s) SubCutaneous three times a day before meals  isosorbide   mononitrate ER Tablet (IMDUR) 30 milliGRAM(s) Oral daily  losartan 25 milliGRAM(s) Oral daily  metolazone 5 milliGRAM(s) Oral daily  metoprolol tartrate 25 milliGRAM(s) Oral two times a day  ondansetron  IVPB 4 milliGRAM(s) IV Intermittent once  pantoprazole    Tablet 40 milliGRAM(s) Oral before breakfast  senna Syrup 5 milliLiter(s) Oral two times a day  tamsulosin 0.4 milliGRAM(s) Oral at bedtime    PRN MEDICATIONS  acetaminophen   Tablet .. 650 milliGRAM(s) Oral every 6 hours PRN  aluminum hydroxide/magnesium hydroxide/simethicone Suspension 30 milliLiter(s) Oral every 6 hours PRN  glucagon  Injectable 1 milliGRAM(s) IntraMuscular once PRN  morphine  - Injectable 4 milliGRAM(s) IV Push every 10 minutes PRN  morphine  - Injectable 2 milliGRAM(s) IV Push every 10 minutes PRN    VITALS:   T(F): 97.1  HR: 66  BP: 121/59  RR: 18  SpO2: 96%    LABS:                        8.9    5.90  )-----------( 154      ( 22 Oct 2018 05:49 )             27.9     10-22    134<L>  |  92<L>  |  95<HH>  ----------------------------<  160<H>  3.8   |  21  |  6.4<HH>    Ca    8.8      22 Oct 2018 05:49  Mg     1.9     10-22        RADIOLOGY:    PHYSICAL EXAM:  GEN: No acute distress  LUNGS: Decreased BS B/L  HEART: Regular  ABD: Soft, non-tender, non-distended.  EXT: NC/NC/NE/2+PP/BENNETT   NEURO: AAOX3    Intravenous access: PIV

## 2018-10-22 NOTE — PROGRESS NOTE ADULT - ASSESSMENT
67 y/o M with PMH of ESRD on HD, CAD s/p pci with stent in LAD and RCA, Heart failure with reduced EF (unknown %), DLD, DM II, and BPH was sent in from hemodialysis after he was noted to be bradycardic. Found to be complete heart block.    1.) 3rd Degree Heart Block:    - s/p transvenous pacemaker placement in the ED.   now s/p AICD by EP  doing good post procedure    2.) Heart Failure with reduced ejection fraction / CAD / DLD:  sob better      got AICD  cont lasix, Metolazone    - Continue aspirin, plavix, atorvastatin, imdur,       3.) ESRD on HD:    - on HD  MWF  s/p HD yesterday    - Continue lasix and metolazone.     - Renal diet.    - Monitor BMP, Mg, and P.    4.) DM II:    - Continue basal / bolus insulin.    - Carbohydrate consistent diet.    - Check hemoglobin A1c.    - Monitor blood glucose.    5.) BPH:    - Continue tamsulosin and finasteride.    6)  Hematuria  on Abx for UTI  on ASA, Plavix  cont to monitor and see if clears  if not than urology eval    awaiting Insurance approval for rehab  if not approved than home with services    Discussed with her daughter in detail    6.) GI / DVT PPx: protonix / heparin      Discussed with House staff  resident note reviewed

## 2018-10-22 NOTE — PROGRESS NOTE ADULT - SUBJECTIVE AND OBJECTIVE BOX
INTERVAL HPI/OVERNIGHT EVENTS:    HPI  69 y/o M with PMH of ESRD on HD, CAD s/p pci with stent in LAD and RCA, Heart failure with reduced EF (unknown %), DLD, DM II, and BPH was sent in from hemodialysis after he was noted to be bradycardic. During dialysis he had no symptoms and he completed dialysis without issue. He was told however, that his heart rate was "very low" (exact rate not known), and that he should go to the hospital immediately. found to have third degree heart block    CC  Pt seen and examined today for follow up of Heart block/CHF/ESRD/UTI  s/p AICD placement  hematuria resolved  no overnight events    REVIEW OF SYSTEMS:  CONSTITUTIONAL: No fever,  EYES: No eye pain, visual disturbances, or discharge  ENMT:  No difficulty hearing, tinnitus, vertigo; No sinus or throat pain  NECK: No pain or stiffness  BREASTS: No pain, masses, or nipple discharge  RESPIRATORY: No cough, wheezing, chills or hemoptysis; No shortness of breath  CARDIOVASCULAR: No chest pain, palpitations, dizziness, or leg swelling  GASTROINTESTINAL: No abdominal or epigastric pain.   GENITOURINARY: No dysuria, frequency, hematuria, or incontinence  NEUROLOGICAL: No headaches, memory loss, loss of strength, numbness, or tremors  SKIN: No itching, burning, rashes, or lesions   LYMPH NODES: No enlarged glands  ENDOCRINE: No heat or cold intolerance; No hair loss  MUSCULOSKELETAL: No joint pain or swelling; No muscle, back, or extremity pain  PSYCHIATRIC: No depression, anxiety, mood swings, or difficulty sleeping  HEME/LYMPH: No easy bruising, or bleeding gums  ALLERY AND IMMUNOLOGIC: No hives or eczema    VITALS:  T(F): 97.1, Max: 98.5   HR: 66  BP: 121/59  RR: 18  SpO2: 96%    PHYSICAL EXAM:  GENERAL: NAD,   HEAD:  Atraumatic, Normocephalic  EYES: EOMI, PERRLA, conjunctiva and sclera clear  ENMT: No tonsillar erythema, exudates,   NECK: Supple, No JVD, Normal thyroid  NERVOUS SYSTEM:  Alert & Oriented X3  CHEST/LUNG: Clear to percussion bilaterally; No rales, rhonchi, wheezing, or rubs  HEART: Regular rate and rhythm; No murmurs, rubs, or gallops  ABDOMEN: Soft, Nontender, Nondistended; Bowel sounds present  EXTREMITIES:  edema  LYMPH: No lymphadenopathy noted  SKIN: No rashes or lesions      LABS:    10-22    134<L>  |  92<L>  |  95<HH>  ----------------------------<  160<H>  3.8   |  21  |  6.4<HH>    Ca    8.8      22 Oct 2018 05:49  Mg     1.9     10-22                            8.9    5.90  )-----------( 154      ( 22 Oct 2018 05:49 )             27.9           RADIOLOGY & ADDITIONAL TESTS:    Imaging Personally Reviewed:  [ ] YES  [ ] NO    MEDICATIONS:     MEDICATIONS  (STANDING):  aspirin  chewable 81 milliGRAM(s) Oral daily  atorvastatin 80 milliGRAM(s) Oral at bedtime  calcium acetate 667 milliGRAM(s) Oral two times a day with meals  cefpodoxime 100 milliGRAM(s) Oral every 48 hours  chlorhexidine 4% Liquid 1 Application(s) Topical <User Schedule>  clopidogrel Tablet 75 milliGRAM(s) Oral daily  dextrose 50% Injectable 25 Gram(s) IV Push once  dextrose 50% Injectable 25 Gram(s) IV Push once  docusate sodium 100 milliGRAM(s) Oral two times a day  finasteride 5 milliGRAM(s) Oral daily  furosemide    Tablet 80 milliGRAM(s) Oral every 12 hours  heparin  Injectable 5000 Unit(s) SubCutaneous every 12 hours  influenza   Vaccine 0.5 milliLiter(s) IntraMuscular once  insulin glargine Injectable (LANTUS) 16 Unit(s) SubCutaneous at bedtime  insulin lispro (HumaLOG) corrective regimen sliding scale   SubCutaneous three times a day before meals  insulin lispro Injectable (HumaLOG) 6 Unit(s) SubCutaneous three times a day before meals  isosorbide   mononitrate ER Tablet (IMDUR) 30 milliGRAM(s) Oral daily  losartan 25 milliGRAM(s) Oral daily  metolazone 5 milliGRAM(s) Oral daily  metoprolol tartrate 25 milliGRAM(s) Oral two times a day  ondansetron  IVPB 4 milliGRAM(s) IV Intermittent once  pantoprazole    Tablet 40 milliGRAM(s) Oral before breakfast  senna Syrup 5 milliLiter(s) Oral two times a day  tamsulosin 0.4 milliGRAM(s) Oral at bedtime    MEDICATIONS  (PRN):  acetaminophen   Tablet .. 650 milliGRAM(s) Oral every 6 hours PRN Temp greater or equal to 38C (100.4F), Mild Pain (1 - 3)  aluminum hydroxide/magnesium hydroxide/simethicone Suspension 30 milliLiter(s) Oral every 6 hours PRN Dyspepsia  glucagon  Injectable 1 milliGRAM(s) IntraMuscular once PRN Glucose LESS THAN 70 milligrams/deciliter  morphine  - Injectable 4 milliGRAM(s) IV Push every 10 minutes PRN Severe Pain (7 - 10)  morphine  - Injectable 2 milliGRAM(s) IV Push every 10 minutes PRN Moderate Pain (4 - 6)

## 2018-10-23 VITALS
HEART RATE: 68 BPM | RESPIRATION RATE: 18 BRPM | TEMPERATURE: 98 F | DIASTOLIC BLOOD PRESSURE: 59 MMHG | SYSTOLIC BLOOD PRESSURE: 228 MMHG

## 2018-10-23 LAB
ANION GAP SERPL CALC-SCNC: 17 MMOL/L — HIGH (ref 7–14)
BASOPHILS # BLD AUTO: 0.01 K/UL — SIGNIFICANT CHANGE UP (ref 0–0.2)
BASOPHILS NFR BLD AUTO: 0.2 % — SIGNIFICANT CHANGE UP (ref 0–1)
BUN SERPL-MCNC: 77 MG/DL — CRITICAL HIGH (ref 10–20)
CALCIUM SERPL-MCNC: 9 MG/DL — SIGNIFICANT CHANGE UP (ref 8.5–10.1)
CHLORIDE SERPL-SCNC: 98 MMOL/L — SIGNIFICANT CHANGE UP (ref 98–110)
CO2 SERPL-SCNC: 27 MMOL/L — SIGNIFICANT CHANGE UP (ref 17–32)
CREAT SERPL-MCNC: 5 MG/DL — CRITICAL HIGH (ref 0.7–1.5)
EOSINOPHIL # BLD AUTO: 0.43 K/UL — SIGNIFICANT CHANGE UP (ref 0–0.7)
EOSINOPHIL NFR BLD AUTO: 7.4 % — SIGNIFICANT CHANGE UP (ref 0–8)
FERRITIN SERPL-MCNC: 1827 NG/ML — HIGH (ref 30–400)
GLUCOSE BLDC GLUCOMTR-MCNC: 124 MG/DL — HIGH (ref 70–99)
GLUCOSE BLDC GLUCOMTR-MCNC: 141 MG/DL — HIGH (ref 70–99)
GLUCOSE BLDC GLUCOMTR-MCNC: 155 MG/DL — HIGH (ref 70–99)
GLUCOSE SERPL-MCNC: 128 MG/DL — HIGH (ref 70–99)
HCT VFR BLD CALC: 31.2 % — LOW (ref 42–52)
HGB BLD-MCNC: 10 G/DL — LOW (ref 14–18)
IMM GRANULOCYTES NFR BLD AUTO: 1 % — HIGH (ref 0.1–0.3)
IRON SATN MFR SERPL: 26 % — SIGNIFICANT CHANGE UP (ref 15–50)
IRON SATN MFR SERPL: 44 UG/DL — SIGNIFICANT CHANGE UP (ref 35–150)
LYMPHOCYTES # BLD AUTO: 0.86 K/UL — LOW (ref 1.2–3.4)
LYMPHOCYTES # BLD AUTO: 14.9 % — LOW (ref 20.5–51.1)
MAGNESIUM SERPL-MCNC: 2 MG/DL — SIGNIFICANT CHANGE UP (ref 1.8–2.4)
MCHC RBC-ENTMCNC: 30 PG — SIGNIFICANT CHANGE UP (ref 27–31)
MCHC RBC-ENTMCNC: 32.1 G/DL — SIGNIFICANT CHANGE UP (ref 32–37)
MCV RBC AUTO: 93.7 FL — SIGNIFICANT CHANGE UP (ref 80–94)
MONOCYTES # BLD AUTO: 0.56 K/UL — SIGNIFICANT CHANGE UP (ref 0.1–0.6)
MONOCYTES NFR BLD AUTO: 9.7 % — HIGH (ref 1.7–9.3)
NEUTROPHILS # BLD AUTO: 3.86 K/UL — SIGNIFICANT CHANGE UP (ref 1.4–6.5)
NEUTROPHILS NFR BLD AUTO: 66.8 % — SIGNIFICANT CHANGE UP (ref 42.2–75.2)
NRBC # BLD: 0 /100 WBCS — SIGNIFICANT CHANGE UP (ref 0–0)
PHOSPHATE SERPL-MCNC: 4.4 MG/DL — SIGNIFICANT CHANGE UP (ref 2.1–4.9)
PLATELET # BLD AUTO: 164 K/UL — SIGNIFICANT CHANGE UP (ref 130–400)
POTASSIUM SERPL-MCNC: 3.9 MMOL/L — SIGNIFICANT CHANGE UP (ref 3.5–5)
POTASSIUM SERPL-SCNC: 3.9 MMOL/L — SIGNIFICANT CHANGE UP (ref 3.5–5)
RBC # BLD: 3.33 M/UL — LOW (ref 4.7–6.1)
RBC # FLD: 14.3 % — SIGNIFICANT CHANGE UP (ref 11.5–14.5)
SODIUM SERPL-SCNC: 142 MMOL/L — SIGNIFICANT CHANGE UP (ref 135–146)
TIBC SERPL-MCNC: 167 UG/DL — LOW (ref 220–430)
TRANSFERRIN SERPL-MCNC: 140 MG/DL — LOW (ref 200–360)
UIBC SERPL-MCNC: 123 UG/DL — SIGNIFICANT CHANGE UP (ref 110–370)
WBC # BLD: 5.78 K/UL — SIGNIFICANT CHANGE UP (ref 4.8–10.8)
WBC # FLD AUTO: 5.78 K/UL — SIGNIFICANT CHANGE UP (ref 4.8–10.8)

## 2018-10-23 RX ORDER — CHLORHEXIDINE GLUCONATE 213 G/1000ML
1 SOLUTION TOPICAL
Qty: 0 | Refills: 0 | Status: DISCONTINUED | OUTPATIENT
Start: 2018-10-23 | End: 2018-10-23

## 2018-10-23 RX ADMIN — Medication 667 MILLIGRAM(S): at 17:45

## 2018-10-23 RX ADMIN — LOSARTAN POTASSIUM 25 MILLIGRAM(S): 100 TABLET, FILM COATED ORAL at 06:22

## 2018-10-23 RX ADMIN — Medication 80 MILLIGRAM(S): at 06:21

## 2018-10-23 RX ADMIN — Medication 6 UNIT(S): at 08:12

## 2018-10-23 RX ADMIN — HEPARIN SODIUM 5000 UNIT(S): 5000 INJECTION INTRAVENOUS; SUBCUTANEOUS at 06:22

## 2018-10-23 RX ADMIN — Medication 667 MILLIGRAM(S): at 08:12

## 2018-10-23 RX ADMIN — Medication 80 MILLIGRAM(S): at 17:45

## 2018-10-23 RX ADMIN — Medication 25 MILLIGRAM(S): at 06:22

## 2018-10-23 RX ADMIN — Medication 1: at 12:14

## 2018-10-23 RX ADMIN — Medication 81 MILLIGRAM(S): at 12:13

## 2018-10-23 RX ADMIN — CLOPIDOGREL BISULFATE 75 MILLIGRAM(S): 75 TABLET, FILM COATED ORAL at 12:13

## 2018-10-23 RX ADMIN — FINASTERIDE 5 MILLIGRAM(S): 5 TABLET, FILM COATED ORAL at 12:13

## 2018-10-23 RX ADMIN — PANTOPRAZOLE SODIUM 40 MILLIGRAM(S): 20 TABLET, DELAYED RELEASE ORAL at 08:12

## 2018-10-23 RX ADMIN — SENNA PLUS 5 MILLILITER(S): 8.6 TABLET ORAL at 06:21

## 2018-10-23 RX ADMIN — Medication 6 UNIT(S): at 12:14

## 2018-10-23 RX ADMIN — Medication 100 MILLIGRAM(S): at 17:45

## 2018-10-23 RX ADMIN — Medication 100 MILLIGRAM(S): at 06:21

## 2018-10-23 RX ADMIN — Medication 25 MILLIGRAM(S): at 17:46

## 2018-10-23 RX ADMIN — ISOSORBIDE MONONITRATE 30 MILLIGRAM(S): 60 TABLET, EXTENDED RELEASE ORAL at 12:13

## 2018-10-23 NOTE — CHART NOTE - NSCHARTNOTEFT_GEN_A_CORE
<<<RESIDENT DISCHARGE NOTE>>>     ADRIANNA GAINES  MRN-[id number]    VITAL SIGNS:  T(F): 98.2 (10-23-18 @ 04:42), Max: 98.9 (10-22-18 @ 20:53)  HR: 71 (10-23-18 @ 04:42)  BP: 126/80 (10-23-18 @ 04:42)  SpO2: 96% (10-22-18 @ 23:56)      PHYSICAL EXAMINATION:  General: NAD  Head & Neck: Supple  Pulmonary: Clear to auscultation bilaterally  Cardiovascular: Regular  Gastrointestinal/Abdomen & Pelvis: Soft, nontender, nondistended  Neurologic/Motor: AAOx3    TEST RESULTS:                        10.0   5.78  )-----------( 164      ( 23 Oct 2018 07:41 )             31.2       10-23    142  |  98  |  77<HH>  ----------------------------<  128<H>  3.9   |  27  |  5.0<HH>    Ca    9.0      23 Oct 2018 07:41  Phos  4.4     10-23  Mg     2.0     10-23        FINAL DISCHARGE INTERVIEW:  Resident(s) Present: (Name: Dr Jacob Jimenes, RN Present: (Name:  ___________)    DISCHARGE MEDICATION RECONCILIATION  reviewed with Attending (Name: Dr. Parr)    DISPOSITION:   [ X ] Home,    [  ] Home with Visiting Nursing Services,   [    ]  SNF/ NH,    [   ] Acute Rehab (4A),   [   ] Other (Specify:_________) <<<RESIDENT DISCHARGE NOTE>>>     ADRIANNA GAINES  MRN-[id number]    VITAL SIGNS:  T(F): 98.2 (10-23-18 @ 04:42), Max: 98.9 (10-22-18 @ 20:53)  HR: 71 (10-23-18 @ 04:42)  BP: 126/80 (10-23-18 @ 04:42)  SpO2: 96% (10-22-18 @ 23:56)      PHYSICAL EXAMINATION:  General: NAD  Head & Neck: Supple  Pulmonary: Clear to auscultation bilaterally  Cardiovascular: Regular  Gastrointestinal/Abdomen & Pelvis: Soft, nontender, nondistended  Neurologic/Motor: AAOx3    TEST RESULTS:                        10.0   5.78  )-----------( 164      ( 23 Oct 2018 07:41 )             31.2       10-23    142  |  98  |  77<HH>  ----------------------------<  128<H>  3.9   |  27  |  5.0<HH>    Ca    9.0      23 Oct 2018 07:41  Phos  4.4     10-23  Mg     2.0     10-23        FINAL DISCHARGE INTERVIEW:  Resident(s) Present: (Name: Dr James ),     DISCHARGE MEDICATION RECONCILIATION  reviewed with Attending (Name: Dr. Ordonez)    DISPOSITION:   [ X ] Home,    [  ] Home with Visiting Nursing Services,   [    ]  SNF/ NH,    [   ] Acute Rehab (4A),   [   ] Other (Specify:_________)

## 2018-10-23 NOTE — PROGRESS NOTE ADULT - ASSESSMENT
67 y/o M with PMH of ESRD on HD, CAD s/p pci with stent in LAD and RCA, Heart failure with reduced EF (unknown %), DLD, DM II, and BPH was sent in from hemodialysis after he was noted to be bradycardic. Found to be complete heart block.    1.) 3rd Degree Heart Block:    - s/p transvenous pacemaker placement in the ED.   now s/p AICD by EP  doing good post procedure    2.) Heart Failure with reduced ejection fraction / CAD / DLD:  sob better      got AICD  cont lasix, Metolazone    - Continue aspirin, plavix, atorvastatin, imdur,     3.) ESRD on HD:    - on HD  MWF  s/p HD yesterday    - Continue lasix and metolazone.     - Renal diet.    - Monitor BMP, Mg, and P.    4.) DM II:    - Continue basal / bolus insulin.    - Carbohydrate consistent diet.    - Check hemoglobin A1c.    - Monitor blood glucose.    5.) BPH:    - Continue tamsulosin and finasteride.    6)  Hematuria  likely sec to UTI  resolved    not approved by insurance for 4 A  Pt going home today  wheel chair and commode needed  scripts written      Discussed with her daughter in detail    6.) GI / DVT PPx: protonix / heparin      Med rec discussed    Discussed with House staff  resident note reviewed    time spent on discharge 35 minutes

## 2018-10-23 NOTE — PROGRESS NOTE ADULT - PROVIDER SPECIALTY LIST ADULT
Cardiology
Cardiology
Critical Care
Critical Care
Electrophysiology
Electrophysiology
Internal Medicine
Nephrology
Critical Care
Nephrology
Electrophysiology

## 2018-10-23 NOTE — PROGRESS NOTE ADULT - SUBJECTIVE AND OBJECTIVE BOX
INTERVAL HPI/OVERNIGHT EVENTS:  HPI  69 y/o M with PMH of ESRD on HD, CAD s/p pci with stent in LAD and RCA, Heart failure with reduced EF (unknown %), DLD, DM II, and BPH was sent in from hemodialysis after he was noted to be bradycardic. During dialysis he had no symptoms and he completed dialysis without issue. He was told however, that his heart rate was "very low" (exact rate not known), and that he should go to the hospital immediately. found to have third degree heart block    CC  Pt seen and examined today for follow up of Heart block/CHF/ESRD/UTI  s/p AICD placement  hematuria resolved  no overnight events  wants to go home today        REVIEW OF SYSTEMS:  CONSTITUTIONAL: No fever, weight loss, or fatigue  EYES: No eye pain, visual disturbances, or discharge  ENMT:  No difficulty hearing, tinnitus, vertigo; No sinus or throat pain  NECK: No pain or stiffness  BREASTS: No pain, masses, or nipple discharge  RESPIRATORY: No cough, wheezing, chills or hemoptysis; No shortness of breath  CARDIOVASCULAR: No chest pain, palpitations, dizziness, or leg swelling  GASTROINTESTINAL: No abdominal or epigastric pain.   GENITOURINARY: No dysuria, frequency, hematuria, or incontinence  NEUROLOGICAL: No headaches, memory loss, loss of strength, numbness, or tremors  SKIN: No itching, burning, rashes, or lesions   LYMPH NODES: No enlarged glands  ENDOCRINE: No heat or cold intolerance; No hair loss  MUSCULOSKELETAL: No joint pain or swelling; No muscle, back, or extremity pain  PSYCHIATRIC: No depression, anxiety, mood swings, or difficulty sleeping  HEME/LYMPH: No easy bruising, or bleeding gums  ALLERY AND IMMUNOLOGIC: No hives or eczema    VITALS:  T(F): 97.7, Max: 98.9 (10-22-18 @ 20:53)  HR: 68  BP: 228/59  RR: 18  SpO2: 96%    PHYSICAL EXAM:  GENERAL: NAD, well-groomed, well-developed  HEAD:  Atraumatic, Normocephalic  EYES: EOMI, PERRLA, conjunctiva and sclera clear  ENMT: No tonsillar erythema, exudates, or enlargement; Moist mucous membranes, Good dentition, No lesions  NECK: Supple, No JVD, Normal thyroid  NERVOUS SYSTEM:  Alert & Oriented X3,  CHEST/LUNG: Clear to percussion bilaterally; No rales, rhonchi, wheezing, or rubs  HEART: Regular rate and rhythm; No murmurs, rubs, or gallops  ABDOMEN: Soft, Nontender, Nondistended; Bowel sounds present  EXTREMITIES:  trace edema  LYMPH: No lymphadenopathy noted  SKIN: No rashes or lesions      LABS:    10-23    142  |  98  |  77<HH>  ----------------------------<  128<H>  3.9   |  27  |  5.0<HH>    Ca    9.0      23 Oct 2018 07:41  Phos  4.4     10-23  Mg     2.0     10-23                            10.0   5.78  )-----------( 164      ( 23 Oct 2018 07:41 )             31.2           RADIOLOGY & ADDITIONAL TESTS:    Imaging Personally Reviewed:  [ ] YES  [ ] NO    MEDICATIONS:     MEDICATIONS  (STANDING):  aspirin  chewable 81 milliGRAM(s) Oral daily  atorvastatin 80 milliGRAM(s) Oral at bedtime  calcium acetate 667 milliGRAM(s) Oral two times a day with meals  cefpodoxime 100 milliGRAM(s) Oral every 48 hours  chlorhexidine 4% Liquid 1 Application(s) Topical <User Schedule>  clopidogrel Tablet 75 milliGRAM(s) Oral daily  dextrose 50% Injectable 25 Gram(s) IV Push once  dextrose 50% Injectable 25 Gram(s) IV Push once  docusate sodium 100 milliGRAM(s) Oral two times a day  finasteride 5 milliGRAM(s) Oral daily  furosemide    Tablet 80 milliGRAM(s) Oral every 12 hours  heparin  Injectable 5000 Unit(s) SubCutaneous every 12 hours  influenza   Vaccine 0.5 milliLiter(s) IntraMuscular once  insulin glargine Injectable (LANTUS) 16 Unit(s) SubCutaneous at bedtime  insulin lispro (HumaLOG) corrective regimen sliding scale   SubCutaneous three times a day before meals  insulin lispro Injectable (HumaLOG) 6 Unit(s) SubCutaneous three times a day before meals  isosorbide   mononitrate ER Tablet (IMDUR) 30 milliGRAM(s) Oral daily  losartan 25 milliGRAM(s) Oral daily  metolazone 5 milliGRAM(s) Oral daily  metoprolol tartrate 25 milliGRAM(s) Oral two times a day  ondansetron  IVPB 4 milliGRAM(s) IV Intermittent once  pantoprazole    Tablet 40 milliGRAM(s) Oral before breakfast  senna Syrup 5 milliLiter(s) Oral two times a day  tamsulosin 0.4 milliGRAM(s) Oral at bedtime    MEDICATIONS  (PRN):  acetaminophen   Tablet .. 650 milliGRAM(s) Oral every 6 hours PRN Temp greater or equal to 38C (100.4F), Mild Pain (1 - 3)  aluminum hydroxide/magnesium hydroxide/simethicone Suspension 30 milliLiter(s) Oral every 6 hours PRN Dyspepsia  glucagon  Injectable 1 milliGRAM(s) IntraMuscular once PRN Glucose LESS THAN 70 milligrams/deciliter

## 2018-10-23 NOTE — PROGRESS NOTE ADULT - NSHPATTENDINGPLANDISCUSS_GEN_ALL_CORE
House staff
House staff, Family, EP
House staff, Pt and family
House staff, family
House staff, family
patient, daughter, housestaff, Dr. Acosta
patient, housestaff

## 2018-10-23 NOTE — PROGRESS NOTE ADULT - REASON FOR ADMISSION
Dizziness and bradycardia

## 2018-10-25 PROBLEM — N40.0 BENIGN PROSTATIC HYPERPLASIA WITHOUT LOWER URINARY TRACT SYMPTOMS: Chronic | Status: ACTIVE | Noted: 2018-10-12

## 2018-10-25 PROBLEM — E78.5 HYPERLIPIDEMIA, UNSPECIFIED: Chronic | Status: ACTIVE | Noted: 2018-10-12

## 2018-10-25 PROBLEM — E11.9 TYPE 2 DIABETES MELLITUS WITHOUT COMPLICATIONS: Chronic | Status: ACTIVE | Noted: 2018-10-12

## 2018-10-25 PROBLEM — I10 ESSENTIAL (PRIMARY) HYPERTENSION: Chronic | Status: ACTIVE | Noted: 2018-10-12

## 2018-10-25 PROBLEM — I50.20 UNSPECIFIED SYSTOLIC (CONGESTIVE) HEART FAILURE: Chronic | Status: ACTIVE | Noted: 2018-10-12

## 2018-10-25 PROBLEM — N18.6 END STAGE RENAL DISEASE: Chronic | Status: ACTIVE | Noted: 2018-10-12

## 2018-11-06 NOTE — CHART NOTE - NSCHARTNOTEFT_GEN_A_CORE
Cardiac Electrophysiology Procedure Report    Date of procedure	10/15/2018  EP Attending	Kerry Landrum MD  Referring Physician	Alden Acosta MD  Anesthesiologist	Aldo Lazo MD  Procedure	Attempted Right sided BiV ICD Implant & R IJ TVP removal      Indication: 67 yo M h/o CAD s/p PCI to LAD, RCA, HFrEF, ESRD on HD, sent to ER after being found to have bradycardia while at hemodialysis. Patient noted to have high degree AV block with anticipated pacing > 40%.     A thorough discussion was had with the patient and his family concerning all aspects of ICD therapy. We reviewed the data supporting ICD therapy and how it applies individually.  We discussed the procedures, risks and outcomes of ICD implantation and living with an ICD. We discussed management of ICD therapy throughout life, including deactivation of the ICD. After all questions were answered, it was a shared decision to proceed with ICD therapy.      EQUIPMENT IMPLANTED AND BASELINE PARAMETERS  Pulse Generator   :	 [employer]  Model Number:	 Ellipse DR 2411-36Q  Serial Number:	 [id number]      Atrial Lead  Model Number:	STS 2088TC/52 cm  Serial Number:	[id number]  Location: 	Right atrial appendage  Sensin.7 mV  Impedance:	380 Ohms  Threshold:	1.25 V at 0.5 ms    Right Ventricular Lead  Model Number:	Durata 7122Q/58cm  Serial Number:	[id number]  Location	RV apical septum  Sensing:		10 mV  Impedance:	390 Ohms / 44 Ohms  Threshold:	0.5 V at 0.5 ms      DESCRIPTION OF PROCEDURE  The patient was brought to the procedure room in a nonsedated and fasting state, having received preoperative antibiotics. Informed, written consent was obtained prior to the procedure. Conscious sedation was administered by the anesthesiologist. Blood pressure, oxygenation and level of comfort were stable throughout. The left shoulder region was cleaned and prepped with serial applications of Chlorhexidine solution. Patient was then covered from head to toe with sterile drapes in the usual manner.    The right deltopectoral groove region was defined; 20 cc of premixed 50/50 lidocaine/sensorcaine solution was administered. A 3.5 cm incision was made along the right deltopectoral groove / parallel to and 2 cm below the right clavicle. The incision was extended down to the level of the anterior pectoralis fascia using electrocautery and blunt dissection.     Next, using 12 cc of IV contrast a right upper extremity venogram was performed showing patent and left axillary and subclavian vein. Using a micropuncture needle and guidewire, the axillary vein was accessed three times using modified Seldinger technique. The guidewires were followed down to the IVC to confirm venous access and secured in place.     Over the first guidewire, a 7 Fr sheath was advanced into the axillary vein.  Through this sheath, using a combination of straight and curved stylets, the right ventricular lead was passed across the tricuspid annulus, advanced into the right ventricular outflow tract, and then pulled down against the interventricular septum fluoroscopic guidance. A site was found in the RV apical septum with adequate pacing threshold, sensing, and impedance without diaphragmatic stimulation; the lead's screw was extended. The lead's position and adequate slack were confirmed in [place] and FOFANA projections.  The introducer sheath was split and removed. The lead's position and adequate slack were confirmed in [place] and FOFANA projections. Adequate slack was placed into the ventricular lead. The sewing sleeve was positioned at the site of entry into the vein and the lead was secured to the pre-pectoral fascia with two 0 silk sutures tied around the sewing sleeve.     Next a 6 Fr sheath was advanced over the second guidewire into the axillary vein under fluoroscopic guidance.  The dilator and guidewire were removed and, through this sheath, the atrial pacing lead was advanced into the heart under fluoroscopic guidance.  Next, using a J curved stylet the atrial lead was placed in the right atrial appendage.  Once adequate pacing and sensing threshold parameters were obtained, the fixation screw was extended and the sheath was split and removed. The lead position was confirmed in the [place] and FOFANA projections. There was no evidence of diaphragmatic pacing at maximal output. Adequate slack was placed into the atrial lead. The sewing sleeve was positioned at the site of entry into the vein and the lead was secured to the pre-pectoral fascia with two 0 silk sutures tied around the sewing sleeve.    A 10 Fr introducer sheath was placed into the vein. Into this, a [employer]. Valentin guiding sheath with a Supra CS catheter was inserted. The combination was followed to the right atrium with the catheter leading. The CS was unable to be cannulated despite trial of multiple different sheaths and different catheters.     The plane between the prepectoral fascia and the pectoralis muscle was exposed to create a pocket for the pulse generator.  Next, the pocket was inspected for bleeding, and electrocautery applied where appropriate. The wound was irrigated with copious amounts of antibiotic solution. The lead was wiped clean and then connected to the pulse generator. The leads and pulse generator were coiled into the pocket.  Under fluoroscopy, the right sided TVP was removed without disturbing the implanted leads.     The wound margins were approximated well, without any tension or overlap. The wound was closed using an interrupted layer of 2-0 absorbable Vicryl suture for the deep fascial layer. This was followed by a continuous layer of 3-0 absorbable suture. The skin layer was approximated with Dermabond. Steri-strips were applied over this and a dry, sterile dressing was placed over this. The patient was returned to a hospital room in stable condition. Needle count was performed and found to be consistent at the end of the procedure       COMPLICATIONS:  The patient tolerated the procedure well. There were no immediate complications.     CONCLUSIONS:  Successful implantation of a right sided DC ICD & R IJ TVP removal    EBL: 10 cc    FLUORO: 15.2 min      _______________________________  Kerry Landrum MD  Cardiac Electrophysiology

## 2018-11-12 ENCOUNTER — APPOINTMENT (OUTPATIENT)
Dept: CARDIOLOGY | Facility: CLINIC | Age: 68
End: 2018-11-12

## 2018-11-12 VITALS
BODY MASS INDEX: 30.92 KG/M2 | HEIGHT: 67 IN | DIASTOLIC BLOOD PRESSURE: 60 MMHG | SYSTOLIC BLOOD PRESSURE: 126 MMHG | HEART RATE: 70 BPM | WEIGHT: 197 LBS

## 2018-11-12 NOTE — ASSESSMENT
[FreeTextEntry1] : Systolic CHF. S/p AICD\par AV block, s/p PPM\par C/w diuretics, HD. Consider more aggressive fluid removal. F/u with Dr. Schreiber.\par F/u with EP - has f/u scheduled for next week.\par Vascular f/u - Dr. Brewer. Venous insufficiency.\par S/p PCI - c/w DAPT. 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 F/u with pulmonary.\par Secondary prevention discussed.\par Diet discussed.\par Gradual increase in exertion recommended.\par Repeat 2D echo - ordered.\par F/u in January.\par \par

## 2018-11-12 NOTE — REVIEW OF SYSTEMS
[Fever] : no fever [Chills] : no chills [Feeling Fatigued] : feeling fatigued [Blurry Vision] : no blurred vision [Sore Throat] : no sore throat [Sinus Pressure] : no sinus pressure [Shortness Of Breath] : shortness of breath [Dyspnea on exertion] : dyspnea during exertion [Chest Pain] : no chest pain [Lower Ext Edema] : lower extremity edema [Cough] : cough [Wheezing] : no wheezing [Coughing Up Blood] : no hemoptysis [Abdominal Pain] : no abdominal pain [Nausea] : no nausea [Vomiting] : no vomiting [Urinary Frequency] : urinary frequency [Hematuria] : no hematuria [Nocturia] : nocturia [Joint Pain] : joint pain [Joint Swelling] : no joint swelling [Dizziness] : no dizziness [Confusion] : no confusion was observed [Easy Bleeding] : no tendency for easy bleeding [Negative] : Integumentary

## 2018-11-12 NOTE — PHYSICAL EXAM
[General Appearance - Well Developed] : well developed [General Appearance - Well Nourished] : well nourished [General Appearance - In No Acute Distress] : no acute distress [Normal Conjunctiva] : the conjunctiva exhibited no abnormalities [Normal Oral Mucosa] : normal oral mucosa [Normal Oropharynx] : normal oropharynx [Respiration, Rhythm And Depth] : normal respiratory rhythm and effort [Heart Rate And Rhythm] : heart rate and rhythm were normal [Heart Sounds] : normal S1 and S2 [Arterial Pulses Normal] : the arterial pulses were normal [Systolic grade ___/6] : A grade [unfilled]/6 systolic murmur was heard. [Bowel Sounds] : normal bowel sounds [Abdomen Soft] : soft [Abdomen Tenderness] : non-tender [Abnormal Walk] : normal gait [Nail Clubbing] : no clubbing of the fingernails [FreeTextEntry1] : erythema b/l LE, + venostasis [Oriented To Time, Place, And Person] : oriented to person, place, and time [Affect] : the affect was normal

## 2018-11-12 NOTE — HISTORY OF PRESENT ILLNESS
[FreeTextEntry1] : 69 y/o male with history of CAD, s/p PCI of RCA and LAD , last EF 40% by echo, but subsequently was admitted to [facility] with advanced AV block and needed a PPM. EF was significantly reduced, so he got AICD/BiV PPM. No chest pain. Still with dyspnea and LE edema. Making minimal urine with Lasix/metolazone, and on HD. He reports compliance with medical therapy. had AV fistula revision with vascular surgery. Still with edema - following with vascular surgery. \par Not sure, but feels he may have had a shock from the device last week. Has appointment with EP scheduled.

## 2018-11-21 ENCOUNTER — APPOINTMENT (OUTPATIENT)
Dept: CARDIOLOGY | Facility: CLINIC | Age: 68
End: 2018-11-21

## 2018-11-21 VITALS — WEIGHT: 200 LBS | DIASTOLIC BLOOD PRESSURE: 60 MMHG | BODY MASS INDEX: 31.32 KG/M2 | SYSTOLIC BLOOD PRESSURE: 110 MMHG

## 2018-11-21 VITALS — HEART RATE: 60 BPM

## 2018-11-21 DIAGNOSIS — I72.4 ANEURYSM OF ARTERY OF LOWER EXTREMITY: ICD-10-CM

## 2018-11-21 DIAGNOSIS — Z98.890 OTHER SPECIFIED POSTPROCEDURAL STATES: ICD-10-CM

## 2018-11-21 DIAGNOSIS — Z87.440 PERSONAL HISTORY OF URINARY (TRACT) INFECTIONS: ICD-10-CM

## 2018-11-21 DIAGNOSIS — Z87.898 PERSONAL HISTORY OF OTHER SPECIFIED CONDITIONS: ICD-10-CM

## 2018-11-21 DIAGNOSIS — I83.893 VARICOSE VEINS OF BILATERAL LOWER EXTREMITIES WITH OTHER COMPLICATIONS: ICD-10-CM

## 2018-11-21 DIAGNOSIS — N43.3 HYDROCELE, UNSPECIFIED: ICD-10-CM

## 2018-11-21 DIAGNOSIS — Z87.09 PERSONAL HISTORY OF OTHER DISEASES OF THE RESPIRATORY SYSTEM: ICD-10-CM

## 2018-11-21 DIAGNOSIS — L03.115 CELLULITIS OF RIGHT LOWER LIMB: ICD-10-CM

## 2018-11-21 DIAGNOSIS — Z00.00 ENCOUNTER FOR GENERAL ADULT MEDICAL EXAMINATION W/OUT ABNORMAL FINDINGS: ICD-10-CM

## 2018-11-21 NOTE — PROCEDURE
[No] : not [Sinus Bradycardia] : sinus bradycardia [See Device Printout] : See device printout [ICD] : Implantable cardioverter-defibrillator [DDI] : DDI [Voltage: ___ volts] : Voltage was [unfilled] volts [Threshold Testing Performed] : Threshold testing was performed [Lead Imp:  ___ohms] : lead impedance was [unfilled] ohms [Sensing Amplitude ___mv] : sensing amplitude was [unfilled] mv [___V @] : [unfilled] V [___ ms] : [unfilled] ms [Outputs/Safety Margin] : output changed to allow for adequate safety margin [de-identified] : [employer] [de-identified] : Ellipse DR [de-identified] : [id number] [de-identified] : 10/15/18 [de-identified] : 60 [de-identified] : A-paced 41%; V-paced 58%\par <1% AT/AF burden. \par On Nov 9, 2018, patient had ten episodes of AT/AF with avg vent rate in the 60s. Episodes reviewed and appear to be AFib. Most episodes were 1-2 min, but longest episode was 1 hr 52 min.

## 2018-11-21 NOTE — HISTORY OF PRESENT ILLNESS
[de-identified] : 67 yo M with history of CAD s/p PCI of RCA and LAD  (2017) with recent admission for bradycardia noted at dialysis. Repeat echo showed a depressed EF. Right sided BiV ICD was attempted, but was only able to get a DC ICD due to difficult anatomy. Patient has been doing well since discharge. The patient denies any cardiovascular complaints including chest pain, palpitations, dizziness, lightheadedness, presyncope or syncope. He has dyspnea and edema still. He thought he experienced a shock from his device 1 week after implant. \par

## 2018-11-21 NOTE — PHYSICAL EXAM
[General Appearance - Well Developed] : well developed [Normal Appearance] : normal appearance [Well Groomed] : well groomed [General Appearance - Well Nourished] : well nourished [No Deformities] : no deformities [General Appearance - In No Acute Distress] : no acute distress [Heart Rate And Rhythm] : heart rate and rhythm were normal [Heart Sounds] : normal S1 and S2 [Murmurs] : no murmurs present [] : no respiratory distress [Respiration, Rhythm And Depth] : normal respiratory rhythm and effort [Exaggerated Use Of Accessory Muscles For Inspiration] : no accessory muscle use [Auscultation Breath Sounds / Voice Sounds] : lungs were clear to auscultation bilaterally [Right Infraclavicular] : right infraclavicular area [Clean] : clean [Dry] : dry [Well-Healed] : well-healed [Bowel Sounds] : normal bowel sounds [Abdomen Soft] : soft [Abdomen Tenderness] : non-tender [Nail Clubbing] : no clubbing of the fingernails [Cyanosis, Localized] : no localized cyanosis [Bleeding] : no active bleeding [Purulent Drainage] : no purulent drainage [Erythema] : not erythematous [Warm] : not warm [Tender] : not tender [FreeTextEntry1] : 1+ pitting edema in b/l LE; left arm fistula

## 2018-11-21 NOTE — DISCUSSION/SUMMARY
[AICD Function Normal] : normal AICD function [FreeTextEntry1] : Mr. Hilton presented with his daughter for evaluation after recent implant of [employer] Valentin DC ICD for primary prevention of ICM (EF 26%). He has a history of CAD, DM, HTN, and ESRD on HD. His wound is healing well. His device is functioning well. No shocks or treatments were delivered. Interrogation also revealed that patient had episodes of AF on 11/9/18 (longest episode was < 1 hr 52 min). \par \par His [id number]=2 with an estimated yearly stroke risk of 6.7% and his HAsbled=3, with an estimated yearly bleeding risk of 3.74%. Anticoagulation was discussed in detail with patient and family. Risks and benefits were explained. The patient and family had an opportunity to ask questions and all questions were answered. The patient states he bruises easily from aspirin and plavix. He is not interested in adding coumadin to his regimen despite understanding his increased risk of stroke. He and his daughter say they will address this in the future if it recurs and will consider anticoagulation at that time. \par \par I have also advised the patient to go to the nearest emergency room if he experiences any chest pain, dyspnea, syncope, or has any other compelling symptoms.\par \par Follow up in 2 months. \par

## 2019-01-01 ENCOUNTER — APPOINTMENT (OUTPATIENT)
Dept: CARDIOLOGY | Facility: CLINIC | Age: 69
End: 2019-01-01
Payer: MEDICARE

## 2019-01-01 VITALS
SYSTOLIC BLOOD PRESSURE: 106 MMHG | HEART RATE: 60 BPM | HEIGHT: 67 IN | DIASTOLIC BLOOD PRESSURE: 60 MMHG | WEIGHT: 206 LBS | BODY MASS INDEX: 32.33 KG/M2

## 2019-01-01 PROCEDURE — 93000 ELECTROCARDIOGRAM COMPLETE: CPT

## 2019-01-01 PROCEDURE — 99214 OFFICE O/P EST MOD 30 MIN: CPT

## 2019-01-22 ENCOUNTER — APPOINTMENT (OUTPATIENT)
Dept: CARDIOLOGY | Facility: CLINIC | Age: 69
End: 2019-01-22

## 2019-01-23 ENCOUNTER — APPOINTMENT (OUTPATIENT)
Dept: CARDIOLOGY | Facility: CLINIC | Age: 69
End: 2019-01-23

## 2019-01-23 VITALS
SYSTOLIC BLOOD PRESSURE: 136 MMHG | HEART RATE: 80 BPM | DIASTOLIC BLOOD PRESSURE: 65 MMHG | BODY MASS INDEX: 31.32 KG/M2 | WEIGHT: 200 LBS

## 2019-01-23 DIAGNOSIS — N18.4 CHRONIC KIDNEY DISEASE, STAGE 4 (SEVERE): ICD-10-CM

## 2019-01-23 DIAGNOSIS — Z99.2 DEPENDENCE ON RENAL DIALYSIS: ICD-10-CM

## 2019-01-23 RX ORDER — ASPIRIN 81 MG
81 TABLET, DELAYED RELEASE (ENTERIC COATED) ORAL DAILY
Refills: 0 | Status: ACTIVE | COMMUNITY

## 2019-01-23 RX ORDER — ISOSORBIDE MONONITRATE 30 MG/1
30 TABLET, EXTENDED RELEASE ORAL
Refills: 0 | Status: ACTIVE | COMMUNITY
Start: 2017-06-22

## 2019-01-23 RX ORDER — METOPROLOL TARTRATE 25 MG/1
25 TABLET, FILM COATED ORAL TWICE DAILY
Qty: 60 | Refills: 6 | Status: ACTIVE | COMMUNITY

## 2019-01-23 RX ORDER — FUROSEMIDE 80 MG/1
80 TABLET ORAL TWICE DAILY
Refills: 0 | Status: ACTIVE | COMMUNITY

## 2019-01-23 NOTE — PROCEDURE
[No] : not [NSR] : normal sinus rhythm [ICD] : Implantable cardioverter-defibrillator [DDI] : DDI [Impedance: ___ Ohms] : current cell impedance is [unfilled] Ohms [Charge Time: ___ sec] : charge time was [unfilled] seconds [Longevity: ___ months] : The estimated remaining battery life is [unfilled] months [Normal] : The battery status is normal. [Threshold Testing Performed] : Threshold testing was performed [Lead Imp:  ___ohms] : lead impedance was [unfilled] ohms [Sensing Amplitude ___mv] : sensing amplitude was [unfilled] mv [___V @] : [unfilled] V [___ ms] : [unfilled] ms [Counters Reset] : the counters were reset [Asense-Vsense ___ %] : Asense-Vsense [unfilled]% [Asense-Vpace ___ %] : Asense-Vpace [unfilled]% [Apace-Vsense ___ %] : Apace-Vsense [unfilled]% [Apace-Vpace ___ %] : Apace-Vpace [unfilled]% [Auto Capture "On"] : auto capture was switched "On" [de-identified] : [employer] [de-identified] : [id number] [de-identified] : [id number] [de-identified] : 10/15/2018 [de-identified] : 59 [de-identified] : 1 AT/AF Episode on 12/13/18 lasting 12 seconds. Appears to be 1:1. Could be representative of AT. \par CorVue is stable.

## 2019-01-23 NOTE — PHYSICAL EXAM
[General Appearance - Well Developed] : well developed [Normal Appearance] : normal appearance [Well Groomed] : well groomed [General Appearance - Well Nourished] : well nourished [No Deformities] : no deformities [General Appearance - In No Acute Distress] : no acute distress [Heart Rate And Rhythm] : heart rate and rhythm were normal [Heart Sounds] : normal S1 and S2 [Murmurs] : no murmurs present [] : no respiratory distress [Respiration, Rhythm And Depth] : normal respiratory rhythm and effort [Exaggerated Use Of Accessory Muscles For Inspiration] : no accessory muscle use [Auscultation Breath Sounds / Voice Sounds] : lungs were clear to auscultation bilaterally [Right Infraclavicular] : right infraclavicular area [Clean] : clean [Dry] : dry [Well-Healed] : well-healed [Bowel Sounds] : normal bowel sounds [Nail Clubbing] : no clubbing of the fingernails [Cyanosis, Localized] : no localized cyanosis [Bleeding] : no active bleeding [Purulent Drainage] : no purulent drainage [Erythema] : not erythematous [Warm] : not warm [Tender] : not tender [Abdomen Tenderness] : non-tender [FreeTextEntry1] : 1+ pitting edema in b/l LE; left arm fistula

## 2019-01-23 NOTE — PROCEDURE
[No] : not [NSR] : normal sinus rhythm [ICD] : Implantable cardioverter-defibrillator [DDI] : DDI [Impedance: ___ Ohms] : current cell impedance is [unfilled] Ohms [Charge Time: ___ sec] : charge time was [unfilled] seconds [Longevity: ___ months] : The estimated remaining battery life is [unfilled] months [Normal] : The battery status is normal. [Threshold Testing Performed] : Threshold testing was performed [Lead Imp:  ___ohms] : lead impedance was [unfilled] ohms [Sensing Amplitude ___mv] : sensing amplitude was [unfilled] mv [___V @] : [unfilled] V [___ ms] : [unfilled] ms [Counters Reset] : the counters were reset [Asense-Vsense ___ %] : Asense-Vsense [unfilled]% [Asense-Vpace ___ %] : Asense-Vpace [unfilled]% [Apace-Vsense ___ %] : Apace-Vsense [unfilled]% [Apace-Vpace ___ %] : Apace-Vpace [unfilled]% [Auto Capture "On"] : auto capture was switched "On" [de-identified] : [employer] [de-identified] : [id number] [de-identified] : [id number] [de-identified] : 10/15/2018 [de-identified] : 59 [de-identified] : 1 AT/AF Episode on 12/13/18 lasting 12 seconds. Appears to be 1:1. Could be representative of AT. \par CorVue is stable.

## 2019-01-23 NOTE — DISCUSSION/SUMMARY
[AICD Function Normal] : normal AICD function [FreeTextEntry1] : Mr. Hilton presented with his daughter for eval of ICD for primary prevention of ICM (EF 26%). He has a history of CAD, DM, HTN, and ESRD on HD. During his last office visit, he had frequent episodes of AFib. \par \par His CHADS VASc is 5 with estimated yearly stroke risk of 6.7% and his Has BLED is 3, with estimated yearly bleeding risk of 3.74%. Anticoagulation was discussed in detail with patient and family and risks and benefits were explained. The patient states he bruises easily from aspirin and plavix. He is not interested in adding coumadin to his regimen despite understanding his increased risk of stroke. He and his daughter say they will address this in the future if it recurs and will consider anticoagulation at that time. Since his last visit he has only had 1 episode of 12 sec of what appears to be AT. \par \par I have asked the pt to follow up in 4-6 months and I have also advised the patient to go to the nearest emergency room if he experiences any chest pain, dyspnea, syncope, or has any other compelling symptoms.

## 2019-01-23 NOTE — HISTORY OF PRESENT ILLNESS
[de-identified] : Cardiologist: Dr. Acosta\par \par 67 yo M h/o CAD s/p PCI to LAD, RCA, HFrEF, ESRD on HD, with admission in Nov for severe bradycardia while at hemodialysis. Patient was found to have high degree AV block with anticipated pacing > 40%. A BiV  ICD was attempted but the CS was unable to be cannulated. Repeat echo with EF 26%.  During our last office visit in Nov, pt was noted to have <1% AT/AF burden. A thorough discussion was initiated regarding anticoagulation but the patient and his family opted against anticoagulation because he bruises easily with aspirin and plavix. \par The patient denies any cardiovascular complaints including chest pain, dyspnea, palpitations, dizziness, lightheadedness, presyncope or syncope. He drove over from dialysis to the office and states he feels well. \par \par

## 2019-01-23 NOTE — REVIEW OF SYSTEMS
[see HPI] : see HPI [Lower Ext Edema] : lower extremity edema [Negative] : Endocrine [Easy Bruising] : a tendency for easy bruising

## 2019-01-23 NOTE — HISTORY OF PRESENT ILLNESS
[de-identified] : Cardiologist: Dr. Acosta\par \par 69 yo M h/o CAD s/p PCI to LAD, RCA, HFrEF, ESRD on HD, with admission in Nov for severe bradycardia while at hemodialysis. Patient was found to have high degree AV block with anticipated pacing > 40%. A BiV  ICD was attempted but the CS was unable to be cannulated. Repeat echo with EF 26%.  During our last office visit in Nov, pt was noted to have <1% AT/AF burden. A thorough discussion was initiated regarding anticoagulation but the patient and his family opted against anticoagulation because he bruises easily with aspirin and plavix. \par The patient denies any cardiovascular complaints including chest pain, dyspnea, palpitations, dizziness, lightheadedness, presyncope or syncope. He drove over from dialysis to the office and states he feels well. \par \par

## 2019-01-28 ENCOUNTER — APPOINTMENT (OUTPATIENT)
Dept: CARDIOLOGY | Facility: CLINIC | Age: 69
End: 2019-01-28

## 2019-01-28 VITALS
BODY MASS INDEX: 30.92 KG/M2 | DIASTOLIC BLOOD PRESSURE: 58 MMHG | WEIGHT: 197 LBS | HEART RATE: 70 BPM | HEIGHT: 67 IN | SYSTOLIC BLOOD PRESSURE: 104 MMHG

## 2019-01-28 RX ORDER — INSULIN DETEMIR 100 [IU]/ML
100 INJECTION, SOLUTION SUBCUTANEOUS AT BEDTIME
Refills: 0 | Status: ACTIVE | COMMUNITY
Start: 2017-04-28

## 2019-01-28 RX ORDER — INSULIN LISPRO 100 [IU]/ML
100 INJECTION, SOLUTION INTRAVENOUS; SUBCUTANEOUS 3 TIMES DAILY
Refills: 0 | Status: ACTIVE | COMMUNITY
Start: 2017-04-27

## 2019-01-28 RX ORDER — CALCIUM ACETATE 667 MG
667 TABLET ORAL 3 TIMES DAILY
Refills: 0 | Status: ACTIVE | COMMUNITY

## 2019-01-28 NOTE — HISTORY OF PRESENT ILLNESS
[FreeTextEntry1] : 69 y/o male with history of CAD, s/p PCI of RCA and LAD , last EF 40% by echo, but subsequently was admitted to [facility] with advanced AV block and needed a PPM. EF was significantly reduced, so he got AICD/BiV PPM, but unable to cannulate CS. No chest pain. Dyspnea and LE edema improved. Making minimal urine with Lasix/metolazone, and on HD. He reports compliance with medical therapy. had AV fistula revision with vascular surgery. EP follow-up appreciated. 2D echo noted.

## 2019-01-28 NOTE — ASSESSMENT
[FreeTextEntry1] : Systolic CHF. S/p AICD\par AV block, s/p PPM\par C/w diuretics, HD. Consider more aggressive fluid removal. F/u with Dr. Schreiber.\par F/u with EP - follows with Dr. Landrum.\par Vascular f/u - Dr. Brewer. Venous insufficiency.\par S/p PCI - c/w DAPT. 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 F/u with pulmonary.\par Use of Coumadin discussed given an episode of PAF. He would like to avoid AC, and will f/u with Dr. landrum to determine if there are any recurrent episodes.\par Secondary prevention discussed.\par Diet discussed.\par Gradual increase in exertion recommended.\par Repeat 2D echo noted.\par F/u in 4-6 months.\par \par

## 2019-03-04 ENCOUNTER — APPOINTMENT (OUTPATIENT)
Dept: CARDIOLOGY | Facility: CLINIC | Age: 69
End: 2019-03-04

## 2019-03-14 ENCOUNTER — APPOINTMENT (OUTPATIENT)
Dept: UROLOGY | Facility: CLINIC | Age: 69
End: 2019-03-14
Payer: MEDICARE

## 2019-03-14 VITALS
BODY MASS INDEX: 30.92 KG/M2 | HEIGHT: 67 IN | HEART RATE: 60 BPM | DIASTOLIC BLOOD PRESSURE: 47 MMHG | WEIGHT: 197 LBS | SYSTOLIC BLOOD PRESSURE: 101 MMHG

## 2019-03-14 DIAGNOSIS — R39.9 UNSPECIFIED SYMPTOMS AND SIGNS INVOLVING THE GENITOURINARY SYSTEM: ICD-10-CM

## 2019-03-14 DIAGNOSIS — R39.89 OTHER SYMPTOMS AND SIGNS INVOLVING THE GENITOURINARY SYSTEM: ICD-10-CM

## 2019-03-14 PROCEDURE — 99214 OFFICE O/P EST MOD 30 MIN: CPT

## 2019-03-14 NOTE — PHYSICAL EXAM
[General Appearance - Well Developed] : well developed [General Appearance - Well Nourished] : well nourished [Normal Appearance] : normal appearance [Bowel Sounds] : normal bowel sounds [Abdomen Soft] : soft [Skin Color & Pigmentation] : normal skin color and pigmentation [] : no respiratory distress [Oriented To Time, Place, And Person] : oriented to person, place, and time [Not Anxious] : not anxious [Normal Station and Gait] : the gait and station were normal for the patient's age [No Focal Deficits] : no focal deficits [FreeTextEntry1] : + le edema

## 2019-03-14 NOTE — ASSESSMENT
[Urinary Symptom or Sign (788.99\R39.89)] : implantation [Urinary Tract Infection (599.0\N39.0)] : qualitative ~C was positive [FreeTextEntry1] : 69 yo ESRD\par pneumaturia\par recommend \par CIC twice daily with measured outputs\par \par - CT scan with IV contrast\par - F/U in 3 weeks\par - UA and culture

## 2019-03-14 NOTE — HISTORY OF PRESENT ILLNESS
[Nocturia] : nocturia [FreeTextEntry1] : 67 yo with ESRD - last seen 9.2018\par patient had stopped CIC - explains that he did not have large volumes obtained and at times only air came out when he catheterized\par he denies fever or chills\par denies gross hematuria\par \par he states that he has 3x nocturia - but no urine comes out  [Bladder Spasm] : no bladder spasm [Abdominal Pain] : no abdominal pain [Flank Pain] : no flank pain

## 2019-03-14 NOTE — LETTER BODY
[Dear  ___] : Dear  [unfilled], [Consult Letter:] : I had the pleasure of evaluating your patient, [unfilled]. [Please see my note below.] : Please see my note below. [Consult Closing:] : Thank you very much for allowing me to participate in the care of this patient.  If you have any questions, please do not hesitate to contact me. [Sincerely,] : Sincerely, [FreeTextEntry3] : Diallo Foote MD, FACS\par

## 2019-03-18 ENCOUNTER — FORM ENCOUNTER (OUTPATIENT)
Age: 69
End: 2019-03-18

## 2019-03-19 ENCOUNTER — OUTPATIENT (OUTPATIENT)
Dept: OUTPATIENT SERVICES | Facility: HOSPITAL | Age: 69
LOS: 1 days | Discharge: HOME | End: 2019-03-19

## 2019-03-19 DIAGNOSIS — R39.89 OTHER SYMPTOMS AND SIGNS INVOLVING THE GENITOURINARY SYSTEM: ICD-10-CM

## 2019-04-04 ENCOUNTER — APPOINTMENT (OUTPATIENT)
Dept: UROLOGY | Facility: CLINIC | Age: 69
End: 2019-04-04
Payer: MEDICARE

## 2019-04-04 DIAGNOSIS — Z87.440 PERSONAL HISTORY OF URINARY (TRACT) INFECTIONS: ICD-10-CM

## 2019-04-04 PROCEDURE — 99213 OFFICE O/P EST LOW 20 MIN: CPT

## 2019-04-04 NOTE — HISTORY OF PRESENT ILLNESS
[Nocturia] : nocturia [FreeTextEntry1] : 67 yo with ESRD - last seen 3/14/.2018\par \par he is here to review his urine studies and CT scan\par \par - culture 4/1/2019\par E Coli\par \par \par - CT scan 3/19/2019\par circumferential urinary bladder wall thickening\par no intraluminal air\par no colovesical fistula\par \par  [Bladder Spasm] : no bladder spasm [Abdominal Pain] : no abdominal pain [Flank Pain] : no flank pain

## 2019-04-04 NOTE — ASSESSMENT
[Urinary Tract Infection (599.0\N39.0)] : qualitative ~C was positive [FreeTextEntry1] : 67 yo ESRD\par \par continue CIC twice daily \par \par - Ceftin 250 BID x 10 days\par - repeat urine culture\par - f/u in 6 months

## 2019-04-30 ENCOUNTER — APPOINTMENT (OUTPATIENT)
Dept: CARDIOLOGY | Facility: CLINIC | Age: 69
End: 2019-04-30
Payer: MEDICARE

## 2019-04-30 PROCEDURE — 93295 DEV INTERROG REMOTE 1/2/MLT: CPT

## 2019-04-30 PROCEDURE — 93296 REM INTERROG EVL PM/IDS: CPT

## 2019-06-17 ENCOUNTER — RECORD ABSTRACTING (OUTPATIENT)
Age: 69
End: 2019-06-17

## 2019-06-17 DIAGNOSIS — Z92.89 PERSONAL HISTORY OF OTHER MEDICAL TREATMENT: ICD-10-CM

## 2019-07-10 ENCOUNTER — APPOINTMENT (OUTPATIENT)
Dept: CARDIOLOGY | Facility: CLINIC | Age: 69
End: 2019-07-10
Payer: MEDICARE

## 2019-07-10 VITALS
OXYGEN SATURATION: 83 % | DIASTOLIC BLOOD PRESSURE: 55 MMHG | SYSTOLIC BLOOD PRESSURE: 110 MMHG | HEIGHT: 67 IN | BODY MASS INDEX: 31.14 KG/M2 | WEIGHT: 198.41 LBS | HEART RATE: 56 BPM

## 2019-07-10 DIAGNOSIS — I50.30 UNSPECIFIED DIASTOLIC (CONGESTIVE) HEART FAILURE: ICD-10-CM

## 2019-07-10 PROCEDURE — 93000 ELECTROCARDIOGRAM COMPLETE: CPT | Mod: 59

## 2019-07-10 PROCEDURE — 93290 INTERROG DEV EVAL ICPMS IP: CPT | Mod: 26

## 2019-07-10 PROCEDURE — 93283 PRGRMG EVAL IMPLANTABLE DFB: CPT

## 2019-07-10 PROCEDURE — 99214 OFFICE O/P EST MOD 30 MIN: CPT

## 2019-07-10 RX ORDER — CEFDINIR 300 MG/1
300 CAPSULE ORAL
Qty: 30 | Refills: 0 | Status: ACTIVE | COMMUNITY
Start: 2019-06-12

## 2019-07-10 RX ORDER — CEFUROXIME AXETIL 250 MG/1
250 TABLET ORAL
Qty: 20 | Refills: 0 | Status: DISCONTINUED | COMMUNITY
Start: 2019-04-04 | End: 2019-07-10

## 2019-07-10 NOTE — REVIEW OF SYSTEMS
[Easy Bruising] : a tendency for easy bruising [Lower Ext Edema] : lower extremity edema [Negative] : Endocrine [Dyspnea on exertion] : dyspnea during exertion

## 2019-07-10 NOTE — DISCUSSION/SUMMARY
[AICD Function Normal] : normal AICD function [FreeTextEntry1] : Mr. Hliton is a 67 y/o male with pmh of CAD, DM, HTN, and ESRD on HD.  \par Brief episodes of  subclinical Paroxysmal AT/ Atrial flutter, noted on pervious  in-office device interrogations. \par His CHADS VASc is 5 with estimated yearly stroke risk of 6.7% and his Has BLED is 3, with estimated yearly bleeding risk of 3.74%. Anticoagulation was discussed in detail  with patient and family and risks and benefits were explained , decision was made to re-assess AT/AF burden, if recurrent episodes, longer in duration then he might consider long term anticoagulation. \par AICD interrogation today : Normal function, All parameters stable . 1 episode of AT/Aflutter, lasting 8 seconds and 1 episode of NSVT.  Device reprogrammed from DDI to DDD 60/110 bpm. See print out for details . \par Patient was seen and examined with Dr. Landrum:  Data and findings from the device interrogation discussed with  the patient and his daughter.  At this time , we advised patient to continue his current medication regimen, continue remote  monitoring , RTO in 6 months , F/U with DR. Acosta as scheduled .     \par \par

## 2019-07-10 NOTE — HISTORY OF PRESENT ILLNESS
[None] : The patient complains of no symptoms [de-identified] : \par Cardiologist: Dr. Acosta\par \par 67 yo M h/o CAD s/p PCI to LAD, RCA, HFrEF , ESRD on HD, with admission in Nov for severe bradycardia while at hemodialysis. Patient was found to have high degree AV block with anticipated pacing > 40%. A BiV  ICD was attempted but the CS was unable to be cannulated. Repeat echo with EF 26%. \par \par On previous device interrogation noted  to have <1% AT/AF burden.  OAC discussed, family opted against anticoagulation because he bruises easily with aspirin and plavix.  He returns for routine follow up \par \par Denies CP/SOB or palpitations. Reports  LOUIS .  Currently on antibiotics for recurrent UTI . \par Denies  dizziness, lightheadedness, presyncope,  syncope or AICD shocks . \par \par ECG ( 7/10/2019) SR at 60, RV paced   \par \par

## 2019-07-10 NOTE — END OF VISIT
[FreeTextEntry3] : I was present with the nurse practitioner during the history and exam of the patient. I discussed patient's management with the NP in detail. I reviewed the NP’s note and agree with the documented findings and plan of care. I would like to take this opportunity to thank you for involving me in this patient’s care. Please do not hesitate to contact me if you have any further questions at [phone].\par

## 2019-07-10 NOTE — PHYSICAL EXAM
[Normal Appearance] : normal appearance [Well Groomed] : well groomed [No Deformities] : no deformities [General Appearance - In No Acute Distress] : no acute distress [Heart Rate And Rhythm] : heart rate and rhythm were normal [Heart Sounds] : normal S1 and S2 [Murmurs] : no murmurs present [] : no respiratory distress [Respiration, Rhythm And Depth] : normal respiratory rhythm and effort [Exaggerated Use Of Accessory Muscles For Inspiration] : no accessory muscle use [Right Infraclavicular] : right infraclavicular area [Clean] : clean [Dry] : dry [Well-Healed] : well-healed [Abdomen Soft] : soft [Abdomen Tenderness] : non-tender [Nail Clubbing] : no clubbing of the fingernails [Cyanosis, Localized] : no localized cyanosis [Purulent Drainage] : no purulent drainage [Bleeding] : no active bleeding [Erythema] : not erythematous [Warm] : not warm [Tender] : not tender [FreeTextEntry1] : 1+ pitting edema in b/l LE; left arm fistula

## 2019-07-10 NOTE — PROCEDURE
[NSR] : normal sinus rhythm [ICD] : Implantable cardioverter-defibrillator [DDI] : DDI [Charge Time: ___ sec] : charge time was [unfilled] seconds [Impedance: ___ Ohms] : current cell impedance is [unfilled] Ohms [Longevity: ___ months] : The estimated remaining battery life is [unfilled] months [Ventricular] : Ventricular [Threshold Testing Performed] : Threshold testing was performed [Lead Imp:  ___ohms] : lead impedance was [unfilled] ohms [___V @] : [unfilled] V [Sensing Amplitude ___mv] : sensing amplitude was [unfilled] mv [Programmed for Longevity] : output reprogrammed for improved battery longevity [Counters Reset] : the counters were reset [___ ms] : [unfilled] ms [Asense-Vsense ___ %] : Asense-Vsense [unfilled]% [Asense-Vpace ___ %] : Asense-Vpace [unfilled]% [Apace-Vsense ___ %] : Apace-Vsense [unfilled]% [Apace-Vpace ___ %] : Apace-Vpace [unfilled]% [See Scanned Paceart Report] : See scanned paceart report [See Device Printout] : See device printout [de-identified] : High Degree AV block  [de-identified] : [facility] [de-identified] : [id number] [de-identified] : [id number] [de-identified] : 10/15/2018 [de-identified] : 60/110 [de-identified] : 2 AT/AF episodes. One appears to be AF, lasting 8 seconds in March, other appears to be NSVT (printed) lasting 10 seconds in March. \par Corvue - stable impedance. \par Mode reprogrammed to DDD 60/110 bpm . See print out for details .  [de-identified] : mode reprogrammed to DDD

## 2019-07-18 ENCOUNTER — APPOINTMENT (OUTPATIENT)
Dept: CARDIOLOGY | Facility: CLINIC | Age: 69
End: 2019-07-18

## 2019-07-23 ENCOUNTER — OTHER (OUTPATIENT)
Age: 69
End: 2019-07-23

## 2019-08-22 ENCOUNTER — APPOINTMENT (OUTPATIENT)
Dept: CARDIOLOGY | Facility: CLINIC | Age: 69
End: 2019-08-22
Payer: MEDICARE

## 2019-08-22 PROCEDURE — 93306 TTE W/DOPPLER COMPLETE: CPT

## 2019-08-26 ENCOUNTER — APPOINTMENT (OUTPATIENT)
Dept: CARDIOLOGY | Facility: CLINIC | Age: 69
End: 2019-08-26
Payer: MEDICARE

## 2019-08-26 VITALS
DIASTOLIC BLOOD PRESSURE: 56 MMHG | SYSTOLIC BLOOD PRESSURE: 114 MMHG | BODY MASS INDEX: 31.86 KG/M2 | HEART RATE: 83 BPM | HEIGHT: 67 IN | WEIGHT: 203 LBS

## 2019-08-26 PROCEDURE — 93000 ELECTROCARDIOGRAM COMPLETE: CPT

## 2019-08-26 PROCEDURE — 99214 OFFICE O/P EST MOD 30 MIN: CPT

## 2019-08-26 NOTE — REVIEW OF SYSTEMS
[Fever] : no fever [Feeling Fatigued] : feeling fatigued [Chills] : no chills [Blurry Vision] : no blurred vision [Sore Throat] : no sore throat [Sinus Pressure] : no sinus pressure [Shortness Of Breath] : shortness of breath [Chest Pain] : no chest pain [Dyspnea on exertion] : dyspnea during exertion [Lower Ext Edema] : lower extremity edema [Cough] : cough [Wheezing] : no wheezing [Coughing Up Blood] : no hemoptysis [Abdominal Pain] : no abdominal pain [Nausea] : no nausea [Vomiting] : no vomiting [Urinary Frequency] : urinary frequency [Nocturia] : nocturia [Hematuria] : no hematuria [Joint Swelling] : no joint swelling [Joint Pain] : joint pain [Dizziness] : no dizziness [Confusion] : no confusion was observed [Easy Bleeding] : no tendency for easy bleeding [Negative] : Integumentary

## 2019-08-26 NOTE — HISTORY OF PRESENT ILLNESS
[FreeTextEntry1] : 69 y/o male with history of CAD, s/p PCI of RCA and LAD , last EF 40% by echo, but subsequently was admitted to [facility] with advanced AV block and needed a PPM. EF was significantly reduced, so he got AICD/BiV PPM, but unable to cannulate CS. No chest pain. Dyspnea and LE edema improved. Making minimal urine with Lasix/metolazone, and on HD. He reports compliance with medical therapy. had AV fistula revision with vascular surgery. EP follow-up appreciated. 2D echo repeated last week - EF remained at 30-35%. The patient reports LOUIS, feels better with oxygen. Had few seconds of AF on the device - anticoagulation was discussed with him and the family by Dr. Landrum, and he chose to hold off on anticoagulation.

## 2019-08-26 NOTE — ASSESSMENT
[FreeTextEntry1] : Systolic CHF. S/p AICD\par AV block, s/p PPM\par C/w diuretics, HD. Consider more aggressive fluid removal. F/u with Dr. Schreiber.\par F/u with EP - follows with Dr. Landrum.\par Vascular f/u - Dr. Brewer. Venous insufficiency.\par Option of repeat cath was disucssed, given his symptoms. He would prefer to hold off at this time.\par S/p PCI - c/w DAPT. 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 F/u with pulmonary for CLIFTON, COPD.\par Use of Coumadin discussed given an episode of PAF. He would like to avoid AC, and will f/u with Dr. Landrum to determine if there are any recurrent episodes.\par Secondary prevention discussed.\par Diet discussed.\par Gradual increase in exertion recommended.\par Repeat 2D echo noted.\par F/u in 3-4 months.\par \par

## 2019-08-26 NOTE — REASON FOR VISIT
[Coronary Artery Disease] : coronary artery disease [Follow-Up - Clinic] : a clinic follow-up of [Heart Failure] : congestive heart failure [Dyspnea] : dyspnea

## 2019-09-07 ENCOUNTER — OUTPATIENT (OUTPATIENT)
Dept: OUTPATIENT SERVICES | Facility: HOSPITAL | Age: 69
LOS: 1 days | Discharge: HOME | End: 2019-09-07
Payer: MEDICARE

## 2019-09-07 DIAGNOSIS — R06.89 OTHER ABNORMALITIES OF BREATHING: ICD-10-CM

## 2019-09-07 PROCEDURE — 71046 X-RAY EXAM CHEST 2 VIEWS: CPT | Mod: 26

## 2019-10-07 ENCOUNTER — APPOINTMENT (OUTPATIENT)
Dept: UROLOGY | Facility: CLINIC | Age: 69
End: 2019-10-07

## 2019-10-10 ENCOUNTER — APPOINTMENT (OUTPATIENT)
Dept: CARDIOLOGY | Facility: CLINIC | Age: 69
End: 2019-10-10
Payer: MEDICARE

## 2019-10-10 PROCEDURE — 93295 DEV INTERROG REMOTE 1/2/MLT: CPT

## 2019-10-10 PROCEDURE — 93296 REM INTERROG EVL PM/IDS: CPT

## 2019-12-16 NOTE — ASSESSMENT
[FreeTextEntry1] : Systolic CHF. S/p AICD\par AV block, s/p PPM\par C/w diuretics, HD. Consider more aggressive fluid removal. F/u with Dr. Schreiber.\par F/u with EP - follows with Dr. Landrum.\par Vascular f/u - Dr. Brewer. Venous insufficiency.\par Option of repeat cath was discussed, given his symptoms. He would prefer to hold off at this time.\par In addition the option of epicardial lead was discussed, should his CHF symptoms progress.\par S/p PCI - c/w DAPT. 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 F/u with pulmonary for CLIFTON, COPD.\par Use of Coumadin discussed given an episode of PAF. He would like to avoid AC, and will f/u with Dr. Landrum to determine if there are any recurrent episodes.\par Secondary prevention discussed.\par Diet discussed.\par \par F/u in 3-4 months.\par \par

## 2019-12-16 NOTE — REVIEW OF SYSTEMS
[Fever] : no fever [Feeling Fatigued] : feeling fatigued [Blurry Vision] : no blurred vision [Chills] : no chills [Sore Throat] : no sore throat [Sinus Pressure] : no sinus pressure [Shortness Of Breath] : shortness of breath [Chest Pain] : no chest pain [Dyspnea on exertion] : dyspnea during exertion [Lower Ext Edema] : lower extremity edema [Cough] : cough [Wheezing] : no wheezing [Abdominal Pain] : no abdominal pain [Coughing Up Blood] : no hemoptysis [Vomiting] : no vomiting [Nausea] : no nausea [Nocturia] : nocturia [Urinary Frequency] : urinary frequency [Hematuria] : no hematuria [Joint Pain] : joint pain [Joint Swelling] : no joint swelling [Dizziness] : no dizziness [Confusion] : no confusion was observed [Negative] : Integumentary [Easy Bleeding] : no tendency for easy bleeding

## 2019-12-16 NOTE — HISTORY OF PRESENT ILLNESS
[FreeTextEntry1] : 70 y/o male with history of CAD, s/p PCI of RCA and LAD , reduced EF by echo, advanced AV block s/p AICD/PPM, unable to cannulate CS, so not LV paced. No chest pain. Still with epiosdes of dyspnea, no LE edema. Making minimal urine with Lasix/metolazone, and on HD. He reports compliance with medical therapy. EP follow-up appreciated. 2D echo repeated earlier this year - EF remained at 30-35%. The patient reports LOUIS, feels better with oxygen. Had few seconds of AF on the device - anticoagulation was discussed with him and the family by Dr. Landrum, and he chose to hold off on anticoagulation. Has a follow-up scheduled with Dr. Landrum to re-check the device.

## 2019-12-16 NOTE — REASON FOR VISIT
[Follow-Up - Clinic] : a clinic follow-up of [Coronary Artery Disease] : coronary artery disease [Dyspnea] : dyspnea [Heart Failure] : congestive heart failure

## 2019-12-16 NOTE — PHYSICAL EXAM
[General Appearance - Well Developed] : well developed [General Appearance - Well Nourished] : well nourished [General Appearance - In No Acute Distress] : no acute distress [Normal Conjunctiva] : the conjunctiva exhibited no abnormalities [Normal Oral Mucosa] : normal oral mucosa [Normal Oropharynx] : normal oropharynx [Heart Rate And Rhythm] : heart rate and rhythm were normal [Arterial Pulses Normal] : the arterial pulses were normal [Heart Sounds] : normal S1 and S2 [Systolic grade ___/6] : A grade [unfilled]/6 systolic murmur was heard. [Respiration, Rhythm And Depth] : normal respiratory rhythm and effort [Abdomen Tenderness] : non-tender [Bowel Sounds] : normal bowel sounds [Abdomen Soft] : soft [Nail Clubbing] : no clubbing of the fingernails [Abnormal Walk] : normal gait [Oriented To Time, Place, And Person] : oriented to person, place, and time [FreeTextEntry1] : erythema b/l LE, + venostasis [Affect] : the affect was normal

## 2020-01-01 ENCOUNTER — APPOINTMENT (OUTPATIENT)
Dept: CARDIOLOGY | Facility: CLINIC | Age: 70
End: 2020-01-01
Payer: MEDICARE

## 2020-01-01 ENCOUNTER — INPATIENT (INPATIENT)
Facility: HOSPITAL | Age: 70
LOS: 14 days | Discharge: HOME HEALTH | End: 2020-09-01
Attending: INTERNAL MEDICINE | Admitting: INTERNAL MEDICINE
Payer: MEDICARE

## 2020-01-01 ENCOUNTER — APPOINTMENT (OUTPATIENT)
Dept: OTOLARYNGOLOGY | Facility: CLINIC | Age: 70
End: 2020-01-01

## 2020-01-01 ENCOUNTER — RESULT REVIEW (OUTPATIENT)
Age: 70
End: 2020-01-01

## 2020-01-01 ENCOUNTER — APPOINTMENT (OUTPATIENT)
Dept: CARDIOLOGY | Facility: CLINIC | Age: 70
End: 2020-01-01

## 2020-01-01 ENCOUNTER — TRANSCRIPTION ENCOUNTER (OUTPATIENT)
Age: 70
End: 2020-01-01

## 2020-01-01 ENCOUNTER — EMERGENCY (EMERGENCY)
Facility: HOSPITAL | Age: 70
LOS: 0 days | Discharge: HOME | End: 2020-09-03
Attending: EMERGENCY MEDICINE | Admitting: INTERNAL MEDICINE
Payer: MEDICARE

## 2020-01-01 ENCOUNTER — INPATIENT (INPATIENT)
Facility: HOSPITAL | Age: 70
LOS: 4 days | End: 2020-11-14
Attending: INTERNAL MEDICINE | Admitting: INTERNAL MEDICINE
Payer: MEDICARE

## 2020-01-01 VITALS
DIASTOLIC BLOOD PRESSURE: 63 MMHG | WEIGHT: 206 LBS | SYSTOLIC BLOOD PRESSURE: 106 MMHG | HEART RATE: 79 BPM | BODY MASS INDEX: 32.33 KG/M2 | HEIGHT: 67 IN

## 2020-01-01 VITALS
RESPIRATION RATE: 20 BRPM | DIASTOLIC BLOOD PRESSURE: 54 MMHG | SYSTOLIC BLOOD PRESSURE: 94 MMHG | HEART RATE: 60 BPM | OXYGEN SATURATION: 97 % | TEMPERATURE: 98 F

## 2020-01-01 VITALS
HEART RATE: 82 BPM | DIASTOLIC BLOOD PRESSURE: 62 MMHG | SYSTOLIC BLOOD PRESSURE: 120 MMHG | RESPIRATION RATE: 18 BRPM | TEMPERATURE: 98 F

## 2020-01-01 VITALS
RESPIRATION RATE: 20 BRPM | HEART RATE: 58 BPM | WEIGHT: 205.91 LBS | TEMPERATURE: 101 F | OXYGEN SATURATION: 83 % | HEIGHT: 67 IN

## 2020-01-01 VITALS
HEART RATE: 60 BPM | TEMPERATURE: 97.8 F | DIASTOLIC BLOOD PRESSURE: 48 MMHG | HEIGHT: 67 IN | WEIGHT: 204 LBS | BODY MASS INDEX: 32.02 KG/M2 | SYSTOLIC BLOOD PRESSURE: 92 MMHG

## 2020-01-01 VITALS
HEART RATE: 60 BPM | RESPIRATION RATE: 18 BRPM | DIASTOLIC BLOOD PRESSURE: 48 MMHG | TEMPERATURE: 98 F | SYSTOLIC BLOOD PRESSURE: 86 MMHG | OXYGEN SATURATION: 89 %

## 2020-01-01 VITALS
TEMPERATURE: 97 F | DIASTOLIC BLOOD PRESSURE: 52 MMHG | HEART RATE: 100 BPM | SYSTOLIC BLOOD PRESSURE: 101 MMHG | RESPIRATION RATE: 18 BRPM

## 2020-01-01 VITALS
BODY MASS INDEX: 32.8 KG/M2 | DIASTOLIC BLOOD PRESSURE: 63 MMHG | HEIGHT: 67 IN | SYSTOLIC BLOOD PRESSURE: 115 MMHG | WEIGHT: 209 LBS

## 2020-01-01 VITALS — DIASTOLIC BLOOD PRESSURE: 64 MMHG | SYSTOLIC BLOOD PRESSURE: 135 MMHG

## 2020-01-01 VITALS — HEART RATE: 83 BPM

## 2020-01-01 DIAGNOSIS — K56.7 ILEUS, UNSPECIFIED: ICD-10-CM

## 2020-01-01 DIAGNOSIS — J94.2 HEMOTHORAX: ICD-10-CM

## 2020-01-01 DIAGNOSIS — R06.02 SHORTNESS OF BREATH: ICD-10-CM

## 2020-01-01 DIAGNOSIS — I25.10 ATHEROSCLEROTIC HEART DISEASE OF NATIVE CORONARY ARTERY W/OUT ANGINA PECTORIS: ICD-10-CM

## 2020-01-01 DIAGNOSIS — Z91.19 PATIENT'S NONCOMPLIANCE WITH OTHER MEDICAL TREATMENT AND REGIMEN: ICD-10-CM

## 2020-01-01 DIAGNOSIS — N40.0 BENIGN PROSTATIC HYPERPLASIA WITHOUT LOWER URINARY TRACT SYMPTOMS: ICD-10-CM

## 2020-01-01 DIAGNOSIS — Z87.891 PERSONAL HISTORY OF NICOTINE DEPENDENCE: ICD-10-CM

## 2020-01-01 DIAGNOSIS — Z79.01 LONG TERM (CURRENT) USE OF ANTICOAGULANTS: ICD-10-CM

## 2020-01-01 DIAGNOSIS — J18.9 PNEUMONIA, UNSPECIFIED ORGANISM: ICD-10-CM

## 2020-01-01 DIAGNOSIS — I50.23 ACUTE ON CHRONIC SYSTOLIC (CONGESTIVE) HEART FAILURE: ICD-10-CM

## 2020-01-01 DIAGNOSIS — Z95.810 PRESENCE OF AUTOMATIC (IMPLANTABLE) CARDIAC DEFIBRILLATOR: ICD-10-CM

## 2020-01-01 DIAGNOSIS — I13.2 HYPERTENSIVE HEART AND CHRONIC KIDNEY DISEASE WITH HEART FAILURE AND WITH STAGE 5 CHRONIC KIDNEY DISEASE, OR END STAGE RENAL DISEASE: ICD-10-CM

## 2020-01-01 DIAGNOSIS — A41.9 SEPSIS, UNSPECIFIED ORGANISM: ICD-10-CM

## 2020-01-01 DIAGNOSIS — I50.20 UNSPECIFIED SYSTOLIC (CONGESTIVE) HEART FAILURE: ICD-10-CM

## 2020-01-01 DIAGNOSIS — I25.10 ATHEROSCLEROTIC HEART DISEASE OF NATIVE CORONARY ARTERY WITHOUT ANGINA PECTORIS: ICD-10-CM

## 2020-01-01 DIAGNOSIS — E78.5 HYPERLIPIDEMIA, UNSPECIFIED: ICD-10-CM

## 2020-01-01 DIAGNOSIS — Z99.2 DEPENDENCE ON RENAL DIALYSIS: ICD-10-CM

## 2020-01-01 DIAGNOSIS — J44.0 CHRONIC OBSTRUCTIVE PULMONARY DISEASE WITH (ACUTE) LOWER RESPIRATORY INFECTION: ICD-10-CM

## 2020-01-01 DIAGNOSIS — N18.6 END STAGE RENAL DISEASE: ICD-10-CM

## 2020-01-01 DIAGNOSIS — R57.1 HYPOVOLEMIC SHOCK: ICD-10-CM

## 2020-01-01 DIAGNOSIS — E11.9 TYPE 2 DIABETES MELLITUS WITHOUT COMPLICATIONS: ICD-10-CM

## 2020-01-01 DIAGNOSIS — E11.9 TYPE 2 DIABETES MELLITUS W/OUT COMPLICATIONS: ICD-10-CM

## 2020-01-01 DIAGNOSIS — E87.5 HYPERKALEMIA: ICD-10-CM

## 2020-01-01 DIAGNOSIS — Z91.15 PATIENT'S NONCOMPLIANCE WITH RENAL DIALYSIS: ICD-10-CM

## 2020-01-01 DIAGNOSIS — I95.9 HYPOTENSION, UNSPECIFIED: ICD-10-CM

## 2020-01-01 DIAGNOSIS — Z95.5 PRESENCE OF CORONARY ANGIOPLASTY IMPLANT AND GRAFT: ICD-10-CM

## 2020-01-01 DIAGNOSIS — Z97.2 PRESENCE OF DENTAL PROSTHETIC DEVICE (COMPLETE) (PARTIAL): ICD-10-CM

## 2020-01-01 DIAGNOSIS — G47.33 OBSTRUCTIVE SLEEP APNEA (ADULT) (PEDIATRIC): ICD-10-CM

## 2020-01-01 DIAGNOSIS — D63.1 ANEMIA IN CHRONIC KIDNEY DISEASE: ICD-10-CM

## 2020-01-01 DIAGNOSIS — Z79.82 LONG TERM (CURRENT) USE OF ASPIRIN: ICD-10-CM

## 2020-01-01 DIAGNOSIS — R10.9 UNSPECIFIED ABDOMINAL PAIN: ICD-10-CM

## 2020-01-01 DIAGNOSIS — R09.02 HYPOXEMIA: ICD-10-CM

## 2020-01-01 DIAGNOSIS — E11.22 TYPE 2 DIABETES MELLITUS WITH DIABETIC CHRONIC KIDNEY DISEASE: ICD-10-CM

## 2020-01-01 DIAGNOSIS — N39.0 URINARY TRACT INFECTION, SITE NOT SPECIFIED: ICD-10-CM

## 2020-01-01 DIAGNOSIS — J96.20 ACUTE AND CHRONIC RESPIRATORY FAILURE, UNSPECIFIED WHETHER WITH HYPOXIA OR HYPERCAPNIA: ICD-10-CM

## 2020-01-01 DIAGNOSIS — Z99.89 DEPENDENCE ON OTHER ENABLING MACHINES AND DEVICES: ICD-10-CM

## 2020-01-01 DIAGNOSIS — E87.2 ACIDOSIS: ICD-10-CM

## 2020-01-01 DIAGNOSIS — I48.0 PAROXYSMAL ATRIAL FIBRILLATION: ICD-10-CM

## 2020-01-01 DIAGNOSIS — I10 ESSENTIAL (PRIMARY) HYPERTENSION: ICD-10-CM

## 2020-01-01 DIAGNOSIS — E87.70 FLUID OVERLOAD, UNSPECIFIED: ICD-10-CM

## 2020-01-01 DIAGNOSIS — I50.9 HEART FAILURE, UNSPECIFIED: ICD-10-CM

## 2020-01-01 DIAGNOSIS — J96.22 ACUTE AND CHRONIC RESPIRATORY FAILURE WITH HYPERCAPNIA: ICD-10-CM

## 2020-01-01 DIAGNOSIS — Z45.02 ENCOUNTER FOR ADJUSTMENT AND MANAGEMENT OF AUTOMATIC IMPLANTABLE CARDIAC DEFIBRILLATOR: ICD-10-CM

## 2020-01-01 DIAGNOSIS — Z99.81 DEPENDENCE ON SUPPLEMENTAL OXYGEN: ICD-10-CM

## 2020-01-01 DIAGNOSIS — J91.8 PLEURAL EFFUSION IN OTHER CONDITIONS CLASSIFIED ELSEWHERE: ICD-10-CM

## 2020-01-01 DIAGNOSIS — Z51.5 ENCOUNTER FOR PALLIATIVE CARE: ICD-10-CM

## 2020-01-01 DIAGNOSIS — J44.9 CHRONIC OBSTRUCTIVE PULMONARY DISEASE, UNSPECIFIED: ICD-10-CM

## 2020-01-01 DIAGNOSIS — R65.21 SEVERE SEPSIS WITH SEPTIC SHOCK: ICD-10-CM

## 2020-01-01 DIAGNOSIS — I50.42 CHRONIC COMBINED SYSTOLIC (CONGESTIVE) AND DIASTOLIC (CONGESTIVE) HEART FAILURE: ICD-10-CM

## 2020-01-01 DIAGNOSIS — B96.20 UNSPECIFIED ESCHERICHIA COLI [E. COLI] AS THE CAUSE OF DISEASES CLASSIFIED ELSEWHERE: ICD-10-CM

## 2020-01-01 DIAGNOSIS — G93.40 ENCEPHALOPATHY, UNSPECIFIED: ICD-10-CM

## 2020-01-01 DIAGNOSIS — I48.20 CHRONIC ATRIAL FIBRILLATION, UNSPECIFIED: ICD-10-CM

## 2020-01-01 DIAGNOSIS — Z66 DO NOT RESUSCITATE: ICD-10-CM

## 2020-01-01 DIAGNOSIS — J90 PLEURAL EFFUSION, NOT ELSEWHERE CLASSIFIED: ICD-10-CM

## 2020-01-01 DIAGNOSIS — I27.20 PULMONARY HYPERTENSION, UNSPECIFIED: ICD-10-CM

## 2020-01-01 DIAGNOSIS — J86.9 PYOTHORAX WITHOUT FISTULA: ICD-10-CM

## 2020-01-01 DIAGNOSIS — J96.21 ACUTE AND CHRONIC RESPIRATORY FAILURE WITH HYPOXIA: ICD-10-CM

## 2020-01-01 DIAGNOSIS — I44.39 OTHER ATRIOVENTRICULAR BLOCK: ICD-10-CM

## 2020-01-01 DIAGNOSIS — Z79.84 LONG TERM (CURRENT) USE OF ORAL HYPOGLYCEMIC DRUGS: ICD-10-CM

## 2020-01-01 DIAGNOSIS — E66.9 OBESITY, UNSPECIFIED: ICD-10-CM

## 2020-01-01 DIAGNOSIS — I50.22 CHRONIC SYSTOLIC (CONGESTIVE) HEART FAILURE: ICD-10-CM

## 2020-01-01 LAB
-  AMIKACIN: SIGNIFICANT CHANGE UP
-  AMOXICILLIN/CLAVULANIC ACID: SIGNIFICANT CHANGE UP
-  AMPICILLIN/SULBACTAM: SIGNIFICANT CHANGE UP
-  AMPICILLIN: SIGNIFICANT CHANGE UP
-  AZTREONAM: SIGNIFICANT CHANGE UP
-  CEFAZOLIN: SIGNIFICANT CHANGE UP
-  CEFEPIME: SIGNIFICANT CHANGE UP
-  CEFOXITIN: SIGNIFICANT CHANGE UP
-  CEFTRIAXONE: SIGNIFICANT CHANGE UP
-  CIPROFLOXACIN: SIGNIFICANT CHANGE UP
-  ERTAPENEM: SIGNIFICANT CHANGE UP
-  GENTAMICIN: SIGNIFICANT CHANGE UP
-  IMIPENEM: SIGNIFICANT CHANGE UP
-  LEVOFLOXACIN: SIGNIFICANT CHANGE UP
-  MEROPENEM: SIGNIFICANT CHANGE UP
-  NITROFURANTOIN: SIGNIFICANT CHANGE UP
-  PIPERACILLIN/TAZOBACTAM: SIGNIFICANT CHANGE UP
-  TIGECYCLINE: SIGNIFICANT CHANGE UP
-  TOBRAMYCIN: SIGNIFICANT CHANGE UP
-  TRIMETHOPRIM/SULFAMETHOXAZOLE: SIGNIFICANT CHANGE UP
A1C WITH ESTIMATED AVERAGE GLUCOSE RESULT: 6.8 % — HIGH (ref 4–5.6)
A1C WITH ESTIMATED AVERAGE GLUCOSE RESULT: 6.9 % — HIGH (ref 4–5.6)
A1C WITH ESTIMATED AVERAGE GLUCOSE RESULT: 7.8 % — HIGH (ref 4–5.6)
ALBUMIN FLD-MCNC: 2.9 G/DL — SIGNIFICANT CHANGE UP
ALBUMIN SERPL ELPH-MCNC: 3.4 G/DL — LOW (ref 3.5–5.2)
ALBUMIN SERPL ELPH-MCNC: 3.5 G/DL — SIGNIFICANT CHANGE UP (ref 3.5–5.2)
ALBUMIN SERPL ELPH-MCNC: 3.5 G/DL — SIGNIFICANT CHANGE UP (ref 3.5–5.2)
ALBUMIN SERPL ELPH-MCNC: 3.6 G/DL — SIGNIFICANT CHANGE UP (ref 3.5–5.2)
ALBUMIN SERPL ELPH-MCNC: 3.7 G/DL — SIGNIFICANT CHANGE UP (ref 3.5–5.2)
ALBUMIN SERPL ELPH-MCNC: 3.8 G/DL — SIGNIFICANT CHANGE UP (ref 3.5–5.2)
ALBUMIN SERPL ELPH-MCNC: 3.9 G/DL — SIGNIFICANT CHANGE UP (ref 3.5–5.2)
ALBUMIN SERPL ELPH-MCNC: 4.1 G/DL — SIGNIFICANT CHANGE UP (ref 3.5–5.2)
ALBUMIN SERPL ELPH-MCNC: 4.2 G/DL — SIGNIFICANT CHANGE UP (ref 3.5–5.2)
ALBUMIN SERPL ELPH-MCNC: 4.3 G/DL — SIGNIFICANT CHANGE UP (ref 3.5–5.2)
ALBUMIN SERPL ELPH-MCNC: 4.4 G/DL — SIGNIFICANT CHANGE UP (ref 3.5–5.2)
ALBUMIN SERPL ELPH-MCNC: 4.5 G/DL — SIGNIFICANT CHANGE UP (ref 3.5–5.2)
ALBUMIN SERPL ELPH-MCNC: 4.6 G/DL — SIGNIFICANT CHANGE UP (ref 3.5–5.2)
ALP SERPL-CCNC: 121 U/L — HIGH (ref 30–115)
ALP SERPL-CCNC: 122 U/L — HIGH (ref 30–115)
ALP SERPL-CCNC: 122 U/L — HIGH (ref 30–115)
ALP SERPL-CCNC: 127 U/L — HIGH (ref 30–115)
ALP SERPL-CCNC: 128 U/L — HIGH (ref 30–115)
ALP SERPL-CCNC: 134 U/L — HIGH (ref 30–115)
ALP SERPL-CCNC: 144 U/L — HIGH (ref 30–115)
ALP SERPL-CCNC: 149 U/L — HIGH (ref 30–115)
ALP SERPL-CCNC: 155 U/L — HIGH (ref 30–115)
ALP SERPL-CCNC: 161 U/L — HIGH (ref 30–115)
ALP SERPL-CCNC: 168 U/L — HIGH (ref 30–115)
ALP SERPL-CCNC: 168 U/L — HIGH (ref 30–115)
ALP SERPL-CCNC: 169 U/L — HIGH (ref 30–115)
ALP SERPL-CCNC: 172 U/L — HIGH (ref 30–115)
ALP SERPL-CCNC: 175 U/L — HIGH (ref 30–115)
ALP SERPL-CCNC: 176 U/L — HIGH (ref 30–115)
ALP SERPL-CCNC: 193 U/L — HIGH (ref 30–115)
ALT FLD-CCNC: 10 U/L — SIGNIFICANT CHANGE UP (ref 0–41)
ALT FLD-CCNC: 11 U/L — SIGNIFICANT CHANGE UP (ref 0–41)
ALT FLD-CCNC: 13 U/L — SIGNIFICANT CHANGE UP (ref 0–41)
ALT FLD-CCNC: 14 U/L — SIGNIFICANT CHANGE UP (ref 0–41)
ALT FLD-CCNC: 14 U/L — SIGNIFICANT CHANGE UP (ref 0–41)
ALT FLD-CCNC: 15 U/L — SIGNIFICANT CHANGE UP (ref 0–41)
ALT FLD-CCNC: 15 U/L — SIGNIFICANT CHANGE UP (ref 0–41)
ALT FLD-CCNC: 16 U/L — SIGNIFICANT CHANGE UP (ref 0–41)
ALT FLD-CCNC: 16 U/L — SIGNIFICANT CHANGE UP (ref 0–41)
ALT FLD-CCNC: 17 U/L — SIGNIFICANT CHANGE UP (ref 0–41)
ALT FLD-CCNC: 18 U/L — SIGNIFICANT CHANGE UP (ref 0–41)
ALT FLD-CCNC: 19 U/L — SIGNIFICANT CHANGE UP (ref 0–41)
ALT FLD-CCNC: 19 U/L — SIGNIFICANT CHANGE UP (ref 0–41)
ALT FLD-CCNC: 21 U/L — SIGNIFICANT CHANGE UP (ref 0–41)
ANION GAP SERPL CALC-SCNC: 11 MMOL/L — SIGNIFICANT CHANGE UP (ref 7–14)
ANION GAP SERPL CALC-SCNC: 12 MMOL/L — SIGNIFICANT CHANGE UP (ref 7–14)
ANION GAP SERPL CALC-SCNC: 13 MMOL/L — SIGNIFICANT CHANGE UP (ref 7–14)
ANION GAP SERPL CALC-SCNC: 14 MMOL/L — SIGNIFICANT CHANGE UP (ref 7–14)
ANION GAP SERPL CALC-SCNC: 14 MMOL/L — SIGNIFICANT CHANGE UP (ref 7–14)
ANION GAP SERPL CALC-SCNC: 15 MMOL/L — HIGH (ref 7–14)
ANION GAP SERPL CALC-SCNC: 16 MMOL/L — HIGH (ref 7–14)
ANION GAP SERPL CALC-SCNC: 16 MMOL/L — HIGH (ref 7–14)
ANION GAP SERPL CALC-SCNC: 17 MMOL/L — HIGH (ref 7–14)
ANION GAP SERPL CALC-SCNC: 18 MMOL/L — HIGH (ref 7–14)
ANION GAP SERPL CALC-SCNC: 19 MMOL/L — HIGH (ref 7–14)
ANION GAP SERPL CALC-SCNC: 20 MMOL/L — HIGH (ref 7–14)
APPEARANCE UR: ABNORMAL
APTT BLD: 33 SEC — SIGNIFICANT CHANGE UP (ref 27–39.2)
APTT BLD: 34.7 SEC — SIGNIFICANT CHANGE UP (ref 27–39.2)
APTT BLD: 34.7 SEC — SIGNIFICANT CHANGE UP (ref 27–39.2)
APTT BLD: 39.5 SEC — HIGH (ref 27–39.2)
APTT BLD: 46 SEC — HIGH (ref 27–39.2)
APTT BLD: 67.6 SEC — HIGH (ref 27–39.2)
AST SERPL-CCNC: 17 U/L — SIGNIFICANT CHANGE UP (ref 0–41)
AST SERPL-CCNC: 18 U/L — SIGNIFICANT CHANGE UP (ref 0–41)
AST SERPL-CCNC: 19 U/L — SIGNIFICANT CHANGE UP (ref 0–41)
AST SERPL-CCNC: 20 U/L — SIGNIFICANT CHANGE UP (ref 0–41)
AST SERPL-CCNC: 20 U/L — SIGNIFICANT CHANGE UP (ref 0–41)
AST SERPL-CCNC: 21 U/L — SIGNIFICANT CHANGE UP (ref 0–41)
AST SERPL-CCNC: 23 U/L — SIGNIFICANT CHANGE UP (ref 0–41)
AST SERPL-CCNC: 26 U/L — SIGNIFICANT CHANGE UP (ref 0–41)
AST SERPL-CCNC: 32 U/L — SIGNIFICANT CHANGE UP (ref 0–41)
B PERT IGG+IGM PNL SER: ABNORMAL
BACTERIA # UR AUTO: ABNORMAL /HPF
BASE EXCESS BLDA CALC-SCNC: -0.9 MMOL/L — SIGNIFICANT CHANGE UP (ref -2–2)
BASE EXCESS BLDA CALC-SCNC: -1.3 MMOL/L — SIGNIFICANT CHANGE UP (ref -2–2)
BASE EXCESS BLDV CALC-SCNC: -1.4 MMOL/L — SIGNIFICANT CHANGE UP (ref -2–2)
BASE EXCESS BLDV CALC-SCNC: -1.5 MMOL/L — SIGNIFICANT CHANGE UP (ref -2–2)
BASE EXCESS BLDV CALC-SCNC: 6.4 MMOL/L — HIGH (ref -2–2)
BASOPHILS # BLD AUTO: 0.01 K/UL — SIGNIFICANT CHANGE UP (ref 0–0.2)
BASOPHILS # BLD AUTO: 0.02 K/UL — SIGNIFICANT CHANGE UP (ref 0–0.2)
BASOPHILS # BLD AUTO: 0.03 K/UL — SIGNIFICANT CHANGE UP (ref 0–0.2)
BASOPHILS # BLD AUTO: 0.04 K/UL — SIGNIFICANT CHANGE UP (ref 0–0.2)
BASOPHILS NFR BLD AUTO: 0.1 % — SIGNIFICANT CHANGE UP (ref 0–1)
BASOPHILS NFR BLD AUTO: 0.2 % — SIGNIFICANT CHANGE UP (ref 0–1)
BASOPHILS NFR BLD AUTO: 0.3 % — SIGNIFICANT CHANGE UP (ref 0–1)
BASOPHILS NFR BLD AUTO: 0.3 % — SIGNIFICANT CHANGE UP (ref 0–1)
BASOPHILS NFR BLD AUTO: 0.4 % — SIGNIFICANT CHANGE UP (ref 0–1)
BILIRUB DIRECT SERPL-MCNC: <0.2 MG/DL — SIGNIFICANT CHANGE UP (ref 0–0.2)
BILIRUB INDIRECT FLD-MCNC: >0.1 MG/DL — LOW (ref 0.2–1.2)
BILIRUB SERPL-MCNC: 0.3 MG/DL — SIGNIFICANT CHANGE UP (ref 0.2–1.2)
BILIRUB SERPL-MCNC: 0.4 MG/DL — SIGNIFICANT CHANGE UP (ref 0.2–1.2)
BILIRUB SERPL-MCNC: 0.4 MG/DL — SIGNIFICANT CHANGE UP (ref 0.2–1.2)
BILIRUB SERPL-MCNC: 0.5 MG/DL — SIGNIFICANT CHANGE UP (ref 0.2–1.2)
BILIRUB SERPL-MCNC: 0.5 MG/DL — SIGNIFICANT CHANGE UP (ref 0.2–1.2)
BILIRUB SERPL-MCNC: 0.6 MG/DL — SIGNIFICANT CHANGE UP (ref 0.2–1.2)
BILIRUB SERPL-MCNC: 0.6 MG/DL — SIGNIFICANT CHANGE UP (ref 0.2–1.2)
BILIRUB SERPL-MCNC: 0.8 MG/DL — SIGNIFICANT CHANGE UP (ref 0.2–1.2)
BILIRUB UR-MCNC: ABNORMAL
BLD GP AB SCN SERPL QL: SIGNIFICANT CHANGE UP
BLD GP AB SCN SERPL QL: SIGNIFICANT CHANGE UP
BUN SERPL-MCNC: 27 MG/DL — HIGH (ref 10–20)
BUN SERPL-MCNC: 30 MG/DL — HIGH (ref 10–20)
BUN SERPL-MCNC: 30 MG/DL — HIGH (ref 10–20)
BUN SERPL-MCNC: 36 MG/DL — HIGH (ref 10–20)
BUN SERPL-MCNC: 37 MG/DL — HIGH (ref 10–20)
BUN SERPL-MCNC: 38 MG/DL — HIGH (ref 10–20)
BUN SERPL-MCNC: 39 MG/DL — HIGH (ref 10–20)
BUN SERPL-MCNC: 40 MG/DL — HIGH (ref 10–20)
BUN SERPL-MCNC: 42 MG/DL — HIGH (ref 10–20)
BUN SERPL-MCNC: 45 MG/DL — HIGH (ref 10–20)
BUN SERPL-MCNC: 45 MG/DL — HIGH (ref 10–20)
BUN SERPL-MCNC: 46 MG/DL — HIGH (ref 10–20)
BUN SERPL-MCNC: 47 MG/DL — HIGH (ref 10–20)
BUN SERPL-MCNC: 49 MG/DL — HIGH (ref 10–20)
BUN SERPL-MCNC: 56 MG/DL — HIGH (ref 10–20)
BUN SERPL-MCNC: 58 MG/DL — HIGH (ref 10–20)
BUN SERPL-MCNC: 58 MG/DL — HIGH (ref 10–20)
BUN SERPL-MCNC: 59 MG/DL — HIGH (ref 10–20)
BUN SERPL-MCNC: 60 MG/DL — HIGH (ref 10–20)
BUN SERPL-MCNC: 70 MG/DL — CRITICAL HIGH (ref 10–20)
BUN SERPL-MCNC: 71 MG/DL — CRITICAL HIGH (ref 10–20)
BUN SERPL-MCNC: 77 MG/DL — CRITICAL HIGH (ref 10–20)
CA-I SERPL-SCNC: 1.1 MMOL/L — LOW (ref 1.12–1.3)
CA-I SERPL-SCNC: 1.19 MMOL/L — SIGNIFICANT CHANGE UP (ref 1.12–1.3)
CA-I SERPL-SCNC: 1.22 MMOL/L — SIGNIFICANT CHANGE UP (ref 1.12–1.3)
CALCIUM SERPL-MCNC: 8.1 MG/DL — LOW (ref 8.5–10.1)
CALCIUM SERPL-MCNC: 8.2 MG/DL — LOW (ref 8.5–10.1)
CALCIUM SERPL-MCNC: 8.3 MG/DL — LOW (ref 8.5–10.1)
CALCIUM SERPL-MCNC: 8.5 MG/DL — SIGNIFICANT CHANGE UP (ref 8.5–10.1)
CALCIUM SERPL-MCNC: 8.5 MG/DL — SIGNIFICANT CHANGE UP (ref 8.5–10.1)
CALCIUM SERPL-MCNC: 8.6 MG/DL — SIGNIFICANT CHANGE UP (ref 8.5–10.1)
CALCIUM SERPL-MCNC: 8.7 MG/DL — SIGNIFICANT CHANGE UP (ref 8.4–10.5)
CALCIUM SERPL-MCNC: 8.7 MG/DL — SIGNIFICANT CHANGE UP (ref 8.5–10.1)
CALCIUM SERPL-MCNC: 8.8 MG/DL — SIGNIFICANT CHANGE UP (ref 8.5–10.1)
CALCIUM SERPL-MCNC: 8.9 MG/DL — SIGNIFICANT CHANGE UP (ref 8.4–10.5)
CALCIUM SERPL-MCNC: 8.9 MG/DL — SIGNIFICANT CHANGE UP (ref 8.5–10.1)
CALCIUM SERPL-MCNC: 9 MG/DL — SIGNIFICANT CHANGE UP (ref 8.5–10.1)
CALCIUM SERPL-MCNC: 9.1 MG/DL — SIGNIFICANT CHANGE UP (ref 8.5–10.1)
CALCIUM SERPL-MCNC: 9.1 MG/DL — SIGNIFICANT CHANGE UP (ref 8.5–10.1)
CALCIUM SERPL-MCNC: 9.3 MG/DL — SIGNIFICANT CHANGE UP (ref 8.5–10.1)
CHLORIDE SERPL-SCNC: 100 MMOL/L — SIGNIFICANT CHANGE UP (ref 98–110)
CHLORIDE SERPL-SCNC: 88 MMOL/L — LOW (ref 98–110)
CHLORIDE SERPL-SCNC: 89 MMOL/L — LOW (ref 98–110)
CHLORIDE SERPL-SCNC: 91 MMOL/L — LOW (ref 98–110)
CHLORIDE SERPL-SCNC: 92 MMOL/L — LOW (ref 98–110)
CHLORIDE SERPL-SCNC: 92 MMOL/L — LOW (ref 98–110)
CHLORIDE SERPL-SCNC: 93 MMOL/L — LOW (ref 98–110)
CHLORIDE SERPL-SCNC: 94 MMOL/L — LOW (ref 98–110)
CHLORIDE SERPL-SCNC: 95 MMOL/L — LOW (ref 98–110)
CHLORIDE SERPL-SCNC: 96 MMOL/L — LOW (ref 98–110)
CHLORIDE SERPL-SCNC: 96 MMOL/L — LOW (ref 98–110)
CHLORIDE SERPL-SCNC: 97 MMOL/L — LOW (ref 98–110)
CHLORIDE SERPL-SCNC: 98 MMOL/L — SIGNIFICANT CHANGE UP (ref 98–110)
CHLORIDE SERPL-SCNC: 99 MMOL/L — SIGNIFICANT CHANGE UP (ref 98–110)
CHLORIDE SERPL-SCNC: 99 MMOL/L — SIGNIFICANT CHANGE UP (ref 98–110)
CK MB CFR SERPL CALC: 4.1 NG/ML — SIGNIFICANT CHANGE UP (ref 0.6–6.3)
CK MB CFR SERPL CALC: 4.5 NG/ML — SIGNIFICANT CHANGE UP (ref 0.6–6.3)
CK MB CFR SERPL CALC: 4.7 NG/ML — SIGNIFICANT CHANGE UP (ref 0.6–6.3)
CK SERPL-CCNC: 54 U/L — SIGNIFICANT CHANGE UP (ref 0–225)
CK SERPL-CCNC: 79 U/L — SIGNIFICANT CHANGE UP (ref 0–225)
CO2 SERPL-SCNC: 21 MMOL/L — SIGNIFICANT CHANGE UP (ref 17–32)
CO2 SERPL-SCNC: 22 MMOL/L — SIGNIFICANT CHANGE UP (ref 17–32)
CO2 SERPL-SCNC: 25 MMOL/L — SIGNIFICANT CHANGE UP (ref 17–32)
CO2 SERPL-SCNC: 26 MMOL/L — SIGNIFICANT CHANGE UP (ref 17–32)
CO2 SERPL-SCNC: 27 MMOL/L — SIGNIFICANT CHANGE UP (ref 17–32)
CO2 SERPL-SCNC: 28 MMOL/L — SIGNIFICANT CHANGE UP (ref 17–32)
CO2 SERPL-SCNC: 29 MMOL/L — SIGNIFICANT CHANGE UP (ref 17–32)
CO2 SERPL-SCNC: 29 MMOL/L — SIGNIFICANT CHANGE UP (ref 17–32)
CO2 SERPL-SCNC: 30 MMOL/L — SIGNIFICANT CHANGE UP (ref 17–32)
CO2 SERPL-SCNC: 30 MMOL/L — SIGNIFICANT CHANGE UP (ref 17–32)
COLOR FLD: SIGNIFICANT CHANGE UP
COLOR SPEC: ABNORMAL
CORTIS AM PEAK SERPL-MCNC: 24.7 UG/DL — HIGH (ref 6–18.4)
CREAT SERPL-MCNC: 4.6 MG/DL — CRITICAL HIGH (ref 0.7–1.5)
CREAT SERPL-MCNC: 4.9 MG/DL — CRITICAL HIGH (ref 0.7–1.5)
CREAT SERPL-MCNC: 4.9 MG/DL — CRITICAL HIGH (ref 0.7–1.5)
CREAT SERPL-MCNC: 5.4 MG/DL — CRITICAL HIGH (ref 0.7–1.5)
CREAT SERPL-MCNC: 5.6 MG/DL — CRITICAL HIGH (ref 0.7–1.5)
CREAT SERPL-MCNC: 5.9 MG/DL — CRITICAL HIGH (ref 0.7–1.5)
CREAT SERPL-MCNC: 6 MG/DL — CRITICAL HIGH (ref 0.7–1.5)
CREAT SERPL-MCNC: 6.1 MG/DL — CRITICAL HIGH (ref 0.7–1.5)
CREAT SERPL-MCNC: 6.2 MG/DL — CRITICAL HIGH (ref 0.7–1.5)
CREAT SERPL-MCNC: 6.3 MG/DL — CRITICAL HIGH (ref 0.7–1.5)
CREAT SERPL-MCNC: 6.3 MG/DL — CRITICAL HIGH (ref 0.7–1.5)
CREAT SERPL-MCNC: 6.6 MG/DL — CRITICAL HIGH (ref 0.7–1.5)
CREAT SERPL-MCNC: 6.8 MG/DL — CRITICAL HIGH (ref 0.7–1.5)
CREAT SERPL-MCNC: 7.2 MG/DL — CRITICAL HIGH (ref 0.7–1.5)
CREAT SERPL-MCNC: 7.2 MG/DL — CRITICAL HIGH (ref 0.7–1.5)
CREAT SERPL-MCNC: 7.5 MG/DL — CRITICAL HIGH (ref 0.7–1.5)
CREAT SERPL-MCNC: 7.5 MG/DL — CRITICAL HIGH (ref 0.7–1.5)
CREAT SERPL-MCNC: 8.6 MG/DL — CRITICAL HIGH (ref 0.7–1.5)
CREAT SERPL-MCNC: 8.6 MG/DL — CRITICAL HIGH (ref 0.7–1.5)
CREAT SERPL-MCNC: 9.3 MG/DL — CRITICAL HIGH (ref 0.7–1.5)
CULTURE RESULTS: SIGNIFICANT CHANGE UP
DIFF PNL FLD: ABNORMAL
EOSINOPHIL # BLD AUTO: 0 K/UL — SIGNIFICANT CHANGE UP (ref 0–0.7)
EOSINOPHIL # BLD AUTO: 0.01 K/UL — SIGNIFICANT CHANGE UP (ref 0–0.7)
EOSINOPHIL # BLD AUTO: 0.02 K/UL — SIGNIFICANT CHANGE UP (ref 0–0.7)
EOSINOPHIL # BLD AUTO: 0.03 K/UL — SIGNIFICANT CHANGE UP (ref 0–0.7)
EOSINOPHIL # BLD AUTO: 0.1 K/UL — SIGNIFICANT CHANGE UP (ref 0–0.7)
EOSINOPHIL # BLD AUTO: 0.11 K/UL — SIGNIFICANT CHANGE UP (ref 0–0.7)
EOSINOPHIL # BLD AUTO: 0.12 K/UL — SIGNIFICANT CHANGE UP (ref 0–0.7)
EOSINOPHIL # BLD AUTO: 0.14 K/UL — SIGNIFICANT CHANGE UP (ref 0–0.7)
EOSINOPHIL # BLD AUTO: 0.16 K/UL — SIGNIFICANT CHANGE UP (ref 0–0.7)
EOSINOPHIL # BLD AUTO: 0.16 K/UL — SIGNIFICANT CHANGE UP (ref 0–0.7)
EOSINOPHIL # BLD AUTO: 0.27 K/UL — SIGNIFICANT CHANGE UP (ref 0–0.7)
EOSINOPHIL # BLD AUTO: 0.32 K/UL — SIGNIFICANT CHANGE UP (ref 0–0.7)
EOSINOPHIL # BLD AUTO: 0.45 K/UL — SIGNIFICANT CHANGE UP (ref 0–0.7)
EOSINOPHIL # BLD AUTO: 0.46 K/UL — SIGNIFICANT CHANGE UP (ref 0–0.7)
EOSINOPHIL NFR BLD AUTO: 0 % — SIGNIFICANT CHANGE UP (ref 0–8)
EOSINOPHIL NFR BLD AUTO: 0.1 % — SIGNIFICANT CHANGE UP (ref 0–8)
EOSINOPHIL NFR BLD AUTO: 0.1 % — SIGNIFICANT CHANGE UP (ref 0–8)
EOSINOPHIL NFR BLD AUTO: 0.2 % — SIGNIFICANT CHANGE UP (ref 0–8)
EOSINOPHIL NFR BLD AUTO: 0.8 % — SIGNIFICANT CHANGE UP (ref 0–8)
EOSINOPHIL NFR BLD AUTO: 1.6 % — SIGNIFICANT CHANGE UP (ref 0–8)
EOSINOPHIL NFR BLD AUTO: 1.7 % — SIGNIFICANT CHANGE UP (ref 0–8)
EOSINOPHIL NFR BLD AUTO: 1.9 % — SIGNIFICANT CHANGE UP (ref 0–8)
EOSINOPHIL NFR BLD AUTO: 3.8 % — SIGNIFICANT CHANGE UP (ref 0–8)
EOSINOPHIL NFR BLD AUTO: 4.5 % — SIGNIFICANT CHANGE UP (ref 0–8)
EOSINOPHIL NFR BLD AUTO: 4.9 % — SIGNIFICANT CHANGE UP (ref 0–8)
EOSINOPHIL NFR BLD AUTO: 6.1 % — SIGNIFICANT CHANGE UP (ref 0–8)
EPI CELLS # UR: ABNORMAL /HPF
ESTIMATED AVERAGE GLUCOSE: 148 MG/DL — HIGH (ref 68–114)
ESTIMATED AVERAGE GLUCOSE: 151 MG/DL — HIGH (ref 68–114)
ESTIMATED AVERAGE GLUCOSE: 177 MG/DL — HIGH (ref 68–114)
FLUID INTAKE SUBSTANCE CLASS: SIGNIFICANT CHANGE UP
FLUID SEGMENTED GRANULOCYTES: SIGNIFICANT CHANGE UP %
FOLATE SERPL-MCNC: 18.2 NG/ML — SIGNIFICANT CHANGE UP
GAS PNL BLDA: SIGNIFICANT CHANGE UP
GAS PNL BLDA: SIGNIFICANT CHANGE UP
GAS PNL BLDV: 131 MMOL/L — LOW (ref 136–145)
GAS PNL BLDV: 132 MMOL/L — LOW (ref 136–145)
GAS PNL BLDV: 140 MMOL/L — SIGNIFICANT CHANGE UP (ref 136–145)
GAS PNL BLDV: SIGNIFICANT CHANGE UP
GLUCOSE BLDC GLUCOMTR-MCNC: 100 MG/DL — HIGH (ref 70–99)
GLUCOSE BLDC GLUCOMTR-MCNC: 102 MG/DL — HIGH (ref 70–99)
GLUCOSE BLDC GLUCOMTR-MCNC: 102 MG/DL — HIGH (ref 70–99)
GLUCOSE BLDC GLUCOMTR-MCNC: 103 MG/DL — HIGH (ref 70–99)
GLUCOSE BLDC GLUCOMTR-MCNC: 105 MG/DL — HIGH (ref 70–99)
GLUCOSE BLDC GLUCOMTR-MCNC: 106 MG/DL — HIGH (ref 70–99)
GLUCOSE BLDC GLUCOMTR-MCNC: 107 MG/DL — HIGH (ref 70–99)
GLUCOSE BLDC GLUCOMTR-MCNC: 108 MG/DL — HIGH (ref 70–99)
GLUCOSE BLDC GLUCOMTR-MCNC: 112 MG/DL — HIGH (ref 70–99)
GLUCOSE BLDC GLUCOMTR-MCNC: 116 MG/DL — HIGH (ref 70–99)
GLUCOSE BLDC GLUCOMTR-MCNC: 118 MG/DL — HIGH (ref 70–99)
GLUCOSE BLDC GLUCOMTR-MCNC: 118 MG/DL — HIGH (ref 70–99)
GLUCOSE BLDC GLUCOMTR-MCNC: 119 MG/DL — HIGH (ref 70–99)
GLUCOSE BLDC GLUCOMTR-MCNC: 127 MG/DL — HIGH (ref 70–99)
GLUCOSE BLDC GLUCOMTR-MCNC: 128 MG/DL — HIGH (ref 70–99)
GLUCOSE BLDC GLUCOMTR-MCNC: 134 MG/DL — HIGH (ref 70–99)
GLUCOSE BLDC GLUCOMTR-MCNC: 134 MG/DL — HIGH (ref 70–99)
GLUCOSE BLDC GLUCOMTR-MCNC: 140 MG/DL — HIGH (ref 70–99)
GLUCOSE BLDC GLUCOMTR-MCNC: 140 MG/DL — HIGH (ref 70–99)
GLUCOSE BLDC GLUCOMTR-MCNC: 141 MG/DL — HIGH (ref 70–99)
GLUCOSE BLDC GLUCOMTR-MCNC: 142 MG/DL — HIGH (ref 70–99)
GLUCOSE BLDC GLUCOMTR-MCNC: 145 MG/DL — HIGH (ref 70–99)
GLUCOSE BLDC GLUCOMTR-MCNC: 149 MG/DL — HIGH (ref 70–99)
GLUCOSE BLDC GLUCOMTR-MCNC: 151 MG/DL — HIGH (ref 70–99)
GLUCOSE BLDC GLUCOMTR-MCNC: 152 MG/DL — HIGH (ref 70–99)
GLUCOSE BLDC GLUCOMTR-MCNC: 153 MG/DL — HIGH (ref 70–99)
GLUCOSE BLDC GLUCOMTR-MCNC: 155 MG/DL — HIGH (ref 70–99)
GLUCOSE BLDC GLUCOMTR-MCNC: 156 MG/DL — HIGH (ref 70–99)
GLUCOSE BLDC GLUCOMTR-MCNC: 158 MG/DL — HIGH (ref 70–99)
GLUCOSE BLDC GLUCOMTR-MCNC: 158 MG/DL — HIGH (ref 70–99)
GLUCOSE BLDC GLUCOMTR-MCNC: 159 MG/DL — HIGH (ref 70–99)
GLUCOSE BLDC GLUCOMTR-MCNC: 162 MG/DL — HIGH (ref 70–99)
GLUCOSE BLDC GLUCOMTR-MCNC: 163 MG/DL — HIGH (ref 70–99)
GLUCOSE BLDC GLUCOMTR-MCNC: 164 MG/DL — HIGH (ref 70–99)
GLUCOSE BLDC GLUCOMTR-MCNC: 164 MG/DL — HIGH (ref 70–99)
GLUCOSE BLDC GLUCOMTR-MCNC: 168 MG/DL — HIGH (ref 70–99)
GLUCOSE BLDC GLUCOMTR-MCNC: 174 MG/DL — HIGH (ref 70–99)
GLUCOSE BLDC GLUCOMTR-MCNC: 177 MG/DL — HIGH (ref 70–99)
GLUCOSE BLDC GLUCOMTR-MCNC: 181 MG/DL — HIGH (ref 70–99)
GLUCOSE BLDC GLUCOMTR-MCNC: 182 MG/DL — HIGH (ref 70–99)
GLUCOSE BLDC GLUCOMTR-MCNC: 183 MG/DL — HIGH (ref 70–99)
GLUCOSE BLDC GLUCOMTR-MCNC: 189 MG/DL — HIGH (ref 70–99)
GLUCOSE BLDC GLUCOMTR-MCNC: 190 MG/DL — HIGH (ref 70–99)
GLUCOSE BLDC GLUCOMTR-MCNC: 191 MG/DL — HIGH (ref 70–99)
GLUCOSE BLDC GLUCOMTR-MCNC: 199 MG/DL — HIGH (ref 70–99)
GLUCOSE BLDC GLUCOMTR-MCNC: 200 MG/DL — HIGH (ref 70–99)
GLUCOSE BLDC GLUCOMTR-MCNC: 200 MG/DL — HIGH (ref 70–99)
GLUCOSE BLDC GLUCOMTR-MCNC: 202 MG/DL — HIGH (ref 70–99)
GLUCOSE BLDC GLUCOMTR-MCNC: 203 MG/DL — HIGH (ref 70–99)
GLUCOSE BLDC GLUCOMTR-MCNC: 206 MG/DL — HIGH (ref 70–99)
GLUCOSE BLDC GLUCOMTR-MCNC: 208 MG/DL — HIGH (ref 70–99)
GLUCOSE BLDC GLUCOMTR-MCNC: 212 MG/DL — HIGH (ref 70–99)
GLUCOSE BLDC GLUCOMTR-MCNC: 212 MG/DL — HIGH (ref 70–99)
GLUCOSE BLDC GLUCOMTR-MCNC: 213 MG/DL — HIGH (ref 70–99)
GLUCOSE BLDC GLUCOMTR-MCNC: 213 MG/DL — HIGH (ref 70–99)
GLUCOSE BLDC GLUCOMTR-MCNC: 219 MG/DL — HIGH (ref 70–99)
GLUCOSE BLDC GLUCOMTR-MCNC: 222 MG/DL — HIGH (ref 70–99)
GLUCOSE BLDC GLUCOMTR-MCNC: 223 MG/DL — HIGH (ref 70–99)
GLUCOSE BLDC GLUCOMTR-MCNC: 233 MG/DL — HIGH (ref 70–99)
GLUCOSE BLDC GLUCOMTR-MCNC: 244 MG/DL — HIGH (ref 70–99)
GLUCOSE BLDC GLUCOMTR-MCNC: 244 MG/DL — HIGH (ref 70–99)
GLUCOSE BLDC GLUCOMTR-MCNC: 249 MG/DL — HIGH (ref 70–99)
GLUCOSE BLDC GLUCOMTR-MCNC: 255 MG/DL — HIGH (ref 70–99)
GLUCOSE BLDC GLUCOMTR-MCNC: 256 MG/DL — HIGH (ref 70–99)
GLUCOSE BLDC GLUCOMTR-MCNC: 273 MG/DL — HIGH (ref 70–99)
GLUCOSE BLDC GLUCOMTR-MCNC: 278 MG/DL — HIGH (ref 70–99)
GLUCOSE BLDC GLUCOMTR-MCNC: 63 MG/DL — LOW (ref 70–99)
GLUCOSE BLDC GLUCOMTR-MCNC: 63 MG/DL — LOW (ref 70–99)
GLUCOSE BLDC GLUCOMTR-MCNC: 71 MG/DL — SIGNIFICANT CHANGE UP (ref 70–99)
GLUCOSE BLDC GLUCOMTR-MCNC: 81 MG/DL — SIGNIFICANT CHANGE UP (ref 70–99)
GLUCOSE BLDC GLUCOMTR-MCNC: 86 MG/DL — SIGNIFICANT CHANGE UP (ref 70–99)
GLUCOSE BLDC GLUCOMTR-MCNC: 88 MG/DL — SIGNIFICANT CHANGE UP (ref 70–99)
GLUCOSE BLDC GLUCOMTR-MCNC: 89 MG/DL — SIGNIFICANT CHANGE UP (ref 70–99)
GLUCOSE BLDC GLUCOMTR-MCNC: 95 MG/DL — SIGNIFICANT CHANGE UP (ref 70–99)
GLUCOSE BLDC GLUCOMTR-MCNC: 98 MG/DL — SIGNIFICANT CHANGE UP (ref 70–99)
GLUCOSE BLDC GLUCOMTR-MCNC: 99 MG/DL — SIGNIFICANT CHANGE UP (ref 70–99)
GLUCOSE FLD-MCNC: 80 MG/DL — SIGNIFICANT CHANGE UP
GLUCOSE SERPL-MCNC: 102 MG/DL — HIGH (ref 70–99)
GLUCOSE SERPL-MCNC: 121 MG/DL — HIGH (ref 70–99)
GLUCOSE SERPL-MCNC: 122 MG/DL — HIGH (ref 70–99)
GLUCOSE SERPL-MCNC: 129 MG/DL — HIGH (ref 70–99)
GLUCOSE SERPL-MCNC: 132 MG/DL — HIGH (ref 70–99)
GLUCOSE SERPL-MCNC: 132 MG/DL — HIGH (ref 70–99)
GLUCOSE SERPL-MCNC: 133 MG/DL — HIGH (ref 70–99)
GLUCOSE SERPL-MCNC: 147 MG/DL — HIGH (ref 70–99)
GLUCOSE SERPL-MCNC: 150 MG/DL — HIGH (ref 70–99)
GLUCOSE SERPL-MCNC: 161 MG/DL — HIGH (ref 70–99)
GLUCOSE SERPL-MCNC: 163 MG/DL — HIGH (ref 70–99)
GLUCOSE SERPL-MCNC: 167 MG/DL — HIGH (ref 70–99)
GLUCOSE SERPL-MCNC: 173 MG/DL — HIGH (ref 70–99)
GLUCOSE SERPL-MCNC: 192 MG/DL — HIGH (ref 70–99)
GLUCOSE SERPL-MCNC: 201 MG/DL — HIGH (ref 70–99)
GLUCOSE SERPL-MCNC: 201 MG/DL — HIGH (ref 70–99)
GLUCOSE SERPL-MCNC: 223 MG/DL — HIGH (ref 70–99)
GLUCOSE SERPL-MCNC: 235 MG/DL — HIGH (ref 70–99)
GLUCOSE SERPL-MCNC: 240 MG/DL — HIGH (ref 70–99)
GLUCOSE SERPL-MCNC: 279 MG/DL — HIGH (ref 70–99)
GLUCOSE SERPL-MCNC: 92 MG/DL — SIGNIFICANT CHANGE UP (ref 70–99)
GLUCOSE SERPL-MCNC: 95 MG/DL — SIGNIFICANT CHANGE UP (ref 70–99)
GLUCOSE UR QL: NEGATIVE MG/DL — SIGNIFICANT CHANGE UP
GRAM STN FLD: SIGNIFICANT CHANGE UP
HCO3 BLDA-SCNC: 29 MMOL/L — HIGH (ref 23–27)
HCO3 BLDA-SCNC: 30 MMOL/L — HIGH (ref 21–29)
HCO3 BLDV-SCNC: 26 MMOL/L — SIGNIFICANT CHANGE UP (ref 22–29)
HCO3 BLDV-SCNC: 27 MMOL/L — SIGNIFICANT CHANGE UP (ref 22–29)
HCO3 BLDV-SCNC: 34 MMOL/L — HIGH (ref 22–29)
HCT VFR BLD CALC: 29.2 % — LOW (ref 42–52)
HCT VFR BLD CALC: 29.6 % — LOW (ref 42–52)
HCT VFR BLD CALC: 29.6 % — LOW (ref 42–52)
HCT VFR BLD CALC: 29.8 % — LOW (ref 42–52)
HCT VFR BLD CALC: 31.3 % — LOW (ref 42–52)
HCT VFR BLD CALC: 31.8 % — LOW (ref 42–52)
HCT VFR BLD CALC: 32.2 % — LOW (ref 42–52)
HCT VFR BLD CALC: 32.4 % — LOW (ref 42–52)
HCT VFR BLD CALC: 32.6 % — LOW (ref 42–52)
HCT VFR BLD CALC: 33.1 % — LOW (ref 42–52)
HCT VFR BLD CALC: 33.4 % — LOW (ref 42–52)
HCT VFR BLD CALC: 33.7 % — LOW (ref 42–52)
HCT VFR BLD CALC: 33.8 % — LOW (ref 42–52)
HCT VFR BLD CALC: 34.1 % — LOW (ref 42–52)
HCT VFR BLD CALC: 34.6 % — LOW (ref 42–52)
HCT VFR BLD CALC: 34.8 % — LOW (ref 42–52)
HCT VFR BLD CALC: 35.7 % — LOW (ref 42–52)
HCT VFR BLD CALC: 36.4 % — LOW (ref 42–52)
HCT VFR BLD CALC: 37.2 % — LOW (ref 42–52)
HCT VFR BLD CALC: 37.7 % — LOW (ref 42–52)
HCT VFR BLD CALC: 39.5 % — LOW (ref 42–52)
HCT VFR BLDA CALC: 25.9 % — LOW (ref 34–44)
HCT VFR BLDA CALC: 32.3 % — LOW (ref 34–44)
HCT VFR BLDA CALC: 36.8 % — SIGNIFICANT CHANGE UP (ref 34–44)
HCV AB S/CO SERPL IA: 0.04 COI — SIGNIFICANT CHANGE UP
HCV AB SERPL-IMP: SIGNIFICANT CHANGE UP
HGB BLD CALC-MCNC: 10.5 G/DL — LOW (ref 14–18)
HGB BLD CALC-MCNC: 12 G/DL — LOW (ref 14–18)
HGB BLD CALC-MCNC: 8.4 G/DL — LOW (ref 14–18)
HGB BLD-MCNC: 10.2 G/DL — LOW (ref 14–18)
HGB BLD-MCNC: 10.3 G/DL — LOW (ref 14–18)
HGB BLD-MCNC: 10.5 G/DL — LOW (ref 14–18)
HGB BLD-MCNC: 10.6 G/DL — LOW (ref 14–18)
HGB BLD-MCNC: 10.9 G/DL — LOW (ref 14–18)
HGB BLD-MCNC: 11 G/DL — LOW (ref 14–18)
HGB BLD-MCNC: 11.2 G/DL — LOW (ref 14–18)
HGB BLD-MCNC: 11.6 G/DL — LOW (ref 14–18)
HGB BLD-MCNC: 8.6 G/DL — LOW (ref 14–18)
HGB BLD-MCNC: 8.8 G/DL — LOW (ref 14–18)
HGB BLD-MCNC: 8.8 G/DL — LOW (ref 14–18)
HGB BLD-MCNC: 8.9 G/DL — LOW (ref 14–18)
HGB BLD-MCNC: 9.4 G/DL — LOW (ref 14–18)
HGB BLD-MCNC: 9.6 G/DL — LOW (ref 14–18)
HGB BLD-MCNC: 9.6 G/DL — LOW (ref 14–18)
HGB BLD-MCNC: 9.7 G/DL — LOW (ref 14–18)
HGB BLD-MCNC: 9.8 G/DL — LOW (ref 14–18)
HOROWITZ INDEX BLDV+IHG-RTO: 21 — SIGNIFICANT CHANGE UP
IMM GRANULOCYTES NFR BLD AUTO: 0.3 % — SIGNIFICANT CHANGE UP (ref 0.1–0.3)
IMM GRANULOCYTES NFR BLD AUTO: 0.4 % — HIGH (ref 0.1–0.3)
IMM GRANULOCYTES NFR BLD AUTO: 0.7 % — HIGH (ref 0.1–0.3)
IMM GRANULOCYTES NFR BLD AUTO: 0.7 % — HIGH (ref 0.1–0.3)
IMM GRANULOCYTES NFR BLD AUTO: 0.8 % — HIGH (ref 0.1–0.3)
IMM GRANULOCYTES NFR BLD AUTO: 0.9 % — HIGH (ref 0.1–0.3)
IMM GRANULOCYTES NFR BLD AUTO: 1 % — HIGH (ref 0.1–0.3)
IMM GRANULOCYTES NFR BLD AUTO: 1.1 % — HIGH (ref 0.1–0.3)
IMM GRANULOCYTES NFR BLD AUTO: 1.2 % — HIGH (ref 0.1–0.3)
IMM GRANULOCYTES NFR BLD AUTO: 1.2 % — HIGH (ref 0.1–0.3)
INR BLD: 1.16 RATIO — SIGNIFICANT CHANGE UP (ref 0.65–1.3)
INR BLD: 1.17 RATIO — SIGNIFICANT CHANGE UP (ref 0.65–1.3)
INR BLD: 1.2 RATIO — SIGNIFICANT CHANGE UP (ref 0.65–1.3)
INR BLD: 1.41 RATIO — HIGH (ref 0.65–1.3)
KETONES UR-MCNC: ABNORMAL
LACTATE BLDV-MCNC: 1 MMOL/L — SIGNIFICANT CHANGE UP (ref 0.5–1.6)
LACTATE BLDV-MCNC: 1.6 MMOL/L — SIGNIFICANT CHANGE UP (ref 0.5–1.6)
LACTATE BLDV-MCNC: 1.6 MMOL/L — SIGNIFICANT CHANGE UP (ref 0.5–1.6)
LACTATE BLDV-MCNC: 1.8 MMOL/L — HIGH (ref 0.5–1.6)
LACTATE SERPL-SCNC: 0.8 MMOL/L — SIGNIFICANT CHANGE UP (ref 0.7–2)
LACTATE SERPL-SCNC: 0.8 MMOL/L — SIGNIFICANT CHANGE UP (ref 0.7–2)
LACTATE SERPL-SCNC: 1.6 MMOL/L — SIGNIFICANT CHANGE UP (ref 0.7–2)
LDH SERPL L TO P-CCNC: 220 U/L — SIGNIFICANT CHANGE UP (ref 50–242)
LDH SERPL L TO P-CCNC: 712 U/L — SIGNIFICANT CHANGE UP
LEUKOCYTE ESTERASE UR-ACNC: ABNORMAL
LIDOCAIN IGE QN: 14 U/L — SIGNIFICANT CHANGE UP (ref 7–60)
LYMPHOCYTES # BLD AUTO: 0.16 K/UL — LOW (ref 1.2–3.4)
LYMPHOCYTES # BLD AUTO: 0.3 K/UL — LOW (ref 1.2–3.4)
LYMPHOCYTES # BLD AUTO: 0.35 K/UL — LOW (ref 1.2–3.4)
LYMPHOCYTES # BLD AUTO: 0.47 K/UL — LOW (ref 1.2–3.4)
LYMPHOCYTES # BLD AUTO: 0.5 K/UL — LOW (ref 1.2–3.4)
LYMPHOCYTES # BLD AUTO: 0.67 K/UL — LOW (ref 1.2–3.4)
LYMPHOCYTES # BLD AUTO: 0.74 K/UL — LOW (ref 1.2–3.4)
LYMPHOCYTES # BLD AUTO: 0.75 K/UL — LOW (ref 1.2–3.4)
LYMPHOCYTES # BLD AUTO: 0.77 K/UL — LOW (ref 1.2–3.4)
LYMPHOCYTES # BLD AUTO: 0.8 K/UL — LOW (ref 1.2–3.4)
LYMPHOCYTES # BLD AUTO: 0.81 K/UL — LOW (ref 1.2–3.4)
LYMPHOCYTES # BLD AUTO: 0.93 K/UL — LOW (ref 1.2–3.4)
LYMPHOCYTES # BLD AUTO: 0.99 K/UL — LOW (ref 1.2–3.4)
LYMPHOCYTES # BLD AUTO: 1.18 K/UL — LOW (ref 1.2–3.4)
LYMPHOCYTES # BLD AUTO: 1.5 % — LOW (ref 20.5–51.1)
LYMPHOCYTES # BLD AUTO: 10.6 % — LOW (ref 20.5–51.1)
LYMPHOCYTES # BLD AUTO: 10.8 % — LOW (ref 20.5–51.1)
LYMPHOCYTES # BLD AUTO: 11 % — LOW (ref 20.5–51.1)
LYMPHOCYTES # BLD AUTO: 11.2 % — LOW (ref 20.5–51.1)
LYMPHOCYTES # BLD AUTO: 11.8 % — LOW (ref 20.5–51.1)
LYMPHOCYTES # BLD AUTO: 13.9 % — LOW (ref 20.5–51.1)
LYMPHOCYTES # BLD AUTO: 2.9 % — LOW (ref 20.5–51.1)
LYMPHOCYTES # BLD AUTO: 3 % — LOW (ref 20.5–51.1)
LYMPHOCYTES # BLD AUTO: 4.1 % — LOW (ref 20.5–51.1)
LYMPHOCYTES # BLD AUTO: 5.6 % — LOW (ref 20.5–51.1)
LYMPHOCYTES # BLD AUTO: 6 % — LOW (ref 20.5–51.1)
LYMPHOCYTES # BLD AUTO: 7.5 % — LOW (ref 20.5–51.1)
LYMPHOCYTES # BLD AUTO: 9.1 % — LOW (ref 20.5–51.1)
LYMPHOCYTES # FLD: SIGNIFICANT CHANGE UP
MAGNESIUM SERPL-MCNC: 1.9 MG/DL — SIGNIFICANT CHANGE UP (ref 1.8–2.4)
MAGNESIUM SERPL-MCNC: 1.9 MG/DL — SIGNIFICANT CHANGE UP (ref 1.8–2.4)
MAGNESIUM SERPL-MCNC: 2 MG/DL — SIGNIFICANT CHANGE UP (ref 1.8–2.4)
MAGNESIUM SERPL-MCNC: 2.1 MG/DL — SIGNIFICANT CHANGE UP (ref 1.8–2.4)
MAGNESIUM SERPL-MCNC: 2.2 MG/DL — SIGNIFICANT CHANGE UP (ref 1.8–2.4)
MAGNESIUM SERPL-MCNC: 2.2 MG/DL — SIGNIFICANT CHANGE UP (ref 1.8–2.4)
MAGNESIUM SERPL-MCNC: 2.3 MG/DL — SIGNIFICANT CHANGE UP (ref 1.8–2.4)
MCHC RBC-ENTMCNC: 27.9 G/DL — LOW (ref 32–37)
MCHC RBC-ENTMCNC: 28.1 PG — SIGNIFICANT CHANGE UP (ref 27–31)
MCHC RBC-ENTMCNC: 28.4 G/DL — LOW (ref 32–37)
MCHC RBC-ENTMCNC: 28.4 G/DL — LOW (ref 32–37)
MCHC RBC-ENTMCNC: 28.5 PG — SIGNIFICANT CHANGE UP (ref 27–31)
MCHC RBC-ENTMCNC: 28.5 PG — SIGNIFICANT CHANGE UP (ref 27–31)
MCHC RBC-ENTMCNC: 28.7 PG — SIGNIFICANT CHANGE UP (ref 27–31)
MCHC RBC-ENTMCNC: 29.1 G/DL — LOW (ref 32–37)
MCHC RBC-ENTMCNC: 29.3 G/DL — LOW (ref 32–37)
MCHC RBC-ENTMCNC: 29.4 G/DL — LOW (ref 32–37)
MCHC RBC-ENTMCNC: 29.6 G/DL — LOW (ref 32–37)
MCHC RBC-ENTMCNC: 29.6 G/DL — LOW (ref 32–37)
MCHC RBC-ENTMCNC: 29.7 G/DL — LOW (ref 32–37)
MCHC RBC-ENTMCNC: 29.8 G/DL — LOW (ref 32–37)
MCHC RBC-ENTMCNC: 29.9 G/DL — LOW (ref 32–37)
MCHC RBC-ENTMCNC: 30 G/DL — LOW (ref 32–37)
MCHC RBC-ENTMCNC: 30.1 G/DL — LOW (ref 32–37)
MCHC RBC-ENTMCNC: 30.3 G/DL — LOW (ref 32–37)
MCHC RBC-ENTMCNC: 30.4 PG — SIGNIFICANT CHANGE UP (ref 27–31)
MCHC RBC-ENTMCNC: 30.4 PG — SIGNIFICANT CHANGE UP (ref 27–31)
MCHC RBC-ENTMCNC: 30.6 PG — SIGNIFICANT CHANGE UP (ref 27–31)
MCHC RBC-ENTMCNC: 30.7 PG — SIGNIFICANT CHANGE UP (ref 27–31)
MCHC RBC-ENTMCNC: 30.8 G/DL — LOW (ref 32–37)
MCHC RBC-ENTMCNC: 30.8 PG — SIGNIFICANT CHANGE UP (ref 27–31)
MCHC RBC-ENTMCNC: 30.9 PG — SIGNIFICANT CHANGE UP (ref 27–31)
MCHC RBC-ENTMCNC: 31 PG — SIGNIFICANT CHANGE UP (ref 27–31)
MCHC RBC-ENTMCNC: 31.1 PG — HIGH (ref 27–31)
MCHC RBC-ENTMCNC: 31.2 PG — HIGH (ref 27–31)
MCHC RBC-ENTMCNC: 31.2 PG — HIGH (ref 27–31)
MCHC RBC-ENTMCNC: 31.5 PG — HIGH (ref 27–31)
MCHC RBC-ENTMCNC: 31.7 PG — HIGH (ref 27–31)
MCV RBC AUTO: 100.3 FL — HIGH (ref 80–94)
MCV RBC AUTO: 100.3 FL — HIGH (ref 80–94)
MCV RBC AUTO: 101.2 FL — HIGH (ref 80–94)
MCV RBC AUTO: 101.6 FL — HIGH (ref 80–94)
MCV RBC AUTO: 101.7 FL — HIGH (ref 80–94)
MCV RBC AUTO: 102.4 FL — HIGH (ref 80–94)
MCV RBC AUTO: 103 FL — HIGH (ref 80–94)
MCV RBC AUTO: 103.5 FL — HIGH (ref 80–94)
MCV RBC AUTO: 103.8 FL — HIGH (ref 80–94)
MCV RBC AUTO: 103.9 FL — HIGH (ref 80–94)
MCV RBC AUTO: 104.2 FL — HIGH (ref 80–94)
MCV RBC AUTO: 104.5 FL — HIGH (ref 80–94)
MCV RBC AUTO: 104.6 FL — HIGH (ref 80–94)
MCV RBC AUTO: 105.3 FL — HIGH (ref 80–94)
MCV RBC AUTO: 105.8 FL — HIGH (ref 80–94)
MCV RBC AUTO: 106.2 FL — HIGH (ref 80–94)
MCV RBC AUTO: 106.3 FL — HIGH (ref 80–94)
MCV RBC AUTO: 97.7 FL — HIGH (ref 80–94)
MCV RBC AUTO: 99.1 FL — HIGH (ref 80–94)
METHOD TYPE: SIGNIFICANT CHANGE UP
MONOCYTES # BLD AUTO: 0.39 K/UL — SIGNIFICANT CHANGE UP (ref 0.1–0.6)
MONOCYTES # BLD AUTO: 0.44 K/UL — SIGNIFICANT CHANGE UP (ref 0.1–0.6)
MONOCYTES # BLD AUTO: 0.51 K/UL — SIGNIFICANT CHANGE UP (ref 0.1–0.6)
MONOCYTES # BLD AUTO: 0.54 K/UL — SIGNIFICANT CHANGE UP (ref 0.1–0.6)
MONOCYTES # BLD AUTO: 0.55 K/UL — SIGNIFICANT CHANGE UP (ref 0.1–0.6)
MONOCYTES # BLD AUTO: 0.58 K/UL — SIGNIFICANT CHANGE UP (ref 0.1–0.6)
MONOCYTES # BLD AUTO: 0.61 K/UL — HIGH (ref 0.1–0.6)
MONOCYTES # BLD AUTO: 0.63 K/UL — HIGH (ref 0.1–0.6)
MONOCYTES # BLD AUTO: 0.77 K/UL — HIGH (ref 0.1–0.6)
MONOCYTES # BLD AUTO: 0.81 K/UL — HIGH (ref 0.1–0.6)
MONOCYTES # BLD AUTO: 0.83 K/UL — HIGH (ref 0.1–0.6)
MONOCYTES # BLD AUTO: 1.11 K/UL — HIGH (ref 0.1–0.6)
MONOCYTES # BLD AUTO: 1.21 K/UL — HIGH (ref 0.1–0.6)
MONOCYTES # BLD AUTO: 1.22 K/UL — HIGH (ref 0.1–0.6)
MONOCYTES NFR BLD AUTO: 10.8 % — HIGH (ref 1.7–9.3)
MONOCYTES NFR BLD AUTO: 12.1 % — HIGH (ref 1.7–9.3)
MONOCYTES NFR BLD AUTO: 5.3 % — SIGNIFICANT CHANGE UP (ref 1.7–9.3)
MONOCYTES NFR BLD AUTO: 5.7 % — SIGNIFICANT CHANGE UP (ref 1.7–9.3)
MONOCYTES NFR BLD AUTO: 5.9 % — SIGNIFICANT CHANGE UP (ref 1.7–9.3)
MONOCYTES NFR BLD AUTO: 6.2 % — SIGNIFICANT CHANGE UP (ref 1.7–9.3)
MONOCYTES NFR BLD AUTO: 6.2 % — SIGNIFICANT CHANGE UP (ref 1.7–9.3)
MONOCYTES NFR BLD AUTO: 6.7 % — SIGNIFICANT CHANGE UP (ref 1.7–9.3)
MONOCYTES NFR BLD AUTO: 7.4 % — SIGNIFICANT CHANGE UP (ref 1.7–9.3)
MONOCYTES NFR BLD AUTO: 7.4 % — SIGNIFICANT CHANGE UP (ref 1.7–9.3)
MONOCYTES NFR BLD AUTO: 7.8 % — SIGNIFICANT CHANGE UP (ref 1.7–9.3)
MONOCYTES NFR BLD AUTO: 8.2 % — SIGNIFICANT CHANGE UP (ref 1.7–9.3)
MONOCYTES NFR BLD AUTO: 8.8 % — SIGNIFICANT CHANGE UP (ref 1.7–9.3)
MONOCYTES NFR BLD AUTO: 9.1 % — SIGNIFICANT CHANGE UP (ref 1.7–9.3)
MONOS+MACROS # FLD: SIGNIFICANT CHANGE UP %
NEUTROPHILS # BLD AUTO: 10.09 K/UL — HIGH (ref 1.4–6.5)
NEUTROPHILS # BLD AUTO: 12.03 K/UL — HIGH (ref 1.4–6.5)
NEUTROPHILS # BLD AUTO: 14.25 K/UL — HIGH (ref 1.4–6.5)
NEUTROPHILS # BLD AUTO: 14.38 K/UL — HIGH (ref 1.4–6.5)
NEUTROPHILS # BLD AUTO: 4.85 K/UL — SIGNIFICANT CHANGE UP (ref 1.4–6.5)
NEUTROPHILS # BLD AUTO: 4.95 K/UL — SIGNIFICANT CHANGE UP (ref 1.4–6.5)
NEUTROPHILS # BLD AUTO: 5.33 K/UL — SIGNIFICANT CHANGE UP (ref 1.4–6.5)
NEUTROPHILS # BLD AUTO: 5.33 K/UL — SIGNIFICANT CHANGE UP (ref 1.4–6.5)
NEUTROPHILS # BLD AUTO: 5.5 K/UL — SIGNIFICANT CHANGE UP (ref 1.4–6.5)
NEUTROPHILS # BLD AUTO: 5.75 K/UL — SIGNIFICANT CHANGE UP (ref 1.4–6.5)
NEUTROPHILS # BLD AUTO: 6.5 K/UL — SIGNIFICANT CHANGE UP (ref 1.4–6.5)
NEUTROPHILS # BLD AUTO: 6.74 K/UL — HIGH (ref 1.4–6.5)
NEUTROPHILS # BLD AUTO: 7.06 K/UL — HIGH (ref 1.4–6.5)
NEUTROPHILS # BLD AUTO: 9.39 K/UL — HIGH (ref 1.4–6.5)
NEUTROPHILS NFR BLD AUTO: 70.2 % — SIGNIFICANT CHANGE UP (ref 42.2–75.2)
NEUTROPHILS NFR BLD AUTO: 71 % — SIGNIFICANT CHANGE UP (ref 42.2–75.2)
NEUTROPHILS NFR BLD AUTO: 73.7 % — SIGNIFICANT CHANGE UP (ref 42.2–75.2)
NEUTROPHILS NFR BLD AUTO: 75 % — SIGNIFICANT CHANGE UP (ref 42.2–75.2)
NEUTROPHILS NFR BLD AUTO: 76.9 % — HIGH (ref 42.2–75.2)
NEUTROPHILS NFR BLD AUTO: 78.8 % — HIGH (ref 42.2–75.2)
NEUTROPHILS NFR BLD AUTO: 81.4 % — HIGH (ref 42.2–75.2)
NEUTROPHILS NFR BLD AUTO: 82.2 % — HIGH (ref 42.2–75.2)
NEUTROPHILS NFR BLD AUTO: 84.9 % — HIGH (ref 42.2–75.2)
NEUTROPHILS NFR BLD AUTO: 85.1 % — HIGH (ref 42.2–75.2)
NEUTROPHILS NFR BLD AUTO: 87.1 % — HIGH (ref 42.2–75.2)
NEUTROPHILS NFR BLD AUTO: 90.1 % — HIGH (ref 42.2–75.2)
NEUTROPHILS NFR BLD AUTO: 90.4 % — HIGH (ref 42.2–75.2)
NEUTROPHILS NFR BLD AUTO: 91.9 % — HIGH (ref 42.2–75.2)
NITRITE UR-MCNC: NEGATIVE — SIGNIFICANT CHANGE UP
NON-GYNECOLOGICAL CYTOLOGY STUDY: SIGNIFICANT CHANGE UP
NON-GYNECOLOGICAL CYTOLOGY STUDY: SIGNIFICANT CHANGE UP
NRBC # BLD: 0 /100 WBCS — SIGNIFICANT CHANGE UP (ref 0–0)
NT-PROBNP SERPL-SCNC: HIGH PG/ML (ref 0–300)
ORGANISM # SPEC MICROSCOPIC CNT: SIGNIFICANT CHANGE UP
ORGANISM # SPEC MICROSCOPIC CNT: SIGNIFICANT CHANGE UP
PCO2 BLDA: 79 MMHG — CRITICAL HIGH (ref 38–42)
PCO2 BLDA: 88 MMHG — CRITICAL HIGH (ref 38–42)
PCO2 BLDV: 53 MMHG — HIGH (ref 41–51)
PCO2 BLDV: 62 MMHG — HIGH (ref 41–51)
PCO2 BLDV: 65 MMHG — HIGH (ref 41–51)
PH BLDA: 7.14 — CRITICAL LOW (ref 7.38–7.42)
PH BLDA: 7.18 — CRITICAL LOW (ref 7.38–7.42)
PH BLDV: 7.24 — LOW (ref 7.26–7.43)
PH BLDV: 7.3 — SIGNIFICANT CHANGE UP (ref 7.26–7.43)
PH BLDV: 7.32 — SIGNIFICANT CHANGE UP (ref 7.26–7.43)
PH FLD: 7.6 — SIGNIFICANT CHANGE UP
PH UR: 6 — SIGNIFICANT CHANGE UP (ref 5–8)
PHOSPHATE SERPL-MCNC: 2.9 MG/DL — SIGNIFICANT CHANGE UP (ref 2.1–4.9)
PHOSPHATE SERPL-MCNC: 3.2 MG/DL — SIGNIFICANT CHANGE UP (ref 2.1–4.9)
PHOSPHATE SERPL-MCNC: 3.8 MG/DL — SIGNIFICANT CHANGE UP (ref 2.1–4.9)
PHOSPHATE SERPL-MCNC: 3.9 MG/DL — SIGNIFICANT CHANGE UP (ref 2.1–4.9)
PHOSPHATE SERPL-MCNC: 4.3 MG/DL — SIGNIFICANT CHANGE UP (ref 2.1–4.9)
PHOSPHATE SERPL-MCNC: 4.7 MG/DL — SIGNIFICANT CHANGE UP (ref 2.1–4.9)
PHOSPHATE SERPL-MCNC: 4.7 MG/DL — SIGNIFICANT CHANGE UP (ref 2.1–4.9)
PHOSPHATE SERPL-MCNC: 4.8 MG/DL — SIGNIFICANT CHANGE UP (ref 2.1–4.9)
PHOSPHATE SERPL-MCNC: 5.4 MG/DL — HIGH (ref 2.1–4.9)
PHOSPHATE SERPL-MCNC: 7 MG/DL — HIGH (ref 2.1–4.9)
PHOSPHATE SERPL-MCNC: 7 MG/DL — HIGH (ref 2.1–4.9)
PHOSPHATE SERPL-MCNC: 7.5 MG/DL — HIGH (ref 2.1–4.9)
PLATELET # BLD AUTO: 111 K/UL — LOW (ref 130–400)
PLATELET # BLD AUTO: 115 K/UL — LOW (ref 130–400)
PLATELET # BLD AUTO: 119 K/UL — LOW (ref 130–400)
PLATELET # BLD AUTO: 120 K/UL — LOW (ref 130–400)
PLATELET # BLD AUTO: 120 K/UL — LOW (ref 130–400)
PLATELET # BLD AUTO: 127 K/UL — LOW (ref 130–400)
PLATELET # BLD AUTO: 127 K/UL — LOW (ref 130–400)
PLATELET # BLD AUTO: 144 K/UL — SIGNIFICANT CHANGE UP (ref 130–400)
PLATELET # BLD AUTO: 145 K/UL — SIGNIFICANT CHANGE UP (ref 130–400)
PLATELET # BLD AUTO: 146 K/UL — SIGNIFICANT CHANGE UP (ref 130–400)
PLATELET # BLD AUTO: 149 K/UL — SIGNIFICANT CHANGE UP (ref 130–400)
PLATELET # BLD AUTO: 151 K/UL — SIGNIFICANT CHANGE UP (ref 130–400)
PLATELET # BLD AUTO: 152 K/UL — SIGNIFICANT CHANGE UP (ref 130–400)
PLATELET # BLD AUTO: 161 K/UL — SIGNIFICANT CHANGE UP (ref 130–400)
PLATELET # BLD AUTO: 163 K/UL — SIGNIFICANT CHANGE UP (ref 130–400)
PLATELET # BLD AUTO: 164 K/UL — SIGNIFICANT CHANGE UP (ref 130–400)
PLATELET # BLD AUTO: 164 K/UL — SIGNIFICANT CHANGE UP (ref 130–400)
PLATELET # BLD AUTO: 165 K/UL — SIGNIFICANT CHANGE UP (ref 130–400)
PLATELET # BLD AUTO: 166 K/UL — SIGNIFICANT CHANGE UP (ref 130–400)
PLATELET # BLD AUTO: 169 K/UL — SIGNIFICANT CHANGE UP (ref 130–400)
PLATELET # BLD AUTO: 190 K/UL — SIGNIFICANT CHANGE UP (ref 130–400)
PO2 BLDA: 112 MMHG — HIGH (ref 78–95)
PO2 BLDA: 49 MMHG — LOW (ref 78–95)
PO2 BLDV: 31 MMHG — SIGNIFICANT CHANGE UP (ref 20–40)
PO2 BLDV: 40 MMHG — SIGNIFICANT CHANGE UP (ref 20–40)
PO2 BLDV: 83 MMHG — HIGH (ref 20–40)
POTASSIUM BLDV-SCNC: 4.6 MMOL/L — SIGNIFICANT CHANGE UP (ref 3.3–5.6)
POTASSIUM BLDV-SCNC: 4.9 MMOL/L — SIGNIFICANT CHANGE UP (ref 3.3–5.6)
POTASSIUM BLDV-SCNC: 5.1 MMOL/L — SIGNIFICANT CHANGE UP (ref 3.3–5.6)
POTASSIUM SERPL-MCNC: 3.8 MMOL/L — SIGNIFICANT CHANGE UP (ref 3.5–5)
POTASSIUM SERPL-MCNC: 4 MMOL/L — SIGNIFICANT CHANGE UP (ref 3.5–5)
POTASSIUM SERPL-MCNC: 4 MMOL/L — SIGNIFICANT CHANGE UP (ref 3.5–5)
POTASSIUM SERPL-MCNC: 4.1 MMOL/L — SIGNIFICANT CHANGE UP (ref 3.5–5)
POTASSIUM SERPL-MCNC: 4.2 MMOL/L — SIGNIFICANT CHANGE UP (ref 3.5–5)
POTASSIUM SERPL-MCNC: 4.3 MMOL/L — SIGNIFICANT CHANGE UP (ref 3.5–5)
POTASSIUM SERPL-MCNC: 4.5 MMOL/L — SIGNIFICANT CHANGE UP (ref 3.5–5)
POTASSIUM SERPL-MCNC: 4.5 MMOL/L — SIGNIFICANT CHANGE UP (ref 3.5–5)
POTASSIUM SERPL-MCNC: 4.7 MMOL/L — SIGNIFICANT CHANGE UP (ref 3.5–5)
POTASSIUM SERPL-MCNC: 4.8 MMOL/L — SIGNIFICANT CHANGE UP (ref 3.5–5)
POTASSIUM SERPL-MCNC: 4.9 MMOL/L — SIGNIFICANT CHANGE UP (ref 3.5–5)
POTASSIUM SERPL-MCNC: 4.9 MMOL/L — SIGNIFICANT CHANGE UP (ref 3.5–5)
POTASSIUM SERPL-MCNC: 5 MMOL/L — SIGNIFICANT CHANGE UP (ref 3.5–5)
POTASSIUM SERPL-MCNC: 5.2 MMOL/L — HIGH (ref 3.5–5)
POTASSIUM SERPL-MCNC: 5.3 MMOL/L — HIGH (ref 3.5–5)
POTASSIUM SERPL-MCNC: 5.4 MMOL/L — HIGH (ref 3.5–5)
POTASSIUM SERPL-MCNC: 5.9 MMOL/L — HIGH (ref 3.5–5)
POTASSIUM SERPL-MCNC: 6.6 MMOL/L — CRITICAL HIGH (ref 3.5–5)
POTASSIUM SERPL-SCNC: 3.8 MMOL/L — SIGNIFICANT CHANGE UP (ref 3.5–5)
POTASSIUM SERPL-SCNC: 4 MMOL/L — SIGNIFICANT CHANGE UP (ref 3.5–5)
POTASSIUM SERPL-SCNC: 4 MMOL/L — SIGNIFICANT CHANGE UP (ref 3.5–5)
POTASSIUM SERPL-SCNC: 4.1 MMOL/L — SIGNIFICANT CHANGE UP (ref 3.5–5)
POTASSIUM SERPL-SCNC: 4.2 MMOL/L — SIGNIFICANT CHANGE UP (ref 3.5–5)
POTASSIUM SERPL-SCNC: 4.3 MMOL/L — SIGNIFICANT CHANGE UP (ref 3.5–5)
POTASSIUM SERPL-SCNC: 4.5 MMOL/L — SIGNIFICANT CHANGE UP (ref 3.5–5)
POTASSIUM SERPL-SCNC: 4.5 MMOL/L — SIGNIFICANT CHANGE UP (ref 3.5–5)
POTASSIUM SERPL-SCNC: 4.7 MMOL/L — SIGNIFICANT CHANGE UP (ref 3.5–5)
POTASSIUM SERPL-SCNC: 4.8 MMOL/L — SIGNIFICANT CHANGE UP (ref 3.5–5)
POTASSIUM SERPL-SCNC: 4.9 MMOL/L — SIGNIFICANT CHANGE UP (ref 3.5–5)
POTASSIUM SERPL-SCNC: 4.9 MMOL/L — SIGNIFICANT CHANGE UP (ref 3.5–5)
POTASSIUM SERPL-SCNC: 5 MMOL/L — SIGNIFICANT CHANGE UP (ref 3.5–5)
POTASSIUM SERPL-SCNC: 5.2 MMOL/L — HIGH (ref 3.5–5)
POTASSIUM SERPL-SCNC: 5.3 MMOL/L — HIGH (ref 3.5–5)
POTASSIUM SERPL-SCNC: 5.4 MMOL/L — HIGH (ref 3.5–5)
POTASSIUM SERPL-SCNC: 5.9 MMOL/L — HIGH (ref 3.5–5)
POTASSIUM SERPL-SCNC: 6.6 MMOL/L — CRITICAL HIGH (ref 3.5–5)
PROCALCITONIN SERPL-MCNC: 0.62 NG/ML — HIGH (ref 0.02–0.1)
PROT FLD-MCNC: 5.2 G/DL — SIGNIFICANT CHANGE UP
PROT SERPL-MCNC: 6 G/DL — SIGNIFICANT CHANGE UP (ref 6–8)
PROT SERPL-MCNC: 6 G/DL — SIGNIFICANT CHANGE UP (ref 6–8)
PROT SERPL-MCNC: 6.1 G/DL — SIGNIFICANT CHANGE UP (ref 6–8)
PROT SERPL-MCNC: 6.1 G/DL — SIGNIFICANT CHANGE UP (ref 6–8)
PROT SERPL-MCNC: 6.3 G/DL — SIGNIFICANT CHANGE UP (ref 6–8)
PROT SERPL-MCNC: 6.4 G/DL — SIGNIFICANT CHANGE UP (ref 6–8)
PROT SERPL-MCNC: 6.5 G/DL — SIGNIFICANT CHANGE UP (ref 6–8)
PROT SERPL-MCNC: 6.8 G/DL — SIGNIFICANT CHANGE UP (ref 6–8)
PROT SERPL-MCNC: 6.8 G/DL — SIGNIFICANT CHANGE UP (ref 6–8)
PROT SERPL-MCNC: 7 G/DL — SIGNIFICANT CHANGE UP (ref 6–8)
PROT SERPL-MCNC: 7.1 G/DL — SIGNIFICANT CHANGE UP (ref 6–8)
PROT SERPL-MCNC: 7.2 G/DL — SIGNIFICANT CHANGE UP (ref 6–8)
PROT SERPL-MCNC: 7.2 G/DL — SIGNIFICANT CHANGE UP (ref 6–8)
PROT SERPL-MCNC: 7.4 G/DL — SIGNIFICANT CHANGE UP (ref 6–8)
PROT SERPL-MCNC: 7.5 G/DL — SIGNIFICANT CHANGE UP (ref 6–8)
PROT UR-MCNC: >=300 MG/DL
PROTHROM AB SERPL-ACNC: 13.3 SEC — HIGH (ref 9.95–12.87)
PROTHROM AB SERPL-ACNC: 13.4 SEC — HIGH (ref 9.95–12.87)
PROTHROM AB SERPL-ACNC: 13.8 SEC — HIGH (ref 9.95–12.87)
PROTHROM AB SERPL-ACNC: 16.2 SEC — HIGH (ref 9.95–12.87)
PTH-INTACT FLD-MCNC: 203 PG/ML — HIGH (ref 15–65)
PTH-INTACT FLD-MCNC: 237 PG/ML — HIGH (ref 15–65)
RAPID RVP RESULT: SIGNIFICANT CHANGE UP
RBC # BLD: 2.81 M/UL — LOW (ref 4.7–6.1)
RBC # BLD: 2.84 M/UL — LOW (ref 4.7–6.1)
RBC # BLD: 2.87 M/UL — LOW (ref 4.7–6.1)
RBC # BLD: 2.87 M/UL — LOW (ref 4.7–6.1)
RBC # BLD: 3.04 M/UL — LOW (ref 4.7–6.1)
RBC # BLD: 3.04 M/UL — LOW (ref 4.7–6.1)
RBC # BLD: 3.11 M/UL — LOW (ref 4.7–6.1)
RBC # BLD: 3.13 M/UL — LOW (ref 4.7–6.1)
RBC # BLD: 3.19 M/UL — LOW (ref 4.7–6.1)
RBC # BLD: 3.31 M/UL — LOW (ref 4.7–6.1)
RBC # BLD: 3.34 M/UL — LOW (ref 4.7–6.1)
RBC # BLD: 3.36 M/UL — LOW (ref 4.7–6.1)
RBC # BLD: 3.36 M/UL — LOW (ref 4.7–6.1)
RBC # BLD: 3.37 M/UL — LOW (ref 4.7–6.1)
RBC # BLD: 3.37 M/UL — LOW (ref 4.7–6.1)
RBC # BLD: 3.4 M/UL — LOW (ref 4.7–6.1)
RBC # BLD: 3.42 M/UL — LOW (ref 4.7–6.1)
RBC # BLD: 3.44 M/UL — LOW (ref 4.7–6.1)
RBC # BLD: 3.57 M/UL — LOW (ref 4.7–6.1)
RBC # BLD: 3.63 M/UL — LOW (ref 4.7–6.1)
RBC # BLD: 3.72 M/UL — LOW (ref 4.7–6.1)
RBC # FLD: 13.2 % — SIGNIFICANT CHANGE UP (ref 11.5–14.5)
RBC # FLD: 13.3 % — SIGNIFICANT CHANGE UP (ref 11.5–14.5)
RBC # FLD: 13.3 % — SIGNIFICANT CHANGE UP (ref 11.5–14.5)
RBC # FLD: 13.4 % — SIGNIFICANT CHANGE UP (ref 11.5–14.5)
RBC # FLD: 13.4 % — SIGNIFICANT CHANGE UP (ref 11.5–14.5)
RBC # FLD: 13.5 % — SIGNIFICANT CHANGE UP (ref 11.5–14.5)
RBC # FLD: 13.5 % — SIGNIFICANT CHANGE UP (ref 11.5–14.5)
RBC # FLD: 13.6 % — SIGNIFICANT CHANGE UP (ref 11.5–14.5)
RBC # FLD: 13.7 % — SIGNIFICANT CHANGE UP (ref 11.5–14.5)
RBC # FLD: 13.8 % — SIGNIFICANT CHANGE UP (ref 11.5–14.5)
RBC # FLD: 13.9 % — SIGNIFICANT CHANGE UP (ref 11.5–14.5)
RBC # FLD: 14.1 % — SIGNIFICANT CHANGE UP (ref 11.5–14.5)
RBC # FLD: 14.1 % — SIGNIFICANT CHANGE UP (ref 11.5–14.5)
RBC # FLD: 14.2 % — SIGNIFICANT CHANGE UP (ref 11.5–14.5)
RBC # FLD: 14.2 % — SIGNIFICANT CHANGE UP (ref 11.5–14.5)
RBC # FLD: 15.1 % — HIGH (ref 11.5–14.5)
RBC # FLD: 15.2 % — HIGH (ref 11.5–14.5)
RBC # FLD: 15.5 % — HIGH (ref 11.5–14.5)
RBC # FLD: 15.5 % — HIGH (ref 11.5–14.5)
RBC CASTS # UR COMP ASSIST: ABNORMAL /HPF
RCV VOL RI: HIGH /UL (ref 0–0)
SAO2 % BLDA: 84 % — LOW (ref 92–96)
SAO2 % BLDA: 98 % — SIGNIFICANT CHANGE UP (ref 94–98)
SAO2 % BLDV: 63 % — SIGNIFICANT CHANGE UP
SAO2 % BLDV: 76 % — SIGNIFICANT CHANGE UP
SAO2 % BLDV: 92 % — SIGNIFICANT CHANGE UP
SARS-COV-2 IGG SERPL QL IA: NEGATIVE — SIGNIFICANT CHANGE UP
SARS-COV-2 IGM SERPL IA-ACNC: 0.25 RATIO — SIGNIFICANT CHANGE UP
SARS-COV-2 RNA SPEC QL NAA+PROBE: SIGNIFICANT CHANGE UP
SARS-COV-2 RNA SPEC QL NAA+PROBE: SIGNIFICANT CHANGE UP
SODIUM SERPL-SCNC: 129 MMOL/L — LOW (ref 135–146)
SODIUM SERPL-SCNC: 129 MMOL/L — LOW (ref 135–146)
SODIUM SERPL-SCNC: 132 MMOL/L — LOW (ref 135–146)
SODIUM SERPL-SCNC: 133 MMOL/L — LOW (ref 135–146)
SODIUM SERPL-SCNC: 134 MMOL/L — LOW (ref 135–146)
SODIUM SERPL-SCNC: 135 MMOL/L — SIGNIFICANT CHANGE UP (ref 135–146)
SODIUM SERPL-SCNC: 136 MMOL/L — SIGNIFICANT CHANGE UP (ref 135–146)
SODIUM SERPL-SCNC: 137 MMOL/L — SIGNIFICANT CHANGE UP (ref 135–146)
SODIUM SERPL-SCNC: 137 MMOL/L — SIGNIFICANT CHANGE UP (ref 135–146)
SODIUM SERPL-SCNC: 138 MMOL/L — SIGNIFICANT CHANGE UP (ref 135–146)
SODIUM SERPL-SCNC: 139 MMOL/L — SIGNIFICANT CHANGE UP (ref 135–146)
SODIUM SERPL-SCNC: 139 MMOL/L — SIGNIFICANT CHANGE UP (ref 135–146)
SODIUM SERPL-SCNC: 140 MMOL/L — SIGNIFICANT CHANGE UP (ref 135–146)
SODIUM SERPL-SCNC: 140 MMOL/L — SIGNIFICANT CHANGE UP (ref 135–146)
SODIUM SERPL-SCNC: 141 MMOL/L — SIGNIFICANT CHANGE UP (ref 135–146)
SP GR SPEC: 1.02 — SIGNIFICANT CHANGE UP (ref 1.01–1.03)
SPECIMEN SOURCE: SIGNIFICANT CHANGE UP
TM INTERPRETATION: SIGNIFICANT CHANGE UP
TOTAL NUCLEATED CELL COUNT, BODY FLUID: 994 /UL — SIGNIFICANT CHANGE UP
TROPONIN T SERPL-MCNC: 0.13 NG/ML — CRITICAL HIGH
TROPONIN T SERPL-MCNC: 0.13 NG/ML — CRITICAL HIGH
TROPONIN T SERPL-MCNC: 0.14 NG/ML — CRITICAL HIGH
TROPONIN T SERPL-MCNC: 0.15 NG/ML — CRITICAL HIGH
TROPONIN T SERPL-MCNC: 0.16 NG/ML — CRITICAL HIGH
TROPONIN T SERPL-MCNC: 0.21 NG/ML — CRITICAL HIGH
TROPONIN T SERPL-MCNC: 0.22 NG/ML — CRITICAL HIGH
TROPONIN T SERPL-MCNC: 0.33 NG/ML — CRITICAL HIGH
TROPONIN T SERPL-MCNC: 0.36 NG/ML — CRITICAL HIGH
TROPONIN T SERPL-MCNC: 0.37 NG/ML — CRITICAL HIGH
TROPONIN T SERPL-MCNC: 0.46 NG/ML — CRITICAL HIGH
TSH SERPL-MCNC: 2.29 UIU/ML — SIGNIFICANT CHANGE UP (ref 0.27–4.2)
TSH SERPL-MCNC: 3.93 UIU/ML — SIGNIFICANT CHANGE UP (ref 0.27–4.2)
TUBE TYPE: SIGNIFICANT CHANGE UP
UROBILINOGEN FLD QL: 0.2 MG/DL — SIGNIFICANT CHANGE UP (ref 0.2–0.2)
VIT B12 SERPL-MCNC: 1237 PG/ML — SIGNIFICANT CHANGE UP (ref 232–1245)
WBC # BLD: 10.04 K/UL — SIGNIFICANT CHANGE UP (ref 4.8–10.8)
WBC # BLD: 10.41 K/UL — SIGNIFICANT CHANGE UP (ref 4.8–10.8)
WBC # BLD: 10.98 K/UL — HIGH (ref 4.8–10.8)
WBC # BLD: 14.19 K/UL — HIGH (ref 4.8–10.8)
WBC # BLD: 14.62 K/UL — HIGH (ref 4.8–10.8)
WBC # BLD: 15.74 K/UL — HIGH (ref 4.8–10.8)
WBC # BLD: 16.5 K/UL — HIGH (ref 4.8–10.8)
WBC # BLD: 5.82 K/UL — SIGNIFICANT CHANGE UP (ref 4.8–10.8)
WBC # BLD: 6.31 K/UL — SIGNIFICANT CHANGE UP (ref 4.8–10.8)
WBC # BLD: 6.58 K/UL — SIGNIFICANT CHANGE UP (ref 4.8–10.8)
WBC # BLD: 6.7 K/UL — SIGNIFICANT CHANGE UP (ref 4.8–10.8)
WBC # BLD: 6.72 K/UL — SIGNIFICANT CHANGE UP (ref 4.8–10.8)
WBC # BLD: 7.11 K/UL — SIGNIFICANT CHANGE UP (ref 4.8–10.8)
WBC # BLD: 7.29 K/UL — SIGNIFICANT CHANGE UP (ref 4.8–10.8)
WBC # BLD: 7.51 K/UL — SIGNIFICANT CHANGE UP (ref 4.8–10.8)
WBC # BLD: 7.57 K/UL — SIGNIFICANT CHANGE UP (ref 4.8–10.8)
WBC # BLD: 7.75 K/UL — SIGNIFICANT CHANGE UP (ref 4.8–10.8)
WBC # BLD: 7.93 K/UL — SIGNIFICANT CHANGE UP (ref 4.8–10.8)
WBC # BLD: 8.28 K/UL — SIGNIFICANT CHANGE UP (ref 4.8–10.8)
WBC # BLD: 8.45 K/UL — SIGNIFICANT CHANGE UP (ref 4.8–10.8)
WBC # BLD: 8.86 K/UL — SIGNIFICANT CHANGE UP (ref 4.8–10.8)
WBC # FLD AUTO: 10.04 K/UL — SIGNIFICANT CHANGE UP (ref 4.8–10.8)
WBC # FLD AUTO: 10.41 K/UL — SIGNIFICANT CHANGE UP (ref 4.8–10.8)
WBC # FLD AUTO: 10.98 K/UL — HIGH (ref 4.8–10.8)
WBC # FLD AUTO: 14.19 K/UL — HIGH (ref 4.8–10.8)
WBC # FLD AUTO: 14.62 K/UL — HIGH (ref 4.8–10.8)
WBC # FLD AUTO: 15.74 K/UL — HIGH (ref 4.8–10.8)
WBC # FLD AUTO: 16.5 K/UL — HIGH (ref 4.8–10.8)
WBC # FLD AUTO: 5.82 K/UL — SIGNIFICANT CHANGE UP (ref 4.8–10.8)
WBC # FLD AUTO: 6.31 K/UL — SIGNIFICANT CHANGE UP (ref 4.8–10.8)
WBC # FLD AUTO: 6.58 K/UL — SIGNIFICANT CHANGE UP (ref 4.8–10.8)
WBC # FLD AUTO: 6.7 K/UL — SIGNIFICANT CHANGE UP (ref 4.8–10.8)
WBC # FLD AUTO: 6.72 K/UL — SIGNIFICANT CHANGE UP (ref 4.8–10.8)
WBC # FLD AUTO: 7.11 K/UL — SIGNIFICANT CHANGE UP (ref 4.8–10.8)
WBC # FLD AUTO: 7.29 K/UL — SIGNIFICANT CHANGE UP (ref 4.8–10.8)
WBC # FLD AUTO: 7.51 K/UL — SIGNIFICANT CHANGE UP (ref 4.8–10.8)
WBC # FLD AUTO: 7.57 K/UL — SIGNIFICANT CHANGE UP (ref 4.8–10.8)
WBC # FLD AUTO: 7.75 K/UL — SIGNIFICANT CHANGE UP (ref 4.8–10.8)
WBC # FLD AUTO: 7.93 K/UL — SIGNIFICANT CHANGE UP (ref 4.8–10.8)
WBC # FLD AUTO: 8.28 K/UL — SIGNIFICANT CHANGE UP (ref 4.8–10.8)
WBC # FLD AUTO: 8.45 K/UL — SIGNIFICANT CHANGE UP (ref 4.8–10.8)
WBC # FLD AUTO: 8.86 K/UL — SIGNIFICANT CHANGE UP (ref 4.8–10.8)
WBC UR QL: >50 /HPF

## 2020-01-01 PROCEDURE — 99233 SBSQ HOSP IP/OBS HIGH 50: CPT

## 2020-01-01 PROCEDURE — 99223 1ST HOSP IP/OBS HIGH 75: CPT

## 2020-01-01 PROCEDURE — 74176 CT ABD & PELVIS W/O CONTRAST: CPT | Mod: 26

## 2020-01-01 PROCEDURE — 71045 X-RAY EXAM CHEST 1 VIEW: CPT | Mod: 26

## 2020-01-01 PROCEDURE — 74018 RADEX ABDOMEN 1 VIEW: CPT | Mod: 26

## 2020-01-01 PROCEDURE — 32557 INSERT CATH PLEURA W/ IMAGE: CPT | Mod: RT

## 2020-01-01 PROCEDURE — 93010 ELECTROCARDIOGRAM REPORT: CPT

## 2020-01-01 PROCEDURE — 93296 REM INTERROG EVL PM/IDS: CPT

## 2020-01-01 PROCEDURE — 93295 DEV INTERROG REMOTE 1/2/MLT: CPT

## 2020-01-01 PROCEDURE — 93970 EXTREMITY STUDY: CPT | Mod: 26

## 2020-01-01 PROCEDURE — 99285 EMERGENCY DEPT VISIT HI MDM: CPT | Mod: GC

## 2020-01-01 PROCEDURE — 71045 X-RAY EXAM CHEST 1 VIEW: CPT | Mod: 26,77

## 2020-01-01 PROCEDURE — 71250 CT THORAX DX C-: CPT | Mod: 26

## 2020-01-01 PROCEDURE — 93000 ELECTROCARDIOGRAM COMPLETE: CPT | Mod: 59

## 2020-01-01 PROCEDURE — 93283 PRGRMG EVAL IMPLANTABLE DFB: CPT

## 2020-01-01 PROCEDURE — 74177 CT ABD & PELVIS W/CONTRAST: CPT | Mod: 26

## 2020-01-01 PROCEDURE — 99285 EMERGENCY DEPT VISIT HI MDM: CPT | Mod: CS

## 2020-01-01 PROCEDURE — 99214 OFFICE O/P EST MOD 30 MIN: CPT

## 2020-01-01 PROCEDURE — 99152 MOD SED SAME PHYS/QHP 5/>YRS: CPT

## 2020-01-01 PROCEDURE — 88305 TISSUE EXAM BY PATHOLOGIST: CPT | Mod: 26

## 2020-01-01 PROCEDURE — 99239 HOSP IP/OBS DSCHRG MGMT >30: CPT

## 2020-01-01 PROCEDURE — 93010 ELECTROCARDIOGRAM REPORT: CPT | Mod: 77

## 2020-01-01 PROCEDURE — 99285 EMERGENCY DEPT VISIT HI MDM: CPT | Mod: CS,GC

## 2020-01-01 PROCEDURE — 99213 OFFICE O/P EST LOW 20 MIN: CPT

## 2020-01-01 PROCEDURE — 88112 CYTOPATH CELL ENHANCE TECH: CPT | Mod: 26

## 2020-01-01 PROCEDURE — 99497 ADVNCD CARE PLAN 30 MIN: CPT

## 2020-01-01 PROCEDURE — 99291 CRITICAL CARE FIRST HOUR: CPT | Mod: 25

## 2020-01-01 PROCEDURE — 99497 ADVNCD CARE PLAN 30 MIN: CPT | Mod: 25

## 2020-01-01 PROCEDURE — 88189 FLOWCYTOMETRY/READ 16 & >: CPT

## 2020-01-01 PROCEDURE — 99222 1ST HOSP IP/OBS MODERATE 55: CPT

## 2020-01-01 PROCEDURE — 99232 SBSQ HOSP IP/OBS MODERATE 35: CPT

## 2020-01-01 PROCEDURE — 99215 OFFICE O/P EST HI 40 MIN: CPT | Mod: 25

## 2020-01-01 PROCEDURE — 71045 X-RAY EXAM CHEST 1 VIEW: CPT | Mod: 26,76

## 2020-01-01 PROCEDURE — 93288 INTERROG EVL PM/LDLS PM IP: CPT

## 2020-01-01 PROCEDURE — 93000 ELECTROCARDIOGRAM COMPLETE: CPT

## 2020-01-01 PROCEDURE — 99231 SBSQ HOSP IP/OBS SF/LOW 25: CPT

## 2020-01-01 PROCEDURE — 93290 INTERROG DEV EVAL ICPMS IP: CPT | Mod: 26

## 2020-01-01 PROCEDURE — 99223 1ST HOSP IP/OBS HIGH 75: CPT | Mod: AI

## 2020-01-01 RX ORDER — DEXTROSE 50 % IN WATER 50 %
12.5 SYRINGE (ML) INTRAVENOUS ONCE
Refills: 0 | Status: DISCONTINUED | OUTPATIENT
Start: 2020-01-01 | End: 2020-01-01

## 2020-01-01 RX ORDER — TAMSULOSIN HYDROCHLORIDE 0.4 MG/1
0.8 CAPSULE ORAL AT BEDTIME
Refills: 0 | Status: DISCONTINUED | OUTPATIENT
Start: 2020-01-01 | End: 2020-01-01

## 2020-01-01 RX ORDER — SODIUM CHLORIDE 9 MG/ML
1000 INJECTION, SOLUTION INTRAVENOUS
Refills: 0 | Status: DISCONTINUED | OUTPATIENT
Start: 2020-01-01 | End: 2020-01-01

## 2020-01-01 RX ORDER — INSULIN GLARGINE 100 [IU]/ML
10 INJECTION, SOLUTION SUBCUTANEOUS AT BEDTIME
Refills: 0 | Status: DISCONTINUED | OUTPATIENT
Start: 2020-01-01 | End: 2020-01-01

## 2020-01-01 RX ORDER — SODIUM ZIRCONIUM CYCLOSILICATE 10 G/10G
10 POWDER, FOR SUSPENSION ORAL EVERY 12 HOURS
Refills: 0 | Status: DISCONTINUED | OUTPATIENT
Start: 2020-01-01 | End: 2020-01-01

## 2020-01-01 RX ORDER — DEXTROSE 50 % IN WATER 50 %
15 SYRINGE (ML) INTRAVENOUS ONCE
Refills: 0 | Status: DISCONTINUED | OUTPATIENT
Start: 2020-01-01 | End: 2020-01-01

## 2020-01-01 RX ORDER — CEFDINIR 250 MG/5ML
1 POWDER, FOR SUSPENSION ORAL
Qty: 0 | Refills: 0 | DISCHARGE

## 2020-01-01 RX ORDER — MIDODRINE HYDROCHLORIDE 2.5 MG/1
1 TABLET ORAL
Qty: 0 | Refills: 0 | DISCHARGE

## 2020-01-01 RX ORDER — VANCOMYCIN HCL 1 G
1000 VIAL (EA) INTRAVENOUS ONCE
Refills: 0 | Status: COMPLETED | OUTPATIENT
Start: 2020-01-01 | End: 2020-01-01

## 2020-01-01 RX ORDER — METOPROLOL TARTRATE 50 MG
1 TABLET ORAL
Qty: 0 | Refills: 0 | DISCHARGE

## 2020-01-01 RX ORDER — ISOSORBIDE MONONITRATE 60 MG/1
30 TABLET, EXTENDED RELEASE ORAL DAILY
Refills: 0 | Status: DISCONTINUED | OUTPATIENT
Start: 2020-01-01 | End: 2020-01-01

## 2020-01-01 RX ORDER — HEPARIN SODIUM 5000 [USP'U]/ML
5000 INJECTION INTRAVENOUS; SUBCUTANEOUS EVERY 8 HOURS
Refills: 0 | Status: DISCONTINUED | OUTPATIENT
Start: 2020-01-01 | End: 2020-01-01

## 2020-01-01 RX ORDER — MEROPENEM 1 G/30ML
500 INJECTION INTRAVENOUS ONCE
Refills: 0 | Status: COMPLETED | OUTPATIENT
Start: 2020-01-01 | End: 2020-01-01

## 2020-01-01 RX ORDER — POLYETHYLENE GLYCOL 3350 17 G/17G
17 POWDER, FOR SOLUTION ORAL
Refills: 0 | Status: DISCONTINUED | OUTPATIENT
Start: 2020-01-01 | End: 2020-01-01

## 2020-01-01 RX ORDER — CHLORHEXIDINE GLUCONATE 213 G/1000ML
1 SOLUTION TOPICAL
Refills: 0 | Status: DISCONTINUED | OUTPATIENT
Start: 2020-01-01 | End: 2020-01-01

## 2020-01-01 RX ORDER — PANTOPRAZOLE SODIUM 20 MG/1
40 TABLET, DELAYED RELEASE ORAL
Refills: 0 | Status: DISCONTINUED | OUTPATIENT
Start: 2020-01-01 | End: 2020-01-01

## 2020-01-01 RX ORDER — ONDANSETRON 8 MG/1
4 TABLET, FILM COATED ORAL ONCE
Refills: 0 | Status: COMPLETED | OUTPATIENT
Start: 2020-01-01 | End: 2020-01-01

## 2020-01-01 RX ORDER — CALCIUM ACETATE 667 MG
667 TABLET ORAL
Refills: 0 | Status: DISCONTINUED | OUTPATIENT
Start: 2020-01-01 | End: 2020-01-01

## 2020-01-01 RX ORDER — LOSARTAN POTASSIUM 100 MG/1
25 TABLET, FILM COATED ORAL DAILY
Refills: 0 | Status: DISCONTINUED | OUTPATIENT
Start: 2020-01-01 | End: 2020-01-01

## 2020-01-01 RX ORDER — ISOSORBIDE MONONITRATE 60 MG/1
1 TABLET, EXTENDED RELEASE ORAL
Qty: 0 | Refills: 0 | DISCHARGE

## 2020-01-01 RX ORDER — NOREPINEPHRINE BITARTRATE/D5W 8 MG/250ML
0.16 PLASTIC BAG, INJECTION (ML) INTRAVENOUS
Qty: 8 | Refills: 0 | Status: DISCONTINUED | OUTPATIENT
Start: 2020-01-01 | End: 2020-01-01

## 2020-01-01 RX ORDER — MORPHINE SULFATE 50 MG/1
2 CAPSULE, EXTENDED RELEASE ORAL EVERY 4 HOURS
Refills: 0 | Status: DISCONTINUED | OUTPATIENT
Start: 2020-01-01 | End: 2020-01-01

## 2020-01-01 RX ORDER — ADHESIVE TAPE 3"X 2.3 YD
50 MCG TAPE, NON-MEDICATED TOPICAL DAILY
Refills: 0 | Status: ACTIVE | COMMUNITY

## 2020-01-01 RX ORDER — INSULIN GLARGINE 100 [IU]/ML
16 INJECTION, SOLUTION SUBCUTANEOUS
Qty: 0 | Refills: 0 | DISCHARGE

## 2020-01-01 RX ORDER — SENNA PLUS 8.6 MG/1
2 TABLET ORAL AT BEDTIME
Refills: 0 | Status: DISCONTINUED | OUTPATIENT
Start: 2020-01-01 | End: 2020-01-01

## 2020-01-01 RX ORDER — ATORVASTATIN CALCIUM 80 MG/1
80 TABLET, FILM COATED ORAL AT BEDTIME
Refills: 0 | Status: DISCONTINUED | OUTPATIENT
Start: 2020-01-01 | End: 2020-01-01

## 2020-01-01 RX ORDER — MORPHINE SULFATE 50 MG/1
1 CAPSULE, EXTENDED RELEASE ORAL
Qty: 100 | Refills: 0 | Status: DISCONTINUED | OUTPATIENT
Start: 2020-01-01 | End: 2020-01-01

## 2020-01-01 RX ORDER — DEXTROSE 50 % IN WATER 50 %
25 SYRINGE (ML) INTRAVENOUS ONCE
Refills: 0 | Status: DISCONTINUED | OUTPATIENT
Start: 2020-01-01 | End: 2020-01-01

## 2020-01-01 RX ORDER — ROSUVASTATIN CALCIUM 5 MG/1
1 TABLET ORAL
Qty: 0 | Refills: 0 | DISCHARGE

## 2020-01-01 RX ORDER — FUROSEMIDE 40 MG
40 TABLET ORAL
Refills: 0 | Status: DISCONTINUED | OUTPATIENT
Start: 2020-01-01 | End: 2020-01-01

## 2020-01-01 RX ORDER — SODIUM CHLORIDE 9 MG/ML
500 INJECTION INTRAMUSCULAR; INTRAVENOUS; SUBCUTANEOUS ONCE
Refills: 0 | Status: COMPLETED | OUTPATIENT
Start: 2020-01-01 | End: 2020-01-01

## 2020-01-01 RX ORDER — CHOLECALCIFEROL (VITAMIN D3) 125 MCG
2000 CAPSULE ORAL DAILY
Refills: 0 | Status: DISCONTINUED | OUTPATIENT
Start: 2020-01-01 | End: 2020-01-01

## 2020-01-01 RX ORDER — MULTIVIT-MIN/FERROUS GLUCONATE 9 MG/15 ML
1 LIQUID (ML) ORAL DAILY
Refills: 0 | Status: DISCONTINUED | OUTPATIENT
Start: 2020-01-01 | End: 2020-01-01

## 2020-01-01 RX ORDER — MORPHINE SULFATE 50 MG/1
1 CAPSULE, EXTENDED RELEASE ORAL ONCE
Refills: 0 | Status: DISCONTINUED | OUTPATIENT
Start: 2020-01-01 | End: 2020-01-01

## 2020-01-01 RX ORDER — INSULIN LISPRO 100/ML
6 VIAL (ML) SUBCUTANEOUS
Refills: 0 | Status: DISCONTINUED | OUTPATIENT
Start: 2020-01-01 | End: 2020-01-01

## 2020-01-01 RX ORDER — METOPROLOL TARTRATE 50 MG
25 TABLET ORAL
Refills: 0 | Status: DISCONTINUED | OUTPATIENT
Start: 2020-01-01 | End: 2020-01-01

## 2020-01-01 RX ORDER — MULTIVIT-MIN/FA/LYCOPEN/LUTEIN .4-300-25
TABLET ORAL
Refills: 0 | Status: ACTIVE | COMMUNITY

## 2020-01-01 RX ORDER — APIXABAN 2.5 MG/1
5 TABLET, FILM COATED ORAL
Refills: 0 | Status: DISCONTINUED | OUTPATIENT
Start: 2020-01-01 | End: 2020-01-01

## 2020-01-01 RX ORDER — GLUCAGON INJECTION, SOLUTION 0.5 MG/.1ML
1 INJECTION, SOLUTION SUBCUTANEOUS ONCE
Refills: 0 | Status: DISCONTINUED | OUTPATIENT
Start: 2020-01-01 | End: 2020-01-01

## 2020-01-01 RX ORDER — CEFEPIME 1 G/1
2000 INJECTION, POWDER, FOR SOLUTION INTRAMUSCULAR; INTRAVENOUS ONCE
Refills: 0 | Status: COMPLETED | OUTPATIENT
Start: 2020-01-01 | End: 2020-01-01

## 2020-01-01 RX ORDER — HEPARIN SODIUM 5000 [USP'U]/ML
1000 INJECTION INTRAVENOUS; SUBCUTANEOUS
Qty: 25000 | Refills: 0 | Status: DISCONTINUED | OUTPATIENT
Start: 2020-01-01 | End: 2020-01-01

## 2020-01-01 RX ORDER — INSULIN LISPRO 100/ML
VIAL (ML) SUBCUTANEOUS EVERY 6 HOURS
Refills: 0 | Status: DISCONTINUED | OUTPATIENT
Start: 2020-01-01 | End: 2020-01-01

## 2020-01-01 RX ORDER — IOHEXOL 300 MG/ML
30 INJECTION, SOLUTION INTRAVENOUS ONCE
Refills: 0 | Status: COMPLETED | OUTPATIENT
Start: 2020-01-01 | End: 2020-01-01

## 2020-01-01 RX ORDER — IPRATROPIUM/ALBUTEROL SULFATE 18-103MCG
3 AEROSOL WITH ADAPTER (GRAM) INHALATION EVERY 6 HOURS
Refills: 0 | Status: DISCONTINUED | OUTPATIENT
Start: 2020-01-01 | End: 2020-01-01

## 2020-01-01 RX ORDER — CEFPODOXIME PROXETIL 100 MG
200 TABLET ORAL
Refills: 0 | Status: DISCONTINUED | OUTPATIENT
Start: 2020-01-01 | End: 2020-01-01

## 2020-01-01 RX ORDER — MIDODRINE HYDROCHLORIDE 10 MG/1
10 TABLET ORAL 3 TIMES DAILY
Qty: 90 | Refills: 6 | Status: ACTIVE | COMMUNITY
Start: 2020-01-01

## 2020-01-01 RX ORDER — FUROSEMIDE 40 MG
1 TABLET ORAL
Qty: 0 | Refills: 0 | DISCHARGE

## 2020-01-01 RX ORDER — FINASTERIDE 5 MG/1
1 TABLET, FILM COATED ORAL
Qty: 0 | Refills: 0 | DISCHARGE

## 2020-01-01 RX ORDER — NOREPINEPHRINE BITARTRATE/D5W 8 MG/250ML
0.15 PLASTIC BAG, INJECTION (ML) INTRAVENOUS
Qty: 8 | Refills: 0 | Status: DISCONTINUED | OUTPATIENT
Start: 2020-01-01 | End: 2020-01-01

## 2020-01-01 RX ORDER — LANOLIN ALCOHOL/MO/W.PET/CERES
5 CREAM (GRAM) TOPICAL AT BEDTIME
Refills: 0 | Status: DISCONTINUED | OUTPATIENT
Start: 2020-01-01 | End: 2020-01-01

## 2020-01-01 RX ORDER — MORPHINE SULFATE 50 MG/1
4 CAPSULE, EXTENDED RELEASE ORAL ONCE
Refills: 0 | Status: DISCONTINUED | OUTPATIENT
Start: 2020-01-01 | End: 2020-01-01

## 2020-01-01 RX ORDER — SODIUM CHLORIDE 9 MG/ML
250 INJECTION INTRAMUSCULAR; INTRAVENOUS; SUBCUTANEOUS ONCE
Refills: 0 | Status: COMPLETED | OUTPATIENT
Start: 2020-01-01 | End: 2020-01-01

## 2020-01-01 RX ORDER — FINASTERIDE 5 MG/1
5 TABLET, FILM COATED ORAL DAILY
Refills: 0 | Status: DISCONTINUED | OUTPATIENT
Start: 2020-01-01 | End: 2020-01-01

## 2020-01-01 RX ORDER — PANTOPRAZOLE SODIUM 20 MG/1
1 TABLET, DELAYED RELEASE ORAL
Qty: 0 | Refills: 0 | DISCHARGE

## 2020-01-01 RX ORDER — HYDROMORPHONE HYDROCHLORIDE 2 MG/ML
0.5 INJECTION INTRAMUSCULAR; INTRAVENOUS; SUBCUTANEOUS EVERY 6 HOURS
Refills: 0 | Status: DISCONTINUED | OUTPATIENT
Start: 2020-01-01 | End: 2020-01-01

## 2020-01-01 RX ORDER — AZITHROMYCIN 500 MG/1
500 TABLET, FILM COATED ORAL DAILY
Refills: 0 | Status: DISCONTINUED | OUTPATIENT
Start: 2020-01-01 | End: 2020-01-01

## 2020-01-01 RX ORDER — MIDODRINE HYDROCHLORIDE 2.5 MG/1
10 TABLET ORAL EVERY 8 HOURS
Refills: 0 | Status: DISCONTINUED | OUTPATIENT
Start: 2020-01-01 | End: 2020-01-01

## 2020-01-01 RX ORDER — CALCIUM ACETATE 667 MG
1 TABLET ORAL
Qty: 0 | Refills: 0 | DISCHARGE

## 2020-01-01 RX ORDER — INSULIN LISPRO 100/ML
VIAL (ML) SUBCUTANEOUS
Refills: 0 | Status: DISCONTINUED | OUTPATIENT
Start: 2020-01-01 | End: 2020-01-01

## 2020-01-01 RX ORDER — CALCIUM ACETATE 667 MG
1334 TABLET ORAL
Refills: 0 | Status: DISCONTINUED | OUTPATIENT
Start: 2020-01-01 | End: 2020-01-01

## 2020-01-01 RX ORDER — CHOLECALCIFEROL (VITAMIN D3) 125 MCG
0 CAPSULE ORAL
Qty: 0 | Refills: 0 | DISCHARGE

## 2020-01-01 RX ORDER — CALCIUM ACETATE 667 MG
3 TABLET ORAL
Qty: 0 | Refills: 0 | DISCHARGE

## 2020-01-01 RX ORDER — SODIUM ZIRCONIUM CYCLOSILICATE 10 G/10G
5 POWDER, FOR SUSPENSION ORAL ONCE
Refills: 0 | Status: COMPLETED | OUTPATIENT
Start: 2020-01-01 | End: 2020-01-01

## 2020-01-01 RX ORDER — SIMETHICONE 80 MG/1
80 TABLET, CHEWABLE ORAL
Refills: 0 | Status: DISCONTINUED | OUTPATIENT
Start: 2020-01-01 | End: 2020-01-01

## 2020-01-01 RX ORDER — ALFUZOSIN HYDROCHLORIDE 10 MG/1
1 TABLET, EXTENDED RELEASE ORAL
Qty: 0 | Refills: 0 | DISCHARGE

## 2020-01-01 RX ORDER — MEROPENEM 1 G/30ML
500 INJECTION INTRAVENOUS EVERY 24 HOURS
Refills: 0 | Status: DISCONTINUED | OUTPATIENT
Start: 2020-01-01 | End: 2020-01-01

## 2020-01-01 RX ORDER — ASPIRIN/CALCIUM CARB/MAGNESIUM 324 MG
81 TABLET ORAL DAILY
Refills: 0 | Status: DISCONTINUED | OUTPATIENT
Start: 2020-01-01 | End: 2020-01-01

## 2020-01-01 RX ORDER — TRAMADOL HYDROCHLORIDE 50 MG/1
25 TABLET ORAL EVERY 6 HOURS
Refills: 0 | Status: DISCONTINUED | OUTPATIENT
Start: 2020-01-01 | End: 2020-01-01

## 2020-01-01 RX ORDER — APIXABAN 2.5 MG/1
2.5 TABLET, FILM COATED ORAL EVERY 12 HOURS
Refills: 0 | Status: DISCONTINUED | OUTPATIENT
Start: 2020-01-01 | End: 2020-01-01

## 2020-01-01 RX ORDER — AZITHROMYCIN 500 MG/1
500 TABLET, FILM COATED ORAL ONCE
Refills: 0 | Status: COMPLETED | OUTPATIENT
Start: 2020-01-01 | End: 2020-01-01

## 2020-01-01 RX ORDER — MULTIVIT-MIN/FERROUS GLUCONATE 9 MG/15 ML
1 LIQUID (ML) ORAL
Qty: 0 | Refills: 0 | DISCHARGE

## 2020-01-01 RX ORDER — DIATRIZOATE MEGLUMINE 180 MG/ML
30 INJECTION, SOLUTION INTRAVESICAL ONCE
Refills: 0 | Status: DISCONTINUED | OUTPATIENT
Start: 2020-01-01 | End: 2020-01-01

## 2020-01-01 RX ORDER — POLYETHYLENE GLYCOL 3350 17 G/17G
17 POWDER, FOR SOLUTION ORAL DAILY
Refills: 0 | Status: DISCONTINUED | OUTPATIENT
Start: 2020-01-01 | End: 2020-01-01

## 2020-01-01 RX ORDER — INSULIN LISPRO 100/ML
8 VIAL (ML) SUBCUTANEOUS
Refills: 0 | Status: DISCONTINUED | OUTPATIENT
Start: 2020-01-01 | End: 2020-01-01

## 2020-01-01 RX ORDER — DEXTROSE 50 % IN WATER 50 %
12.5 SYRINGE (ML) INTRAVENOUS ONCE
Refills: 0 | Status: COMPLETED | OUTPATIENT
Start: 2020-01-01 | End: 2020-01-01

## 2020-01-01 RX ORDER — CITALOPRAM 10 MG/1
1 TABLET, FILM COATED ORAL
Qty: 0 | Refills: 0 | DISCHARGE

## 2020-01-01 RX ORDER — NOREPINEPHRINE BITARTRATE/D5W 8 MG/250ML
0.05 PLASTIC BAG, INJECTION (ML) INTRAVENOUS
Qty: 16 | Refills: 0 | Status: DISCONTINUED | OUTPATIENT
Start: 2020-01-01 | End: 2020-01-01

## 2020-01-01 RX ORDER — NOREPINEPHRINE BITARTRATE/D5W 8 MG/250ML
0.05 PLASTIC BAG, INJECTION (ML) INTRAVENOUS
Qty: 8 | Refills: 0 | Status: DISCONTINUED | OUTPATIENT
Start: 2020-01-01 | End: 2020-01-01

## 2020-01-01 RX ORDER — DOCUSATE SODIUM 100 MG
1 CAPSULE ORAL
Qty: 0 | Refills: 0 | DISCHARGE

## 2020-01-01 RX ORDER — APIXABAN 5 MG/1
5 TABLET, FILM COATED ORAL
Qty: 180 | Refills: 3 | Status: ACTIVE | COMMUNITY
Start: 2020-01-01 | End: 1900-01-01

## 2020-01-01 RX ORDER — INSULIN GLARGINE 100 [IU]/ML
16 INJECTION, SOLUTION SUBCUTANEOUS AT BEDTIME
Refills: 0 | Status: DISCONTINUED | OUTPATIENT
Start: 2020-01-01 | End: 2020-01-01

## 2020-01-01 RX ORDER — INSULIN GLARGINE 100 [IU]/ML
22 INJECTION, SOLUTION SUBCUTANEOUS AT BEDTIME
Refills: 0 | Status: DISCONTINUED | OUTPATIENT
Start: 2020-01-01 | End: 2020-01-01

## 2020-01-01 RX ORDER — MORPHINE SULFATE 50 MG/1
2 CAPSULE, EXTENDED RELEASE ORAL
Qty: 100 | Refills: 0 | Status: DISCONTINUED | OUTPATIENT
Start: 2020-01-01 | End: 2020-01-01

## 2020-01-01 RX ORDER — MORPHINE SULFATE 50 MG/1
2 CAPSULE, EXTENDED RELEASE ORAL EVERY 6 HOURS
Refills: 0 | Status: DISCONTINUED | OUTPATIENT
Start: 2020-01-01 | End: 2020-01-01

## 2020-01-01 RX ORDER — SENNA PLUS 8.6 MG/1
1 TABLET ORAL AT BEDTIME
Refills: 0 | Status: DISCONTINUED | OUTPATIENT
Start: 2020-01-01 | End: 2020-01-01

## 2020-01-01 RX ORDER — MIDODRINE HYDROCHLORIDE 2.5 MG/1
10 TABLET ORAL THREE TIMES A DAY
Refills: 0 | Status: DISCONTINUED | OUTPATIENT
Start: 2020-01-01 | End: 2020-01-01

## 2020-01-01 RX ORDER — LANOLIN ALCOHOL/MO/W.PET/CERES
1 CREAM (GRAM) TOPICAL AT BEDTIME
Refills: 0 | Status: DISCONTINUED | OUTPATIENT
Start: 2020-01-01 | End: 2020-01-01

## 2020-01-01 RX ORDER — NOREPINEPHRINE BITARTRATE/D5W 8 MG/250ML
0.25 PLASTIC BAG, INJECTION (ML) INTRAVENOUS
Qty: 8 | Refills: 0 | Status: DISCONTINUED | OUTPATIENT
Start: 2020-01-01 | End: 2020-01-01

## 2020-01-01 RX ORDER — CLOPIDOGREL BISULFATE 75 MG/1
1 TABLET, FILM COATED ORAL
Qty: 0 | Refills: 0 | DISCHARGE

## 2020-01-01 RX ORDER — APIXABAN 2.5 MG/1
1 TABLET, FILM COATED ORAL
Qty: 0 | Refills: 0 | DISCHARGE

## 2020-01-01 RX ORDER — BACITRACIN ZINC 500 UNIT/G
1 OINTMENT IN PACKET (EA) TOPICAL DAILY
Refills: 0 | Status: DISCONTINUED | OUTPATIENT
Start: 2020-01-01 | End: 2020-01-01

## 2020-01-01 RX ORDER — DEXTROSE 50 % IN WATER 50 %
5 SYRINGE (ML) INTRAVENOUS ONCE
Refills: 0 | Status: DISCONTINUED | OUTPATIENT
Start: 2020-01-01 | End: 2020-01-01

## 2020-01-01 RX ORDER — DEXTROSE 50 % IN WATER 50 %
25 SYRINGE (ML) INTRAVENOUS ONCE
Refills: 0 | Status: COMPLETED | OUTPATIENT
Start: 2020-01-01 | End: 2020-01-01

## 2020-01-01 RX ORDER — METOLAZONE 5 MG/1
5 TABLET ORAL DAILY
Qty: 90 | Refills: 3 | Status: COMPLETED | COMMUNITY
Start: 2016-03-11 | End: 2020-01-01

## 2020-01-01 RX ORDER — APIXABAN 5 MG/1
5 TABLET, FILM COATED ORAL
Qty: 60 | Refills: 0 | Status: COMPLETED | COMMUNITY
Start: 2020-01-01 | End: 2020-01-01

## 2020-01-01 RX ORDER — INSULIN LISPRO 100/ML
6 VIAL (ML) SUBCUTANEOUS
Qty: 0 | Refills: 0 | DISCHARGE

## 2020-01-01 RX ORDER — SODIUM CHLORIDE 9 MG/ML
2900 INJECTION, SOLUTION INTRAVENOUS ONCE
Refills: 0 | Status: DISCONTINUED | OUTPATIENT
Start: 2020-01-01 | End: 2020-01-01

## 2020-01-01 RX ORDER — MIDODRINE HYDROCHLORIDE 2.5 MG/1
10 TABLET ORAL ONCE
Refills: 0 | Status: COMPLETED | OUTPATIENT
Start: 2020-01-01 | End: 2020-01-01

## 2020-01-01 RX ORDER — ACETAMINOPHEN 500 MG
650 TABLET ORAL EVERY 6 HOURS
Refills: 0 | Status: DISCONTINUED | OUTPATIENT
Start: 2020-01-01 | End: 2020-01-01

## 2020-01-01 RX ORDER — MEROPENEM 1 G/30ML
INJECTION INTRAVENOUS
Refills: 0 | Status: DISCONTINUED | OUTPATIENT
Start: 2020-01-01 | End: 2020-01-01

## 2020-01-01 RX ORDER — ASPIRIN/CALCIUM CARB/MAGNESIUM 324 MG
1 TABLET ORAL
Qty: 0 | Refills: 0 | DISCHARGE

## 2020-01-01 RX ORDER — SIMETHICONE 80 MG/1
160 TABLET, CHEWABLE ORAL ONCE
Refills: 0 | Status: COMPLETED | OUTPATIENT
Start: 2020-01-01 | End: 2020-01-01

## 2020-01-01 RX ORDER — TIOTROPIUM BROMIDE 18 UG/1
1 CAPSULE ORAL; RESPIRATORY (INHALATION) DAILY
Refills: 0 | Status: DISCONTINUED | OUTPATIENT
Start: 2020-01-01 | End: 2020-01-01

## 2020-01-01 RX ORDER — MORPHINE SULFATE 50 MG/1
2 CAPSULE, EXTENDED RELEASE ORAL ONCE
Refills: 0 | Status: DISCONTINUED | OUTPATIENT
Start: 2020-01-01 | End: 2020-01-01

## 2020-01-01 RX ORDER — HEPARIN SODIUM 5000 [USP'U]/ML
5000 INJECTION INTRAVENOUS; SUBCUTANEOUS EVERY 12 HOURS
Refills: 0 | Status: DISCONTINUED | OUTPATIENT
Start: 2020-01-01 | End: 2020-01-01

## 2020-01-01 RX ORDER — TAMSULOSIN HYDROCHLORIDE 0.4 MG/1
0.4 CAPSULE ORAL AT BEDTIME
Refills: 0 | Status: DISCONTINUED | OUTPATIENT
Start: 2020-01-01 | End: 2020-01-01

## 2020-01-01 RX ORDER — HYDROMORPHONE HYDROCHLORIDE 2 MG/ML
0.5 INJECTION INTRAMUSCULAR; INTRAVENOUS; SUBCUTANEOUS ONCE
Refills: 0 | Status: DISCONTINUED | OUTPATIENT
Start: 2020-01-01 | End: 2020-01-01

## 2020-01-01 RX ORDER — VANCOMYCIN HCL 1 G
1250 VIAL (EA) INTRAVENOUS
Refills: 0 | Status: DISCONTINUED | OUTPATIENT
Start: 2020-01-01 | End: 2020-01-01

## 2020-01-01 RX ORDER — CITALOPRAM 10 MG/1
10 TABLET, FILM COATED ORAL DAILY
Refills: 0 | Status: DISCONTINUED | OUTPATIENT
Start: 2020-01-01 | End: 2020-01-01

## 2020-01-01 RX ORDER — LOSARTAN POTASSIUM 25 MG/1
25 TABLET, FILM COATED ORAL DAILY
Qty: 90 | Refills: 3 | Status: COMPLETED | COMMUNITY
End: 2020-01-01

## 2020-01-01 RX ORDER — MEROPENEM 1 G/30ML
1000 INJECTION INTRAVENOUS ONCE
Refills: 0 | Status: COMPLETED | OUTPATIENT
Start: 2020-01-01 | End: 2020-01-01

## 2020-01-01 RX ORDER — CEFEPIME 1 G/1
1000 INJECTION, POWDER, FOR SOLUTION INTRAMUSCULAR; INTRAVENOUS EVERY 24 HOURS
Refills: 0 | Status: DISCONTINUED | OUTPATIENT
Start: 2020-01-01 | End: 2020-01-01

## 2020-01-01 RX ADMIN — Medication 1 MILLIGRAM(S): at 21:40

## 2020-01-01 RX ADMIN — ATORVASTATIN CALCIUM 80 MILLIGRAM(S): 80 TABLET, FILM COATED ORAL at 21:04

## 2020-01-01 RX ADMIN — MORPHINE SULFATE 2 MILLIGRAM(S): 50 CAPSULE, EXTENDED RELEASE ORAL at 17:00

## 2020-01-01 RX ADMIN — Medication 1334 MILLIGRAM(S): at 13:24

## 2020-01-01 RX ADMIN — POLYETHYLENE GLYCOL 3350 17 GRAM(S): 17 POWDER, FOR SOLUTION ORAL at 17:32

## 2020-01-01 RX ADMIN — LOSARTAN POTASSIUM 25 MILLIGRAM(S): 100 TABLET, FILM COATED ORAL at 06:14

## 2020-01-01 RX ADMIN — MIDODRINE HYDROCHLORIDE 10 MILLIGRAM(S): 2.5 TABLET ORAL at 17:13

## 2020-01-01 RX ADMIN — MEROPENEM 100 MILLIGRAM(S): 1 INJECTION INTRAVENOUS at 13:54

## 2020-01-01 RX ADMIN — Medication 81 MILLIGRAM(S): at 11:22

## 2020-01-01 RX ADMIN — Medication 1: at 08:23

## 2020-01-01 RX ADMIN — Medication 3 MILLILITER(S): at 19:28

## 2020-01-01 RX ADMIN — Medication 3 MILLILITER(S): at 13:35

## 2020-01-01 RX ADMIN — SIMETHICONE 80 MILLIGRAM(S): 80 TABLET, CHEWABLE ORAL at 23:00

## 2020-01-01 RX ADMIN — CHLORHEXIDINE GLUCONATE 1 APPLICATION(S): 213 SOLUTION TOPICAL at 05:59

## 2020-01-01 RX ADMIN — Medication 1334 MILLIGRAM(S): at 17:03

## 2020-01-01 RX ADMIN — PANTOPRAZOLE SODIUM 40 MILLIGRAM(S): 20 TABLET, DELAYED RELEASE ORAL at 06:15

## 2020-01-01 RX ADMIN — TAMSULOSIN HYDROCHLORIDE 0.8 MILLIGRAM(S): 0.4 CAPSULE ORAL at 21:04

## 2020-01-01 RX ADMIN — IOHEXOL 30 MILLILITER(S): 300 INJECTION, SOLUTION INTRAVENOUS at 15:09

## 2020-01-01 RX ADMIN — Medication 2: at 08:02

## 2020-01-01 RX ADMIN — TRAMADOL HYDROCHLORIDE 25 MILLIGRAM(S): 50 TABLET ORAL at 21:05

## 2020-01-01 RX ADMIN — POLYETHYLENE GLYCOL 3350 17 GRAM(S): 17 POWDER, FOR SOLUTION ORAL at 13:56

## 2020-01-01 RX ADMIN — MIDODRINE HYDROCHLORIDE 10 MILLIGRAM(S): 2.5 TABLET ORAL at 16:10

## 2020-01-01 RX ADMIN — SODIUM CHLORIDE 500 MILLILITER(S): 9 INJECTION INTRAMUSCULAR; INTRAVENOUS; SUBCUTANEOUS at 06:15

## 2020-01-01 RX ADMIN — Medication 1: at 11:55

## 2020-01-01 RX ADMIN — Medication 81 MILLIGRAM(S): at 12:32

## 2020-01-01 RX ADMIN — CHLORHEXIDINE GLUCONATE 1 APPLICATION(S): 213 SOLUTION TOPICAL at 05:17

## 2020-01-01 RX ADMIN — Medication 650 MILLIGRAM(S): at 11:12

## 2020-01-01 RX ADMIN — MEROPENEM 100 MILLIGRAM(S): 1 INJECTION INTRAVENOUS at 11:10

## 2020-01-01 RX ADMIN — Medication 650 MILLIGRAM(S): at 18:47

## 2020-01-01 RX ADMIN — ONDANSETRON 4 MILLIGRAM(S): 8 TABLET, FILM COATED ORAL at 04:03

## 2020-01-01 RX ADMIN — TAMSULOSIN HYDROCHLORIDE 0.4 MILLIGRAM(S): 0.4 CAPSULE ORAL at 22:02

## 2020-01-01 RX ADMIN — Medication 1 TABLET(S): at 14:01

## 2020-01-01 RX ADMIN — SIMETHICONE 80 MILLIGRAM(S): 80 TABLET, CHEWABLE ORAL at 17:34

## 2020-01-01 RX ADMIN — TAMSULOSIN HYDROCHLORIDE 0.8 MILLIGRAM(S): 0.4 CAPSULE ORAL at 22:03

## 2020-01-01 RX ADMIN — CHLORHEXIDINE GLUCONATE 1 APPLICATION(S): 213 SOLUTION TOPICAL at 05:33

## 2020-01-01 RX ADMIN — ATORVASTATIN CALCIUM 80 MILLIGRAM(S): 80 TABLET, FILM COATED ORAL at 21:48

## 2020-01-01 RX ADMIN — HEPARIN SODIUM 5000 UNIT(S): 5000 INJECTION INTRAVENOUS; SUBCUTANEOUS at 17:10

## 2020-01-01 RX ADMIN — SODIUM CHLORIDE 500 MILLILITER(S): 9 INJECTION INTRAMUSCULAR; INTRAVENOUS; SUBCUTANEOUS at 23:38

## 2020-01-01 RX ADMIN — Medication 81 MILLIGRAM(S): at 17:34

## 2020-01-01 RX ADMIN — Medication 1 APPLICATION(S): at 11:13

## 2020-01-01 RX ADMIN — SIMETHICONE 80 MILLIGRAM(S): 80 TABLET, CHEWABLE ORAL at 06:39

## 2020-01-01 RX ADMIN — SIMETHICONE 80 MILLIGRAM(S): 80 TABLET, CHEWABLE ORAL at 11:44

## 2020-01-01 RX ADMIN — MIDODRINE HYDROCHLORIDE 10 MILLIGRAM(S): 2.5 TABLET ORAL at 21:28

## 2020-01-01 RX ADMIN — Medication 667 MILLIGRAM(S): at 11:38

## 2020-01-01 RX ADMIN — HYDROMORPHONE HYDROCHLORIDE 0.5 MILLIGRAM(S): 2 INJECTION INTRAMUSCULAR; INTRAVENOUS; SUBCUTANEOUS at 23:26

## 2020-01-01 RX ADMIN — SENNA PLUS 2 TABLET(S): 8.6 TABLET ORAL at 21:02

## 2020-01-01 RX ADMIN — CITALOPRAM 10 MILLIGRAM(S): 10 TABLET, FILM COATED ORAL at 11:23

## 2020-01-01 RX ADMIN — Medication 25 MILLIGRAM(S): at 19:04

## 2020-01-01 RX ADMIN — Medication 1: at 17:23

## 2020-01-01 RX ADMIN — Medication 166.67 MILLIGRAM(S): at 12:48

## 2020-01-01 RX ADMIN — Medication 8 UNIT(S): at 16:33

## 2020-01-01 RX ADMIN — MIDODRINE HYDROCHLORIDE 10 MILLIGRAM(S): 2.5 TABLET ORAL at 05:59

## 2020-01-01 RX ADMIN — SIMETHICONE 80 MILLIGRAM(S): 80 TABLET, CHEWABLE ORAL at 13:22

## 2020-01-01 RX ADMIN — Medication 81 MILLIGRAM(S): at 11:23

## 2020-01-01 RX ADMIN — SIMETHICONE 80 MILLIGRAM(S): 80 TABLET, CHEWABLE ORAL at 05:33

## 2020-01-01 RX ADMIN — MIDODRINE HYDROCHLORIDE 10 MILLIGRAM(S): 2.5 TABLET ORAL at 05:34

## 2020-01-01 RX ADMIN — Medication 1 TABLET(S): at 11:13

## 2020-01-01 RX ADMIN — Medication 8 UNIT(S): at 08:32

## 2020-01-01 RX ADMIN — SIMETHICONE 80 MILLIGRAM(S): 80 TABLET, CHEWABLE ORAL at 17:05

## 2020-01-01 RX ADMIN — MORPHINE SULFATE 1 MILLIGRAM(S): 50 CAPSULE, EXTENDED RELEASE ORAL at 02:55

## 2020-01-01 RX ADMIN — HEPARIN SODIUM 5000 UNIT(S): 5000 INJECTION INTRAVENOUS; SUBCUTANEOUS at 05:07

## 2020-01-01 RX ADMIN — Medication 1 TABLET(S): at 11:22

## 2020-01-01 RX ADMIN — ATORVASTATIN CALCIUM 80 MILLIGRAM(S): 80 TABLET, FILM COATED ORAL at 21:02

## 2020-01-01 RX ADMIN — Medication 25 MILLIGRAM(S): at 17:38

## 2020-01-01 RX ADMIN — Medication 1334 MILLIGRAM(S): at 17:04

## 2020-01-01 RX ADMIN — Medication 650 MILLIGRAM(S): at 10:45

## 2020-01-01 RX ADMIN — Medication 25 MILLIGRAM(S): at 06:14

## 2020-01-01 RX ADMIN — PANTOPRAZOLE SODIUM 40 MILLIGRAM(S): 20 TABLET, DELAYED RELEASE ORAL at 06:25

## 2020-01-01 RX ADMIN — POLYETHYLENE GLYCOL 3350 17 GRAM(S): 17 POWDER, FOR SOLUTION ORAL at 17:52

## 2020-01-01 RX ADMIN — Medication 1: at 07:59

## 2020-01-01 RX ADMIN — POLYETHYLENE GLYCOL 3350 17 GRAM(S): 17 POWDER, FOR SOLUTION ORAL at 17:03

## 2020-01-01 RX ADMIN — SIMETHICONE 80 MILLIGRAM(S): 80 TABLET, CHEWABLE ORAL at 23:40

## 2020-01-01 RX ADMIN — TAMSULOSIN HYDROCHLORIDE 0.8 MILLIGRAM(S): 0.4 CAPSULE ORAL at 22:45

## 2020-01-01 RX ADMIN — PANTOPRAZOLE SODIUM 40 MILLIGRAM(S): 20 TABLET, DELAYED RELEASE ORAL at 05:05

## 2020-01-01 RX ADMIN — SIMETHICONE 80 MILLIGRAM(S): 80 TABLET, CHEWABLE ORAL at 15:46

## 2020-01-01 RX ADMIN — Medication 1 TABLET(S): at 12:33

## 2020-01-01 RX ADMIN — POLYETHYLENE GLYCOL 3350 17 GRAM(S): 17 POWDER, FOR SOLUTION ORAL at 05:04

## 2020-01-01 RX ADMIN — Medication 2: at 19:02

## 2020-01-01 RX ADMIN — Medication 1 APPLICATION(S): at 12:29

## 2020-01-01 RX ADMIN — APIXABAN 5 MILLIGRAM(S): 2.5 TABLET, FILM COATED ORAL at 18:21

## 2020-01-01 RX ADMIN — MORPHINE SULFATE 2 MILLIGRAM(S): 50 CAPSULE, EXTENDED RELEASE ORAL at 06:15

## 2020-01-01 RX ADMIN — MEROPENEM 100 MILLIGRAM(S): 1 INJECTION INTRAVENOUS at 06:49

## 2020-01-01 RX ADMIN — HEPARIN SODIUM 10 UNIT(S)/HR: 5000 INJECTION INTRAVENOUS; SUBCUTANEOUS at 18:11

## 2020-01-01 RX ADMIN — Medication 4.37 MICROGRAM(S)/KG/MIN: at 02:36

## 2020-01-01 RX ADMIN — Medication 1 APPLICATION(S): at 15:45

## 2020-01-01 RX ADMIN — Medication 25.5 MICROGRAM(S)/KG/MIN: at 06:20

## 2020-01-01 RX ADMIN — ONDANSETRON 4 MILLIGRAM(S): 8 TABLET, FILM COATED ORAL at 19:21

## 2020-01-01 RX ADMIN — MIDODRINE HYDROCHLORIDE 10 MILLIGRAM(S): 2.5 TABLET ORAL at 13:43

## 2020-01-01 RX ADMIN — CHLORHEXIDINE GLUCONATE 1 APPLICATION(S): 213 SOLUTION TOPICAL at 06:16

## 2020-01-01 RX ADMIN — ISOSORBIDE MONONITRATE 30 MILLIGRAM(S): 60 TABLET, EXTENDED RELEASE ORAL at 11:23

## 2020-01-01 RX ADMIN — Medication 12.5 MILLILITER(S): at 11:16

## 2020-01-01 RX ADMIN — Medication 1 APPLICATION(S): at 10:47

## 2020-01-01 RX ADMIN — MIDODRINE HYDROCHLORIDE 10 MILLIGRAM(S): 2.5 TABLET ORAL at 05:17

## 2020-01-01 RX ADMIN — Medication 1334 MILLIGRAM(S): at 18:15

## 2020-01-01 RX ADMIN — SIMETHICONE 80 MILLIGRAM(S): 80 TABLET, CHEWABLE ORAL at 11:13

## 2020-01-01 RX ADMIN — MIDODRINE HYDROCHLORIDE 10 MILLIGRAM(S): 2.5 TABLET ORAL at 06:26

## 2020-01-01 RX ADMIN — Medication 1 APPLICATION(S): at 12:33

## 2020-01-01 RX ADMIN — Medication 1 APPLICATION(S): at 14:19

## 2020-01-01 RX ADMIN — POLYETHYLENE GLYCOL 3350 17 GRAM(S): 17 POWDER, FOR SOLUTION ORAL at 12:33

## 2020-01-01 RX ADMIN — HEPARIN SODIUM 5000 UNIT(S): 5000 INJECTION INTRAVENOUS; SUBCUTANEOUS at 05:00

## 2020-01-01 RX ADMIN — Medication 250 MILLIGRAM(S): at 07:19

## 2020-01-01 RX ADMIN — INSULIN GLARGINE 10 UNIT(S): 100 INJECTION, SOLUTION SUBCUTANEOUS at 22:33

## 2020-01-01 RX ADMIN — MIDODRINE HYDROCHLORIDE 10 MILLIGRAM(S): 2.5 TABLET ORAL at 21:03

## 2020-01-01 RX ADMIN — Medication 1 TABLET(S): at 13:24

## 2020-01-01 RX ADMIN — ATORVASTATIN CALCIUM 80 MILLIGRAM(S): 80 TABLET, FILM COATED ORAL at 22:06

## 2020-01-01 RX ADMIN — Medication 667 MILLIGRAM(S): at 17:05

## 2020-01-01 RX ADMIN — CEFEPIME 100 MILLIGRAM(S): 1 INJECTION, POWDER, FOR SOLUTION INTRAMUSCULAR; INTRAVENOUS at 02:46

## 2020-01-01 RX ADMIN — MIDODRINE HYDROCHLORIDE 10 MILLIGRAM(S): 2.5 TABLET ORAL at 05:00

## 2020-01-01 RX ADMIN — MIDODRINE HYDROCHLORIDE 10 MILLIGRAM(S): 2.5 TABLET ORAL at 05:25

## 2020-01-01 RX ADMIN — Medication 1 ENEMA: at 17:02

## 2020-01-01 RX ADMIN — MIDODRINE HYDROCHLORIDE 10 MILLIGRAM(S): 2.5 TABLET ORAL at 15:08

## 2020-01-01 RX ADMIN — SIMETHICONE 80 MILLIGRAM(S): 80 TABLET, CHEWABLE ORAL at 17:09

## 2020-01-01 RX ADMIN — Medication 1334 MILLIGRAM(S): at 17:32

## 2020-01-01 RX ADMIN — Medication 3 MILLILITER(S): at 02:54

## 2020-01-01 RX ADMIN — MIDODRINE HYDROCHLORIDE 10 MILLIGRAM(S): 2.5 TABLET ORAL at 17:35

## 2020-01-01 RX ADMIN — Medication 81 MILLIGRAM(S): at 16:09

## 2020-01-01 RX ADMIN — SIMETHICONE 80 MILLIGRAM(S): 80 TABLET, CHEWABLE ORAL at 11:50

## 2020-01-01 RX ADMIN — MIDODRINE HYDROCHLORIDE 10 MILLIGRAM(S): 2.5 TABLET ORAL at 21:40

## 2020-01-01 RX ADMIN — Medication 1 TABLET(S): at 15:58

## 2020-01-01 RX ADMIN — INSULIN GLARGINE 22 UNIT(S): 100 INJECTION, SOLUTION SUBCUTANEOUS at 21:40

## 2020-01-01 RX ADMIN — CITALOPRAM 10 MILLIGRAM(S): 10 TABLET, FILM COATED ORAL at 17:35

## 2020-01-01 RX ADMIN — SIMETHICONE 80 MILLIGRAM(S): 80 TABLET, CHEWABLE ORAL at 18:54

## 2020-01-01 RX ADMIN — Medication 3 MILLILITER(S): at 02:26

## 2020-01-01 RX ADMIN — Medication 81 MILLIGRAM(S): at 15:56

## 2020-01-01 RX ADMIN — MIDODRINE HYDROCHLORIDE 10 MILLIGRAM(S): 2.5 TABLET ORAL at 11:23

## 2020-01-01 RX ADMIN — SIMETHICONE 80 MILLIGRAM(S): 80 TABLET, CHEWABLE ORAL at 18:16

## 2020-01-01 RX ADMIN — Medication 667 MILLIGRAM(S): at 08:46

## 2020-01-01 RX ADMIN — PANTOPRAZOLE SODIUM 40 MILLIGRAM(S): 20 TABLET, DELAYED RELEASE ORAL at 05:25

## 2020-01-01 RX ADMIN — POLYETHYLENE GLYCOL 3350 17 GRAM(S): 17 POWDER, FOR SOLUTION ORAL at 10:47

## 2020-01-01 RX ADMIN — Medication 2: at 08:21

## 2020-01-01 RX ADMIN — Medication 8 UNIT(S): at 08:19

## 2020-01-01 RX ADMIN — HEPARIN SODIUM 5000 UNIT(S): 5000 INJECTION INTRAVENOUS; SUBCUTANEOUS at 21:37

## 2020-01-01 RX ADMIN — HEPARIN SODIUM 5000 UNIT(S): 5000 INJECTION INTRAVENOUS; SUBCUTANEOUS at 06:42

## 2020-01-01 RX ADMIN — MIDODRINE HYDROCHLORIDE 10 MILLIGRAM(S): 2.5 TABLET ORAL at 21:04

## 2020-01-01 RX ADMIN — Medication 667 MILLIGRAM(S): at 16:53

## 2020-01-01 RX ADMIN — MIDODRINE HYDROCHLORIDE 10 MILLIGRAM(S): 2.5 TABLET ORAL at 13:36

## 2020-01-01 RX ADMIN — Medication 667 MILLIGRAM(S): at 08:52

## 2020-01-01 RX ADMIN — MIDODRINE HYDROCHLORIDE 10 MILLIGRAM(S): 2.5 TABLET ORAL at 06:39

## 2020-01-01 RX ADMIN — MIDODRINE HYDROCHLORIDE 10 MILLIGRAM(S): 2.5 TABLET ORAL at 13:58

## 2020-01-01 RX ADMIN — Medication 667 MILLIGRAM(S): at 11:13

## 2020-01-01 RX ADMIN — Medication 25 MILLIGRAM(S): at 17:14

## 2020-01-01 RX ADMIN — Medication 6 UNIT(S): at 17:13

## 2020-01-01 RX ADMIN — Medication 25 MILLILITER(S): at 02:27

## 2020-01-01 RX ADMIN — MORPHINE SULFATE 2 MILLIGRAM(S): 50 CAPSULE, EXTENDED RELEASE ORAL at 11:47

## 2020-01-01 RX ADMIN — SIMETHICONE 80 MILLIGRAM(S): 80 TABLET, CHEWABLE ORAL at 05:05

## 2020-01-01 RX ADMIN — PANTOPRAZOLE SODIUM 40 MILLIGRAM(S): 20 TABLET, DELAYED RELEASE ORAL at 05:15

## 2020-01-01 RX ADMIN — Medication 81 MILLIGRAM(S): at 10:45

## 2020-01-01 RX ADMIN — MIDODRINE HYDROCHLORIDE 10 MILLIGRAM(S): 2.5 TABLET ORAL at 14:01

## 2020-01-01 RX ADMIN — Medication 667 MILLIGRAM(S): at 19:03

## 2020-01-01 RX ADMIN — SENNA PLUS 2 TABLET(S): 8.6 TABLET ORAL at 21:23

## 2020-01-01 RX ADMIN — Medication 650 MILLIGRAM(S): at 22:34

## 2020-01-01 RX ADMIN — Medication 1 TABLET(S): at 11:23

## 2020-01-01 RX ADMIN — Medication 200 MILLIGRAM(S): at 17:12

## 2020-01-01 RX ADMIN — Medication 1 TABLET(S): at 11:44

## 2020-01-01 RX ADMIN — SIMETHICONE 80 MILLIGRAM(S): 80 TABLET, CHEWABLE ORAL at 23:08

## 2020-01-01 RX ADMIN — SIMETHICONE 80 MILLIGRAM(S): 80 TABLET, CHEWABLE ORAL at 05:06

## 2020-01-01 RX ADMIN — ATORVASTATIN CALCIUM 80 MILLIGRAM(S): 80 TABLET, FILM COATED ORAL at 22:45

## 2020-01-01 RX ADMIN — POLYETHYLENE GLYCOL 3350 17 GRAM(S): 17 POWDER, FOR SOLUTION ORAL at 05:25

## 2020-01-01 RX ADMIN — TAMSULOSIN HYDROCHLORIDE 0.4 MILLIGRAM(S): 0.4 CAPSULE ORAL at 22:26

## 2020-01-01 RX ADMIN — MIDODRINE HYDROCHLORIDE 10 MILLIGRAM(S): 2.5 TABLET ORAL at 05:15

## 2020-01-01 RX ADMIN — TAMSULOSIN HYDROCHLORIDE 0.4 MILLIGRAM(S): 0.4 CAPSULE ORAL at 21:03

## 2020-01-01 RX ADMIN — SIMETHICONE 80 MILLIGRAM(S): 80 TABLET, CHEWABLE ORAL at 12:32

## 2020-01-01 RX ADMIN — SENNA PLUS 2 TABLET(S): 8.6 TABLET ORAL at 22:26

## 2020-01-01 RX ADMIN — MEROPENEM 100 MILLIGRAM(S): 1 INJECTION INTRAVENOUS at 08:58

## 2020-01-01 RX ADMIN — POLYETHYLENE GLYCOL 3350 17 GRAM(S): 17 POWDER, FOR SOLUTION ORAL at 17:05

## 2020-01-01 RX ADMIN — Medication 1334 MILLIGRAM(S): at 17:52

## 2020-01-01 RX ADMIN — Medication 2: at 11:46

## 2020-01-01 RX ADMIN — ATORVASTATIN CALCIUM 80 MILLIGRAM(S): 80 TABLET, FILM COATED ORAL at 21:14

## 2020-01-01 RX ADMIN — POLYETHYLENE GLYCOL 3350 17 GRAM(S): 17 POWDER, FOR SOLUTION ORAL at 11:39

## 2020-01-01 RX ADMIN — SENNA PLUS 2 TABLET(S): 8.6 TABLET ORAL at 21:03

## 2020-01-01 RX ADMIN — Medication 8 UNIT(S): at 16:51

## 2020-01-01 RX ADMIN — APIXABAN 2.5 MILLIGRAM(S): 2.5 TABLET, FILM COATED ORAL at 05:05

## 2020-01-01 RX ADMIN — MORPHINE SULFATE 1 MILLIGRAM(S): 50 CAPSULE, EXTENDED RELEASE ORAL at 09:45

## 2020-01-01 RX ADMIN — Medication 1 ENEMA: at 15:12

## 2020-01-01 RX ADMIN — INSULIN GLARGINE 22 UNIT(S): 100 INJECTION, SOLUTION SUBCUTANEOUS at 21:45

## 2020-01-01 RX ADMIN — SIMETHICONE 80 MILLIGRAM(S): 80 TABLET, CHEWABLE ORAL at 05:53

## 2020-01-01 RX ADMIN — Medication 3 MILLILITER(S): at 02:20

## 2020-01-01 RX ADMIN — POLYETHYLENE GLYCOL 3350 17 GRAM(S): 17 POWDER, FOR SOLUTION ORAL at 18:16

## 2020-01-01 RX ADMIN — POLYETHYLENE GLYCOL 3350 17 GRAM(S): 17 POWDER, FOR SOLUTION ORAL at 11:13

## 2020-01-01 RX ADMIN — ATORVASTATIN CALCIUM 80 MILLIGRAM(S): 80 TABLET, FILM COATED ORAL at 22:26

## 2020-01-01 RX ADMIN — Medication 1: at 17:13

## 2020-01-01 RX ADMIN — MORPHINE SULFATE 2 MG/HR: 50 CAPSULE, EXTENDED RELEASE ORAL at 00:32

## 2020-01-01 RX ADMIN — Medication 1 TABLET(S): at 13:54

## 2020-01-01 RX ADMIN — Medication 1 APPLICATION(S): at 14:01

## 2020-01-01 RX ADMIN — MIDODRINE HYDROCHLORIDE 10 MILLIGRAM(S): 2.5 TABLET ORAL at 05:12

## 2020-01-01 RX ADMIN — INSULIN GLARGINE 16 UNIT(S): 100 INJECTION, SOLUTION SUBCUTANEOUS at 21:28

## 2020-01-01 RX ADMIN — APIXABAN 2.5 MILLIGRAM(S): 2.5 TABLET, FILM COATED ORAL at 17:35

## 2020-01-01 RX ADMIN — APIXABAN 2.5 MILLIGRAM(S): 2.5 TABLET, FILM COATED ORAL at 06:00

## 2020-01-01 RX ADMIN — Medication 667 MILLIGRAM(S): at 11:14

## 2020-01-01 RX ADMIN — MORPHINE SULFATE 2 MILLIGRAM(S): 50 CAPSULE, EXTENDED RELEASE ORAL at 00:15

## 2020-01-01 RX ADMIN — MORPHINE SULFATE 1 MILLIGRAM(S): 50 CAPSULE, EXTENDED RELEASE ORAL at 04:03

## 2020-01-01 RX ADMIN — PANTOPRAZOLE SODIUM 40 MILLIGRAM(S): 20 TABLET, DELAYED RELEASE ORAL at 05:17

## 2020-01-01 RX ADMIN — SENNA PLUS 2 TABLET(S): 8.6 TABLET ORAL at 21:47

## 2020-01-01 RX ADMIN — MEROPENEM 100 MILLIGRAM(S): 1 INJECTION INTRAVENOUS at 11:52

## 2020-01-01 RX ADMIN — Medication 81 MILLIGRAM(S): at 11:49

## 2020-01-01 RX ADMIN — Medication 3 MILLILITER(S): at 09:04

## 2020-01-01 RX ADMIN — AZITHROMYCIN 255 MILLIGRAM(S): 500 TABLET, FILM COATED ORAL at 12:52

## 2020-01-01 RX ADMIN — POLYETHYLENE GLYCOL 3350 17 GRAM(S): 17 POWDER, FOR SOLUTION ORAL at 06:16

## 2020-01-01 RX ADMIN — Medication 667 MILLIGRAM(S): at 18:54

## 2020-01-01 RX ADMIN — ATORVASTATIN CALCIUM 80 MILLIGRAM(S): 80 TABLET, FILM COATED ORAL at 21:28

## 2020-01-01 RX ADMIN — CEFEPIME 100 MILLIGRAM(S): 1 INJECTION, POWDER, FOR SOLUTION INTRAMUSCULAR; INTRAVENOUS at 03:44

## 2020-01-01 RX ADMIN — CHLORHEXIDINE GLUCONATE 1 APPLICATION(S): 213 SOLUTION TOPICAL at 05:06

## 2020-01-01 RX ADMIN — Medication 8 UNIT(S): at 11:41

## 2020-01-01 RX ADMIN — SIMETHICONE 80 MILLIGRAM(S): 80 TABLET, CHEWABLE ORAL at 05:15

## 2020-01-01 RX ADMIN — Medication 23.9 MICROGRAM(S)/KG/MIN: at 18:38

## 2020-01-01 RX ADMIN — MORPHINE SULFATE 2 MILLIGRAM(S): 50 CAPSULE, EXTENDED RELEASE ORAL at 16:27

## 2020-01-01 RX ADMIN — SIMETHICONE 80 MILLIGRAM(S): 80 TABLET, CHEWABLE ORAL at 17:52

## 2020-01-01 RX ADMIN — TAMSULOSIN HYDROCHLORIDE 0.8 MILLIGRAM(S): 0.4 CAPSULE ORAL at 21:40

## 2020-01-01 RX ADMIN — Medication 1: at 17:43

## 2020-01-01 RX ADMIN — ONDANSETRON 4 MILLIGRAM(S): 8 TABLET, FILM COATED ORAL at 17:03

## 2020-01-01 RX ADMIN — ONDANSETRON 4 MILLIGRAM(S): 8 TABLET, FILM COATED ORAL at 10:00

## 2020-01-01 RX ADMIN — Medication 1 APPLICATION(S): at 11:56

## 2020-01-01 RX ADMIN — INSULIN GLARGINE 22 UNIT(S): 100 INJECTION, SOLUTION SUBCUTANEOUS at 22:51

## 2020-01-01 RX ADMIN — MIDODRINE HYDROCHLORIDE 10 MILLIGRAM(S): 2.5 TABLET ORAL at 21:05

## 2020-01-01 RX ADMIN — SODIUM ZIRCONIUM CYCLOSILICATE 5 GRAM(S): 10 POWDER, FOR SUSPENSION ORAL at 10:44

## 2020-01-01 RX ADMIN — MIDODRINE HYDROCHLORIDE 10 MILLIGRAM(S): 2.5 TABLET ORAL at 06:15

## 2020-01-01 RX ADMIN — Medication 31.8 MICROGRAM(S)/KG/MIN: at 06:58

## 2020-01-01 RX ADMIN — ISOSORBIDE MONONITRATE 30 MILLIGRAM(S): 60 TABLET, EXTENDED RELEASE ORAL at 17:35

## 2020-01-01 RX ADMIN — MIDODRINE HYDROCHLORIDE 10 MILLIGRAM(S): 2.5 TABLET ORAL at 21:02

## 2020-01-01 RX ADMIN — APIXABAN 5 MILLIGRAM(S): 2.5 TABLET, FILM COATED ORAL at 05:17

## 2020-01-01 RX ADMIN — SIMETHICONE 80 MILLIGRAM(S): 80 TABLET, CHEWABLE ORAL at 17:02

## 2020-01-01 RX ADMIN — FINASTERIDE 5 MILLIGRAM(S): 5 TABLET, FILM COATED ORAL at 17:35

## 2020-01-01 RX ADMIN — SENNA PLUS 2 TABLET(S): 8.6 TABLET ORAL at 21:05

## 2020-01-01 RX ADMIN — ATORVASTATIN CALCIUM 80 MILLIGRAM(S): 80 TABLET, FILM COATED ORAL at 21:36

## 2020-01-01 RX ADMIN — PANTOPRAZOLE SODIUM 40 MILLIGRAM(S): 20 TABLET, DELAYED RELEASE ORAL at 05:34

## 2020-01-01 RX ADMIN — Medication 8 UNIT(S): at 19:00

## 2020-01-01 RX ADMIN — MIDODRINE HYDROCHLORIDE 10 MILLIGRAM(S): 2.5 TABLET ORAL at 22:26

## 2020-01-01 RX ADMIN — LOSARTAN POTASSIUM 25 MILLIGRAM(S): 100 TABLET, FILM COATED ORAL at 05:26

## 2020-01-01 RX ADMIN — Medication 25.5 MICROGRAM(S)/KG/MIN: at 20:39

## 2020-01-01 RX ADMIN — APIXABAN 2.5 MILLIGRAM(S): 2.5 TABLET, FILM COATED ORAL at 17:10

## 2020-01-01 RX ADMIN — CEFEPIME 100 MILLIGRAM(S): 1 INJECTION, POWDER, FOR SOLUTION INTRAMUSCULAR; INTRAVENOUS at 03:15

## 2020-01-01 RX ADMIN — Medication 1 TABLET(S): at 11:49

## 2020-01-01 RX ADMIN — SIMETHICONE 80 MILLIGRAM(S): 80 TABLET, CHEWABLE ORAL at 14:00

## 2020-01-01 RX ADMIN — MIDODRINE HYDROCHLORIDE 10 MILLIGRAM(S): 2.5 TABLET ORAL at 05:53

## 2020-01-01 RX ADMIN — Medication 3 MILLILITER(S): at 08:08

## 2020-01-01 RX ADMIN — MORPHINE SULFATE 2 MILLIGRAM(S): 50 CAPSULE, EXTENDED RELEASE ORAL at 00:00

## 2020-01-01 RX ADMIN — Medication 2: at 11:41

## 2020-01-01 RX ADMIN — Medication 667 MILLIGRAM(S): at 17:09

## 2020-01-01 RX ADMIN — Medication 1 TABLET(S): at 11:15

## 2020-01-01 RX ADMIN — TAMSULOSIN HYDROCHLORIDE 0.8 MILLIGRAM(S): 0.4 CAPSULE ORAL at 21:47

## 2020-01-01 RX ADMIN — TAMSULOSIN HYDROCHLORIDE 0.4 MILLIGRAM(S): 0.4 CAPSULE ORAL at 21:02

## 2020-01-01 RX ADMIN — Medication 1334 MILLIGRAM(S): at 08:06

## 2020-01-01 RX ADMIN — CHLORHEXIDINE GLUCONATE 1 APPLICATION(S): 213 SOLUTION TOPICAL at 06:43

## 2020-01-01 RX ADMIN — Medication 1 TABLET(S): at 17:36

## 2020-01-01 RX ADMIN — PANTOPRAZOLE SODIUM 40 MILLIGRAM(S): 20 TABLET, DELAYED RELEASE ORAL at 06:36

## 2020-01-01 RX ADMIN — Medication 1: at 17:05

## 2020-01-01 RX ADMIN — MIDODRINE HYDROCHLORIDE 10 MILLIGRAM(S): 2.5 TABLET ORAL at 05:07

## 2020-01-01 RX ADMIN — MORPHINE SULFATE 4 MILLIGRAM(S): 50 CAPSULE, EXTENDED RELEASE ORAL at 02:25

## 2020-01-01 RX ADMIN — HEPARIN SODIUM 5000 UNIT(S): 5000 INJECTION INTRAVENOUS; SUBCUTANEOUS at 22:03

## 2020-01-01 RX ADMIN — Medication 8 UNIT(S): at 17:10

## 2020-01-01 RX ADMIN — FINASTERIDE 5 MILLIGRAM(S): 5 TABLET, FILM COATED ORAL at 11:23

## 2020-01-01 RX ADMIN — SENNA PLUS 1 TABLET(S): 8.6 TABLET ORAL at 21:14

## 2020-01-01 RX ADMIN — AZITHROMYCIN 255 MILLIGRAM(S): 500 TABLET, FILM COATED ORAL at 04:02

## 2020-01-01 RX ADMIN — SIMETHICONE 80 MILLIGRAM(S): 80 TABLET, CHEWABLE ORAL at 05:00

## 2020-01-01 RX ADMIN — Medication 1 APPLICATION(S): at 11:45

## 2020-01-01 RX ADMIN — Medication 81 MILLIGRAM(S): at 11:56

## 2020-01-01 RX ADMIN — Medication 667 MILLIGRAM(S): at 11:25

## 2020-01-01 RX ADMIN — CHLORHEXIDINE GLUCONATE 1 APPLICATION(S): 213 SOLUTION TOPICAL at 05:52

## 2020-01-01 RX ADMIN — Medication 250 MILLIGRAM(S): at 03:15

## 2020-01-01 RX ADMIN — HEPARIN SODIUM 5000 UNIT(S): 5000 INJECTION INTRAVENOUS; SUBCUTANEOUS at 18:38

## 2020-01-01 RX ADMIN — PANTOPRAZOLE SODIUM 40 MILLIGRAM(S): 20 TABLET, DELAYED RELEASE ORAL at 06:39

## 2020-01-01 RX ADMIN — Medication 1: at 17:02

## 2020-01-01 RX ADMIN — Medication 667 MILLIGRAM(S): at 11:52

## 2020-01-01 RX ADMIN — SIMETHICONE 80 MILLIGRAM(S): 80 TABLET, CHEWABLE ORAL at 06:15

## 2020-01-01 RX ADMIN — ONDANSETRON 4 MILLIGRAM(S): 8 TABLET, FILM COATED ORAL at 07:58

## 2020-01-01 RX ADMIN — MEROPENEM 100 MILLIGRAM(S): 1 INJECTION INTRAVENOUS at 06:21

## 2020-01-01 RX ADMIN — INSULIN GLARGINE 22 UNIT(S): 100 INJECTION, SOLUTION SUBCUTANEOUS at 21:36

## 2020-01-01 RX ADMIN — Medication 1: at 16:33

## 2020-01-01 RX ADMIN — PANTOPRAZOLE SODIUM 40 MILLIGRAM(S): 20 TABLET, DELAYED RELEASE ORAL at 06:14

## 2020-01-01 RX ADMIN — Medication 1 TABLET(S): at 10:45

## 2020-01-01 RX ADMIN — MORPHINE SULFATE 2 MILLIGRAM(S): 50 CAPSULE, EXTENDED RELEASE ORAL at 06:00

## 2020-01-01 RX ADMIN — Medication 25 MILLIGRAM(S): at 05:17

## 2020-01-01 RX ADMIN — SODIUM CHLORIDE 500 MILLILITER(S): 9 INJECTION INTRAMUSCULAR; INTRAVENOUS; SUBCUTANEOUS at 19:15

## 2020-01-01 RX ADMIN — APIXABAN 2.5 MILLIGRAM(S): 2.5 TABLET, FILM COATED ORAL at 17:04

## 2020-01-01 RX ADMIN — APIXABAN 2.5 MILLIGRAM(S): 2.5 TABLET, FILM COATED ORAL at 18:54

## 2020-01-01 RX ADMIN — MIDODRINE HYDROCHLORIDE 10 MILLIGRAM(S): 2.5 TABLET ORAL at 13:44

## 2020-01-01 RX ADMIN — APIXABAN 2.5 MILLIGRAM(S): 2.5 TABLET, FILM COATED ORAL at 06:15

## 2020-01-01 RX ADMIN — Medication 3: at 18:03

## 2020-01-01 RX ADMIN — MORPHINE SULFATE 2 MILLIGRAM(S): 50 CAPSULE, EXTENDED RELEASE ORAL at 20:00

## 2020-01-01 RX ADMIN — Medication 1334 MILLIGRAM(S): at 08:24

## 2020-01-01 RX ADMIN — Medication 1: at 16:51

## 2020-01-01 RX ADMIN — Medication 81 MILLIGRAM(S): at 11:13

## 2020-01-01 RX ADMIN — Medication 667 MILLIGRAM(S): at 17:13

## 2020-01-01 RX ADMIN — SIMETHICONE 80 MILLIGRAM(S): 80 TABLET, CHEWABLE ORAL at 05:57

## 2020-01-01 RX ADMIN — POLYETHYLENE GLYCOL 3350 17 GRAM(S): 17 POWDER, FOR SOLUTION ORAL at 05:16

## 2020-01-01 RX ADMIN — INSULIN GLARGINE 22 UNIT(S): 100 INJECTION, SOLUTION SUBCUTANEOUS at 22:25

## 2020-01-01 RX ADMIN — Medication 3 MILLILITER(S): at 19:54

## 2020-01-01 RX ADMIN — SIMETHICONE 80 MILLIGRAM(S): 80 TABLET, CHEWABLE ORAL at 17:10

## 2020-01-01 RX ADMIN — PANTOPRAZOLE SODIUM 40 MILLIGRAM(S): 20 TABLET, DELAYED RELEASE ORAL at 07:05

## 2020-01-01 RX ADMIN — CHLORHEXIDINE GLUCONATE 1 APPLICATION(S): 213 SOLUTION TOPICAL at 05:32

## 2020-01-01 RX ADMIN — SIMETHICONE 80 MILLIGRAM(S): 80 TABLET, CHEWABLE ORAL at 11:53

## 2020-01-01 RX ADMIN — MIDODRINE HYDROCHLORIDE 10 MILLIGRAM(S): 2.5 TABLET ORAL at 15:57

## 2020-01-01 RX ADMIN — PANTOPRAZOLE SODIUM 40 MILLIGRAM(S): 20 TABLET, DELAYED RELEASE ORAL at 11:37

## 2020-01-01 RX ADMIN — Medication 667 MILLIGRAM(S): at 08:33

## 2020-01-01 RX ADMIN — Medication 667 MILLIGRAM(S): at 07:04

## 2020-01-01 RX ADMIN — Medication 8 UNIT(S): at 17:23

## 2020-01-01 RX ADMIN — ATORVASTATIN CALCIUM 80 MILLIGRAM(S): 80 TABLET, FILM COATED ORAL at 21:40

## 2020-01-01 RX ADMIN — Medication 25 MILLIGRAM(S): at 05:26

## 2020-01-01 RX ADMIN — Medication 1 ENEMA: at 12:31

## 2020-01-01 RX ADMIN — Medication 1 APPLICATION(S): at 13:25

## 2020-01-01 RX ADMIN — Medication 667 MILLIGRAM(S): at 07:57

## 2020-01-01 RX ADMIN — MIDODRINE HYDROCHLORIDE 10 MILLIGRAM(S): 2.5 TABLET ORAL at 15:13

## 2020-01-01 RX ADMIN — Medication 1334 MILLIGRAM(S): at 11:44

## 2020-01-01 RX ADMIN — HEPARIN SODIUM 5000 UNIT(S): 5000 INJECTION INTRAVENOUS; SUBCUTANEOUS at 05:34

## 2020-01-01 RX ADMIN — SIMETHICONE 80 MILLIGRAM(S): 80 TABLET, CHEWABLE ORAL at 17:35

## 2020-01-01 RX ADMIN — TAMSULOSIN HYDROCHLORIDE 0.8 MILLIGRAM(S): 0.4 CAPSULE ORAL at 21:28

## 2020-01-01 RX ADMIN — HEPARIN SODIUM 5000 UNIT(S): 5000 INJECTION INTRAVENOUS; SUBCUTANEOUS at 06:38

## 2020-01-01 RX ADMIN — SIMETHICONE 80 MILLIGRAM(S): 80 TABLET, CHEWABLE ORAL at 01:19

## 2020-01-01 RX ADMIN — Medication 81 MILLIGRAM(S): at 11:38

## 2020-01-01 RX ADMIN — SENNA PLUS 2 TABLET(S): 8.6 TABLET ORAL at 21:40

## 2020-01-01 RX ADMIN — SIMETHICONE 80 MILLIGRAM(S): 80 TABLET, CHEWABLE ORAL at 11:37

## 2020-01-01 RX ADMIN — Medication 667 MILLIGRAM(S): at 17:11

## 2020-01-01 RX ADMIN — Medication 667 MILLIGRAM(S): at 09:39

## 2020-01-01 RX ADMIN — MORPHINE SULFATE 2 MILLIGRAM(S): 50 CAPSULE, EXTENDED RELEASE ORAL at 12:00

## 2020-01-01 RX ADMIN — MEROPENEM 100 MILLIGRAM(S): 1 INJECTION INTRAVENOUS at 12:31

## 2020-01-01 RX ADMIN — MIDODRINE HYDROCHLORIDE 10 MILLIGRAM(S): 2.5 TABLET ORAL at 13:25

## 2020-01-01 RX ADMIN — Medication 8 UNIT(S): at 12:10

## 2020-01-01 RX ADMIN — MIDODRINE HYDROCHLORIDE 10 MILLIGRAM(S): 2.5 TABLET ORAL at 21:36

## 2020-01-01 RX ADMIN — MIDODRINE HYDROCHLORIDE 10 MILLIGRAM(S): 2.5 TABLET ORAL at 21:47

## 2020-01-01 RX ADMIN — Medication 8 UNIT(S): at 11:57

## 2020-01-01 RX ADMIN — Medication 81 MILLIGRAM(S): at 11:43

## 2020-01-01 RX ADMIN — MORPHINE SULFATE 1 MILLIGRAM(S): 50 CAPSULE, EXTENDED RELEASE ORAL at 10:00

## 2020-01-01 RX ADMIN — Medication 1: at 11:57

## 2020-01-01 RX ADMIN — SIMETHICONE 80 MILLIGRAM(S): 80 TABLET, CHEWABLE ORAL at 13:54

## 2020-01-01 RX ADMIN — Medication 2000 UNIT(S): at 17:34

## 2020-01-01 RX ADMIN — Medication 3 MILLILITER(S): at 19:34

## 2020-01-01 RX ADMIN — IOHEXOL 30 MILLILITER(S): 300 INJECTION, SOLUTION INTRAVENOUS at 02:36

## 2020-01-01 RX ADMIN — Medication 1 TABLET(S): at 11:53

## 2020-01-01 RX ADMIN — MIDODRINE HYDROCHLORIDE 10 MILLIGRAM(S): 2.5 TABLET ORAL at 13:00

## 2020-01-01 RX ADMIN — Medication 1 MILLIGRAM(S): at 00:08

## 2020-01-01 RX ADMIN — Medication 8 UNIT(S): at 08:01

## 2020-01-01 RX ADMIN — Medication 667 MILLIGRAM(S): at 17:10

## 2020-01-01 RX ADMIN — Medication 1334 MILLIGRAM(S): at 11:50

## 2020-01-01 RX ADMIN — ATORVASTATIN CALCIUM 80 MILLIGRAM(S): 80 TABLET, FILM COATED ORAL at 22:01

## 2020-01-01 RX ADMIN — Medication 2: at 17:12

## 2020-01-01 RX ADMIN — HYDROMORPHONE HYDROCHLORIDE 0.5 MILLIGRAM(S): 2 INJECTION INTRAMUSCULAR; INTRAVENOUS; SUBCUTANEOUS at 19:21

## 2020-01-01 RX ADMIN — ATORVASTATIN CALCIUM 80 MILLIGRAM(S): 80 TABLET, FILM COATED ORAL at 21:23

## 2020-01-01 RX ADMIN — INSULIN GLARGINE 10 UNIT(S): 100 INJECTION, SOLUTION SUBCUTANEOUS at 21:22

## 2020-01-01 RX ADMIN — Medication 667 MILLIGRAM(S): at 11:22

## 2020-01-01 RX ADMIN — Medication 1 MILLIGRAM(S): at 22:06

## 2020-01-01 RX ADMIN — HEPARIN SODIUM 5000 UNIT(S): 5000 INJECTION INTRAVENOUS; SUBCUTANEOUS at 13:30

## 2020-01-01 RX ADMIN — TAMSULOSIN HYDROCHLORIDE 0.8 MILLIGRAM(S): 0.4 CAPSULE ORAL at 21:36

## 2020-01-01 RX ADMIN — Medication 1: at 11:26

## 2020-01-01 RX ADMIN — APIXABAN 2.5 MILLIGRAM(S): 2.5 TABLET, FILM COATED ORAL at 05:33

## 2020-01-01 RX ADMIN — Medication 2000 UNIT(S): at 11:23

## 2020-01-01 RX ADMIN — Medication 650 MILLIGRAM(S): at 21:19

## 2020-01-01 RX ADMIN — Medication 667 MILLIGRAM(S): at 08:20

## 2020-01-01 RX ADMIN — HYDROMORPHONE HYDROCHLORIDE 0.5 MILLIGRAM(S): 2 INJECTION INTRAMUSCULAR; INTRAVENOUS; SUBCUTANEOUS at 23:11

## 2020-01-01 RX ADMIN — APIXABAN 2.5 MILLIGRAM(S): 2.5 TABLET, FILM COATED ORAL at 05:25

## 2020-01-01 RX ADMIN — Medication 1334 MILLIGRAM(S): at 07:59

## 2020-01-01 RX ADMIN — TAMSULOSIN HYDROCHLORIDE 0.8 MILLIGRAM(S): 0.4 CAPSULE ORAL at 21:14

## 2020-01-01 RX ADMIN — Medication 1 APPLICATION(S): at 11:37

## 2020-01-01 RX ADMIN — Medication 25 MILLIGRAM(S): at 18:21

## 2020-01-01 RX ADMIN — HEPARIN SODIUM 5000 UNIT(S): 5000 INJECTION INTRAVENOUS; SUBCUTANEOUS at 17:06

## 2020-01-01 RX ADMIN — MIDODRINE HYDROCHLORIDE 10 MILLIGRAM(S): 2.5 TABLET ORAL at 05:33

## 2020-01-01 RX ADMIN — Medication 2: at 08:32

## 2020-01-01 RX ADMIN — SIMETHICONE 80 MILLIGRAM(S): 80 TABLET, CHEWABLE ORAL at 05:25

## 2020-01-01 RX ADMIN — Medication 200 MILLIGRAM(S): at 19:04

## 2020-01-01 RX ADMIN — MORPHINE SULFATE 1 MG/HR: 50 CAPSULE, EXTENDED RELEASE ORAL at 21:09

## 2020-01-01 RX ADMIN — MIDODRINE HYDROCHLORIDE 10 MILLIGRAM(S): 2.5 TABLET ORAL at 21:23

## 2020-01-01 RX ADMIN — Medication 1 TABLET(S): at 11:38

## 2020-01-01 RX ADMIN — Medication 1334 MILLIGRAM(S): at 14:01

## 2020-01-01 RX ADMIN — TAMSULOSIN HYDROCHLORIDE 0.4 MILLIGRAM(S): 0.4 CAPSULE ORAL at 21:23

## 2020-01-01 RX ADMIN — Medication 1: at 12:12

## 2020-01-01 RX ADMIN — HEPARIN SODIUM 5000 UNIT(S): 5000 INJECTION INTRAVENOUS; SUBCUTANEOUS at 17:09

## 2020-01-01 RX ADMIN — Medication 2: at 11:43

## 2020-01-01 RX ADMIN — SIMETHICONE 80 MILLIGRAM(S): 80 TABLET, CHEWABLE ORAL at 23:57

## 2020-01-01 RX ADMIN — MEROPENEM 100 MILLIGRAM(S): 1 INJECTION INTRAVENOUS at 10:08

## 2020-01-01 RX ADMIN — Medication 1 APPLICATION(S): at 11:14

## 2020-01-01 RX ADMIN — SIMETHICONE 80 MILLIGRAM(S): 80 TABLET, CHEWABLE ORAL at 06:01

## 2020-01-01 RX ADMIN — INSULIN GLARGINE 22 UNIT(S): 100 INJECTION, SOLUTION SUBCUTANEOUS at 21:34

## 2020-01-01 RX ADMIN — SENNA PLUS 2 TABLET(S): 8.6 TABLET ORAL at 22:03

## 2020-01-01 RX ADMIN — Medication 667 MILLIGRAM(S): at 08:22

## 2020-01-01 RX ADMIN — Medication 2: at 12:10

## 2020-01-01 RX ADMIN — TRAMADOL HYDROCHLORIDE 25 MILLIGRAM(S): 50 TABLET ORAL at 00:12

## 2020-01-01 RX ADMIN — APIXABAN 2.5 MILLIGRAM(S): 2.5 TABLET, FILM COATED ORAL at 05:53

## 2020-01-01 RX ADMIN — INSULIN GLARGINE 22 UNIT(S): 100 INJECTION, SOLUTION SUBCUTANEOUS at 22:03

## 2020-01-01 RX ADMIN — SENNA PLUS 1 TABLET(S): 8.6 TABLET ORAL at 21:28

## 2020-01-01 RX ADMIN — SIMETHICONE 80 MILLIGRAM(S): 80 TABLET, CHEWABLE ORAL at 23:04

## 2020-01-01 RX ADMIN — SENNA PLUS 2 TABLET(S): 8.6 TABLET ORAL at 21:28

## 2020-01-01 RX ADMIN — APIXABAN 5 MILLIGRAM(S): 2.5 TABLET, FILM COATED ORAL at 17:14

## 2020-01-01 RX ADMIN — Medication 1: at 06:24

## 2020-01-01 RX ADMIN — INSULIN GLARGINE 22 UNIT(S): 100 INJECTION, SOLUTION SUBCUTANEOUS at 22:07

## 2020-01-01 RX ADMIN — MEROPENEM 100 MILLIGRAM(S): 1 INJECTION INTRAVENOUS at 11:46

## 2020-01-01 RX ADMIN — Medication 6 UNIT(S): at 08:47

## 2020-01-01 RX ADMIN — PANTOPRAZOLE SODIUM 40 MILLIGRAM(S): 20 TABLET, DELAYED RELEASE ORAL at 08:06

## 2020-01-01 RX ADMIN — MIDODRINE HYDROCHLORIDE 10 MILLIGRAM(S): 2.5 TABLET ORAL at 22:01

## 2020-01-01 RX ADMIN — TAMSULOSIN HYDROCHLORIDE 0.8 MILLIGRAM(S): 0.4 CAPSULE ORAL at 21:27

## 2020-01-01 RX ADMIN — Medication 3 MILLILITER(S): at 07:36

## 2020-01-01 RX ADMIN — MEROPENEM 100 MILLIGRAM(S): 1 INJECTION INTRAVENOUS at 09:47

## 2020-01-01 RX ADMIN — SIMETHICONE 160 MILLIGRAM(S): 80 TABLET, CHEWABLE ORAL at 11:48

## 2020-01-01 RX ADMIN — APIXABAN 2.5 MILLIGRAM(S): 2.5 TABLET, FILM COATED ORAL at 18:16

## 2020-01-01 RX ADMIN — Medication 667 MILLIGRAM(S): at 17:34

## 2020-01-01 RX ADMIN — Medication 667 MILLIGRAM(S): at 17:38

## 2020-01-01 RX ADMIN — SENNA PLUS 2 TABLET(S): 8.6 TABLET ORAL at 21:04

## 2020-01-01 RX ADMIN — Medication 81 MILLIGRAM(S): at 14:20

## 2020-01-01 RX ADMIN — Medication 1334 MILLIGRAM(S): at 17:35

## 2020-01-01 RX ADMIN — MORPHINE SULFATE 2 MILLIGRAM(S): 50 CAPSULE, EXTENDED RELEASE ORAL at 20:15

## 2020-01-01 RX ADMIN — ATORVASTATIN CALCIUM 80 MILLIGRAM(S): 80 TABLET, FILM COATED ORAL at 21:03

## 2020-01-01 RX ADMIN — TAMSULOSIN HYDROCHLORIDE 0.8 MILLIGRAM(S): 0.4 CAPSULE ORAL at 21:02

## 2020-01-01 RX ADMIN — HEPARIN SODIUM 5000 UNIT(S): 5000 INJECTION INTRAVENOUS; SUBCUTANEOUS at 21:34

## 2020-01-01 RX ADMIN — Medication 8 UNIT(S): at 17:43

## 2020-01-01 RX ADMIN — MIDODRINE HYDROCHLORIDE 10 MILLIGRAM(S): 2.5 TABLET ORAL at 05:05

## 2020-01-01 RX ADMIN — HEPARIN SODIUM 5000 UNIT(S): 5000 INJECTION INTRAVENOUS; SUBCUTANEOUS at 05:12

## 2020-01-01 RX ADMIN — INSULIN GLARGINE 16 UNIT(S): 100 INJECTION, SOLUTION SUBCUTANEOUS at 21:14

## 2020-01-01 RX ADMIN — MIDODRINE HYDROCHLORIDE 10 MILLIGRAM(S): 2.5 TABLET ORAL at 14:02

## 2020-01-01 RX ADMIN — APIXABAN 2.5 MILLIGRAM(S): 2.5 TABLET, FILM COATED ORAL at 17:03

## 2020-01-01 RX ADMIN — Medication 1: at 08:18

## 2020-01-01 RX ADMIN — Medication 667 MILLIGRAM(S): at 12:32

## 2020-01-15 PROBLEM — I50.20 SYSTOLIC CHF WITH REDUCED LEFT VENTRICULAR FUNCTION, NYHA CLASS 2: Status: ACTIVE | Noted: 2017-09-25

## 2020-01-15 PROBLEM — Z45.02 ENCOUNTER FOR INTERROGATION OF CARDIAC DEFIBRILLATOR: Status: ACTIVE | Noted: 2019-07-10

## 2020-01-15 PROBLEM — I44.39 HIGH DEGREE ATRIOVENTRICULAR BLOCK: Status: ACTIVE | Noted: 2019-07-10

## 2020-01-15 NOTE — REASON FOR VISIT
[Follow-up Device Check] : follow-up device check visit [___ Device Check] : [unfilled] device check [Family Member] : family member [Other: _____] : [unfilled]

## 2020-01-15 NOTE — HISTORY OF PRESENT ILLNESS
[de-identified] : \par Cardiologist: Dr. Acosta\par \par 68 yo M h/o CAD s/p PCI to LAD, RCA, HFrEF , ESRD on HD, with admission in Nov 2018 for severe bradycardia while at hemodialysis. Patient was found to have high degree AV block with anticipated pacing > 40%. A BiV ICD was attempted but the CS was unable to be cannulated. Repeat echo with EF 26%. \par \par On previous device interrogation noted to have <1% AT/AF burden. OAC discussed, family opted against anticoagulation because he bruises easily with aspirin and plavix. He returns for routine follow up visit today. Patient denies CP/SOB or palpitations. He has occasional LOUIS. Denies dizziness, lightheadedness, presyncope, syncope or AICD shocks. \par

## 2020-01-15 NOTE — PHYSICAL EXAM
[General Appearance - Well Developed] : well developed [Normal Appearance] : normal appearance [Well Groomed] : well groomed [General Appearance - Well Nourished] : well nourished [No Deformities] : no deformities [General Appearance - In No Acute Distress] : no acute distress [Heart Rate And Rhythm] : heart rate and rhythm were normal [Heart Sounds] : normal S1 and S2 [Murmurs] : no murmurs present [] : no respiratory distress [Respiration, Rhythm And Depth] : normal respiratory rhythm and effort [Exaggerated Use Of Accessory Muscles For Inspiration] : no accessory muscle use [Auscultation Breath Sounds / Voice Sounds] : lungs were clear to auscultation bilaterally [Right Infraclavicular] : right infraclavicular area [Well-Healed] : well-healed [Bowel Sounds] : normal bowel sounds [Abdomen Soft] : soft [Nail Clubbing] : no clubbing of the fingernails [Cyanosis, Localized] : no localized cyanosis [Erythema] : not erythematous [Warm] : not warm

## 2020-01-15 NOTE — OBJECTIVE
[History reviewed] : History reviewed. [Medications and Allergies reviewed] : Medications and allergies reviewed. Burow's Advancement Flap Text: The defect edges were debeveled with a #15 scalpel blade.  Given the location of the defect and the proximity to free margins a Burow's advancement flap was deemed most appropriate.  Using a sterile surgical marker, the appropriate advancement flap was drawn incorporating the defect and placing the expected incisions within the relaxed skin tension lines where possible.    The area thus outlined was incised deep to adipose tissue with a #15 scalpel blade.  The skin margins were undermined to an appropriate distance in all directions utilizing iris scissors.

## 2020-01-15 NOTE — ASSESSMENT
[FreeTextEntry1] : Mr. Hilton is a 70 yo M with history of CAD s/p PCI, DM, HTN, ESRD on HD, systolic heart failure and CHB s/p DC ICD (BiV ICD was attempted but CS could not be cannulated). Patient had brief episodes of subclinical Paroxysmal AT/ Atrial flutter again on routine device interrogation. This was previously noted on in-office device interrogations. Given his CHADS VASc score of at least 5 (CHF, HTN, Age, DM, CAD), his estimated yearly stroke risk is 6.7% and his Has BLED is 3, with estimated yearly bleeding risk of 3.74%.  Anticoagulation was discussed in detail with patient and family and risks and benefits were explained. After further discussion with the patient's cardiologist (Dr. Acosta), a decision was made to start the patient on Eliquis 5mg PO BID and to discontinue his Plavix. I have sent him for routine bloodwork that he will obtain at dialysis on Fri and also provided a script for bloodwork to be done again in 2 weeks. He will follow up with our NP in 2 weeks. \par \par I have also informed Dr. Acosta of patient's NSVT. Patient was recently offered a cath in Dec, but opted to wait to repeat it. \par \par He is enrolled in remote monitoring and should follow up with me in another 6 months. I have also advised the patient to go to the nearest emergency room if he experiences any chest pain, dyspnea, syncope, or has any other compelling symptoms. I have discussed the plan with the patient, his daughter and Dr. Acosta. \par

## 2020-01-15 NOTE — REVIEW OF SYSTEMS
[see HPI] : see HPI [Lower Ext Edema] : lower extremity edema [Easy Bruising] : a tendency for easy bruising [Negative] : Endocrine

## 2020-01-15 NOTE — PROCEDURE
[ICD] : Implantable cardioverter-defibrillator [Complete Heart Block] : complete heart block [DDD] : DDD [Longevity: ___ months] : The estimated remaining battery life is [unfilled] months [Charge Time: ___ sec] : charge time was [unfilled] seconds [Threshold Testing Performed] : Threshold testing was performed [Normal] : The battery status is normal. [Sensing Amplitude ___mv] : sensing amplitude was [unfilled] mv [Lead Imp:  ___ohms] : lead impedance was [unfilled] ohms [___ ms] : [unfilled] ms [___V @] : [unfilled] V [Asense-Vsense ___ %] : Asense-Vsense [unfilled]% [Counters Reset] : the counters were reset [Asense-Vpace ___ %] : Asense-Vpace [unfilled]% [Apace-Vsense ___ %] : Apace-Vsense [unfilled]% [Apace-Vpace ___ %] : Apace-Vpace [unfilled]% [Programmed for Longevity] : output reprogrammed for improved battery longevity [de-identified] : [facility] [facility]  [de-identified] : 5400-90 [de-identified] : [id number] [de-identified] : 10/15/2018 [de-identified] : 60 [de-identified] : AV delays changed from 225 to 200 ms [de-identified] : 2 episodes of NSVT longest duration 8 seconds @ 205 bpm on 11/15/2019 (printed). \par 20 AMS episodes longest 46 minutes 20 seconds on 1/5/2020 (printed). \par CorVue is stable. \par Normal function of device system.

## 2020-02-05 NOTE — PHYSICAL EXAM
[General Appearance - Well Developed] : well developed [General Appearance - Well Nourished] : well nourished [No Deformities] : no deformities [Normal Oral Mucosa] : normal oral mucosa [JVD Elevated _____cm] : JVD elevated [unfilled] ~U cm above clavicle [Normal Jugular Venous A Waves Present] : normal jugular venous A waves present [Normal Oropharynx] : normal oropharynx [Normal Jugular Venous V Waves Present] : normal jugular venous V waves present [No Jugular Venous Jara A Waves] : no jugular venous jara A waves [Respiration, Rhythm And Depth] : normal respiratory rhythm and effort [Auscultation Breath Sounds / Voice Sounds] : lungs were clear to auscultation bilaterally [Bowel Sounds] : normal bowel sounds [Abdomen Tenderness] : non-tender [Abdomen Soft] : soft [Nail Clubbing] : no clubbing of the fingernails [Abnormal Walk] : normal gait [Cyanosis, Localized] : no localized cyanosis [Petechial Hemorrhages (___cm)] : no petechial hemorrhages [Nail Splinter Hemorrhages] : no splinter hemorrhages of the nails [Skin Color & Pigmentation] : normal skin color and pigmentation [] : no rash [Skin Turgor] : normal skin turgor [Oriented To Time, Place, And Person] : oriented to person, place, and time [Affect] : the affect was normal [Impaired Insight] : insight and judgment were intact [No Anxiety] : not feeling anxious [Mood] : the mood was normal

## 2020-02-06 NOTE — ASSESSMENT
[FreeTextEntry1] : Mr. Hilton is a 68 yo M with history of CAD s/p PCI, DM, HTN, ESRD on HD, systolic heart failure and CHB s/p DC ICD (BiV ICD was attempted but CS could not be cannulated). Patient had brief episodes of subclinical Paroxysmal AT/ Atrial flutter again on routine device interrogation.  Given his CHADS VASc score of at least 5 (CHF, HTN, Age, DM, CAD), his estimated yearly stroke risk is 6.7% and his Has BLED is 3, with estimated yearly bleeding risk of 3.74%.  Anticoagulation was discussed in detail with patient and family and risks and benefits were explained. He agreed to start Eliquis 5 mg BID along with ASA 81 mg .  Plavix discontinued. \par \par He reports c/w meds. \par Had 3-4 episodes of mild epistaxis since he started taking Eliquis. \par Risks vs benefits of anticoagulation discussed : bleeding vs stroke . \par Discussed an option to change Eliquis to Warfarin . He declined , would like to continue Eliquis for now . \par Tips to prevent nosebleed reviewed : use OTC nasal saline spray and Vaseline to help moisten dry nasal membranes (he uses oxygen via nc at night) , use a humidifier at night and gently blow his nose.\par Educated on modifiable bleeding risk factors such as correct dosage of Eliquis , frequency, adherence, to avoid  concomitant use of over-the -counter nonsteroidal anti-inflammatory medications, excessive alcohol intake, and compliance with antihypertensive meds to control blood pressure.\par Potential  complications associated with AC such as bleeding , signs and symptoms, when to call office and when to seek immediate medical attention/ ER visit  discussed in great  details. \par Fall prevention, to avoid hazardous activities reinforced. \par Patient and daughter verbalized full understanding , all  their questions were answered . \par \par Labs reviewed (2/1/2020) : Hg 12.4 (previous 12.2 on 1/18/2020 ) Cr 4.79 ( on HD) . LFTs- WNL . \par \par Plan \par -Continue Elqiuis 5 mg BID ( age <80, weight >60 kg) \par -Continue other medications as prescribed \par -Referred to ENT \par -F/U with urologists   \par -F/U with PCP/ cardio/Dr. Acosta as scheduled \par -HD as scheduled \par -RTO as scheduled with DR. Landrum \par \par \par

## 2020-02-06 NOTE — HISTORY OF PRESENT ILLNESS
[FreeTextEntry1] : Cardiologist: Dr. Acosta\par \par \par 70 yo M h/o CAD s/p PCI to LAD, RCA, HFrEF , ESRD on HD, with admission in Nov 2018 for severe bradycardia while at hemodialysis. Patient was found to have high degree AV block with anticipated pacing > 40%. A BiV ICD was attempted but the CS was unable to be cannulated. Repeat echo with EF 26%. \par \par Seen on 1/15/2020 . Subclinical paroxysmal Afib on device interrogation . AC/Eliquis initiated , he returns for follow up . \par Patient denies CP/SOB or palpitations. He has occasional LOUIS. Denies dizziness, lightheadedness, presyncope, syncope or AICD shocks. Reports noticing recurrent nose bleed, he was able to stop bleeding with ice and holding nostrils below the bridge of the nose . He denies profuse nose bleed requiring  ER visits . Reports noticing dark urine yesterday, no magdy blood . Denies dark stool . \par ECG ( 2/5/2020) :  SR at 83 bpm , RV paced rhythm \par

## 2020-06-19 NOTE — DISCHARGE NOTE ADULT - %
I reviewed the H&P, I examined the patient, and there are no changes in the patient's condition.   26

## 2020-07-27 NOTE — PROGRESS NOTE ADULT - ASSESSMENT
67 yo man with PMH of CAD/ CHF ESRD on HD ([facility]) DM presenting with Complete heart block sp transvenous PM   ·	ESRD on HD for HD on Monday first shift   ·	no need for urgent HD now   ·	on lasix metolazone continue  ·	complete heart block sp transvenous PM   ·	on beta blocker and IMdur / meds as per cardio  ·	for PPM in AM by EPS     will follow This document is complete and the patient is ready for discharge.

## 2020-08-17 NOTE — CONSULT NOTE ADULT - ASSESSMENT
Volume overload secondary to ESRD  ESRD on HD M/W/F  CHFrEF s/p AICD  COPD on home o2      Plan:  HD today  repeat Xray chest in am Volume overload likely secondary to ESRD  ESRD on HD M/W/F  CHFrEF s/p AICD  COPD on home o2  Hyperkalemia      Plan:  HD today, remove 4 ltr if tolerate  repeat Xray chest in am  optimize cardiopulmonary status  repeat BMP in am  will follow Volume overload likely secondary to ESRD  ESRD on HD M/W/F  CHFrEF s/p AICD  COPD on home o2  Hyperkalemia    Plan:  HD today, remove 4 ltr if tolerate  repeat Xray chest in am  optimize cardiopulmonary status  repeat BMP in am  will follow    Attending attestation:  Saw and examined patient.    69M, PMH of ESRD on HD MWF, COPD, HFrEF, admitted for SOB.      #ESRD on HD  - HD today, UF 4L as tolerated, standard bath, through LUE AVF.  Consent obtained  - check phos level, on calcium acetate  - pt anuric- d/c diuretics  - hgb noted, no YASMINE  - low K, low Na, low phos diet     #SOB - EKG without acute ischemic changes, trend trop, ?thoracentesis, appreciate pulm/cards f/u Volume overload likely secondary to ESRD  ESRD on HD M/W/F  CHFrEF s/p AICD  COPD on home o2  Hyperkalemia    Plan:  HD today, remove 4 ltr if tolerate  repeat Xray chest in am  optimize cardiopulmonary status  repeat BMP in am      Attending attestation:  Saw and examined patient.    69M, PMH of ESRD on HD MWF, COPD, HFrEF, admitted for SOB.      #ESRD on HD  - HD today, UF 4L as tolerated, standard bath, through LUE AVF.  Consent obtained  - check phos level, on calcium acetate  - pt anuric- d/c diuretics  - hgb noted, iron stores/YASMINE if dropping  - low K, low Na, low phos diet     #SOB - EKG without acute ischemic changes, trend trop, ?thoracentesis, appreciate pulm/cards f/u

## 2020-08-17 NOTE — ED PROVIDER NOTE - OBJECTIVE STATEMENT
68 y/o M PMH of ESRD on HD MWF, CAD s/p pci with 7 stents and AICD placement in 2018, Heart failure with reduced EF (26% in 2018), DLD, HTN, COPD? on 2L home O2, DM II, BPH presenting to ED for shortness of breath worse over the past 2 weeks, patient states shortness of breath when walking 5-6 steps and getting winded, patient has home O2 increased from  2 to 3 liters, states he had prior pericardial effusion requiring pericardial window and prior pleural effusion that had been drained. denies fevers/chills, admits to dry cough, no chest pain.

## 2020-08-17 NOTE — CONSULT NOTE ADULT - ASSESSMENT
Impression:  chronic right sided effusion now with b/l effusion likely due to heart failure  volume overload  [place] non compliant with CPAP    plan:  s/p emergent HD 3.5 liters removed  repeat cxr tomorrow in AM  diuresis if at all possible (states he makes small amount of urine still)  hold eliquis for possible thoracentesis, heparin drip if full a/c needed  NIV at night and prn  can do cpap with 9 mmHg or Bipap 12/6  will follow Impression:  chronic right sided effusion now with b/l effusion likely due to heart failure/ Fluid overload poorly compliant as op  HO [place] non compliant with CPAP    plan:  s/p emergent HD 3.5 liters removed  repeat cxr tomorrow in AM  hold eliquis for possible thoracentesis, heparin drip if full a/c needed  NIV at night and prn  can do cpap with 9 mmHg or Bipap 12/6  will follow

## 2020-08-17 NOTE — ED PROVIDER NOTE - ATTENDING CONTRIBUTION TO CARE
68 yo M pmh as stated in chart pw sob. Progressively worsening sob x2 weeks, requiring more oxygen. Missed dialysis today. No cp, no fever/chills, no leg swelling, no n/v, no abd pain, no urinary sx, no headache, no vision change, no palpitations.     CONSTITUTIONAL: Well-developed; well-nourished; in no acute distress. Sitting up and providing appropriate history and physical examination  SKIN: skin exam is warm and dry, no acute rash.  HEAD: Normocephalic; atraumatic.  EYES: PERRL, 3 mm bilateral, no nystagmus, EOM intact; conjunctiva and sclera clear.  ENT: No nasal discharge; airway clear.  NECK: Supple; non tender. + full passive ROM in all directions. No JVD  CARD: S1, S2 normal; no murmurs, gallops, or rubs. Regular rate and rhythm. + Symmetric Strong Pulses  RESP: +decreased breath sounds at bases. No wheezes, rales or rhonchi.   ABD: soft; non-distended; non-tender. No Rebound, No Guarding, No signs of peritonitis, No CVA tenderness. No pulsatile abdominal mass. + Strong and Symmetric Pulses  EXT: AV fistula with palpable thrill over L arm. Normal ROM. No clubbing, cyanosis or edema. Dp and Pt Pulses intact. Cap refill less than 3 seconds  NEURO: CN 2-12 intact, normal finger to nose, normal romberg, stable gait, no sensory or motor deficits, Alert, oriented, grossly unremarkable. No Focal deficits. GCS 15. NIH 0  PSYCH: Cooperative, appropriate.

## 2020-08-17 NOTE — CONSULT NOTE ADULT - SUBJECTIVE AND OBJECTIVE BOX
NEPHROLOGY CONSULTATION NOTE    THIS CONSULT IS INCOMPLETE / FULL CONSULT TO FOLLOW    Patient is a 69y Male whom presented to the hospital with increased SOB.  he has ESRD on HD MWF, COPD and CHFrEF on 2 ltr home oxygen and AICD.  Nephrology was consulted for possible hemodialysis.  he only produces few drops of urine    PAST MEDICAL & SURGICAL HISTORY:  Heart failure with reduced ejection fraction  CAD (coronary artery disease)  BPH (benign prostatic hyperplasia)  Type 2 diabetes mellitus  End stage renal disease  Dyslipidemia  Hypertension    Allergies:  No Known Allergies    Home Medications Reviewed  Hospital Medications:   MEDICATIONS  (STANDING):  chlorhexidine 4% Liquid 1 Application(s) Topical <User Schedule>      SOCIAL HISTORY:  Denies ETOH,Smoking,   FAMILY HISTORY:        REVIEW OF SYSTEMS:  CONSTITUTIONAL: No weakness, fevers or chills  EYES/ENT: No visual changes;  No vertigo or throat pain   NECK: No pain or stiffness  GASTROINTESTINAL: No abdominal or epigastric pain. No nausea, vomiting, or hematemesis; No diarrhea or constipation. No melena or hematochezia.   NEUROLOGICAL: No numbness or weakness  SKIN: No itching, burning, rashes, or lesions   VASCULAR: No bilateral lower extremity edema.   All other review of systems is negative unless indicated above.    VITALS:  T(F): 96.6 (08-17-20 @ 09:06), Max: 96.6 (08-17-20 @ 09:06)  HR: 84 (08-17-20 @ 09:06)  BP: 125/60 (08-17-20 @ 09:06)  RR: 20 (08-17-20 @ 09:06)  SpO2: 100% (08-17-20 @ 09:06)        I&O's Detail        PHYSICAL EXAM:  Constitutional: NAD  HEENT: anicteric sclera, oropharynx clear, MMM  Neck: No JVD  Respiratory: dec BS bilateral  Cardiovascular: S1, S2, RRR  Gastrointestinal: BS+, soft, NT/ND  Extremities: No cyanosis or clubbing. No peripheral edema  Neurological: A/O x 3, no focal deficits  Psychiatric: Normal mood, normal affect  : No CVA tenderness. No crook.   Skin: No rashes  Vascular Access: left arm precaution    LABS:  08-17    138  |  95<L>  |  58<H>  ----------------------------<  279<H>  5.4<H>   |  28  |  8.6<HH>    Ca    9.0      17 Aug 2020 09:23    TPro  7.2  /  Alb  4.4  /  TBili  0.3  /  DBili      /  AST  19  /  ALT  18  /  AlkPhos  161<H>  08-17    Creatinine Trend: 8.6 <--                        10.3   8.28  )-----------( 120      ( 17 Aug 2020 09:23 )             34.6     Urine Studies:              RADIOLOGY & ADDITIONAL STUDIES:    < from: Xray Chest 2 Views PA/Lat (09.07.19 @ 08:57) >  Right pleural effusion/right basilar opacity. Stable cardiomegaly. Left   costophrenic angle blunting    < end of copied text > NEPHROLOGY CONSULTATION NOTE    THIS CONSULT IS INCOMPLETE / FULL CONSULT TO FOLLOW    Patient is a 69y Male whom presented to the hospital with increased SOB.  he has ESRD on HD MWF, COPD and CHFrEF on 2Lr home oxygen and AICD.  Nephrology was consulted for possible hemodialysis.  he only produces few drops of urine    PAST MEDICAL & SURGICAL HISTORY:  Heart failure with reduced ejection fraction  CAD (coronary artery disease)  BPH (benign prostatic hyperplasia)  Type 2 diabetes mellitus  End stage renal disease  Dyslipidemia  Hypertension    Allergies:  No Known Allergies    Home Medications Reviewed  Hospital Medications:   MEDICATIONS  (STANDING):  chlorhexidine 4% Liquid 1 Application(s) Topical <User Schedule>        SOCIAL HISTORY:  Denies ETOH,Smoking,   FAMILY HISTORY:        REVIEW OF SYSTEMS:  CONSTITUTIONAL: No weakness, fevers or chills  EYES/ENT: No visual changes;  No vertigo or throat pain   NECK: No pain or stiffness  GASTROINTESTINAL: No abdominal or epigastric pain. No nausea, vomiting, or hematemesis; No diarrhea or constipation. No melena or hematochezia.   NEUROLOGICAL: No numbness or weakness  SKIN: No itching, burning, rashes, or lesions   VASCULAR: No bilateral lower extremity edema.   All other review of systems is negative unless indicated above.    VITALS:  T(F): 96.6 (08-17-20 @ 09:06), Max: 96.6 (08-17-20 @ 09:06)  HR: 84 (08-17-20 @ 09:06)  BP: 125/60 (08-17-20 @ 09:06)  RR: 20 (08-17-20 @ 09:06)  SpO2: 100% (08-17-20 @ 09:06)        I&O's Detail        PHYSICAL EXAM:  Constitutional: NAD  HEENT: anicteric sclera, oropharynx clear, MMM  Neck: No JVD  Respiratory: dec BS bilateral  Cardiovascular: S1, S2, RRR  Gastrointestinal: BS+, soft, NT/ND  Extremities: No cyanosis or clubbing. No peripheral edema  Neurological: A/O x 3, no focal deficits  Psychiatric: Normal mood, normal affect  : No CVA tenderness. No crook.   Skin: No rashes  Vascular Access: left arm precaution    LABS:  08-17    138  |  95<L>  |  58<H>  ----------------------------<  279<H>  5.4<H>   |  28  |  8.6<HH>    Ca    9.0      17 Aug 2020 09:23    TPro  7.2  /  Alb  4.4  /  TBili  0.3  /  DBili      /  AST  19  /  ALT  18  /  AlkPhos  161<H>  08-17    Creatinine Trend: 8.6 <--                        10.3   8.28  )-----------( 120      ( 17 Aug 2020 09:23 )             34.6     Urine Studies:              RADIOLOGY & ADDITIONAL STUDIES:    < from: Xray Chest 2 Views PA/Lat (09.07.19 @ 08:57) >  Right pleural effusion/right basilar opacity. Stable cardiomegaly. Left   costophrenic angle blunting    < end of copied text >

## 2020-08-17 NOTE — H&P ADULT - NSICDXPASTMEDICALHX_GEN_ALL_CORE_FT
PAST MEDICAL HISTORY:  BPH (benign prostatic hyperplasia)     CAD (coronary artery disease) s/p PCI and AICD placement    Dyslipidemia     End stage renal disease     Heart failure with reduced ejection fraction     Hypertension     Type 2 diabetes mellitus

## 2020-08-17 NOTE — ED PROVIDER NOTE - CLINICAL SUMMARY MEDICAL DECISION MAKING FREE TEXT BOX
I personally evaluated the patient. I reviewed the Resident’s or Physician Assistant’s note (as assigned above), and agree with the findings and plan except as documented in my note. Patient evaluated for sob. Labs, ekg, xray performed. Bedside sono shows B lines diffusely, CXR worsened from prior. Discussed with nephrology for dialysis. Admitted to medicine for further evaluation and treatment.

## 2020-08-17 NOTE — H&P ADULT - NSHPPHYSICALEXAM_GEN_ALL_CORE
GENERAL: NAD, lying in bed comfortably on 3L  HEAD:  Atraumatic, Normocephalic  EYES: EOMI, PERRLA, conjunctiva and sclera clear  ENT: Moist mucous membranes  NECK: Supple, No JVD  CHEST/LUNG: Clear to auscultation bilaterally; No rales, rhonchi, wheezing, or rubs. Low breath sounds  HEART: Regular rate and rhythm; No murmurs, rubs, or gallops  ABDOMEN: Bowel sounds present; Soft, Nontender, Distended (supposedly baseline)  EXTREMITIES:  2+ Peripheral Pulses, brisk capillary refill. No clubbing, cyanosis, or edema  NERVOUS SYSTEM:  Alert & Oriented X3, speech clear. No deficits   MSK: FROM all 4 extremities, full and equal strength  SKIN: No rashes or lesions GENERAL: NAD, lying in bed comfortably on 3L  HEAD:  Atraumatic, Normocephalic  EYES: EOMI, PERRLA, conjunctiva and sclera clear  ENT: Moist mucous membranes  NECK: Supple, No JVD  CHEST/LUNG: Clear to auscultation bilaterally; No rales, rhonchi, wheezing, or rubs. Low breath sounds  HEART: Regular rate and rhythm; No murmurs, rubs, or gallops  ABDOMEN: Bowel sounds present; Soft, Nontender, Distended   EXTREMITIES:  2+ Peripheral Pulses, brisk capillary refill. No clubbing, cyanosis, or edema  NERVOUS SYSTEM:  Alert & Oriented X3, speech clear. No deficits   MSK: FROM all 4 extremities, full and equal strength  SKIN: No rashes or lesions

## 2020-08-17 NOTE — CONSULT NOTE ADULT - ATTENDING COMMENTS
patient seen and examined, agree with above, SOB/ B/L effusion highly fluid overload, HD keep neg balance repeat cxr if no improvement thora, poor prognosis

## 2020-08-17 NOTE — ED ADULT NURSE NOTE - OBJECTIVE STATEMENT
pt A&ox4, pt states that he has worsening SOB for a few days. pt states that he is prescribed 2L NC PRN, but has needed 3. pt states history of dialysis with left arm precautions. pt states history of kidney failure, DM, COPD. pt states that he produces very little urine, but recently he feels like his urine is not clear. pt has no s/s of acute distress at this time. pt accompanied by daughter. safety measures maintained pt A&ox4, pt states that he has worsening SOB for a few days. pt states that he is prescribed 2L NC PRN, but has needed 3. pt states history of dialysis with left arm precautions. pt states history of kidney failure, DM, COPD. pt states that he produces very little urine, but recently he feels like his urine is not clear. pt has no s/s of acute distress at this time. pt denies chest pain, denies pain. pt accompanied by daughter. safety measures maintained

## 2020-08-17 NOTE — H&P ADULT - HISTORY OF PRESENT ILLNESS
70 y/o M with PMH of ESRD on HD MWF, CAD s/p pci with 7 stents and AICD placement in 2018, Heart failure with reduced EF (26% in 2018), DLD, HTN, COPD? on 2L home O2, DM II (well controlled with HbA1C in the 6-7 range), and BPH presents to the ED for SOB that has been worsening over the last 2 weeks. As per patient's daughter patient has had multiple friends pass away in the last few months and since then has complained that he needs home O2 (Daughter thinks this is anxiety driven). Patient's daughter reports that patient saturates in the low 90s at home without O2 and saturates at 100% on 2L. Two week ago he developed hypotension while at dialysis. He refused to go the hospital at that time. Since then he reports that his breathing has gotten worse and he requires more O2. A few days ago he requested that his daughter raise his home O2 to 3L. Before 2 week sago he was able to walk around the house without O2 but over the last two weeks he has required O2 to move around his house. He reports that his SOB is worse with walking and better when he sits down. He reports a dry cough at baseline but denies any chest pain, sore throat, wheezing, weight changes, fatigue, dizziness, or changes in urination. No travel history and no one at home is sick. At baseline patient walks with a cane. He lives with his daughter and she takes care of his medication for him. He sleeps on a recliner due to the HF and wears compression stockings. He is oliguric and urinates "a few drops" approximately 2 times a week.    In the ED vitals were stable and patient is breathing well on 3L NC. Labs were significant for elevated potassium and troponins likely secondary to ESRD. Chest xray reveals worsening right-sided pleural effusion with increased left lower lung airspace opacities. Nephrology was consulted and patient was dialyzed in the ED

## 2020-08-17 NOTE — ED ADULT TRIAGE NOTE - CHIEF COMPLAINT QUOTE
c/o worsening SOB x past few days. Hx dialysis, patient missed today (mon, wed, fri). Patient on O2 PRN, but has been using it all day as per the daughter.

## 2020-08-17 NOTE — ED PROVIDER NOTE - NS ED ROS FT
Constitutional: See HPI.  Eyes: No visual changes, eye pain or discharge. No Photophobia  ENMT: No hearing changes, pain, discharge or infections. No neck pain or stiffness. No limited ROM  Cardiac: see hpi  Respiratory: see hpi  GI: No nausea, vomiting, diarrhea or abdominal pain.  : No dysuria, frequency or burning. No Discharge  MS: No myalgia, muscle weakness, joint pain or back pain.  Neuro: No headache or weakness. No LOC.  Skin: No skin rash.  Except as documented in the HPI, all other systems are negative.

## 2020-08-17 NOTE — H&P ADULT - ATTENDING COMMENTS
Patient seen and examined at bedside. Not in acute distress.   Agree with the assessment and plan above. Changes made to HPI as needed.   Please note the changes below.    69 year old male with pmhx of ESRD on HD (MWF), COPD (on 2L NC), HFrEF presents for sob. As per patent, he was getting progressive sob, required 3L NC. He noticed his ankles were also getting swollen. He missed one HD session because he was feeling tired. He denies any chest pain, palpitation, cough or phlegm. Patient states he is compliant with his medication.     # SOB  - etiology multifactorial: exacerbation of HFrEF vs vol overload from missed HD  - s/p emergent HD 4 liters removed  - repeat cxr tomorrow in AM  - will hold Eliquis for possible thoracentesis  - NIV at night  - pulmonary following     # ESRD on HD (MWF)  - s/p HD   - check phos level, on calcium acetate  - low K, low Na, low phos diet   - nephrology following    # Elevated Trops  - trop 0.13 <-- 0.16  - likely NSTEMI type II and ESRD  - EKG: no ST changes noted    # Full code

## 2020-08-17 NOTE — ED PROVIDER NOTE - PHYSICAL EXAMINATION
VITAL SIGNS: I have reviewed nursing notes and confirm.  CONSTITUTIONAL: Appears stated age, on nasal cannular, no acute distress sitting on side of stretcher  SKIN: Warm dry, normal skin turgor  HEAD: NCAT  EYES: , no scleral icterus  ENT: Moist mucous membranes, normal pharynx   NECK: Supple; non tender. Full ROM.   CARD: RRR, no, rubs or gallops  RESP: right side diminished breath sounds, left basilar crackles  ABD: soft, + BS, non-tender, non-distended, no rebound or guarding. No CVA tenderness  EXT: Full ROM, no bony tenderness, no pedal edema, no calf tenderness  NEURO: normal gait  PSYCH: Cooperative, appropriate.

## 2020-08-17 NOTE — ED ADULT NURSE NOTE - INTERVENTIONS DEFINITIONS
Call bell, personal items and telephone within reach/[place] to call system/Instruct patient to call for assistance/Non-slip footwear when patient is off stretcher/Physically safe environment: no spills, clutter or unnecessary equipment/Monitor gait and stability/Monitor for mental status changes and reorient to person, place, and time/Reinforce activity limits and safety measures with patient and family

## 2020-08-17 NOTE — H&P ADULT - NSHPLABSRESULTS_GEN_ALL_CORE
LABS/RADIOLOGY RESULTS:                          10.3   8.28  )-----------( 120      ( 17 Aug 2020 09:23 )             34.6   08-17    138  |  95<L>  |  58<H>  ----------------------------<  279<H>  5.4<H>   |  28  |  8.6<HH>    Ca    9.0      17 Aug 2020 09:23    TPro  7.2  /  Alb  4.4  /  TBili  0.3  /  DBili  x   /  AST  19  /  ALT  18  /  AlkPhos  161<H>  08-17  PT/INR - ( 17 Aug 2020 09:23 )   PT: 16.20 sec;   INR: 1.41 ratio         PTT - ( 17 Aug 2020 09:23 )  PTT:39.5 secBlood Cultures    < from: Xray Chest 1 View-PORTABLE IMMEDIATE (08.17.20 @ 09:38) >      EXAM:  XR CHEST PORTABLE IMMED 1V            PROCEDURE DATE:  08/17/2020            INTERPRETATION:  Chest x-ray portable single view    CLINICAL HISTORY / REASON FOR EXAM: Shortness of breath.    COMPARISON: Chest x-ray 9/7/2019    TECHNIQUE/POSITIONING: Satisfactory. .    FINDINGS:    SUPPORT DEVICES: Right-sided AICD, unchanged in position..  Aortic calcifications    CARDIAC/MEDIASTINUM/HILUM: Unchanged cardiac silhouette.    LUNG PARENCHYMA/PLEURA: Worsening right sided pleural effusion with overlying hazy airspace opacities.. Increased left lung hazy opacities.. No pneumothorax.    SKELETON/SOFT TISSUES: Unchanged.      IMPRESSION:    Worsening right-sided pleural effusion with increased left lower lung airspace opacities.      < end of copied text >

## 2020-08-17 NOTE — CONSULT NOTE ADULT - SUBJECTIVE AND OBJECTIVE BOX
Patient is a 69y old  Male who presents with a chief complaint of SOB (17 Aug 2020 14:27)      HPI:  68 y/o M with PMH of ESRD on HD MWF, CAD s/p pci with 7 stents and AICD placement in 2018, Heart failure with reduced EF (26% in 2018), DLD, HTN, COPD? on 2L home O2, DM II (well controlled with HbA1C in the 6-7 range), and BPH presents to the ED for SOB that has been worsening over the last 2 weeks. As per patient's daughter patient has had multiple friends pass away in the last few months and since then has complained that he needs home O2 (Daughter thinks this is anxiety driven). Patient's daughter reports that patient saturates in the low 90s at home without O2 and saturates at 100% on 2L. Two week ago he developed hypotension while at dialysis. He refused to go the hospital at that time. Since then he reports that his breathing has gotten worse and he requires more O2. A few days ago he requested that his daughter raise his home O2 to 3L. Before 2 week sago he was able to walk around the house without O2 but over the last two weeks he has required O2 to move around his house. He reports that his SOB is worse with walking and better when he sits down. He reports a dry cough at baseline but denies any chest pain, sore throat, wheezing, weight changes, fatigue, dizziness, or changes in urination. No travel history and no one at home is sick. At baseline patient walks with a cane. He lives with his daughter and she takes care of his medication for him. He sleeps on a recliner due to the HF and wears compression stockings. He is oliguric and urinates "a few drops" approximately 2 times a week.    In the ED vitals were stable and patient is breathing well on 3L NC. Labs were significant for elevated potassium and troponins likely secondary to ESRD. Chest xray reveals worsening right-sided pleural effusion with increased left lower lung airspace opacities. Nephrology was consulted and patient was dialyzed in the ED (17 Aug 2020 14:27)      PAST MEDICAL & SURGICAL HISTORY:  Heart failure with reduced ejection fraction  CAD (coronary artery disease): s/p PCI and AICD placement  BPH (benign prostatic hyperplasia)  Type 2 diabetes mellitus  End stage renal disease  Dyslipidemia  Hypertension  No significant past surgical history      SOCIAL HX:   Smoking                         ETOH                            Other    FAMILY HISTORY:  FH: type 2 diabetes: sister  FH: HTN (hypertension): Sister  .  No cardiovascular or pulmonary family history     REVIEW OF SYSTEMS:    All ROS are negative except per HPI     Allergies    No Known Allergies    Intolerances          PHYSICAL EXAM  Vital Signs Last 24 Hrs  T(C): 35.9 (17 Aug 2020 09:06), Max: 35.9 (17 Aug 2020 09:06)  T(F): 96.6 (17 Aug 2020 09:06), Max: 96.6 (17 Aug 2020 09:06)  HR: 78 (17 Aug 2020 16:45) (78 - 84)  BP: 118/64 (17 Aug 2020 16:45) (118/64 - 135/64)  BP(mean): --  RR: 18 (17 Aug 2020 16:45) (18 - 20)  SpO2: 100% (17 Aug 2020 09:06) (100% - 100%)    CONSTITUTIONAL:  Well nourished elderly male sitting in bed in NAD    ENT:   Airway patent,   No thrush  on 3L NC    EYES:   Clear bilaterally,   pupils equal,     CARDIAC:   Normal rate,   regular rhythm.    B/L LE edema    RESPIRATORY:   No wheezing  normal chest expansion  Not tachypneic,  No use of accessory muscles  decreased BS b/l bases    GASTROINTESTINAL:  Abdomen soft,   non-tender,   no guarding,     MUSCULOSKELETAL:   range of motion is not limited,  no clubbing, cyanosis    NEUROLOGICAL:   Alert and oriented   no motor deficits.    SKIN:   Skin normal color for race,   No evidence of rash.    PSYCHIATRIC:   normal mood and affect.   no apparent risk to self or others.    HEME LYMPH:   No cervical  lymphadenopathy.  no inguinal lymphadenopathy          LABS:                          10.3   8.28  )-----------( 120      ( 17 Aug 2020 09:23 )             34.6                                               08-17    140  |  99  |  45<H>  ----------------------------<  240<H>  4.2   |  26  |  5.9<HH>    Ca    9.1      17 Aug 2020 15:04  Phos  3.8     08-17    TPro  7.5  /  Alb  4.6  /  TBili  0.3  /  DBili  x   /  AST  20  /  ALT  19  /  AlkPhos  176<H>  08-17      PT/INR - ( 17 Aug 2020 09:23 )   PT: 16.20 sec;   INR: 1.41 ratio         PTT - ( 17 Aug 2020 09:23 )  PTT:39.5 sec                                           CARDIAC MARKERS ( 17 Aug 2020 15:04 )  x     / 0.13 ng/mL / x     / x     / x      CARDIAC MARKERS ( 17 Aug 2020 09:23 )  x     / 0.14 ng/mL / x     / x     / x                                                LIVER FUNCTIONS - ( 17 Aug 2020 15:04 )  Alb: 4.6 g/dL / Pro: 7.5 g/dL / ALK PHOS: 176 U/L / ALT: 19 U/L / AST: 20 U/L / GGT: x                                                                                                MEDICATIONS  (STANDING):  aspirin  chewable 81 milliGRAM(s) Oral daily  atorvastatin 80 milliGRAM(s) Oral at bedtime  calcium acetate 667 milliGRAM(s) Oral three times a day with meals  cefpodoxime 200 milliGRAM(s) Oral <User Schedule>  chlorhexidine 4% Liquid 1 Application(s) Topical <User Schedule>  dextrose 5%. 1000 milliLiter(s) (50 mL/Hr) IV Continuous <Continuous>  dextrose 50% Injectable 12.5 Gram(s) IV Push once  dextrose 50% Injectable 25 Gram(s) IV Push once  dextrose 50% Injectable 25 Gram(s) IV Push once  furosemide    Tablet 40 milliGRAM(s) Oral <User Schedule>  heparin  Infusion 1000 Unit(s)/Hr (10 mL/Hr) IV Continuous <Continuous>  insulin glargine Injectable (LANTUS) 22 Unit(s) SubCutaneous at bedtime  insulin lispro (HumaLOG) corrective regimen sliding scale   SubCutaneous three times a day before meals  insulin lispro Injectable (HumaLOG) 8 Unit(s) SubCutaneous three times a day before meals  isosorbide   mononitrate ER Tablet (IMDUR) 30 milliGRAM(s) Oral daily  losartan 25 milliGRAM(s) Oral daily  metoprolol tartrate 25 milliGRAM(s) Oral two times a day  multivitamin/minerals 1 Tablet(s) Oral daily  pantoprazole    Tablet 40 milliGRAM(s) Oral before breakfast  tamsulosin 0.8 milliGRAM(s) Oral at bedtime    MEDICATIONS  (PRN):  acetaminophen   Tablet .. 650 milliGRAM(s) Oral every 6 hours PRN Mild Pain (1 - 3)  dextrose 40% Gel 15 Gram(s) Oral once PRN Blood Glucose LESS THAN 70 milliGRAM(s)/deciliter  glucagon  Injectable 1 milliGRAM(s) IntraMuscular once PRN Glucose LESS THAN 70 milligrams/deciliter      X-Rays reviewed:    CXR interpreted by me: Patient is a 69y old  Male who presents with a chief complaint of SOB (17 Aug 2020 14:27)      HPI:  70 y/o M with PMH of ESRD on HD MWF, CAD s/p pci with 7 stents and AICD placement in 2018, Heart failure with reduced EF (26% in 2018), DLD, HTN, COPD? on 2L home O2, DM II (well controlled with HbA1C in the 6-7 range), and BPH presents to the ED for SOB that has been worsening over the last 2 weeks. As per patient's daughter patient has had multiple friends pass away in the last few months and since then has complained that he needs home O2 (Daughter thinks this is anxiety driven). Patient's daughter reports that patient saturates in the low 90s at home without O2 and saturates at 100% on 2L. Two week ago he developed hypotension while at dialysis. He refused to go the hospital at that time. Since then he reports that his breathing has gotten worse and he requires more O2. A few days ago he requested that his daughter raise his home O2 to 3L. Before 2 week sago he was able to walk around the house without O2 but over the last two weeks he has required O2 to move around his house. He reports that his SOB is worse with walking and better when he sits down. He reports a dry cough at baseline but denies any chest pain, sore throat, wheezing, weight changes, fatigue, dizziness, or changes in urination. No travel history and no one at home is sick. At baseline patient walks with a cane. He lives with his daughter and she takes care of his medication for him. He sleeps on a recliner due to the HF and wears compression stockings. He is oliguric and urinates "a few drops" approximately 2 times a week.    In the ED vitals were stable and patient is breathing well on 3L NC. Labs were significant for elevated potassium and troponins likely secondary to ESRD. Chest xray reveals worsening right-sided pleural effusion with increased left lower lung airspace opacities. Nephrology was consulted and patient was dialyzed in the ED (17 Aug 2020 14:27) called for thora no fever, chills, patient had chronic r side effusion/ opacity sp prior thora      PAST MEDICAL & SURGICAL HISTORY:  Heart failure with reduced ejection fraction  CAD (coronary artery disease): s/p PCI and AICD placement  BPH (benign prostatic hyperplasia)  Type 2 diabetes mellitus  End stage renal disease  Dyslipidemia  Hypertension  No significant past surgical history      SOCIAL HX:   Smoking   ex    FAMILY HISTORY:  FH: type 2 diabetes: sister  FH: HTN (hypertension): Sister  .  No cardiovascular or pulmonary family history     REVIEW OF SYSTEMS:    All ROS are negative except per HPI     Allergies    No Known Allergies    Intolerances          PHYSICAL EXAM  Vital Signs Last 24 Hrs  T(C): 35.9 (17 Aug 2020 09:06), Max: 35.9 (17 Aug 2020 09:06)  T(F): 96.6 (17 Aug 2020 09:06), Max: 96.6 (17 Aug 2020 09:06)  HR: 78 (17 Aug 2020 16:45) (78 - 84)  BP: 118/64 (17 Aug 2020 16:45) (118/64 - 135/64  RR: 18 (17 Aug 2020 16:45) (18 - 20)  SpO2: 100% (17 Aug 2020 09:06) (100% - 100%)    CONSTITUTIONAL:  Well nourished elderly male sitting in bed in NAD, mildly tachypneic    ENT:   Airway patent,   No thrush  on 3L NC    EYES:   Clear bilaterally,   pupils equal,     CARDIAC:   BERTIN 3/6  B/L LE edema    RESPIRATORY:   No wheezing  normal chest expansion  Not tachypneic,  No use of accessory muscles  decreased BS b/l bases    GASTROINTESTINAL:  Abdomen soft,   non-tender,   no guarding,     MUSCULOSKELETAL:   range of motion is not limited,  no clubbing, cyanosis    NEUROLOGICAL:   Alert and oriented   no motor deficits.    SKIN:   Skin normal color for race,   No evidence of rash.    PSYCHIATRIC:   normal mood and affect.   no apparent risk to self or others.    HEME LYMPH:   No cervical  lymphadenopathy.  no inguinal lymphadenopathy          LABS:                          10.3   8.28  )-----------( 120      ( 17 Aug 2020 09:23 )             34.6                                               08-17    140  |  99  |  45<H>  ----------------------------<  240<H>  4.2   |  26  |  5.9<HH>    Ca    9.1      17 Aug 2020 15:04  Phos  3.8     08-17    TPro  7.5  /  Alb  4.6  /  TBili  0.3  /  DBili  x   /  AST  20  /  ALT  19  /  AlkPhos  176<H>  08-17      PT/INR - ( 17 Aug 2020 09:23 )   PT: 16.20 sec;   INR: 1.41 ratio         PTT - ( 17 Aug 2020 09:23 )  PTT:39.5 sec                                           CARDIAC MARKERS ( 17 Aug 2020 15:04 )  x     / 0.13 ng/mL / x     / x     / x      CARDIAC MARKERS ( 17 Aug 2020 09:23 )  x     / 0.14 ng/mL / x     / x     / x                                                LIVER FUNCTIONS - ( 17 Aug 2020 15:04 )  Alb: 4.6 g/dL / Pro: 7.5 g/dL / ALK PHOS: 176 U/L / ALT: 19 U/L / AST: 20 U/L / GGT: x                                                                                                MEDICATIONS  (STANDING):  aspirin  chewable 81 milliGRAM(s) Oral daily  atorvastatin 80 milliGRAM(s) Oral at bedtime  calcium acetate 667 milliGRAM(s) Oral three times a day with meals  cefpodoxime 200 milliGRAM(s) Oral <User Schedule>  chlorhexidine 4% Liquid 1 Application(s) Topical <User Schedule>  dextrose 5%. 1000 milliLiter(s) (50 mL/Hr) IV Continuous <Continuous>  dextrose 50% Injectable 12.5 Gram(s) IV Push once  dextrose 50% Injectable 25 Gram(s) IV Push once  dextrose 50% Injectable 25 Gram(s) IV Push once  furosemide    Tablet 40 milliGRAM(s) Oral <User Schedule>  heparin  Infusion 1000 Unit(s)/Hr (10 mL/Hr) IV Continuous <Continuous>  insulin glargine Injectable (LANTUS) 22 Unit(s) SubCutaneous at bedtime  insulin lispro (HumaLOG) corrective regimen sliding scale   SubCutaneous three times a day before meals  insulin lispro Injectable (HumaLOG) 8 Unit(s) SubCutaneous three times a day before meals  isosorbide   mononitrate ER Tablet (IMDUR) 30 milliGRAM(s) Oral daily  losartan 25 milliGRAM(s) Oral daily  metoprolol tartrate 25 milliGRAM(s) Oral two times a day  multivitamin/minerals 1 Tablet(s) Oral daily  pantoprazole    Tablet 40 milliGRAM(s) Oral before breakfast  tamsulosin 0.8 milliGRAM(s) Oral at bedtime    MEDICATIONS  (PRN):  acetaminophen   Tablet .. 650 milliGRAM(s) Oral every 6 hours PRN Mild Pain (1 - 3)  dextrose 40% Gel 15 Gram(s) Oral once PRN Blood Glucose LESS THAN 70 milliGRAM(s)/deciliter  glucagon  Injectable 1 milliGRAM(s) IntraMuscular once PRN Glucose LESS THAN 70 milligrams/deciliter      X-Rays reviewed:    CXR interpreted by me:

## 2020-08-17 NOTE — ED PROVIDER NOTE - PROGRESS NOTE DETAILS
Patient endorsed to medical team, spoke with Dr. Bayron Pardo for inpatient dialysis, discussed possible need for therapeutic tap as inpatient - patient on Eliquis - took dose this morning

## 2020-08-17 NOTE — H&P ADULT - ASSESSMENT
70 y/o M with PMH of ESRD on HD MWF, CAD s/p pci with 7 stents and AICD placement in 2018, Heart failure with reduced EF (26% in 2018), DLD, HTN, COPD? on 2L home O2, DM II (well controlled with HbA1C in the 6-7 range), and BPH presents to the ED for SOB that has been worsening over the last 2 weeks.    #SOB  #ESRD  #HFpEF  SOB: ESRD vs HFrEF  Chest xray shows worsening R pleural effusion with ?L pleural effusion  Patient requiring 3L of O2 now but only 2L before a few days ago  Elevated troponin 0.14 but this is likely chronic (troponin 0.15 in 2018)  Patient has not gotten morning dialysis yet  Goes for HD MWF   Oliguric at baseline  Last Echo shows EF of 26%  - f/u repeat troponin  - patient to be dialyzed today in the ED  - Nephrology following  - check O2 saturation off NC  - f/u Echo  - f/u repeat chest xray in the morning  - give home lasix 40 BID on non-dialysis days  - spoke to pulm; if patient does not improve by tomorrow may need thoracentesis  - will hold home eliquis in case of thoracentesis; last eliquis dose this AM  - c/w home Isosorbide mononitrite on non dialysis days  - c/w calcium acetate    # Hyperkalemia  K 5.4 today  Likely secondary to ESRD  -f/u morning BMP    #CAD s/p PCI with 7 stents and AICD  AICD placed in [place] in 2018 after episode of bradycardia  -c/w Metoprolol  -c/w aspirin  -Hold home eliquis in the setting of possible thoracentesis  -Started on heparin drip  -follow ptt    #T2DM  Well controlled with HbA1C in the 6 to 7 range  -started on insulin and sliding scale    # HTN  -continue home losartan    # DLD  -continue statin    #COPD  Unclear if patient actually has COPD  on home O2 2L  Breathing well on 3L in hospital  - continue to monitor    #BPH  -Tamsulosin    Diet: DASH/TLC  Activity: AAT  DVT Prophylaxis: Heparin drip  GI Prophylaxis: home protonix  CHG Order yes  Disposition: Admitted 70 y/o M with PMH of ESRD on HD MWF, CAD s/p pci with 7 stents and AICD placement in 2018, Heart failure with reduced EF (26% in 2018), DLD, HTN, COPD? on 2L home O2, DM II (well controlled with HbA1C in the 6-7 range), and BPH presents to the ED for SOB that has been worsening over the last 2 weeks.    #SOB  #ESRD  #HFpEF  SOB: ESRD vs HFrEF  Chest xray shows worsening R pleural effusion with ?L pleural effusion  Patient requiring 3L of O2 now but only 2L before a few days ago  Elevated troponin 0.14 but this is likely chronic (troponin 0.15 in 2018)  Patient has not gotten morning dialysis yet  Goes for HD MWF   Oliguric at baseline  Last Echo shows EF of 26%  - f/u repeat troponin  - patient to be dialyzed today in the ED  - Nephrology following  - check O2 saturation off NC  - f/u Echo  - f/u repeat chest xray in the morning  - give home lasix 40 BID on non-dialysis days  - spoke to pulm; if patient does not improve by tomorrow may need thoracentesis  - will hold home eliquis in case of thoracentesis; last eliquis dose this AM  - c/w home Isosorbide mononitrite on non dialysis days  - c/w calcium acetate    # Hyperkalemia  K 5.4 today  Likely secondary to ESRD  -f/u morning BMP    #CAD s/p PCI with 7 stents and AICD  AICD placed in [place] in 2018 after episode of bradycardia  -c/w Metoprolol  -c/w aspirin  -Hold home eliquis in the setting of possible thoracentesis  -Started on heparin drip  -follow ptt    #T2DM  Well controlled with HbA1C in the 6 to 7 range  -started on insulin and sliding scale    # HTN  -continue losartan    # DLD  -continue statin    #COPD  Unclear if patient actually has COPD  on home O2 2L  Breathing well on 3L in hospital  - continue to monitor    #BPH  -Tamsulosin    Diet: DASH/TLC  Activity: AAT  DVT Prophylaxis: Heparin drip  GI Prophylaxis: home protonix  CHG Order yes  Disposition: Admitted

## 2020-08-18 NOTE — PROGRESS NOTE ADULT - ASSESSMENT
69M, PMH of ESRD on HD MWF, COPD, HFrEF, admitted for SOB.      #ESRD on HD  - sp HD yesterday . Will do another HD ttt today   - IP noted keep Ca acetate   - pt anuric- d/c diuretics  - hgb noted, iron stores / YASMINE if dropping  - low K, low Na, low phos diet     #SOB - EKG without acute ischemic changes, trend trop, ?thoracentesis, appreciate pulm/cards f/u  sp CT scan of chest

## 2020-08-18 NOTE — PROGRESS NOTE ADULT - SUBJECTIVE AND OBJECTIVE BOX
Nephrology progress note    THIS IS AN INCOMPLETE NOTE . FULL NOTE TO FOLLOW SHORTLY    Patient is seen and examined, events over the last 24 h noted .    Allergies:  No Known Allergies    Hospital Medications:   MEDICATIONS  (STANDING):  aspirin  chewable 81 milliGRAM(s) Oral daily  atorvastatin 80 milliGRAM(s) Oral at bedtime  calcium acetate 667 milliGRAM(s) Oral three times a day with meals  cefpodoxime 200 milliGRAM(s) Oral <User Schedule>  chlorhexidine 4% Liquid 1 Application(s) Topical <User Schedule>  dextrose 5%. 1000 milliLiter(s) (50 mL/Hr) IV Continuous <Continuous>  dextrose 50% Injectable 12.5 Gram(s) IV Push once  dextrose 50% Injectable 25 Gram(s) IV Push once  dextrose 50% Injectable 25 Gram(s) IV Push once  heparin  Infusion 1000 Unit(s)/Hr (10 mL/Hr) IV Continuous <Continuous>  insulin glargine Injectable (LANTUS) 22 Unit(s) SubCutaneous at bedtime  insulin lispro (HumaLOG) corrective regimen sliding scale   SubCutaneous three times a day before meals  insulin lispro Injectable (HumaLOG) 8 Unit(s) SubCutaneous three times a day before meals  isosorbide   mononitrate ER Tablet (IMDUR) 30 milliGRAM(s) Oral daily  losartan 25 milliGRAM(s) Oral daily  metoprolol tartrate 25 milliGRAM(s) Oral two times a day  multivitamin/minerals 1 Tablet(s) Oral daily  pantoprazole    Tablet 40 milliGRAM(s) Oral before breakfast  polyethylene glycol 3350 17 Gram(s) Oral daily  tamsulosin 0.8 milliGRAM(s) Oral at bedtime        VITALS:  T(F): 98.6 (08-18-20 @ 06:40), Max: 99.3 (08-18-20 @ 00:20)  HR: 60 (08-18-20 @ 06:40)  BP: 106/58 (08-18-20 @ 06:40)  RR: 20 (08-18-20 @ 00:20)  SpO2: 95% (08-18-20 @ 07:35)  Wt(kg): --    08-17 @ 07:01  -  08-18 @ 07:00  --------------------------------------------------------  IN: 0 mL / OUT: 3700 mL / NET: -3700 mL        Weight (kg): 90.7 (08-17 @ 12:12)    PHYSICAL EXAM:  Constitutional: NAD  HEENT: anicteric sclera, oropharynx clear, MMM  Neck: No JVD  Respiratory: CTAB, no wheezes, rales or rhonchi  Cardiovascular: S1, S2, RRR  Gastrointestinal: BS+, soft, NT/ND  Extremities: No cyanosis or clubbing. No peripheral edema  :  No crook.   Skin: No rashes    LABS:  08-17    140  |  99  |  45<H>  ----------------------------<  240<H>  4.2   |  26  |  5.9<HH>    Ca    9.1      17 Aug 2020 15:04  Phos  4.7     08-18    TPro  7.5  /  Alb  4.6  /  TBili  0.3  /  DBili      /  AST  20  /  ALT  19  /  AlkPhos  176<H>  08-17                          10.6   7.29  )-----------( 115      ( 18 Aug 2020 05:24 )             35.7       Urine Studies:      RADIOLOGY & ADDITIONAL STUDIES: Nephrology progress note  Patient is seen and examined, events over the last 24 h noted .  still has SOB   sp HD yesterday     Allergies:  No Known Allergies    Hospital Medications:   MEDICATIONS  (STANDING):    aspirin  chewable 81 milliGRAM(s) Oral daily  atorvastatin 80 milliGRAM(s) Oral at bedtime  calcium acetate 667 milliGRAM(s) Oral three times a day with meals  cefpodoxime 200 milliGRAM(s) Oral <User Schedule>  heparin  Infusion 1000 Unit(s)/Hr (10 mL/Hr) IV Continuous <Continuous>  insulin glargine Injectable (LANTUS) 22 Unit(s) SubCutaneous at bedtime  insulin lispro (HumaLOG) corrective regimen sliding scale   SubCutaneous three times a day before meals  insulin lispro Injectable (HumaLOG) 8 Unit(s) SubCutaneous three times a day before meals  isosorbide   mononitrate ER Tablet (IMDUR) 30 milliGRAM(s) Oral daily  losartan 25 milliGRAM(s) Oral daily  metoprolol tartrate 25 milliGRAM(s) Oral two times a day  multivitamin/minerals 1 Tablet(s) Oral daily  pantoprazole    Tablet 40 milliGRAM(s) Oral before breakfast  polyethylene glycol 3350 17 Gram(s) Oral daily  tamsulosin 0.8 milliGRAM(s) Oral at bedtime        VITALS:  T(F): 98.6 (08-18-20 @ 06:40), Max: 99.3 (08-18-20 @ 00:20)  HR: 60 (08-18-20 @ 06:40)  BP: 106/58 (08-18-20 @ 06:40)  RR: 20 (08-18-20 @ 00:20)  SpO2: 95% (08-18-20 @ 07:35)      08-17 @ 07:01  -  08-18 @ 07:00  --------------------------------------------------------  IN: 0 mL / OUT: 3700 mL / NET: -3700 mL        Weight (kg): 90.7 (08-17 @ 12:12)    PHYSICAL EXAM:  Constitutional: NAD  Neck: No JVD  Respiratory: CTAB, no wheezes, rales or rhonchi  Cardiovascular: S1, S2, RRR  Gastrointestinal: BS+, soft, NT/ND  Extremities: No cyanosis or clubbing. plus one edema   :  No crook.   Skin: No rashes    LABS:  08-17    140  |  99  |  45<H>  ----------------------------<  240<H>  4.2   |  26  |  5.9<HH>    Ca    9.1      17 Aug 2020 15:04  Phos  4.7     08-18    TPro  7.5  /  Alb  4.6  /  TBili  0.3  /  DBili      /  AST  20  /  ALT  19  /  AlkPhos  176<H>  08-17                          10.6   7.29  )-----------( 115      ( 18 Aug 2020 05:24 )             35.7       Urine Studies:      RADIOLOGY & ADDITIONAL STUDIES:

## 2020-08-18 NOTE — PROGRESS NOTE ADULT - SUBJECTIVE AND OBJECTIVE BOX
OVERNIGHT EVENTS: events noted, CXR reviewed still large effusion r side, l side improved    Vital Signs Last 24 Hrs  T(C): 37 (18 Aug 2020 06:40), Max: 37.4 (18 Aug 2020 00:20)  T(F): 98.6 (18 Aug 2020 06:40), Max: 99.3 (18 Aug 2020 00:20)  HR: 60 (18 Aug 2020 06:40) (60 - 86)  BP: 106/58 (18 Aug 2020 06:40) (106/58 - 135/64)  RR: 20 (18 Aug 2020 00:20) (18 - 20)  SpO2: 95% (18 Aug 2020 07:35) (95% - 100%)    PHYSICAL EXAMINATION:    GENERAL: sitting on the chair comfortable    HEENT: Head is normocephalic and atraumatic. Extraocular muscles are intact. Mucous membranes are moist.    NECK: Supple.    LUNGS: dec bs r side    HEART: BERTIN 3/6    ABDOMEN: Soft, nontender, and nondistended.      EXTREMITIES: Without any cyanosis, clubbing, rash, lesions or edema.    NEUROLOGIC: Grossly intact.    SKIN: No ulceration or induration present.      LABS:                        10.6   7.29  )-----------( 115      ( 18 Aug 2020 05:24 )             35.7     08-17    140  |  99  |  45<H>  ----------------------------<  240<H>  4.2   |  26  |  5.9<HH>    Ca    9.1      17 Aug 2020 15:04  Phos  3.8     08-17    TPro  7.5  /  Alb  4.6  /  TBili  0.3  /  DBili  x   /  AST  20  /  ALT  19  /  AlkPhos  176<H>  08-17    PT/INR - ( 17 Aug 2020 09:23 )   PT: 16.20 sec;   INR: 1.41 ratio         PTT - ( 18 Aug 2020 05:24 )  PTT:67.6 sec      CARDIAC MARKERS ( 17 Aug 2020 15:04 )  x     / 0.13 ng/mL / x     / x     / x      CARDIAC MARKERS ( 17 Aug 2020 09:23 )  x     / 0.14 ng/mL / x     / x     / x                      08-17-20 @ 07:01  -  08-18-20 @ 07:00  --------------------------------------------------------  IN: 0 mL / OUT: 3700 mL / NET: -3700 mL        MICROBIOLOGY:      MEDICATIONS  (STANDING):  aspirin  chewable 81 milliGRAM(s) Oral daily  atorvastatin 80 milliGRAM(s) Oral at bedtime  calcium acetate 667 milliGRAM(s) Oral three times a day with meals  cefpodoxime 200 milliGRAM(s) Oral <User Schedule>  chlorhexidine 4% Liquid 1 Application(s) Topical <User Schedule>  dextrose 5%. 1000 milliLiter(s) (50 mL/Hr) IV Continuous <Continuous>  dextrose 50% Injectable 12.5 Gram(s) IV Push once  dextrose 50% Injectable 25 Gram(s) IV Push once  dextrose 50% Injectable 25 Gram(s) IV Push once  heparin  Infusion 1000 Unit(s)/Hr (10 mL/Hr) IV Continuous <Continuous>  insulin glargine Injectable (LANTUS) 22 Unit(s) SubCutaneous at bedtime  insulin lispro (HumaLOG) corrective regimen sliding scale   SubCutaneous three times a day before meals  insulin lispro Injectable (HumaLOG) 8 Unit(s) SubCutaneous three times a day before meals  isosorbide   mononitrate ER Tablet (IMDUR) 30 milliGRAM(s) Oral daily  losartan 25 milliGRAM(s) Oral daily  metoprolol tartrate 25 milliGRAM(s) Oral two times a day  multivitamin/minerals 1 Tablet(s) Oral daily  pantoprazole    Tablet 40 milliGRAM(s) Oral before breakfast  tamsulosin 0.8 milliGRAM(s) Oral at bedtime    MEDICATIONS  (PRN):  acetaminophen   Tablet .. 650 milliGRAM(s) Oral every 6 hours PRN Mild Pain (1 - 3)  dextrose 40% Gel 15 Gram(s) Oral once PRN Blood Glucose LESS THAN 70 milliGRAM(s)/deciliter  glucagon  Injectable 1 milliGRAM(s) IntraMuscular once PRN Glucose LESS THAN 70 milligrams/deciliter      RADIOLOGY & ADDITIONAL STUDIES:

## 2020-08-18 NOTE — PROGRESS NOTE ADULT - ASSESSMENT
Impression:  chronic right sided effusion now with b/l effusion likely due to heart failure/ Fluid overload poorly compliant as op  [facility] [place] non compliant with CPAP    plan:  s/p emergent HD 3.5 liters removed  repeat cxr tomorrow in AM  CHEST CT TODAY/ IR for pigtail  NIV at night and prn  can do cpap with 9 mmHg or Bipap 12/6  will follow  spoke with daughter

## 2020-08-18 NOTE — CONSULT NOTE ADULT - SUBJECTIVE AND OBJECTIVE BOX
INTERVENTIONAL RADIOLOGY CONSULT:     Procedure Requested: Right chest tube placement     HPI:  68 y/o M with PMH of ESRD on HD MWF, CAD s/p pci with 7 stents and AICD placement in 2018, Heart failure with reduced EF (26% in 2018), DLD, HTN, COPD? on 2L home O2, DM II (well controlled with HbA1C in the 6-7 range), and BPH presents to the ED for SOB that has been worsening over the last 2 weeks. As per patient's daughter patient has had multiple friends pass away in the last few months and since then has complained that he needs home O2 (Daughter thinks this is anxiety driven). Patient's daughter reports that patient saturates in the low 90s at home without O2 and saturates at 100% on 2L. Two week ago he developed hypotension while at dialysis. He refused to go the hospital at that time. Since then he reports that his breathing has gotten worse and he requires more O2. A few days ago he requested that his daughter raise his home O2 to 3L. Before 2 week sago he was able to walk around the house without O2 but over the last two weeks he has required O2 to move around his house. He reports that his SOB is worse with walking and better when he sits down. He reports a dry cough at baseline but denies any chest pain, sore throat, wheezing, weight changes, fatigue, dizziness, or changes in urination. No travel history and no one at home is sick. At baseline patient walks with a cane. He lives with his daughter and she takes care of his medication for him. He sleeps on a recliner due to the HF and wears compression stockings. He is oliguric and urinates "a few drops" approximately 2 times a week.    In the ED vitals were stable and patient is breathing well on 3L NC. Labs were significant for elevated potassium and troponins likely secondary to ESRD. Chest xray reveals worsening right-sided pleural effusion with increased left lower lung airspace opacities. Nephrology was consulted and patient was dialyzed in the ED (17 Aug 2020 14:27)      PAST MEDICAL & SURGICAL HISTORY:  Heart failure with reduced ejection fraction  CAD (coronary artery disease): s/p PCI and AICD placement  BPH (benign prostatic hyperplasia)  Type 2 diabetes mellitus  End stage renal disease  Dyslipidemia  Hypertension  No significant past surgical history      MEDICATIONS  (STANDING):  aspirin  chewable 81 milliGRAM(s) Oral daily  atorvastatin 80 milliGRAM(s) Oral at bedtime  BACItracin   Ointment 1 Application(s) Topical daily  calcium acetate 667 milliGRAM(s) Oral three times a day with meals  cefpodoxime 200 milliGRAM(s) Oral <User Schedule>  chlorhexidine 4% Liquid 1 Application(s) Topical <User Schedule>  dextrose 5%. 1000 milliLiter(s) (50 mL/Hr) IV Continuous <Continuous>  dextrose 50% Injectable 12.5 Gram(s) IV Push once  dextrose 50% Injectable 25 Gram(s) IV Push once  dextrose 50% Injectable 25 Gram(s) IV Push once  heparin   Injectable 5000 Unit(s) SubCutaneous every 8 hours  insulin glargine Injectable (LANTUS) 22 Unit(s) SubCutaneous at bedtime  insulin lispro (HumaLOG) corrective regimen sliding scale   SubCutaneous three times a day before meals  insulin lispro Injectable (HumaLOG) 8 Unit(s) SubCutaneous three times a day before meals  isosorbide   mononitrate ER Tablet (IMDUR) 30 milliGRAM(s) Oral daily  losartan 25 milliGRAM(s) Oral daily  metoprolol tartrate 25 milliGRAM(s) Oral two times a day  multivitamin/minerals 1 Tablet(s) Oral daily  pantoprazole    Tablet 40 milliGRAM(s) Oral before breakfast  polyethylene glycol 3350 17 Gram(s) Oral daily  tamsulosin 0.8 milliGRAM(s) Oral at bedtime    MEDICATIONS  (PRN):  acetaminophen   Tablet .. 650 milliGRAM(s) Oral every 6 hours PRN Mild Pain (1 - 3)  dextrose 40% Gel 15 Gram(s) Oral once PRN Blood Glucose LESS THAN 70 milliGRAM(s)/deciliter  glucagon  Injectable 1 milliGRAM(s) IntraMuscular once PRN Glucose LESS THAN 70 milligrams/deciliter  melatonin 1 milliGRAM(s) Oral at bedtime PRN Insomnia      Allergies    No Known Allergies      FAMILY HISTORY:  FH: type 2 diabetes: sister  FH: HTN (hypertension): Sister      Physical Exam:   Vital Signs Last 24 Hrs  T(C): 37 (18 Aug 2020 06:40), Max: 37.4 (18 Aug 2020 00:20)  T(F): 98.6 (18 Aug 2020 06:40), Max: 99.3 (18 Aug 2020 00:20)  HR: 73 (18 Aug 2020 15:00) (60 - 86)  BP: 107/58 (18 Aug 2020 15:00) (101/53 - 118/64)  BP(mean): --  RR: 20 (18 Aug 2020 00:20) (18 - 20)  SpO2: 98% (18 Aug 2020 15:00) (95% - 100%)      Labs:                         10.6   7.29  )-----------( 115      ( 18 Aug 2020 05:24 )             35.7     08-18    139  |  97<L>  |  47<H>  ----------------------------<  201<H>  4.7   |  27  |  6.6<HH>    Ca    8.6      18 Aug 2020 05:24  Phos  4.7     08-18  Mg     2.1     08-18    TPro  7.0  /  Alb  4.2  /  TBili  0.4  /  DBili  x   /  AST  17  /  ALT  17  /  AlkPhos  168<H>  08-18    PT/INR - ( 17 Aug 2020 09:23 )   PT: 16.20 sec;   INR: 1.41 ratio         PTT - ( 18 Aug 2020 05:24 )  PTT:67.6 sec    Pertinent labs:                      10.6   7.29  )-----------( 115      ( 18 Aug 2020 05:24 )             35.7       08-18    139  |  97<L>  |  47<H>  ----------------------------<  201<H>  4.7   |  27  |  6.6<HH>    Ca    8.6      18 Aug 2020 05:24  Phos  4.7     08-18  Mg     2.1     08-18    TPro  7.0  /  Alb  4.2  /  TBili  0.4  /  DBili  x   /  AST  17  /  ALT  17  /  AlkPhos  168<H>  08-18      PT/INR - ( 17 Aug 2020 09:23 )   PT: 16.20 sec;   INR: 1.41 ratio         PTT - ( 18 Aug 2020 05:24 )  PTT:67.6 sec    Radiology & Additional Studies:     Radiology imaging reviewed.       ASSESSMENT/ PLAN:   68 y/o M with PMH of ESRD on HD MWF, CAD s/p pci with 7 stents and AICD placement in 2018, Heart failure with reduced EF (26% in 2018), DLD, HTN, COPD? on 2L home O2, DM II (well controlled with HbA1C in the 6-7 range), and BPH presents to the ED for SOB that has been worsening over the last 2 weeks.   -CT showed large right pleural effusion.   -IR consulted for right-sided chest tube placement.   -Will schedule patient for tomorrow 8/19/20.  -Please place all lab orders for diagnosis prior to the procedure.        Thank you for the courtesy of this consult, please call [phone] with any further questions. INTERVENTIONAL RADIOLOGY CONSULT:     Procedure Requested: Right chest tube placement     HPI:  70 y/o M with PMH of ESRD on HD MWF, CAD s/p pci with 7 stents and AICD placement in 2018, Heart failure with reduced EF (26% in 2018), DLD, HTN, COPD? on 2L home O2, DM II (well controlled with HbA1C in the 6-7 range), and BPH presents to the ED for SOB that has been worsening over the last 2 weeks. As per patient's daughter patient has had multiple friends pass away in the last few months and since then has complained that he needs home O2 (Daughter thinks this is anxiety driven). Patient's daughter reports that patient saturates in the low 90s at home without O2 and saturates at 100% on 2L. Two week ago he developed hypotension while at dialysis. He refused to go the hospital at that time. Since then he reports that his breathing has gotten worse and he requires more O2. A few days ago he requested that his daughter raise his home O2 to 3L. Before 2 week sago he was able to walk around the house without O2 but over the last two weeks he has required O2 to move around his house. He reports that his SOB is worse with walking and better when he sits down. He reports a dry cough at baseline but denies any chest pain, sore throat, wheezing, weight changes, fatigue, dizziness, or changes in urination. No travel history and no one at home is sick. At baseline patient walks with a cane. He lives with his daughter and she takes care of his medication for him. He sleeps on a recliner due to the HF and wears compression stockings. He is oliguric and urinates "a few drops" approximately 2 times a week.    In the ED vitals were stable and patient is breathing well on 3L NC. Labs were significant for elevated potassium and troponins likely secondary to ESRD. Chest xray reveals worsening right-sided pleural effusion with increased left lower lung airspace opacities. Nephrology was consulted and patient was dialyzed in the ED (17 Aug 2020 14:27)      PAST MEDICAL & SURGICAL HISTORY:  Heart failure with reduced ejection fraction  CAD (coronary artery disease): s/p PCI and AICD placement  BPH (benign prostatic hyperplasia)  Type 2 diabetes mellitus  End stage renal disease  Dyslipidemia  Hypertension  No significant past surgical history      MEDICATIONS  (STANDING):  aspirin  chewable 81 milliGRAM(s) Oral daily  atorvastatin 80 milliGRAM(s) Oral at bedtime  BACItracin   Ointment 1 Application(s) Topical daily  calcium acetate 667 milliGRAM(s) Oral three times a day with meals  cefpodoxime 200 milliGRAM(s) Oral <User Schedule>  chlorhexidine 4% Liquid 1 Application(s) Topical <User Schedule>  dextrose 5%. 1000 milliLiter(s) (50 mL/Hr) IV Continuous <Continuous>  dextrose 50% Injectable 12.5 Gram(s) IV Push once  dextrose 50% Injectable 25 Gram(s) IV Push once  dextrose 50% Injectable 25 Gram(s) IV Push once  heparin   Injectable 5000 Unit(s) SubCutaneous every 8 hours  insulin glargine Injectable (LANTUS) 22 Unit(s) SubCutaneous at bedtime  insulin lispro (HumaLOG) corrective regimen sliding scale   SubCutaneous three times a day before meals  insulin lispro Injectable (HumaLOG) 8 Unit(s) SubCutaneous three times a day before meals  isosorbide   mononitrate ER Tablet (IMDUR) 30 milliGRAM(s) Oral daily  losartan 25 milliGRAM(s) Oral daily  metoprolol tartrate 25 milliGRAM(s) Oral two times a day  multivitamin/minerals 1 Tablet(s) Oral daily  pantoprazole    Tablet 40 milliGRAM(s) Oral before breakfast  polyethylene glycol 3350 17 Gram(s) Oral daily  tamsulosin 0.8 milliGRAM(s) Oral at bedtime    MEDICATIONS  (PRN):  acetaminophen   Tablet .. 650 milliGRAM(s) Oral every 6 hours PRN Mild Pain (1 - 3)  dextrose 40% Gel 15 Gram(s) Oral once PRN Blood Glucose LESS THAN 70 milliGRAM(s)/deciliter  glucagon  Injectable 1 milliGRAM(s) IntraMuscular once PRN Glucose LESS THAN 70 milligrams/deciliter  melatonin 1 milliGRAM(s) Oral at bedtime PRN Insomnia      Allergies    No Known Allergies      FAMILY HISTORY:  FH: type 2 diabetes: sister  FH: HTN (hypertension): Sister      Physical Exam:   Vital Signs Last 24 Hrs  T(C): 37 (18 Aug 2020 06:40), Max: 37.4 (18 Aug 2020 00:20)  T(F): 98.6 (18 Aug 2020 06:40), Max: 99.3 (18 Aug 2020 00:20)  HR: 73 (18 Aug 2020 15:00) (60 - 86)  BP: 107/58 (18 Aug 2020 15:00) (101/53 - 118/64)  BP(mean): --  RR: 20 (18 Aug 2020 00:20) (18 - 20)  SpO2: 98% (18 Aug 2020 15:00) (95% - 100%)      Labs:                         10.6   7.29  )-----------( 115      ( 18 Aug 2020 05:24 )             35.7     08-18    139  |  97<L>  |  47<H>  ----------------------------<  201<H>  4.7   |  27  |  6.6<HH>    Ca    8.6      18 Aug 2020 05:24  Phos  4.7     08-18  Mg     2.1     08-18    TPro  7.0  /  Alb  4.2  /  TBili  0.4  /  DBili  x   /  AST  17  /  ALT  17  /  AlkPhos  168<H>  08-18    PT/INR - ( 17 Aug 2020 09:23 )   PT: 16.20 sec;   INR: 1.41 ratio         PTT - ( 18 Aug 2020 05:24 )  PTT:67.6 sec    Pertinent labs:                      10.6   7.29  )-----------( 115      ( 18 Aug 2020 05:24 )             35.7       08-18    139  |  97<L>  |  47<H>  ----------------------------<  201<H>  4.7   |  27  |  6.6<HH>    Ca    8.6      18 Aug 2020 05:24  Phos  4.7     08-18  Mg     2.1     08-18    TPro  7.0  /  Alb  4.2  /  TBili  0.4  /  DBili  x   /  AST  17  /  ALT  17  /  AlkPhos  168<H>  08-18      PT/INR - ( 17 Aug 2020 09:23 )   PT: 16.20 sec;   INR: 1.41 ratio         PTT - ( 18 Aug 2020 05:24 )  PTT:67.6 sec    Radiology & Additional Studies:     Radiology imaging reviewed.       ASSESSMENT/ PLAN:   70 y/o M with PMH of ESRD on HD MWF, CAD s/p pci with 7 stents and AICD placement in 2018, Heart failure with reduced EF (26% in 2018), DLD, HTN, COPD? on 2L home O2, DM II (well controlled with HbA1C in the 6-7 range), and BPH presents to the ED for SOB that has been worsening over the last 2 weeks.   -CT showed large right pleural effusion.   -IR consulted for right-sided chest tube placement.   -Will schedule patient for tomorrow 8/19/20.  -Please place all lab orders for diagnosis prior to the procedure.  -NPO midnight. Continue holding Eliquis.         Thank you for the courtesy of this consult, please call [phone] with any further questions.

## 2020-08-18 NOTE — PATIENT PROFILE ADULT - FALL HARM RISK
Hide Accession Number?: No Size Of Lesion In Cm: 0.5 Cautery Type: electrodesiccation Detail Level: Detailed What Was Performed First?: Curettage Total Volume (Ccs): 1 Anesthesia Type: 1% lidocaine with epinephrine and a 1:10 solution of 8.4% sodium bicarbonate other Number Of Curettages: 3 Consent was obtained from the patient. The risks, benefits and alternatives to therapy were discussed in detail. Specifically, the risks of infection, scarring, bleeding, prolonged wound healing, nerve injury, incomplete removal, allergy to anesthesia and recurrence were addressed. Alternatives to ED&C, such as: surgical removal and XRT were also discussed.  Prior to the procedure, the treatment site was clearly identified and confirmed by the patient. All components of Universal Protocol/PAUSE Rule completed. Post-Care Instructions: I reviewed with the patient in detail post-care instructions. Patient is to keep the area dry for 48 hours, and not to engage in any swimming until the area is healed. Should the patient develop any fevers, chills, bleeding, severe pain patient will contact the office immediately. Bill As A Line Item Or As Units: Line Item Size Of Lesion After Curettage: 0.8 Additional Information: (Optional): The wound was cleaned, and a pressure dressing was applied.  The patient received detailed post-op instructions. Size Of Lesion In Cm: 0.6 Size Of Lesion After Curettage: 0.7

## 2020-08-18 NOTE — PROGRESS NOTE ADULT - SUBJECTIVE AND OBJECTIVE BOX
Patient is a 69y old  Male who presents with a chief complaint of SOB (18 Aug 2020 09:39)      HPI:  70 y/o M with PMH of ESRD on HD MWF, CAD s/p pci with 7 stents and AICD placement in 2018, Heart failure with reduced EF (26% in 2018), DLD, HTN, COPD? on 2L home O2, DM II (well controlled with HbA1C in the 6-7 range), and BPH presents to the ED for SOB that has been worsening over the last 2 weeks. As per patient's daughter patient has had multiple friends pass away in the last few months and since then has complained that he needs home O2 (Daughter thinks this is anxiety driven). Patient's daughter reports that patient saturates in the low 90s at home without O2 and saturates at 100% on 2L. Two week ago he developed hypotension while at dialysis. He refused to go the hospital at that time. Since then he reports that his breathing has gotten worse and he requires more O2. A few days ago he requested that his daughter raise his home O2 to 3L. Before 2 week sago he was able to walk around the house without O2 but over the last two weeks he has required O2 to move around his house. He reports that his SOB is worse with walking and better when he sits down. He reports a dry cough at baseline but denies any chest pain, sore throat, wheezing, weight changes, fatigue, dizziness, or changes in urination. No travel history and no one at home is sick. At baseline patient walks with a cane. He lives with his daughter and she takes care of his medication for him. He sleeps on a recliner due to the HF and wears compression stockings. He is oliguric and urinates "a few drops" approximately 2 times a week.    In the ED vitals were stable and patient is breathing well on 3L NC. Labs were significant for elevated potassium and troponins likely secondary to ESRD. Chest xray reveals worsening right-sided pleural effusion with increased left lower lung airspace opacities. Nephrology was consulted and patient was dialyzed in the ED (17 Aug 2020 14:27)      PAST MEDICAL & SURGICAL HISTORY:  Heart failure with reduced ejection fraction  CAD (coronary artery disease): s/p PCI and AICD placement  BPH (benign prostatic hyperplasia)  Type 2 diabetes mellitus  End stage renal disease  Dyslipidemia  Hypertension  No significant past surgical history      Home Medications:  alfuzosin 10 mg oral tablet, extended release: 1 tab(s) orally once a day on non-dialysis days (17 Aug 2020 15:02)  aspirin 81 mg oral tablet, chewable: 1 tab(s) orally once a day (17 Aug 2020 15:02)  calcium acetate 667 mg oral tablet: 1 tab(s) orally 3 times a day (17 Aug 2020 15:02)  cefdinir 300 mg oral capsule: 1 tab(s) orally 3 times a day after dialysis (17 Aug 2020 15:02)  Centrum Silver oral tablet: 1 tab(s) orally once a day (17 Aug 2020 15:02)  Crestor 20 mg oral tablet: 1 tab(s) orally once a day (17 Aug 2020 15:02)  Eliquis 5 mg oral tablet: 1 tab(s) orally 2 times a day (17 Aug 2020 15:02)  furosemide 40 mg oral tablet: 1 tab(s) orally once a day on non-dialysis days (17 Aug 2020 15:02)  isosorbide mononitrate 30 mg oral tablet, extended release: 1 tab(s) orally once a day on non-dialysis days (17 Aug 2020 15:02)  pantoprazole 40 mg oral delayed release tablet: 1 tab(s) orally once a day (17 Aug 2020 15:02)      .  Tobacco use:   EtOH use:  Illicit drug use:    Living situation:    No Known Allergies      FAMILY HISTORY:  FH: type 2 diabetes: sister  FH: HTN (hypertension): Sister      acetaminophen   Tablet .. 650 milliGRAM(s) Oral every 6 hours PRN  aspirin  chewable 81 milliGRAM(s) Oral daily  atorvastatin 80 milliGRAM(s) Oral at bedtime  calcium acetate 667 milliGRAM(s) Oral three times a day with meals  cefpodoxime 200 milliGRAM(s) Oral <User Schedule>  chlorhexidine 4% Liquid 1 Application(s) Topical <User Schedule>  dextrose 40% Gel 15 Gram(s) Oral once PRN  dextrose 5%. 1000 milliLiter(s) IV Continuous <Continuous>  dextrose 50% Injectable 12.5 Gram(s) IV Push once  dextrose 50% Injectable 25 Gram(s) IV Push once  dextrose 50% Injectable 25 Gram(s) IV Push once  glucagon  Injectable 1 milliGRAM(s) IntraMuscular once PRN  heparin  Infusion 1000 Unit(s)/Hr IV Continuous <Continuous>  insulin glargine Injectable (LANTUS) 22 Unit(s) SubCutaneous at bedtime  insulin lispro (HumaLOG) corrective regimen sliding scale   SubCutaneous three times a day before meals  insulin lispro Injectable (HumaLOG) 8 Unit(s) SubCutaneous three times a day before meals  isosorbide   mononitrate ER Tablet (IMDUR) 30 milliGRAM(s) Oral daily  losartan 25 milliGRAM(s) Oral daily  metoprolol tartrate 25 milliGRAM(s) Oral two times a day  multivitamin/minerals 1 Tablet(s) Oral daily  pantoprazole    Tablet 40 milliGRAM(s) Oral before breakfast  polyethylene glycol 3350 17 Gram(s) Oral daily  tamsulosin 0.8 milliGRAM(s) Oral at bedtime      Vital Signs Last 24 Hrs  T(C): 37 (18 Aug 2020 06:40), Max: 37.4 (18 Aug 2020 00:20)  T(F): 98.6 (18 Aug 2020 06:40), Max: 99.3 (18 Aug 2020 00:20)  HR: 60 (18 Aug 2020 13:00) (60 - 86)  BP: 101/53 (18 Aug 2020 13:00) (101/53 - 118/64)  BP(mean): --  RR: 20 (18 Aug 2020 00:20) (18 - 20)  SpO2: 97% (18 Aug 2020 13:00) (95% - 100%)    I&O's Summary    17 Aug 2020 07:01  -  18 Aug 2020 07:00  --------------------------------------------------------  IN: 0 mL / OUT: 3700 mL / NET: -3700 mL                              10.6   7.29  )-----------( 115      ( 18 Aug 2020 05:24 )             35.7       08-18    139  |  97<L>  |  47<H>  ----------------------------<  201<H>  4.7   |  27  |  6.6<HH>    Ca    8.6      18 Aug 2020 05:24  Phos  4.7     08-18  Mg     2.1     08-18    TPro  7.0  /  Alb  4.2  /  TBili  0.4  /  DBili  x   /  AST  17  /  ALT  17  /  AlkPhos  168<H>  08-18      CARDIAC MARKERS ( 18 Aug 2020 05:24 )  x     / 0.16 ng/mL / x     / x     / x      CARDIAC MARKERS ( 17 Aug 2020 15:04 )  x     / 0.13 ng/mL / x     / x     / x      CARDIAC MARKERS ( 17 Aug 2020 09:23 )  x     / 0.14 ng/mL / x     / x     / x                PHYSICAL EXAM:  GENERAL: NAD, sitting in chair comfortably, nodding off  HEAD:  Atraumatic, Normocephalic  EYES: EOMI, conjunctiva and sclera clear  ENT: Moist mucous membranes  NECK: Supple, No JVD  CHEST/LUNG: On NC 3L of O2, BL crackles in lung bases, decreased breath sounds in R lung. Unlabored respirations  HEART: Regular rate and rhythm; No murmurs, rubs, or gallops  ABDOMEN: Bowel sounds present; Soft, Nontender, Nondistended. No hepatomegaly   EXTREMITIES:  2+ Peripheral Pulses, brisk capillary refill. No clubbing, cyanosis, or edema. Wearing compression stockings.  NERVOUS SYSTEM:  Alert & Oriented X3, speech clear. No deficits   SKIN: No rashes or lesions      < from: Xray Chest 1 View-PORTABLE IMMEDIATE (08.17.20 @ 09:38) >  IMPRESSION:    Worsening right-sided pleural effusion with increased left lower lung airspace opacities.      < end of copied text > Patient is a 69y old  Male who presents with a chief complaint of SOB (18 Aug 2020 09:39)      HPI:  68 y/o M with PMH of ESRD on HD MWF, CAD s/p pci with 7 stents and AICD placement in 2018, Heart failure with reduced EF (26% in 2018), DLD, HTN, COPD? on 2L home O2, DM II (well controlled with HbA1C in the 6-7 range), and BPH presents to the ED for SOB that has been worsening over the last 2 weeks. As per patient's daughter patient has had multiple friends pass away in the last few months and since then has complained that he needs home O2 (Daughter thinks this is anxiety driven). Patient's daughter reports that patient saturates in the low 90s at home without O2 and saturates at 100% on 2L. Two week ago he developed hypotension while at dialysis. He refused to go the hospital at that time. Since then he reports that his breathing has gotten worse and he requires more O2. A few days ago he requested that his daughter raise his home O2 to 3L. Before 2 week sago he was able to walk around the house without O2 but over the last two weeks he has required O2 to move around his house. He reports that his SOB is worse with walking and better when he sits down. He reports a dry cough at baseline but denies any chest pain, sore throat, wheezing, weight changes, fatigue, dizziness, or changes in urination. No travel history and no one at home is sick. At baseline patient walks with a cane. He lives with his daughter and she takes care of his medication for him. He sleeps on a recliner due to the HF and wears compression stockings. He is oliguric and urinates "a few drops" approximately 2 times a week.    In the ED vitals were stable and patient is breathing well on 3L NC. Labs were significant for elevated potassium and troponins likely secondary to ESRD. Chest xray reveals worsening right-sided pleural effusion with increased left lower lung airspace opacities. Nephrology was consulted and patient was dialyzed in the ED (17 Aug 2020 14:27)      PAST MEDICAL & SURGICAL HISTORY:  Heart failure with reduced ejection fraction  CAD (coronary artery disease): s/p PCI and AICD placement  BPH (benign prostatic hyperplasia)  Type 2 diabetes mellitus  End stage renal disease  Dyslipidemia  Hypertension  No significant past surgical history      Home Medications:  alfuzosin 10 mg oral tablet, extended release: 1 tab(s) orally once a day on non-dialysis days (17 Aug 2020 15:02)  aspirin 81 mg oral tablet, chewable: 1 tab(s) orally once a day (17 Aug 2020 15:02)  calcium acetate 667 mg oral tablet: 1 tab(s) orally 3 times a day (17 Aug 2020 15:02)  cefdinir 300 mg oral capsule: 1 tab(s) orally 3 times a day after dialysis (17 Aug 2020 15:02)  Centrum Silver oral tablet: 1 tab(s) orally once a day (17 Aug 2020 15:02)  Crestor 20 mg oral tablet: 1 tab(s) orally once a day (17 Aug 2020 15:02)  Eliquis 5 mg oral tablet: 1 tab(s) orally 2 times a day (17 Aug 2020 15:02)  furosemide 40 mg oral tablet: 1 tab(s) orally once a day on non-dialysis days (17 Aug 2020 15:02)  isosorbide mononitrate 30 mg oral tablet, extended release: 1 tab(s) orally once a day on non-dialysis days (17 Aug 2020 15:02)  pantoprazole 40 mg oral delayed release tablet: 1 tab(s) orally once a day (17 Aug 2020 15:02)      .  Tobacco use:   EtOH use:  Illicit drug use:    Living situation:    No Known Allergies      FAMILY HISTORY:  FH: type 2 diabetes: sister  FH: HTN (hypertension): Sister      acetaminophen   Tablet .. 650 milliGRAM(s) Oral every 6 hours PRN  aspirin  chewable 81 milliGRAM(s) Oral daily  atorvastatin 80 milliGRAM(s) Oral at bedtime  calcium acetate 667 milliGRAM(s) Oral three times a day with meals  cefpodoxime 200 milliGRAM(s) Oral <User Schedule>  chlorhexidine 4% Liquid 1 Application(s) Topical <User Schedule>  dextrose 40% Gel 15 Gram(s) Oral once PRN  dextrose 5%. 1000 milliLiter(s) IV Continuous <Continuous>  dextrose 50% Injectable 12.5 Gram(s) IV Push once  dextrose 50% Injectable 25 Gram(s) IV Push once  dextrose 50% Injectable 25 Gram(s) IV Push once  glucagon  Injectable 1 milliGRAM(s) IntraMuscular once PRN  heparin  Infusion 1000 Unit(s)/Hr IV Continuous <Continuous>  insulin glargine Injectable (LANTUS) 22 Unit(s) SubCutaneous at bedtime  insulin lispro (HumaLOG) corrective regimen sliding scale   SubCutaneous three times a day before meals  insulin lispro Injectable (HumaLOG) 8 Unit(s) SubCutaneous three times a day before meals  isosorbide   mononitrate ER Tablet (IMDUR) 30 milliGRAM(s) Oral daily  losartan 25 milliGRAM(s) Oral daily  metoprolol tartrate 25 milliGRAM(s) Oral two times a day  multivitamin/minerals 1 Tablet(s) Oral daily  pantoprazole    Tablet 40 milliGRAM(s) Oral before breakfast  polyethylene glycol 3350 17 Gram(s) Oral daily  tamsulosin 0.8 milliGRAM(s) Oral at bedtime      Vital Signs Last 24 Hrs  T(C): 37 (18 Aug 2020 06:40), Max: 37.4 (18 Aug 2020 00:20)  T(F): 98.6 (18 Aug 2020 06:40), Max: 99.3 (18 Aug 2020 00:20)  HR: 60 (18 Aug 2020 13:00) (60 - 86)  BP: 101/53 (18 Aug 2020 13:00) (101/53 - 118/64)  BP(mean): --  RR: 20 (18 Aug 2020 00:20) (18 - 20)  SpO2: 97% (18 Aug 2020 13:00) (95% - 100%)    I&O's Summary    17 Aug 2020 07:01  -  18 Aug 2020 07:00  --------------------------------------------------------  IN: 0 mL / OUT: 3700 mL / NET: -3700 mL                              10.6   7.29  )-----------( 115      ( 18 Aug 2020 05:24 )             35.7       08-18    139  |  97<L>  |  47<H>  ----------------------------<  201<H>  4.7   |  27  |  6.6<HH>    Ca    8.6      18 Aug 2020 05:24  Phos  4.7     08-18  Mg     2.1     08-18    TPro  7.0  /  Alb  4.2  /  TBili  0.4  /  DBili  x   /  AST  17  /  ALT  17  /  AlkPhos  168<H>  08-18      CARDIAC MARKERS ( 18 Aug 2020 05:24 )  x     / 0.16 ng/mL / x     / x     / x      CARDIAC MARKERS ( 17 Aug 2020 15:04 )  x     / 0.13 ng/mL / x     / x     / x      CARDIAC MARKERS ( 17 Aug 2020 09:23 )  x     / 0.14 ng/mL / x     / x     / x        < from: Xray Chest 1 View-PORTABLE IMMEDIATE (08.17.20 @ 09:38) >  Worsening right-sided pleural effusion with increased left lower lung airspace opacities.    < end of copied text >          PHYSICAL EXAM:  GENERAL: NAD, sitting in chair comfortably, nodding off  HEAD:  Atraumatic, Normocephalic  EYES: EOMI, conjunctiva and sclera clear  ENT: Moist mucous membranes  NECK: Supple, No JVD  CHEST/LUNG: On NC 3L of O2, BL crackles in lung bases, decreased breath sounds in R lung. Unlabored respirations  HEART: Regular rate and rhythm; No murmurs, rubs, or gallops  ABDOMEN: Bowel sounds present; Soft, Nontender, Nondistended. No hepatomegaly   EXTREMITIES:  2+ Peripheral Pulses, brisk capillary refill. No clubbing, cyanosis, or edema. Wearing compression stockings.  NERVOUS SYSTEM:  Alert & Oriented X3, speech clear. No deficits   SKIN: No rashes or lesions      < from: Xray Chest 1 View-PORTABLE IMMEDIATE (08.17.20 @ 09:38) >  IMPRESSION:    Worsening right-sided pleural effusion with increased left lower lung airspace opacities.      < end of copied text >

## 2020-08-18 NOTE — PROGRESS NOTE ADULT - ASSESSMENT
68 y/o M with PMH of ESRD on HD MWF, CAD s/p pci with 7 stents and AICD placement in 2018, Heart failure with reduced EF (26% in 2018), DLD, HTN, COPD? on 2L home O2, DM II (well controlled with HbA1C in the 6-7 range), and BPH presents to the ED for SOB that has been worsening over the last 2 weeks.    #SOB   -likely due to ESRD vs HFpEF  -CXR showed improved L lower lobe opacities this am from yesterday s/p emergent dialysis  -CXR shows worsening R pleural effusions   -CT scan chest done today will f/u results  -pt on 3L of O2 via NC, BiPAP at night and PRN  -home eliquis held as pulm might do thora  -pulm following, IR consulted for pigtail catheter     #ESRD  -dialysis MWF, nephro following  -getting dialysis today 08/18 per nephro   -pt anuric, diuretics discontinued   -labs improved after dialysis    #HFrEF  -EF 26% on echo in 2018  -repeat echo ordered, will f/u results  -elevated trops likely due to ESRD    #CAD s/p PCI with 7 stents and AICD placement in 2018  -c/w metoprolol, aspirin  -eliquis held, pt on heparin drip, follow PTT, keep PTT 60-80    #DM II  -A1c range 6-7, well controlled   -on insulin and sliding scale  -check FS    #HTN  -c/w losartan    #DLD  -c/w statin    #BPH  -c/w tamsulosin    Pending: pulm f/u, CT scan chest, IR f/u  Diet: DASH/TLC, renal restricted  Activity: AAT  DVT prophylaxis: Heparin drip  GI prophylaxis: protonix  Disposition: acute 68 y/o M with PMH of ESRD on HD MWF, CAD s/p pci with 7 stents and AICD placement in 2018, Heart failure with reduced EF (26% in 2018), DLD, HTN, COPD? on 2L home O2, DM II (well controlled with HbA1C in the 6-7 range), and BPH presents to the ED for SOB that has been worsening over the last 2 weeks.    #SOB   -likely due to ESRD vs HFpEF  -CXR showed improved L lower lobe opacities this am from yesterday s/p emergent dialysis  -CXR shows worsening R pleural effusions   -CT scan chest done today will f/u results  -pt on 3L of O2 via NC, BiPAP at night and PRN  -home eliquis held as pulm might do thora  -pulm following, IR consulted for pigtail catheter     #ESRD  -dialysis MWF, nephro following  -getting dialysis today 08/18 per nephro   -pt anuric, diuretics discontinued   -labs improved after dialysis  -will continue to monitor CBC, CMP    #HFrEF  -EF 26% on echo in 2018  -repeat echo ordered, will f/u results  -elevated trops likely due to ESRD    #CAD s/p PCI with 7 stents and AICD placement in 2018  -c/w metoprolol, aspirin  -eliquis held, pt on heparin drip, follow PTT, keep PTT 60-80    #DM II  -A1c 7.8  -on insulin and sliding scale  -check FS, continue to monitor    #HTN  -c/w losartan    #DLD  -c/w statin    #BPH  -c/w tamsulosin    Pending: pulm f/u, CT scan chest, IR f/u  Diet: DASH/TLC, renal restricted  Activity: AAT  DVT prophylaxis: Heparin drip  GI prophylaxis: protonix  Disposition: acute 68 y/o M with PMH of ESRD on HD MWF, CAD s/p pci with 7 stents and AICD placement in 2018, Heart failure with reduced EF (26% in 2018), DLD, HTN, COPD? on 2L home O2, DM II (well controlled with HbA1C in the 6-7 range), and BPH presents to the ED for SOB that has been worsening over the last 2 weeks.    #SOB   -due to pleural effusion and poss opacities, also fluid overload in setting of ESRD plus HFpEF  -CXR showed improved L lower lobe opacities this am from yesterday s/p emergent dialysis  -CXR shows worsening R pleural effusions   -CT scan chest done today will f/u results  -pt on 3L of O2 via NC, BiPAP at night and PRN  -home eliquis held as pulm might do thoracentesis   -pulm following, IR consulted for pigtail catheter     #ESRD, dialysis dependent   -dialysis MWF, nephro following  -getting dialysis today 08/18 per nephro   -pt anuric, diuretics discontinued   -labs improved after dialysis  -will continue to monitor CBC, CMP    #HFrEF  -EF 26% on echo in 2018  -repeat echo ordered, will f/u results  -elevated trops likely due to ESRD    #CAD s/p PCI with 7 stents  -c/w metoprolol, aspirin    #pt is on Eliquis for afib, hold for poss procedure. ( pig tail)     #DM II, uncontrolled.   -A1c 7.8  -on insulin and sliding scale  -check FS, continue to monitor  -consider     #HTN  -c/w losartan    #DLD  -c/w statin    #BPH: on flomax    Pending: pulm f/u, CT scan chest, IR f/u  Diet: DASH/TLC, renal restricted  Activity: AAT  DVT prophylaxis: Heparin subq  Disposition: acute

## 2020-08-19 NOTE — PROGRESS NOTE ADULT - ASSESSMENT
Impression:  chronic right sided effusion however loculation is new, low grade fever, nl WBC  HO [place] non compliant with CPAP    plan:    IR drain loculation/ fluid ( ph, cell count, LDH, prot, glucose, cyto, flow cytometry, gram stain/ cx)  NIV at night and prn  can do cpap with 9 mmHg or Bipap 12/6  echo  will follow  spoke with daughter

## 2020-08-19 NOTE — PROGRESS NOTE ADULT - ASSESSMENT
69M, PMH of ESRD on HD MWF, COPD, HFrEF, admitted for SOB.      #ESRD on HD  - for short ttt today 2h UF 1l   - IP noted keep Ca acetate   - hgb noted, iron stores / YASMINE if dropping  - low K, low Na, low phos diet     #SOB - sp CT of chest with loculated effusion for thoracentesis/ chest tube today

## 2020-08-19 NOTE — PHYSICAL THERAPY INITIAL EVALUATION ADULT - SPECIFY REASON(S)
Attempted to see patient 0140pm, however patient still didn't come back from hemodialysis. Will f/u.

## 2020-08-19 NOTE — PROGRESS NOTE ADULT - SUBJECTIVE AND OBJECTIVE BOX
Patient is a 69y old  Male who presents with a chief complaint of SOB (19 Aug 2020 09:40)      HPI:  68 y/o M with PMH of ESRD on HD MWF, CAD s/p pci with 7 stents and AICD placement in 2018, Heart failure with reduced EF (26% in 2018), DLD, HTN, COPD? on 2L home O2, DM II (well controlled with HbA1C in the 6-7 range), and BPH presents to the ED for SOB that has been worsening over the last 2 weeks. As per patient's daughter patient has had multiple friends pass away in the last few months and since then has complained that he needs home O2 (Daughter thinks this is anxiety driven). Patient's daughter reports that patient saturates in the low 90s at home without O2 and saturates at 100% on 2L. Two week ago he developed hypotension while at dialysis. He refused to go the hospital at that time. Since then he reports that his breathing has gotten worse and he requires more O2. A few days ago he requested that his daughter raise his home O2 to 3L. Before 2 week sago he was able to walk around the house without O2 but over the last two weeks he has required O2 to move around his house. He reports that his SOB is worse with walking and better when he sits down. He reports a dry cough at baseline but denies any chest pain, sore throat, wheezing, weight changes, fatigue, dizziness, or changes in urination. No travel history and no one at home is sick. At baseline patient walks with a cane. He lives with his daughter and she takes care of his medication for him. He sleeps on a recliner due to the HF and wears compression stockings. He is oliguric and urinates "a few drops" approximately 2 times a week.    In the ED vitals were stable and patient is breathing well on 3L NC. Labs were significant for elevated potassium and troponins likely secondary to ESRD. Chest xray reveals worsening right-sided pleural effusion with increased left lower lung airspace opacities. Nephrology was consulted and patient was dialyzed in the ED (17 Aug 2020 14:27)      PAST MEDICAL & SURGICAL HISTORY:  Heart failure with reduced ejection fraction  CAD (coronary artery disease): s/p PCI and AICD placement  BPH (benign prostatic hyperplasia)  Type 2 diabetes mellitus  End stage renal disease  Dyslipidemia  Hypertension  No significant past surgical history      Home Medications:  alfuzosin 10 mg oral tablet, extended release: 1 tab(s) orally once a day on non-dialysis days (17 Aug 2020 15:02)  aspirin 81 mg oral tablet, chewable: 1 tab(s) orally once a day (17 Aug 2020 15:02)  calcium acetate 667 mg oral tablet: 1 tab(s) orally 3 times a day (17 Aug 2020 15:02)  cefdinir 300 mg oral capsule: 1 tab(s) orally 3 times a day after dialysis (17 Aug 2020 15:02)  Centrum Silver oral tablet: 1 tab(s) orally once a day (17 Aug 2020 15:02)  Crestor 20 mg oral tablet: 1 tab(s) orally once a day (17 Aug 2020 15:02)  Eliquis 5 mg oral tablet: 1 tab(s) orally 2 times a day (17 Aug 2020 15:02)  furosemide 40 mg oral tablet: 1 tab(s) orally once a day on non-dialysis days (17 Aug 2020 15:02)  isosorbide mononitrate 30 mg oral tablet, extended release: 1 tab(s) orally once a day on non-dialysis days (17 Aug 2020 15:02)  pantoprazole 40 mg oral delayed release tablet: 1 tab(s) orally once a day (17 Aug 2020 15:02)      .  Tobacco use:   EtOH use:  Illicit drug use:    Living situation:    No Known Allergies      FAMILY HISTORY:  FH: type 2 diabetes: sister  FH: HTN (hypertension): Sister      acetaminophen   Tablet .. 650 milliGRAM(s) Oral every 6 hours PRN  aspirin  chewable 81 milliGRAM(s) Oral daily  atorvastatin 80 milliGRAM(s) Oral at bedtime  BACItracin   Ointment 1 Application(s) Topical daily  calcium acetate 667 milliGRAM(s) Oral three times a day with meals  cefpodoxime 200 milliGRAM(s) Oral <User Schedule>  chlorhexidine 4% Liquid 1 Application(s) Topical <User Schedule>  dextrose 40% Gel 15 Gram(s) Oral once PRN  dextrose 5%. 1000 milliLiter(s) IV Continuous <Continuous>  dextrose 50% Injectable 12.5 Gram(s) IV Push once  dextrose 50% Injectable 25 Gram(s) IV Push once  dextrose 50% Injectable 25 Gram(s) IV Push once  glucagon  Injectable 1 milliGRAM(s) IntraMuscular once PRN  heparin   Injectable 5000 Unit(s) SubCutaneous every 8 hours  insulin glargine Injectable (LANTUS) 22 Unit(s) SubCutaneous at bedtime  insulin lispro (HumaLOG) corrective regimen sliding scale   SubCutaneous three times a day before meals  insulin lispro Injectable (HumaLOG) 8 Unit(s) SubCutaneous three times a day before meals  isosorbide   mononitrate ER Tablet (IMDUR) 30 milliGRAM(s) Oral daily  losartan 25 milliGRAM(s) Oral daily  melatonin 1 milliGRAM(s) Oral at bedtime PRN  metoprolol tartrate 25 milliGRAM(s) Oral two times a day  multivitamin/minerals 1 Tablet(s) Oral daily  pantoprazole    Tablet 40 milliGRAM(s) Oral before breakfast  polyethylene glycol 3350 17 Gram(s) Oral daily  tamsulosin 0.8 milliGRAM(s) Oral at bedtime      Vital Signs Last 24 Hrs  T(C): 36.4 (19 Aug 2020 06:53), Max: 38.1 (18 Aug 2020 21:23)  T(F): 97.5 (19 Aug 2020 06:53), Max: 100.5 (18 Aug 2020 21:23)  HR: 69 (19 Aug 2020 09:30) (60 - 80)  BP: 112/54 (19 Aug 2020 09:30) (88/41 - 138/58)  BP(mean): --  RR: 16 (19 Aug 2020 09:30) (16 - 18)  SpO2: 96% (18 Aug 2020 20:01) (96% - 98%)    I&O's Summary    18 Aug 2020 07:01  -  19 Aug 2020 07:00  --------------------------------------------------------  IN: 0 mL / OUT: 2000 mL / NET: -2000 mL                              10.9   8.45  )-----------( 111      ( 19 Aug 2020 04:30 )             36.4       08-19    139  |  97<L>  |  38<H>  ----------------------------<  129<H>  4.8   |  26  |  6.1<HH>    Ca    9.1      19 Aug 2020 04:30  Phos  4.7     08-18  Mg     2.2     08-19    TPro  7.4  /  Alb  4.5  /  TBili  0.5  /  DBili  x   /  AST  21  /  ALT  19  /  AlkPhos  175<H>  08-19      CARDIAC MARKERS ( 18 Aug 2020 05:24 )  x     / 0.16 ng/mL / x     / x     / x      CARDIAC MARKERS ( 17 Aug 2020 15:04 )  x     / 0.13 ng/mL / x     / x     / x              PHYSICAL EXAM:  GENERAL: NAD, sitting in chair comfortably, nodding off  HEAD:  Atraumatic, Normocephalic  EYES: EOMI, conjunctiva and sclera clear  ENT: Moist mucous membranes  NECK: Supple, No JVD  CHEST/LUNG: On NC 3L of O2, BL crackles in lung bases, decreased breath sounds in R lung. Unlabored respirations  HEART: Regular rate and rhythm; No murmurs, rubs, or gallops  ABDOMEN: Bowel sounds present; Soft, Nontender, Nondistended. No hepatomegaly   EXTREMITIES:  2+ Peripheral Pulses, brisk capillary refill. No clubbing, cyanosis, or edema. Wearing compression stockings.  NERVOUS SYSTEM:  Alert & Oriented X3, speech clear. No deficits   SKIN: No rashes or lesions    IMAGES:    < from: CT Chest No Cont (08.18.20 @ 11:02) >  IMPRESSION:  Large right pleural effusion with near complete collapse of the right lung. Large component of this effusion is subpulmonic. Partially loculated anteriorly at the apex.    New nonspecific 1.4 cm right cardiophrenic lymph node.    < end of copied text >

## 2020-08-19 NOTE — PROGRESS NOTE ADULT - SUBJECTIVE AND OBJECTIVE BOX
Interventional Radiology Follow-Up Note    69y Male s/p Image-guided right pleural drianage with pigtail catheter placement and aspiration of 1 liter serosanguinous fluid today in Interventional Radiology with ZEE Martinez and Dr. Hernandez.        IR on-call contacted with patient c/o tachypnea and hypotension.  Patient seen and examined bedside.  CXR obtained by primary team.    Vitals: T(F): 97.7 (08-19-20 @ 19:01), Max: 100.5 (08-18-20 @ 21:23)  HR: 89 (08-19-20 @ 19:50) (61 - 89)  BP: 90/40 (08-19-20 @ 19:29) (80/50 - 138/58)  RR: 30 (08-19-20 @ 19:01) (16 - 30)  SpO2: 95% (08-19-20 @ 19:50) (95% - 96%)  Wt(kg): --    LABS:                        10.9   8.45  )-----------( 111      ( 19 Aug 2020 04:30 )             36.4     08-19    139  |  97<L>  |  38<H>  ----------------------------<  129<H>  4.8   |  26  |  6.1<HH>    Ca    9.1      19 Aug 2020 04:30  Phos  4.7     08-18  Mg     2.2     08-19    TPro  7.4  /  Alb  4.5  /  TBili  0.5  /  DBili  x   /  AST  21  /  ALT  19  /  AlkPhos  175<H>  08-19    PT/INR - ( 19 Aug 2020 04:30 )   PT: 13.40 sec;   INR: 1.17 ratio    PTT - ( 19 Aug 2020 04:30 )  PTT:34.7 sec      PHYSICAL EXAM:  General: OOB to chair. NAD.  Sleepy, but arouseable having had Dilaudid for pain.  Neuro:  A&Ox3  Right chest tube in place.  Site is c/d/i.  Attached to pleur-evac atrium on continuous suction.  Dark red fluid in atrium.  Minimal fluid in pigtail catheter.  No air leak seen.    CXR:  Improved since pre-procedure CT chest.  Small right pleural effusion remains.  No pneumothorax seen.  Catheter evaluated and flushed bedside with hospitalist present.  Returned to drainage, where additional fluid seen in tube.      Impression:  69y Male admitted with SOB (SHORTNESS OF BREATH); large right PLEURAL EFFUSION.     IR contacted after patient experienced episode of tachypnea and hypotension several hours post-drainage of large right pleural effusion.  Patient seen and examined bedside and observed to be more comfortable but somnolent; not tachypneic on O2 w/ nasal canula.  Catheter evaluated and flushed with improved drainage.  This is possible related to large volume of fluid drained between HD and thoracentesis, as d/w primary team.    Plan:  Recommend continued suction drainage and f/u tomorrow.  Recommend am CXR.  D/W attending, Dr. Landrum (#[id number]) at 8:20pm.       Please call Interventional Radiology [phone] with any questions, concerns, or issues regarding above.

## 2020-08-19 NOTE — PROGRESS NOTE ADULT - SUBJECTIVE AND OBJECTIVE BOX
OVERNIGHT EVENTS: events noted, Chest ct noted loculated r effusion/ large, low grade fever, sp HD    Vital Signs Last 24 Hrs  T(C): 36.4 (19 Aug 2020 06:53), Max: 38.1 (18 Aug 2020 21:23)  T(F): 97.5 (19 Aug 2020 06:53), Max: 100.5 (18 Aug 2020 21:23)  HR: 80 (19 Aug 2020 06:53) (60 - 80)  BP: 116/55 (19 Aug 2020 06:53) (88/41 - 138/58)  RR: 16 (19 Aug 2020 06:53) (16 - 18)  SpO2: 96% (18 Aug 2020 20:01) (96% - 98%)    PHYSICAL EXAMINATION:    GENERAL: The patient is awake and alert in no apparent distress.     HEENT: Head is normocephalic and atraumatic.     NECK: Supple.    LUNGS: dec bs r side  HEART: Regular rate and rhythm without murmur.    ABDOMEN: Soft, nontender, and nondistended.      EXTREMITIES: Without any cyanosis, clubbing, rash, lesions or edema.    NEUROLOGIC: Grossly intact.    SKIN: No ulceration or induration present.      LABS:                        10.9   8.45  )-----------( 111      ( 19 Aug 2020 04:30 )             36.4     08-19    139  |  97<L>  |  38<H>  ----------------------------<  129<H>  4.8   |  26  |  6.1<HH>    Ca    9.1      19 Aug 2020 04:30  Phos  4.7     08-18  Mg     2.2     08-19    TPro  7.4  /  Alb  4.5  /  TBili  0.5  /  DBili  x   /  AST  21  /  ALT  19  /  AlkPhos  175<H>  08-19    PT/INR - ( 19 Aug 2020 04:30 )   PT: 13.40 sec;   INR: 1.17 ratio         PTT - ( 19 Aug 2020 04:30 )  PTT:34.7 sec      CARDIAC MARKERS ( 18 Aug 2020 05:24 )  x     / 0.16 ng/mL / x     / x     / x      CARDIAC MARKERS ( 17 Aug 2020 15:04 )  x     / 0.13 ng/mL / x     / x     / x      CARDIAC MARKERS ( 17 Aug 2020 09:23 )  x     / 0.14 ng/mL / x     / x     / x                      08-18-20 @ 07:01  -  08-19-20 @ 07:00  --------------------------------------------------------  IN: 0 mL / OUT: 2000 mL / NET: -2000 mL        MICROBIOLOGY:      MEDICATIONS  (STANDING):  aspirin  chewable 81 milliGRAM(s) Oral daily  atorvastatin 80 milliGRAM(s) Oral at bedtime  BACItracin   Ointment 1 Application(s) Topical daily  calcium acetate 667 milliGRAM(s) Oral three times a day with meals  cefpodoxime 200 milliGRAM(s) Oral <User Schedule>  chlorhexidine 4% Liquid 1 Application(s) Topical <User Schedule>  dextrose 5%. 1000 milliLiter(s) (50 mL/Hr) IV Continuous <Continuous>  dextrose 50% Injectable 12.5 Gram(s) IV Push once  dextrose 50% Injectable 25 Gram(s) IV Push once  dextrose 50% Injectable 25 Gram(s) IV Push once  heparin   Injectable 5000 Unit(s) SubCutaneous every 8 hours  insulin glargine Injectable (LANTUS) 22 Unit(s) SubCutaneous at bedtime  insulin lispro (HumaLOG) corrective regimen sliding scale   SubCutaneous three times a day before meals  insulin lispro Injectable (HumaLOG) 8 Unit(s) SubCutaneous three times a day before meals  isosorbide   mononitrate ER Tablet (IMDUR) 30 milliGRAM(s) Oral daily  losartan 25 milliGRAM(s) Oral daily  metoprolol tartrate 25 milliGRAM(s) Oral two times a day  multivitamin/minerals 1 Tablet(s) Oral daily  pantoprazole    Tablet 40 milliGRAM(s) Oral before breakfast  polyethylene glycol 3350 17 Gram(s) Oral daily  tamsulosin 0.8 milliGRAM(s) Oral at bedtime    MEDICATIONS  (PRN):  acetaminophen   Tablet .. 650 milliGRAM(s) Oral every 6 hours PRN Mild Pain (1 - 3)  dextrose 40% Gel 15 Gram(s) Oral once PRN Blood Glucose LESS THAN 70 milliGRAM(s)/deciliter  glucagon  Injectable 1 milliGRAM(s) IntraMuscular once PRN Glucose LESS THAN 70 milligrams/deciliter  melatonin 1 milliGRAM(s) Oral at bedtime PRN Insomnia      RADIOLOGY & ADDITIONAL STUDIES:

## 2020-08-19 NOTE — OCCUPATIONAL THERAPY INITIAL EVALUATION ADULT - SPECIFY REASON(S)
Pt is unavailable for eval at this time.  Pt is out of room for hemodialysis.  F/U later in day Pt is unavailable for eval at this time.  Pt is out of room for hemodialysis. Second attempt to perform IE made rizwana afternoon.  Pt still unavailable, as per RN, Pt still at hemodialysis

## 2020-08-19 NOTE — CHART NOTE - NSCHARTNOTEFT_GEN_A_CORE
Was called by bedside by nursing staff and intern on call after patient returned from IR suite s/p right chest tube that had drained 1000mL of sanguinous fluid before coming to the floor. During the 20min that the patient was on the floor, his chest tube drained another 600mL of sanguinous fluid.    Patient was tachypneic to 30 (SpO2 96% on 3L) and hypotensive to 94/52->80/50->90/40. He was placed on nasal cannula despite no desaturation. He was very nauseous and complaining of significant right flank pain near the chest tube site.     Gave 250mL bolus of NS 0.9%, ondansetron 4mg IV, and hydromorphone 0.4mg IV. Patient symptomatically improved. Stat chest X-ray shows interval improvement in right-sided effusion without any overt evidence of pneumothorax. Per Dr. Marr, there is a potential underlying mass noted on the chest X-ray.    Hypotension is likely due to significant fluid shift over the past 4 hours given dialysis immediately followed by 1L drainage from the chest tube. The chest tube is still connected to suction but is no longer draining. May be blocked by a clot internally or is sitting above the remaining pleural effusion.  - Follow CBC, BMP, Mg, Phos, PT/PTT/INR  - Type and screen sent  - If patient is still hypotensive, would give another 250mL bolus of NS  - If BP refractory to second bolus, would then start low dose Levophed  - Transfuse if hemoglobin <8  - IR attending Dr. Marr is aware. They will monitor the patient tomorrow Was called by bedside by nursing staff and intern on call after patient returned from IR suite s/p right chest tube that had drained 1000mL of sanguinous fluid before coming to the floor. During the 20min that the patient was on the floor, his chest tube drained another 600mL of sanguinous fluid.    Patient was tachypneic to 30 (SpO2 96% on 3L) and hypotensive to 94/52->80/50->90/40. He was placed on nasal cannula despite no desaturation. He was very nauseous and complaining of significant right flank pain near the chest tube site.     Gave 250mL bolus of NS 0.9%, ondansetron 4mg IV, and hydromorphone 0.4mg IV. Patient symptomatically improved. Stat chest X-ray shows interval improvement in right-sided effusion without any overt evidence of pneumothorax. Per Dr. Marr, there is a potential underlying mass noted on the chest X-ray.    Hypotension is likely due to significant fluid shift over the past 4 hours given dialysis immediately followed by 1L drainage from the chest tube. The chest tube is still connected to suction but is no longer draining. May be blocked by a clot internally or is sitting above the remaining pleural effusion.  - Follow CBC, BMP, Mg, Phos, PT/PTT/INR  - Type and screen sent  - If patient is still hypotensive, give additional fluid boluses  - Transfuse if hemoglobin <8  - IR attending Dr. Marr is aware. They will monitor the patient tomorrow        Attending addendum:  Patient was seen and evaluated.    Briefly, this is a 68 yo M pt w/ ESRD (on HD), HFrEF (LVEF of 26% in 2018) and chronic hypoxic respiratory failure (on home O2 at 2 L/min).   Earlier today, pt was tachypneic. Underwent pigtail placement by IR after which he had > 1.5 L output of sanguineous fluid.   Notified by the covering Resident Physicians and nursing staff that the patient was hypotensive and that there was no additional output from the pigtail catheter.    On exam: pt lethargic but arousable. Not tachypneic. no rhonci or wheezing. B/l air entry. S1 and S2. BS+. Abd non-tend to palp. About 1.8 to 1.9 L in the pleur-evac container.    Impression:  Hypotension likely secondary to excessive fluid removal (~ 2 L since pigtail was placed) + underwent dialysis today.  Lethargy could be 2/2 this as well plus patient received hydromorphone for pain control.  Respiratory distress has improved - no longer tachypneic.    Plan:  Can give fluid boluses as necessary for hypotension  Avoid further sedatives for now - given lethargy  Chest tube was flushed - case discussed w/ Dr. Sorto (IR) and recommendations appreciated  Chest tube drainage container changed: ~ 800 cc output after changing drainage container  AM labs - pending  Repeat X-ray in AM (requested)  Close clinical monitoring

## 2020-08-19 NOTE — PROGRESS NOTE ADULT - ATTENDING COMMENTS
Patient seen and examined independently. Agree with resident note.  # Rt pleural effusion s/p thoracentesis-- 1L of fluid removed and sent for analysis as well as cytology.  # ESRD on HD  # Chr systolic CHF-- off diuretics-- anuric.  # A fib o eliquis-- currently on hold.
Patient seen and examined independently. Agree with resident note/ history / physical exam and plan of care with following exceptions/additions/updates. Case discussed with patient/pt decision maker, house-staff and nursing.   above notes edited.   hold eliquis  aicd interogated in july remotely, no issues,   poss thoracetesis in am   full code  dw resident   dw daughter ( evie)

## 2020-08-19 NOTE — PHYSICAL THERAPY INITIAL EVALUATION ADULT - SPECIFY REASON(S)
Attempted to see patient for PT IE however patient unavailable at this time, went for HD. Will f/u as appropriate.

## 2020-08-19 NOTE — PROGRESS NOTE ADULT - SUBJECTIVE AND OBJECTIVE BOX
Nephrology progress note  Patient is seen and examined, events over the last 24 h noted .  still having shortness of breath     Allergies:  No Known Allergies    Hospital Medications:   MEDICATIONS  (STANDING):    aspirin  chewable 81 milliGRAM(s) Oral daily  atorvastatin 80 milliGRAM(s) Oral at bedtime  BACItracin   Ointment 1 Application(s) Topical daily  calcium acetate 667 milliGRAM(s) Oral three times a day with meals  cefpodoxime 200 milliGRAM(s) Oral <User Schedule>  heparin   Injectable 5000 Unit(s) SubCutaneous every 8 hours  insulin glargine Injectable (LANTUS) 22 Unit(s) SubCutaneous at bedtime  insulin lispro (HumaLOG) corrective regimen sliding scale   SubCutaneous three times a day before meals  insulin lispro Injectable (HumaLOG) 8 Unit(s) SubCutaneous three times a day before meals  isosorbide   mononitrate ER Tablet (IMDUR) 30 milliGRAM(s) Oral daily  losartan 25 milliGRAM(s) Oral daily  metoprolol tartrate 25 milliGRAM(s) Oral two times a day  multivitamin/minerals 1 Tablet(s) Oral daily  pantoprazole    Tablet 40 milliGRAM(s) Oral before breakfast  polyethylene glycol 3350 17 Gram(s) Oral daily  tamsulosin 0.8 milliGRAM(s) Oral at bedtime        VITALS:  T(F): 97.5 (08-19-20 @ 06:53), Max: 100.5 (08-18-20 @ 21:23)  HR: 69 (08-19-20 @ 09:30)  BP: 112/54 (08-19-20 @ 09:30)  RR: 16 (08-19-20 @ 09:30)  SpO2: 96% (08-18-20 @ 20:01)      08-17 @ 07:01  -  08-18 @ 07:00  --------------------------------------------------------  IN: 0 mL / OUT: 3700 mL / NET: -3700 mL    08-18 @ 07:01  -  08-19 @ 07:00  --------------------------------------------------------  IN: 0 mL / OUT: 2000 mL / NET: -2000 mL      Height (cm): 170 (08-19 @ 06:53)  Weight (kg): 93.2 (08-19 @ 06:53)  BMI (kg/m2): 32.2 (08-19 @ 06:53)  BSA (m2): 2.04 (08-19 @ 06:53)    PHYSICAL EXAM:  HEENT: on oxygen nasal canula   Neck: No JVD  Respiratory: Crackles at base   Cardiovascular: S1, S2, RRR  Gastrointestinal: BS+, soft, NT/ND  Extremities: No cyanosis or clubbing. plus one edema   :  No crook.   Skin: No rashes    LABS:  08-19    139  |  97<L>  |  38<H>  ----------------------------<  129<H>  4.8   |  26  |  6.1<HH>    Ca    9.1      19 Aug 2020 04:30  Phos  4.7     08-18  Mg     2.2     08-19    TPro  7.4  /  Alb  4.5  /  TBili  0.5  /  DBili      /  AST  21  /  ALT  19  /  AlkPhos  175<H>  08-19                          10.9   8.45  )-----------( 111      ( 19 Aug 2020 04:30 )             36.4       Urine Studies:      RADIOLOGY & ADDITIONAL STUDIES:  < from: CT Chest No Cont (08.18.20 @ 11:02) >  Large right pleural effusion with near complete collapse of the right lung. Large component of this effusion is subpulmonic. Partially loculated anteriorly at the apex.    New nonspecific 1.4 cm right cardiophrenic lymph node.    < end of copied text >

## 2020-08-19 NOTE — PROGRESS NOTE ADULT - SUBJECTIVE AND OBJECTIVE BOX
Interventional Radiology Brief- Operative Note    Procedure: right sided image guided chest tube placement     Pre-Op Diagnosis: R pleural effusion    Post-Op Diagnosis: same     Attending: MD David  Performing Provider: JULIAN Martinez    Anesthesia (type):  [ ] General Anesthesia  [ x ] Sedation - Fentanyl 12mcg  [ ] Spinal Anesthesia  [ x ] Local/Regional    Contrast: none    Estimated Blood Loss: <5cc    Condition:   [ ] Critical  [ ] Serious  [ ] Fair   [ x ] Good    Findings/Follow up Plan of Care: successful removal of sanguinous fluid 1L total    Specimens Removed: 120cc removed and sent to lab for testing - orders placed in sunrise by performing provider, cytology form filled out and sent with specimen    Implants: none     Complications: none     Condition/Disposition: monitor vitals Q10 x 2, d/c to  floor       Please call Interventional Radiology [phone] with any questions, concerns, or issues.

## 2020-08-19 NOTE — PROGRESS NOTE ADULT - ASSESSMENT
68 y/o M with PMH of ESRD on HD MWF, CAD s/p pci with 7 stents and AICD placement in 2018, Heart failure with reduced EF (26% in 2018), DLD, HTN, COPD? on 2L home O2, DM II (well controlled with HbA1C in the 6-7 range), and BPH presents to the ED for SOB that has been worsening over the last 2 weeks.    #SOB   -due to pleural effusion and poss opacities, also fluid overload in setting of ESRD plus HFpEF  -CXR showed improved L lower lobe opacities this am from yesterday s/p emergent dialysis  -CXR shows worsening R pleural effusions   -CT scan chest done today will f/u results  -pt on 3L of O2 via NC, BiPAP at night and PRN  -home eliquis held as pulm might do thoracentesis   -pulm following, IR consulted for pigtail catheter     #ESRD, dialysis dependent   -dialysis MWF, nephro following  -getting dialysis today 08/18 per nephro   -pt anuric, diuretics discontinued   -labs improved after dialysis  -will continue to monitor CBC, CMP    #HFrEF  -EF 26% on echo in 2018  -repeat echo ordered, will f/u results  -elevated trops likely due to ESRD    #CAD s/p PCI with 7 stents  -c/w metoprolol, aspirin    #pt is on Eliquis for afib, hold for poss procedure. ( pig tail)     #DM II, uncontrolled.   -A1c 7.8  -on insulin and sliding scale  -check FS, continue to monitor  -consider     #HTN  -c/w losartan    #DLD  -c/w statin    #BPH: on flomax    Pending: thora/chest tube today with IR, clinical improvement  Diet: DASH/TLC, renal restricted  Activity: AAT  DVT prophylaxis: Heparin subq  Disposition: acute 70 y/o M with PMH of ESRD on HD MWF, CAD s/p pci with 7 stents and AICD placement in 2018, Heart failure with reduced EF (26% in 2018), DLD, HTN, COPD? on 2L home O2, DM II (well controlled with HbA1C in the 6-7 range), and BPH presents to the ED for SOB that has been worsening over the last 2 weeks.    #SOB   -due to pleural effusion and poss opacities, also fluid overload in setting of ESRD plus HFpEF  -CXR showed improved L lower lobe opacities this am from yesterday s/p emergent dialysis  -CXR shows worsening R pleural effusions   -CT scan chest done today will f/u results  -pt on 3L of O2 via NC, BiPAP at night and PRN  -home eliquis held as pulm might do thoracentesis   -pulm following, IR consulted for pigtail catheter     #ESRD, dialysis dependent   -dialysis MWF, nephro following  -getting dialysis today 08/19 per nephro   -pt anuric, diuretics discontinued   -labs improved after dialysis  -will continue to monitor CBC, CMP    #HFrEF  -EF 26% on echo in 2018  -repeat echo ordered, will f/u results  -elevated trops likely due to ESRD    #CAD s/p PCI with 7 stents  -c/w metoprolol, aspirin    #Paroxysmal Afib  -on eliquis at home, continue to hold for procedure     #DM II, uncontrolled.   -A1c 7.8  -on insulin and sliding scale  -check FS, continue to monitor    #HTN  -c/w losartan    #DLD  -c/w statin    #BPH  -on flomax    Pending: thora/chest tube today with IR, clinical improvement  Diet: DASH/TLC, renal restricted  Activity: AAT  DVT prophylaxis: Heparin subq  Disposition: acute 70 y/o M with PMH of ESRD on HD MWF, CAD s/p pci with 7 stents and AICD placement in 2018, Heart failure with reduced EF (26% in 2018), DLD, HTN, COPD? on 2L home O2, DM II (well controlled with HbA1C in the 6-7 range), and BPH presents to the ED for SOB that has been worsening over the last 2 weeks.    #SOB   -due to pleural effusion and poss opacities, also fluid overload in setting of ESRD plus HFpEF  -CXR showed improved L lower lobe opacities this am from yesterday s/p emergent dialysis  -CXR shows worsening R pleural effusions   -CT scan chest done today will f/u results  -pt on 3L of O2 via NC, BiPAP at night and PRN  -home eliquis held as pulm might do thoracentesis   -pulm following, IR consulted for pigtail catheter     #ESRD, dialysis dependent   -dialysis MWF, nephro following  -getting dialysis today 08/19 per nephro   -pt anuric, diuretics discontinued   -labs improved after dialysis  -will continue to monitor CBC, CMP    #HFrEF  -EF 26% on echo in 2018  -repeat echo ordered, will f/u results  -elevated trops likely due to ESRD    #CAD s/p PCI with 7 stents  -c/w metoprolol, aspirin    #Paroxysmal Afib  -on eliquis at home, continue to hold for procedure     #DM II, uncontrolled.   -A1c 7.8  -on insulin and sliding scale  -check FS, continue to monitor    #HTN  -c/w losartan    #DLD  -c/w statin    #BPH  -on flomax    Pending: thora/chest tube today with IR, clinical improvement  Diet: DASH/TLC, renal restricted  Activity: AAT  DVT prophylaxis: Heparin subq  Disposition: acute   Update: Notified by IR that the pleural fluid drained had a pH of 7.12

## 2020-08-20 NOTE — PROGRESS NOTE ADULT - ASSESSMENT
70 y/o M with PMH of ESRD on HD MWF, CAD s/p pci with 7 stents and AICD placement in 2018, Heart failure with reduced EF, DLD, HTN, COPD? on 2L home O2, DM II (well controlled with HbA1C in the 6-7 range), and BPH presents to the ED for SOB that has been worsening over the last 2 weeks.    #SOB #R sided pleural effusion  #New onset pneumothorax  -due to pleural effusion and opacities, also fluid overload in setting of ESRD plus HFpEF  -BL pleural effusions worse on the right side, s/p pigtail catheter placement by IR on 08/19  -pt was tachypneic, hypotensive yesterday s/p ~2L fluid drainage and HD and in acute distress, ABGs showed CO2 retention, was put on BiPAP  -pt has been vomiting, made NPO except meds and sips of water for now  -seen today on CXR s/p pigtail catheter placement  -pleural fluid drained was exudative, labs sent including cytopathology and flow cytometry, will f/u results. Suspicion of underlying mass/malignancy   -f/u with pulm  -will avoid Bipap for now     #ESRD, dialysis dependent   -dialysis MWF, nephro following  -pt anuric, diuretics discontinued   -will continue to monitor CBC, CMP  -not getting dialysis today  -K was 5.9, received lokalema, K now 4.5     #HFrEF  -EF 35-40% on echo   -repeat echo ordered, will f/u results  -elevated trops likely due to ESRD    #CAD s/p PCI with 7 stents  -c/w metoprolol, aspirin    #Paroxysmal Afib  -on eliquis at home, continue to hold for procedure     #DM II, uncontrolled.   -A1c 7.8  -on insulin and sliding scale  -check FS, continue to monitor    #HTN  -c/w losartan    #DLD  -c/w statin    #BPH  -on flomax    Pending: pulm f/u, clinical improvement  Diet: DASH/TLC, renal restricted  Activity: AAT  DVT prophylaxis: Heparin subq, home eliquis held for procedure  Disposition: acute

## 2020-08-20 NOTE — CONSULT NOTE ADULT - SUBJECTIVE AND OBJECTIVE BOX
HPI:    68 y/o M with PMH of ESRD on HD MWF, CAD s/p pci with 7 stents and AICD placement in 2018, Heart failure with reduced EF, DLD, HTN, COPD? on 2L home O2, DM II, and BPH presents to the ED for SOB that has been worsening over the last 2 weeks. Pt was found to have a large R sided pleural effusion partially loculated at the apex with near complete collapse of the lung. A pigtail catheter was placed by IR on 08/19 and ~2L of fluid was drained. The fluid is bloody and exudative. Labs have been sent for cytopathology and flow cytometry as per pulm there is suspicion for underlying malignancy. Pt tolerated the procedure well but after returning to the floor he became distressed, was found to be tachypneic and hypotensive. 250ml NS bolus was given and pt was put on NC after which the respiratory distress improved. ABGs were drawn which showed CO2 retention and respiratory acidosis. Pt was put on BiPAP in the morning after breakfast but he could not tolerate and vomited.  Repeat ABGs showed acidosis and mild improvement in CO2 retention. At 11:30 am pt was put on BiPAP until 3:30pm. Repeat CXR in the morning showed R pleural effusion and a new onset 1.6 cm pneumothorax. Another CXR was done later in the day which shows possible worsening of pneumothorax.    PAST MEDICAL & SURGICAL HISTORY:  Heart failure with reduced ejection fraction  CAD (coronary artery disease): s/p PCI and AICD placement  BPH (benign prostatic hyperplasia)  Type 2 diabetes mellitus  End stage renal disease  Dyslipidemia  Hypertension  No significant past surgical history      Home Meds: Home Medications:  alfuzosin 10 mg oral tablet, extended release: 1 tab(s) orally once a day on non-dialysis days (17 Aug 2020 15:02)  aspirin 81 mg oral tablet, chewable: 1 tab(s) orally once a day (17 Aug 2020 15:02)  calcium acetate 667 mg oral tablet: 1 tab(s) orally 3 times a day (17 Aug 2020 15:02)  cefdinir 300 mg oral capsule: 1 tab(s) orally 3 times a day after dialysis (17 Aug 2020 15:02)  Centrum Silver oral tablet: 1 tab(s) orally once a day (17 Aug 2020 15:02)  Crestor 20 mg oral tablet: 1 tab(s) orally once a day (17 Aug 2020 15:02)  Eliquis 5 mg oral tablet: 1 tab(s) orally 2 times a day (17 Aug 2020 15:02)  furosemide 40 mg oral tablet: 1 tab(s) orally once a day on non-dialysis days (17 Aug 2020 15:02)  isosorbide mononitrate 30 mg oral tablet, extended release: 1 tab(s) orally once a day on non-dialysis days (17 Aug 2020 15:02)  pantoprazole 40 mg oral delayed release tablet: 1 tab(s) orally once a day (17 Aug 2020 15:02)    Allergies: Allergies  No Known Allergies  Intolerances      Soc:   Advanced Directives: DNR/ DNI    ROS:    REVIEW OF SYSTEMS  [x] A ten-point review of systems was otherwise negative except as noted.  [ ] Due to altered mental status/intubation, subjective information were not able to be obtained from the patient. History was obtained, to the extent possible, from review of the chart and collateral sources of information.      CURRENT MEDICATIONS:   --------------------------------------------------------------------------------------  Neurologic Medications  acetaminophen   Tablet .. 650 milliGRAM(s) Oral every 6 hours PRN Mild Pain (1 - 3)  melatonin 1 milliGRAM(s) Oral at bedtime PRN Insomnia    Respiratory Medications    Cardiovascular Medications  midodrine 10 milliGRAM(s) Oral every 8 hours  norepinephrine Infusion 0.05 MICROgram(s)/kG/Min IV Continuous <Continuous>  tamsulosin 0.8 milliGRAM(s) Oral at bedtime    Gastrointestinal Medications  calcium acetate 667 milliGRAM(s) Oral three times a day with meals  dextrose 5%. 1000 milliLiter(s) IV Continuous <Continuous>  multivitamin/minerals 1 Tablet(s) Oral daily  pantoprazole    Tablet 40 milliGRAM(s) Oral before breakfast  polyethylene glycol 3350 17 Gram(s) Oral daily    Genitourinary Medications    Hematologic/Oncologic Medications  aspirin  chewable 81 milliGRAM(s) Oral daily  heparin   Injectable 5000 Unit(s) SubCutaneous every 8 hours    Antimicrobial/Immunologic Medications  cefpodoxime 200 milliGRAM(s) Oral <User Schedule>    Endocrine/Metabolic Medications  atorvastatin 80 milliGRAM(s) Oral at bedtime  dextrose 40% Gel 15 Gram(s) Oral once PRN Blood Glucose LESS THAN 70 milliGRAM(s)/deciliter  dextrose 50% Injectable 12.5 Gram(s) IV Push once  dextrose 50% Injectable 25 Gram(s) IV Push once  dextrose 50% Injectable 25 Gram(s) IV Push once  glucagon  Injectable 1 milliGRAM(s) IntraMuscular once PRN Glucose LESS THAN 70 milligrams/deciliter  insulin glargine Injectable (LANTUS) 22 Unit(s) SubCutaneous at bedtime  insulin lispro (HumaLOG) corrective regimen sliding scale   SubCutaneous three times a day before meals  insulin lispro Injectable (HumaLOG) 8 Unit(s) SubCutaneous three times a day before meals    Topical/Other Medications  BACItracin   Ointment 1 Application(s) Topical daily  chlorhexidine 4% Liquid 1 Application(s) Topical <User Schedule>    --------------------------------------------------------------------------------------    VITAL SIGNS, INS/OUTS (last 24 hours):  --------------------------------------------------------------------------------------  ICU Vital Signs Last 24 Hrs  T(C): 36.1 (20 Aug 2020 18:30), Max: 37.2 (20 Aug 2020 04:55)  T(F): 97 (20 Aug 2020 18:30), Max: 98.9 (20 Aug 2020 04:55)  HR: 60 (20 Aug 2020 19:00) (58 - 89)  BP: 83/36 (20 Aug 2020 19:00) (83/36 - 102/40)  BP(mean): 58 (20 Aug 2020 19:00) (52 - 58)  ABP: --  ABP(mean): --  RR: 10 (20 Aug 2020 19:00) (10 - 22)  SpO2: 95% (20 Aug 2020 19:00) (92% - 99%)    I&O's Summary    19 Aug 2020 07:01  -  20 Aug 2020 07:00  --------------------------------------------------------  IN: 250 mL / OUT: 2715 mL / NET: -2465 mL    20 Aug 2020 07:01  -  20 Aug 2020 19:41  --------------------------------------------------------  IN: 0 mL / OUT: 150 mL / NET: -150 mL      --------------------------------------------------------------------------------------  PHYSICAL EXAM  General: NAD, AAOx3, calm and cooperative  HEENT: NCAT, PRANAV, EOMI, Trachea ML, Neck supple  Cardiac: RRR S1, S2,  Respiratory: 3L NS, diminished bl breath sounds, BL crackles in lung bases  Abdomen: Soft, distended, non-tender, +bowel sounds  Musculoskeletal: ROM intact, compartments soft  Neuro:no focal deficits  Vascular: extremities well perfused  -------------------------------------------------------------------------------------  Labs:  CAPILLARY BLOOD GLUCOSE      POCT Blood Glucose.: 128 mg/dL (20 Aug 2020 17:01)  POCT Blood Glucose.: 200 mg/dL (20 Aug 2020 11:11)  POCT Blood Glucose.: 202 mg/dL (20 Aug 2020 07:53)  POCT Blood Glucose.: 140 mg/dL (19 Aug 2020 21:30)                          11.6   10.41 )-----------( 151      ( 20 Aug 2020 06:26 )             39.5       69y  08-20    138  |  95<L>  |  42<H>  ----------------------------<  223<H>  5.9<H>   |  28  |  6.0<HH>      Calcium, Total Serum: 8.9 mg/dL (08-20-20 @ 06:26)      LFTs:             7.2  | 0.6  | 18       ------------------[172     ( 20 Aug 2020 06:26 )  4.3  | x    | 16          Lipase:x      Amylase:x         Male  ABG - ( 20 Aug 2020 16:58 )  pH: 7.17  /  pCO2: 82    /  pO2: 66    / HCO3: 29    / Base Excess: -2.0  /  SaO2: 92              ABG - ( 20 Aug 2020 07:22 )  pH: 7.18  /  pCO2: 79    /  pO2: 112   / HCO3: 29    / Base Excess: -0.9  /  SaO2: 98              ABG - ( 20 Aug 2020 05:33 )  pH: 7.14  /  pCO2: 88    /  pO2: 49    / HCO3: 30    / Base Excess: -1.3  /  SaO2: 84                Coags:     13.30  ----< 1.16    ( 19 Aug 2020 20:06 )     33.0                Culture - Fungal, Body Fluid (collected 19 Aug 2020 16:50)  Source: .Body Fluid Pleural Fluid  Preliminary Report (20 Aug 2020 07:53):    Testing in progress    Culture - Body Fluid with Gram Stain (collected 19 Aug 2020 16:50)  Source: .Body Fluid Pleural Fluid  Gram Stain (20 Aug 2020 03:07):    polymorphonuclear leukocytes seen    No organisms seen    by cytocentrifuge  Preliminary Report (20 Aug 2020 19:03):    No growth      --------------------------------------------------------------------------------------  RADIOLOGY & ADDITIONAL STUDIES:  < from: Xray Chest 1 View- PORTABLE-Urgent (08.20.20 @ 12:23) >    Impression:    Interval placement of a right pleural catheter with interval decrease in the right pleural effusion/opacity.    Unchanged patchy left lung opacities.    < end of copied text >

## 2020-08-20 NOTE — PHYSICAL THERAPY INITIAL EVALUATION ADULT - SPECIFY REASON(S)
As per MD Alfaro during am rounds patient on hold for PT IE due to medical presentation. Will f/u as appropriate.

## 2020-08-20 NOTE — PROGRESS NOTE ADULT - SUBJECTIVE AND OBJECTIVE BOX
Patient is a 69y old  Male who presents with a chief complaint of SOB (20 Aug 2020 08:46)      HPI:  68 y/o M with PMH of ESRD on HD MWF, CAD s/p pci with 7 stents and AICD placement in 2018, Heart failure with reduced EF (26% in 2018), DLD, HTN, COPD? on 2L home O2, DM II (well controlled with HbA1C in the 6-7 range), and BPH presents to the ED for SOB that has been worsening over the last 2 weeks. As per patient's daughter patient has had multiple friends pass away in the last few months and since then has complained that he needs home O2 (Daughter thinks this is anxiety driven). Patient's daughter reports that patient saturates in the low 90s at home without O2 and saturates at 100% on 2L. Two week ago he developed hypotension while at dialysis. He refused to go the hospital at that time. Since then he reports that his breathing has gotten worse and he requires more O2. A few days ago he requested that his daughter raise his home O2 to 3L. Before 2 week sago he was able to walk around the house without O2 but over the last two weeks he has required O2 to move around his house. He reports that his SOB is worse with walking and better when he sits down. He reports a dry cough at baseline but denies any chest pain, sore throat, wheezing, weight changes, fatigue, dizziness, or changes in urination. No travel history and no one at home is sick. At baseline patient walks with a cane. He lives with his daughter and she takes care of his medication for him. He sleeps on a recliner due to the HF and wears compression stockings. He is oliguric and urinates "a few drops" approximately 2 times a week.    In the ED vitals were stable and patient is breathing well on 3L NC. Labs were significant for elevated potassium and troponins likely secondary to ESRD. Chest xray reveals worsening right-sided pleural effusion with increased left lower lung airspace opacities. Nephrology was consulted and patient was dialyzed in the ED (17 Aug 2020 14:27)      PAST MEDICAL & SURGICAL HISTORY:  Heart failure with reduced ejection fraction  CAD (coronary artery disease): s/p PCI and AICD placement  BPH (benign prostatic hyperplasia)  Type 2 diabetes mellitus  End stage renal disease  Dyslipidemia  Hypertension  No significant past surgical history      Home Medications:  alfuzosin 10 mg oral tablet, extended release: 1 tab(s) orally once a day on non-dialysis days (17 Aug 2020 15:02)  aspirin 81 mg oral tablet, chewable: 1 tab(s) orally once a day (17 Aug 2020 15:02)  calcium acetate 667 mg oral tablet: 1 tab(s) orally 3 times a day (17 Aug 2020 15:02)  cefdinir 300 mg oral capsule: 1 tab(s) orally 3 times a day after dialysis (17 Aug 2020 15:02)  Centrum Silver oral tablet: 1 tab(s) orally once a day (17 Aug 2020 15:02)  Crestor 20 mg oral tablet: 1 tab(s) orally once a day (17 Aug 2020 15:02)  Eliquis 5 mg oral tablet: 1 tab(s) orally 2 times a day (17 Aug 2020 15:02)  furosemide 40 mg oral tablet: 1 tab(s) orally once a day on non-dialysis days (17 Aug 2020 15:02)  isosorbide mononitrate 30 mg oral tablet, extended release: 1 tab(s) orally once a day on non-dialysis days (17 Aug 2020 15:02)  pantoprazole 40 mg oral delayed release tablet: 1 tab(s) orally once a day (17 Aug 2020 15:02)      .  Tobacco use:   EtOH use:  Illicit drug use:    Living situation:    No Known Allergies      FAMILY HISTORY:  FH: type 2 diabetes: sister  FH: HTN (hypertension): Sister      acetaminophen   Tablet .. 650 milliGRAM(s) Oral every 6 hours PRN  aspirin  chewable 81 milliGRAM(s) Oral daily  atorvastatin 80 milliGRAM(s) Oral at bedtime  BACItracin   Ointment 1 Application(s) Topical daily  calcium acetate 667 milliGRAM(s) Oral three times a day with meals  cefpodoxime 200 milliGRAM(s) Oral <User Schedule>  chlorhexidine 4% Liquid 1 Application(s) Topical <User Schedule>  dextrose 40% Gel 15 Gram(s) Oral once PRN  dextrose 5%. 1000 milliLiter(s) IV Continuous <Continuous>  dextrose 50% Injectable 12.5 Gram(s) IV Push once  dextrose 50% Injectable 25 Gram(s) IV Push once  dextrose 50% Injectable 25 Gram(s) IV Push once  glucagon  Injectable 1 milliGRAM(s) IntraMuscular once PRN  heparin   Injectable 5000 Unit(s) SubCutaneous every 8 hours  insulin glargine Injectable (LANTUS) 22 Unit(s) SubCutaneous at bedtime  insulin lispro (HumaLOG) corrective regimen sliding scale   SubCutaneous three times a day before meals  insulin lispro Injectable (HumaLOG) 8 Unit(s) SubCutaneous three times a day before meals  isosorbide   mononitrate ER Tablet (IMDUR) 30 milliGRAM(s) Oral daily  losartan 25 milliGRAM(s) Oral daily  melatonin 1 milliGRAM(s) Oral at bedtime PRN  metoprolol tartrate 25 milliGRAM(s) Oral two times a day  multivitamin/minerals 1 Tablet(s) Oral daily  pantoprazole    Tablet 40 milliGRAM(s) Oral before breakfast  polyethylene glycol 3350 17 Gram(s) Oral daily  tamsulosin 0.8 milliGRAM(s) Oral at bedtime      Vital Signs Last 24 Hrs  T(C): 35.6 (20 Aug 2020 13:44), Max: 37.2 (20 Aug 2020 04:55)  T(F): 96.1 (20 Aug 2020 13:44), Max: 98.9 (20 Aug 2020 04:55)  HR: 60 (20 Aug 2020 13:44) (60 - 89)  BP: 102/40 (20 Aug 2020 13:44) (80/50 - 102/40)  BP(mean): --  RR: 18 (20 Aug 2020 13:44) (12 - 30)  SpO2: 96% (20 Aug 2020 11:40) (92% - 99%)    I&O's Summary    19 Aug 2020 07:01  -  20 Aug 2020 07:00  --------------------------------------------------------  IN: 250 mL / OUT: 2715 mL / NET: -2465 mL                              11.6   10.41 )-----------( 151      ( 20 Aug 2020 06:26 )             39.5       08-20    138  |  95<L>  |  42<H>  ----------------------------<  223<H>  5.9<H>   |  28  |  6.0<HH>    Ca    8.9      20 Aug 2020 06:26  Phos  3.9     08-19  Mg     2.2     08-20    TPro  7.2  /  Alb  4.3  /  TBili  0.6  /  DBili  x   /  AST  18  /  ALT  16  /  AlkPhos  172<H>  08-20              Culture - Fungal, Body Fluid (collected 19 Aug 2020 16:50)  Source: .Body Fluid Pleural Fluid  Preliminary Report (20 Aug 2020 07:53):    Testing in progress    Culture - Body Fluid with Gram Stain (collected 19 Aug 2020 16:50)  Source: .Body Fluid Pleural Fluid  Gram Stain (20 Aug 2020 03:07):    polymorphonuclear leukocytes seen    No organisms seen    by cytocentrifuge        PHYSICAL EXAM:  GENERAL: NAD, sitting in chair, on NC 3L  HEAD:  Atraumatic, Normocephalic  EYES: EOMI, conjunctiva and sclera clear  ENT: Moist mucous membranes  NECK: Supple, No JVD  CHEST/LUNG: On NC 3L of O2, BL crackles in lung bases, decreased breath sounds in R lung. Unlabored respirations  HEART: Regular rate and rhythm; No murmurs, rubs, or gallops  ABDOMEN: Bowel sounds present; Soft, Nontender, Nondistended. No hepatomegaly   EXTREMITIES:  2+ Peripheral Pulses, brisk capillary refill. No clubbing, cyanosis, or edema. AV fistula in left upper arm. Wearing compression stockings.  NERVOUS SYSTEM:  Alert & Oriented X3, speech clear. No deficits   SKIN: No rashes or lesions    IMAGING:    < from: Xray Chest 1 View- PORTABLE-Urgent (08.20.20 @ 12:23) >  Impression:    Interval placement of a right pleural catheter with interval decrease in the right pleural effusion/opacity.    Unchanged patchy left lung opacities.          < end of copied text >      < from: Xray Chest 1 View-PORTABLE IMMEDIATE (08.20.20 @ 08:53) >    IMPRESSION:    Persistent right basilar pneumothorax.    < end of copied text >  < from: CT Chest No Cont (08.18.20 @ 11:02) >  IMPRESSION:  Large right pleural effusion with near complete collapse of the right lung. Large component of this effusion is subpulmonic. Partially loculated anteriorly at the apex.    New nonspecific 1.4 cm right cardiophrenic lymph node.    < end of copied text >

## 2020-08-20 NOTE — PROGRESS NOTE ADULT - ASSESSMENT
69M, PMH of ESRD on HD MWF, COPD, HFrEF, admitted for SOB.      #ESRD on HD  - hd today, standard bath uf 2 liters as  tolerated in view of hyperkalemia   - IP noted, d/c binders   - hgb noted, no YASMINE   - d/c losartan  - might need  to start midodrine      #SOB - sp CT of chest with loculated effusion for thoracentesis/ s/p pig tail   # on BIpap, repeat ABG   # pulmonary f/up appreciated   # will follow

## 2020-08-20 NOTE — CHART NOTE - NSCHARTNOTEFT_GEN_A_CORE
70 y/o M with PMH of ESRD on HD MWF, CAD s/p pci with 7 stents and AICD placement in 2018, Heart failure with reduced EF, DLD, HTN, COPD? on 2L home O2, DM II, and BPH presents to the ED for SOB that has been worsening over the last 2 weeks. Pt was found to have a large R sided pleural effusion partially loculated at the apex with near complete collapse of the lung. A pigtail catheter was placed by IR on 08/19 and ~2L of fluid was drained. The fluid is bloody and exudative. Labs have been sent for cytopathology and flow cytometry as per pulm there is suspicion for underlying malignancy. Pt tolerated the procedure well but after returning to the floor he became distressed, was found to be tachypneic and hypotensive. 250ml NS bolus was given and pt was put on NC after which the respiratory distress improved. ABGs were drawn which showed CO2 retention and respiratory acidosis. Pt was put on BiPAP in the morning after breakfast but he could not tolerate and vomited. BiPAP was removed and pt was given zofran 4mg IV push, pt made NPO except meds. Repeat ABGs showed acidosis and mild improvement in CO2 retention. He was not sent for hemodialysis today because of his hypotension and lokelma was given for his hyperkalemia. At 11:30 am pt was put on BiPAP until 3:30pm. Repeat CXR in the morning showed R pleural effusion and a new onset 1.6 cm pneumothorax. Another CXR was done later in the day which shows possible worsening of pneumothorax. STAT ABGs drawn show pH 7.17, CO2 88, pO2 67, HCO3 29, O2 saturation 92, lactate 0.7, K 4.5. BiPAP is off because of pneumothorax.   IR is following   Surgery will come and assess the patient for possible chest tube placement   Per pulm, intubation will not help with the pneumothorax.     Ongoing problems:  -Pt is not in acute distress, being upgraded for closer monitoring and possible intervention for increasing size of pneumothorax.   -Acute on chronic CO2 retention  -ESRD  -New onset pneumothorax  -R sided pleural effusion, pigtail placed draining bloody pleural fluid    Plan:  -surgery evaluation for possible chest tube placement  -IR eval, f/u their recommendations   -labs and CXR ordered for AM, please f/u results.     -Pt is DNR/DNI  -ongoing goals of care conversation with the family, no aggressive intervention per family for now

## 2020-08-20 NOTE — PROGRESS NOTE ADULT - ASSESSMENT
Impression:  chronic right sided effusion however loculation is new, low grade fever, nl WBC sp pigtail 1.8 l serosanguinous fluid/ ? uremic pleurisy/ ro malignancy  Acute on chronic hypercap resp failure now on BIPAP ( sp hydromorphne)  HO [place] non compliant with CPAP    plan:    f/up cyto/ flow  repeat ABG, keep BIPAP for now   pigtail water seal  will follow  spoke with daughter  advance directives

## 2020-08-20 NOTE — GOALS OF CARE CONVERSATION - ADVANCED CARE PLANNING - CONVERSATION DETAILS
patients-s daughter and son stated that he has had a very tough time in his life 5 years ago and they are his health carev proxy-- they told me that patient wanted DNR DNI when in normal state of mind. currently he is little confused. Transferred to ICU

## 2020-08-20 NOTE — OCCUPATIONAL THERAPY INITIAL EVALUATION ADULT - SPECIFY REASON(S)
2nd attempt made by OT to complete OT evaluation and Pt. on hold by MD 2* to decreased clinical status. Will follow up as appropriate.

## 2020-08-20 NOTE — CHART NOTE - NSCHARTNOTEFT_GEN_A_CORE
68 y/o M with PMH of ESRD on HD MWF, CAD s/p pci with 7 stents and AICD placement in 2018, Heart failure with reduced EF (26% in 2018), DLD, HTN, COPD? on 2L home O2, DM II (well controlled with HbA1C in the 6-7 range), and BPH presents to the ED for SOB that has been worsening over the last 2 weeks.    Patient w/ recent Hx of chest tube placement for pleural effusion and hemodialysis for ESRD was seen at beside in altered mental status, responsive but confused. Unable to answer questions or follow commands  - Patient had refused BiPaP last night  - ABG at 5:33 am; pH 7.14, pCO2 88, HCO3 30 O2 Sat 84  - Placed on BiPAP 40% FiO2, 6/12  - Repeat ABG at 7:22am;  pH 7.18, pCO2: 79, pO2: 112, HCO3: 29, O2 Sat 98  - f/u repeat ABG and assess mental status 70 y/o M with PMH of ESRD on HD MWF, CAD s/p pci with 7 stents and AICD placement in 2018, Heart failure with reduced EF (26% in 2018), DLD, HTN, COPD? on 2L home O2, DM II (well controlled with HbA1C in the 6-7 range), and BPH presents to the ED for SOB that has been worsening over the last 2 weeks.    Patient w/ recent Hx of chest tube placement for pleural effusion and hemodialysis for ESRD was seen at beside in altered mental status, responsive but confused. Unable to answer questions or follow commands  - Patient had refused BiPaP last night  - Suspected CO2 Narcosis, ABG ordered showed respiratory acidosis  - ABG at 5:33 am; pH 7.14, pCO2 88, HCO3 30 O2 Sat 84  - Placed on BiPAP 40% FiO2, 6/12  - Repeat ABG at 7:22am;  pH 7.18, pCO2: 79, pO2: 112, HCO3: 29, O2 Sat 98  - f/u repeat ABG and assess mental status

## 2020-08-20 NOTE — PROGRESS NOTE ADULT - SUBJECTIVE AND OBJECTIVE BOX
OVERNIGHT EVENTS: events noted, sp pigtail 1.8 l serosanguinous, hypotension,  chest  pain place on bipap    Vital Signs Last 24 Hrs  T(C): 37.2 (20 Aug 2020 04:55), Max: 37.5 (19 Aug 2020 16:45)  T(F): 98.9 (20 Aug 2020 04:55), Max: 99.5 (19 Aug 2020 16:45)  HR: 60 (20 Aug 2020 04:55) (60 - 89)  BP: 98/55 (20 Aug 2020 04:55) (80/50 - 112/54)  RR: 20 (20 Aug 2020 04:55) (16 - 30)  SpO2: 99% (20 Aug 2020 04:55) (95% - 99%)    PHYSICAL EXAMINATION:    GENERAL: obtunded    HEENT: Head is normocephalic and atraumatic. Extraocular muscles are intact.   NECK: Supple.    LUNGS: dec bs r side    HEART: Regular rate and rhythm without murmur.    ABDOMEN: Soft, nontender, and nondistended.      EXTREMITIES: Without any cyanosis, clubbing, rash, lesions or edema.    NEUROLOGIC: Grossly intact.    SKIN: No ulceration or induration present.      LABS:                        11.6   10.41 )-----------( 151      ( 20 Aug 2020 06:26 )             39.5     08-20    138  |  95<L>  |  42<H>  ----------------------------<  223<H>  5.9<H>   |  28  |  6.0<HH>    Ca    8.9      20 Aug 2020 06:26  Phos  3.9     08-19  Mg     2.2     08-20    TPro  7.2  /  Alb  4.3  /  TBili  0.6  /  DBili  x   /  AST  18  /  ALT  16  /  AlkPhos  172<H>  08-20    PT/INR - ( 19 Aug 2020 20:06 )   PT: 13.30 sec;   INR: 1.16 ratio         PTT - ( 19 Aug 2020 20:06 )  PTT:33.0 sec    ABG - ( 20 Aug 2020 07:22 )  pH, Arterial: 7.18  pH, Blood: x     /  pCO2: 79    /  pO2: 112   / HCO3: 29    / Base Excess: -0.9  /  SaO2: 98                                08-19-20 @ 07:01  -  08-20-20 @ 07:00  --------------------------------------------------------  IN: 250 mL / OUT: 2715 mL / NET: -2465 mL        MICROBIOLOGY:  Culture Results:   Testing in progress (08-19 @ 16:50)      MEDICATIONS  (STANDING):  aspirin  chewable 81 milliGRAM(s) Oral daily  atorvastatin 80 milliGRAM(s) Oral at bedtime  BACItracin   Ointment 1 Application(s) Topical daily  calcium acetate 667 milliGRAM(s) Oral three times a day with meals  cefpodoxime 200 milliGRAM(s) Oral <User Schedule>  chlorhexidine 4% Liquid 1 Application(s) Topical <User Schedule>  dextrose 5%. 1000 milliLiter(s) (50 mL/Hr) IV Continuous <Continuous>  dextrose 50% Injectable 12.5 Gram(s) IV Push once  dextrose 50% Injectable 25 Gram(s) IV Push once  dextrose 50% Injectable 25 Gram(s) IV Push once  heparin   Injectable 5000 Unit(s) SubCutaneous every 8 hours  insulin glargine Injectable (LANTUS) 22 Unit(s) SubCutaneous at bedtime  insulin lispro (HumaLOG) corrective regimen sliding scale   SubCutaneous three times a day before meals  insulin lispro Injectable (HumaLOG) 8 Unit(s) SubCutaneous three times a day before meals  isosorbide   mononitrate ER Tablet (IMDUR) 30 milliGRAM(s) Oral daily  losartan 25 milliGRAM(s) Oral daily  metoprolol tartrate 25 milliGRAM(s) Oral two times a day  multivitamin/minerals 1 Tablet(s) Oral daily  pantoprazole    Tablet 40 milliGRAM(s) Oral before breakfast  polyethylene glycol 3350 17 Gram(s) Oral daily  tamsulosin 0.8 milliGRAM(s) Oral at bedtime    MEDICATIONS  (PRN):  acetaminophen   Tablet .. 650 milliGRAM(s) Oral every 6 hours PRN Mild Pain (1 - 3)  dextrose 40% Gel 15 Gram(s) Oral once PRN Blood Glucose LESS THAN 70 milliGRAM(s)/deciliter  glucagon  Injectable 1 milliGRAM(s) IntraMuscular once PRN Glucose LESS THAN 70 milligrams/deciliter  melatonin 1 milliGRAM(s) Oral at bedtime PRN Insomnia      RADIOLOGY & ADDITIONAL STUDIES:

## 2020-08-20 NOTE — PROGRESS NOTE ADULT - ASSESSMENT
68 y/o M with PMH of ESRD on HD MWF, CAD s/p pci with 7 stents and AICD placement in 2018, Heart failure with reduced EF (26% in 2018), DLD, HTN, COPD? on 2L home O2, DM II (well controlled with HbA1C in the 6-7 range), and BPH presents to the ED for SOB that has been worsening over the last 2 weeks.    # Rt pleural effusion s/p thoracentesis-- 1.8 L of fluid removed--fluid is exudative. r/o malignancy.  #Rt pneumothorax-- on CXR-- pulm follow up is requested.  # ESRD on HD  # Chr systolic CHF-- off diuretics-- anuric.--   # Hyperkalemic -- repeat labs sent now-- got lokelma--bp was low today-- could not get another session of HD-- midodrine started.  # A fib on eliquis-- currently on hold.   family signed DNR and DNI

## 2020-08-20 NOTE — PROGRESS NOTE ADULT - SUBJECTIVE AND OBJECTIVE BOX
seen and examined  last night events noted   on bipap        PAST HISTORY  --------------------------------------------------------------------------------  No significant changes to PMH, PSH, FHx, SHx, unless otherwise noted    ALLERGIES & MEDICATIONS  --------------------------------------------------------------------------------  Allergies    No Known Allergies    Intolerances      Standing Inpatient Medications  aspirin  chewable 81 milliGRAM(s) Oral daily  atorvastatin 80 milliGRAM(s) Oral at bedtime  BACItracin   Ointment 1 Application(s) Topical daily  calcium acetate 667 milliGRAM(s) Oral three times a day with meals  cefpodoxime 200 milliGRAM(s) Oral <User Schedule>  chlorhexidine 4% Liquid 1 Application(s) Topical <User Schedule>  dextrose 5%. 1000 milliLiter(s) IV Continuous <Continuous>  dextrose 50% Injectable 12.5 Gram(s) IV Push once  dextrose 50% Injectable 25 Gram(s) IV Push once  dextrose 50% Injectable 25 Gram(s) IV Push once  heparin   Injectable 5000 Unit(s) SubCutaneous every 8 hours  insulin glargine Injectable (LANTUS) 22 Unit(s) SubCutaneous at bedtime  insulin lispro (HumaLOG) corrective regimen sliding scale   SubCutaneous three times a day before meals  insulin lispro Injectable (HumaLOG) 8 Unit(s) SubCutaneous three times a day before meals  isosorbide   mononitrate ER Tablet (IMDUR) 30 milliGRAM(s) Oral daily  losartan 25 milliGRAM(s) Oral daily  metoprolol tartrate 25 milliGRAM(s) Oral two times a day  multivitamin/minerals 1 Tablet(s) Oral daily  pantoprazole    Tablet 40 milliGRAM(s) Oral before breakfast  polyethylene glycol 3350 17 Gram(s) Oral daily  tamsulosin 0.8 milliGRAM(s) Oral at bedtime    PRN Inpatient Medications  acetaminophen   Tablet .. 650 milliGRAM(s) Oral every 6 hours PRN  dextrose 40% Gel 15 Gram(s) Oral once PRN  glucagon  Injectable 1 milliGRAM(s) IntraMuscular once PRN  melatonin 1 milliGRAM(s) Oral at bedtime PRN          VITALS/PHYSICAL EXAM  --------------------------------------------------------------------------------  T(C): 37.2 (08-20-20 @ 04:55), Max: 37.5 (08-19-20 @ 16:45)  HR: 60 (08-20-20 @ 04:55) (60 - 89)  BP: 98/55 (08-20-20 @ 04:55) (80/50 - 112/54)  RR: 20 (08-20-20 @ 04:55) (16 - 30)  SpO2: 99% (08-20-20 @ 04:55) (95% - 99%)  Wt(kg): --  Height (cm): 170 (08-19-20 @ 06:53)  Weight (kg): 93.2 (08-19-20 @ 06:53)  BMI (kg/m2): 32.2 (08-19-20 @ 06:53)  BSA (m2): 2.04 (08-19-20 @ 06:53)      08-19-20 @ 07:01  -  08-20-20 @ 07:00  --------------------------------------------------------  IN: 250 mL / OUT: 2715 mL / NET: -2465 mL      Physical Exam:  	Gen:  on BIPAP  	Abd: +distended  	LE: ; no edema        LABS/STUDIES  --------------------------------------------------------------------------------              11.6   10.41 >-----------<  151      [08-20-20 @ 06:26]              39.5     138  |  95  |  42  ----------------------------<  223      [08-20-20 @ 06:26]  5.9   |  28  |  6.0        Ca     8.9     [08-20-20 @ 06:26]      Mg     2.2     [08-20-20 @ 06:26]      Phos  3.9     [08-19-20 @ 20:06]    TPro  7.2  /  Alb  4.3  /  TBili  0.6  /  DBili  x   /  AST  18  /  ALT  16  /  AlkPhos  172  [08-20-20 @ 06:26]    PT/INR: PT 13.30, INR 1.16       [08-19-20 @ 20:06]  PTT: 33.0       [08-19-20 @ 20:06]          [08-19-20 @ 04:30]    Creatinine Trend:  SCr 6.0 [08-20 @ 06:26]  SCr 4.9 [08-19 @ 20:06]  SCr 6.1 [08-19 @ 04:30]  SCr 6.6 [08-18 @ 05:24]  SCr 5.9 [08-17 @ 15:04]        PTH -- (Ca 8.9)      [08-18-20 @ 05:24]   237  PTH -- (Ca 8.7)      [08-17-20 @ 15:04]   203  HbA1c 7.1      [10-13-18 @ 05:46]

## 2020-08-20 NOTE — PROGRESS NOTE ADULT - SUBJECTIVE AND OBJECTIVE BOX
SUBJECTIVE:    Patient is a 69y old Male who presents with a chief complaint of SOB (20 Aug 2020 08:46)    Currently admitted to medicine with the primary diagnosis of SOB (shortness of breath)     Today is hospital day 3d.     PAST MEDICAL & SURGICAL HISTORY  Heart failure with reduced ejection fraction  CAD (coronary artery disease): s/p PCI and AICD placement  BPH (benign prostatic hyperplasia)  Type 2 diabetes mellitus  End stage renal disease  Dyslipidemia  Hypertension  No significant past surgical history    ALLERGIES:  No Known Allergies    MEDICATIONS:  STANDING MEDICATIONS  aspirin  chewable 81 milliGRAM(s) Oral daily  atorvastatin 80 milliGRAM(s) Oral at bedtime  BACItracin   Ointment 1 Application(s) Topical daily  calcium acetate 667 milliGRAM(s) Oral three times a day with meals  cefpodoxime 200 milliGRAM(s) Oral <User Schedule>  chlorhexidine 4% Liquid 1 Application(s) Topical <User Schedule>  dextrose 5%. 1000 milliLiter(s) IV Continuous <Continuous>  dextrose 50% Injectable 12.5 Gram(s) IV Push once  dextrose 50% Injectable 25 Gram(s) IV Push once  dextrose 50% Injectable 25 Gram(s) IV Push once  heparin   Injectable 5000 Unit(s) SubCutaneous every 8 hours  insulin glargine Injectable (LANTUS) 22 Unit(s) SubCutaneous at bedtime  insulin lispro (HumaLOG) corrective regimen sliding scale   SubCutaneous three times a day before meals  insulin lispro Injectable (HumaLOG) 8 Unit(s) SubCutaneous three times a day before meals  isosorbide   mononitrate ER Tablet (IMDUR) 30 milliGRAM(s) Oral daily  losartan 25 milliGRAM(s) Oral daily  metoprolol tartrate 25 milliGRAM(s) Oral two times a day  multivitamin/minerals 1 Tablet(s) Oral daily  pantoprazole    Tablet 40 milliGRAM(s) Oral before breakfast  polyethylene glycol 3350 17 Gram(s) Oral daily  tamsulosin 0.8 milliGRAM(s) Oral at bedtime    PRN MEDICATIONS  acetaminophen   Tablet .. 650 milliGRAM(s) Oral every 6 hours PRN  dextrose 40% Gel 15 Gram(s) Oral once PRN  glucagon  Injectable 1 milliGRAM(s) IntraMuscular once PRN  melatonin 1 milliGRAM(s) Oral at bedtime PRN    VITALS:   T(F): 96.1  HR: 60  BP: 102/40  RR: 18  SpO2: 96%    LABS:                        11.6   10.41 )-----------( 151      ( 20 Aug 2020 06:26 )             39.5     08-20    138  |  95<L>  |  42<H>  ----------------------------<  223<H>  5.9<H>   |  28  |  6.0<HH>    Ca    8.9      20 Aug 2020 06:26  Phos  3.9     08-19  Mg     2.2     08-20    TPro  7.2  /  Alb  4.3  /  TBili  0.6  /  DBili  x   /  AST  18  /  ALT  16  /  AlkPhos  172<H>  08-20    PT/INR - ( 19 Aug 2020 20:06 )   PT: 13.30 sec;   INR: 1.16 ratio         PTT - ( 19 Aug 2020 20:06 )  PTT:33.0 sec    ABG - ( 20 Aug 2020 07:22 )  pH, Arterial: 7.18  pH, Blood: x     /  pCO2: 79    /  pO2: 112   / HCO3: 29    / Base Excess: -0.9  /  SaO2: 98                    Culture - Fungal, Body Fluid (collected 19 Aug 2020 16:50)  Source: .Body Fluid Pleural Fluid  Preliminary Report (20 Aug 2020 07:53):    Testing in progress    Culture - Body Fluid with Gram Stain (collected 19 Aug 2020 16:50)  Source: .Body Fluid Pleural Fluid  Gram Stain (20 Aug 2020 03:07):    polymorphonuclear leukocytes seen    No organisms seen    by cytocentrifuge          RADIOLOGY:    PHYSICAL EXAM:  GEN: No acute distress  LUNGS: lt decreased breath sounds at base-- chest tube in place   HEART: S1/S2 present. RRR.   ABD/ GI: Soft, non-tender, non-distended. Bowel sounds present  EXT: moving all ext  NEURO: AAOX3

## 2020-08-20 NOTE — CONSULT NOTE ADULT - ASSESSMENT
ASSESSMENT:  70 y/o M with PMH of ESRD on HD MWF, CAD s/p pci with 7 stents and AICD placement in 2018, Heart failure with reduced EF, DLD, HTN, COPD? on 2L home O2, DM II, and BPH presents to the ED for SOB that has been worsening over the last 2 weeks. Thoracic surgery called for pneumothorax. patient is s/p right pigtail catheter by IR, currently to suction with right sided pneumothorax.    PLAN:   Continue pigtail to suction  IR evaluation to confirm placement  Strip pigtail to ensure patency  Repeat CXR in 4 hours  CT surgery to follow      Above plan discussed with Dr. Zepeda, patient, and [facility] team    -------------------------------------------------------------------------------------- ASSESSMENT:  68 y/o M with PMH of ESRD on HD MWF, CAD s/p pci with 7 stents and AICD placement in 2018, Heart failure with reduced EF, DLD, HTN, COPD? on 2L home O2, DM II, and BPH presents to the ED for SOB that has been worsening over the last 2 weeks. Thoracic surgery called for pneumothorax. patient is s/p right pigtail catheter by IR, currently to suction with right sided pneumothorax.    PLAN:   Continue pigtail to suction  IR evaluation to confirm placement  Strip pigtail to ensure patency  Repeat CXR in 4 hours  CT surgery to follow      Above plan discussed with Dr. Zepeda, patient, and [facility] team    --------------------------------------------------------------------------------------    CTS Attending:  --------------------  Patient interviewed and examined  CxR reviewed and case discussed with surgery resident staff  Patient post drainage of large effusion and has a pigtail drain at the right base.  With onset of penumothorax, I suspect the pigtail drain has failed to drain and is either clotted or there is an air leak that overcomes the drain's capacity to evacuate the space.  Since there was never an air leak post procedure, I suspect the former.    If, however, there is incomplete expansion after assuring the drain's patency, then a new chest tube should be placed.    -FMR ASSESSMENT:  68 y/o M with PMH of ESRD on HD MWF, CAD s/p pci with 7 stents and AICD placement in 2018, Heart failure with reduced EF, DLD, HTN, COPD? on 2L home O2, DM II, and BPH presents to the ED for SOB that has been worsening over the last 2 weeks. Thoracic surgery called for pneumothorax. patient is s/p right pigtail catheter by IR, currently to suction with right sided pneumothorax.    PLAN:   Continue pigtail to suction  IR evaluation to confirm placement  Strip pigtail to ensure patency  Repeat CXR in 4 hours  CT surgery to follow      Above plan discussed with Dr. Zepeda, patient, and [facility] team    --------------------------------------------------------------------------------------    CTS Attending:  --------------------  Patient interviewed and examined  CxR reviewed and case discussed with surgery resident staff  Patient post drainage of large effusion and has a pigtail drain at the right base.  With onset of penumothorax, I suspect the pigtail drain has failed to drain and is either clotted or there is an air leak that overcomes the drain's capacity to evacuate the space.  Since there was never an air leak post procedure, I suspect there is a residual space.  This is usually the result of "trapped lung" which is unable to expand due to either malignancy or fibrous entrapment.  The cytology will give an indication of the etiology.    Will discuss with pulmonary team tomorrow.    -FMR

## 2020-08-21 NOTE — PROGRESS NOTE ADULT - SUBJECTIVE AND OBJECTIVE BOX
Nephrology progress note    THIS IS AN INCOMPLETE NOTE . FULL NOTE TO FOLLOW SHORTLY    Patient is seen and examined, events over the last 24 h noted .    Allergies:  No Known Allergies    Hospital Medications:   MEDICATIONS  (STANDING):  aspirin  chewable 81 milliGRAM(s) Oral daily  atorvastatin 80 milliGRAM(s) Oral at bedtime  BACItracin   Ointment 1 Application(s) Topical daily  calcium acetate 667 milliGRAM(s) Oral three times a day with meals  cefpodoxime 200 milliGRAM(s) Oral <User Schedule>  dextrose 5%. 1000 milliLiter(s) (50 mL/Hr) IV Continuous <Continuous>  heparin   Injectable 5000 Unit(s) SubCutaneous every 8 hours  insulin glargine Injectable (LANTUS) 22 Unit(s) SubCutaneous at bedtime  insulin lispro (HumaLOG) corrective regimen sliding scale   SubCutaneous three times a day before meals  insulin lispro Injectable (HumaLOG) 8 Unit(s) SubCutaneous three times a day before meals  midodrine 10 milliGRAM(s) Oral every 8 hours  multivitamin/minerals 1 Tablet(s) Oral daily  norepinephrine Infusion 0.05 MICROgram(s)/kG/Min (4.37 mL/Hr) IV Continuous <Continuous>  pantoprazole    Tablet 40 milliGRAM(s) Oral before breakfast  polyethylene glycol 3350 17 Gram(s) Oral daily  tamsulosin 0.8 milliGRAM(s) Oral at bedtime        VITALS:  T(F): 99.6 (08-21-20 @ 08:00), Max: 99.6 (08-21-20 @ 08:00)  HR: 64 (08-21-20 @ 08:00)  BP: 139/59 (08-21-20 @ 08:00)  RR: 13 (08-21-20 @ 08:00)  SpO2: 97% (08-21-20 @ 08:00)  Wt(kg): --    08-19 @ 07:01  -  08-20 @ 07:00  --------------------------------------------------------  IN: 250 mL / OUT: 2715 mL / NET: -2465 mL    08-20 @ 07:01  -  08-21 @ 07:00  --------------------------------------------------------  IN: 471.1 mL / OUT: 190 mL / NET: 281.1 mL    08-21 @ 07:01  -  08-21 @ 08:58  --------------------------------------------------------  IN: 31 mL / OUT: 0 mL / NET: 31 mL      Height (cm): 170.2 (08-20 @ 18:30)    PHYSICAL EXAM:  Constitutional: NAD  HEENT: anicteric sclera, oropharynx clear, MMM  Neck: No JVD  Respiratory: CTAB, no wheezes, rales or rhonchi  Cardiovascular: S1, S2, RRR  Gastrointestinal: BS+, soft, NT/ND  Extremities: No cyanosis or clubbing. No peripheral edema  :  No crook.   Skin: No rashes    LABS:  08-21    132<L>  |  89<L>  |  56<H>  ----------------------------<  192<H>  5.3<H>   |  25  |  7.2<HH>    Ca    8.3<L>      21 Aug 2020 05:00  Phos  3.9     08-19  Mg     2.2     08-20    TPro  7.1  /  Alb  4.1  /  TBili  0.4  /  DBili      /  AST  21  /  ALT  15  /  AlkPhos  168<H>  08-21                          11.2   14.19 )-----------( 190      ( 21 Aug 2020 05:00 )             37.7       Urine Studies:      RADIOLOGY & ADDITIONAL STUDIES: Nephrology progress note    Patient is seen and examined, events over the last 24 h noted .  complaining of abdominal pain   on oxygen   on pressors       Allergies:  No Known Allergies    Hospital Medications:   MEDICATIONS  (STANDING):  aspirin  chewable 81 milliGRAM(s) Oral daily  atorvastatin 80 milliGRAM(s) Oral at bedtime  BACItracin   Ointment 1 Application(s) Topical daily  calcium acetate 667 milliGRAM(s) Oral three times a day with meals  cefpodoxime 200 milliGRAM(s) Oral <User Schedule>  dextrose 5%. 1000 milliLiter(s) (50 mL/Hr) IV Continuous <Continuous>  heparin   Injectable 5000 Unit(s) SubCutaneous every 8 hours  insulin glargine Injectable (LANTUS) 22 Unit(s) SubCutaneous at bedtime  insulin lispro (HumaLOG) corrective regimen sliding scale   SubCutaneous three times a day before meals  insulin lispro Injectable (HumaLOG) 8 Unit(s) SubCutaneous three times a day before meals  midodrine 10 milliGRAM(s) Oral every 8 hours  multivitamin/minerals 1 Tablet(s) Oral daily  norepinephrine Infusion 0.05 MICROgram(s)/kG/Min (4.37 mL/Hr) IV Continuous <Continuous>  pantoprazole    Tablet 40 milliGRAM(s) Oral before breakfast  polyethylene glycol 3350 17 Gram(s) Oral daily  tamsulosin 0.8 milliGRAM(s) Oral at bedtime        VITALS:  T(F): 99.6 (08-21-20 @ 08:00), Max: 99.6 (08-21-20 @ 08:00)  HR: 64 (08-21-20 @ 08:00)  BP: 139/59 (08-21-20 @ 08:00)  RR: 13 (08-21-20 @ 08:00)  SpO2: 97% (08-21-20 @ 08:00)  Wt(kg): --    08-19 @ 07:01  -  08-20 @ 07:00  --------------------------------------------------------  IN: 250 mL / OUT: 2715 mL / NET: -2465 mL    08-20 @ 07:01  -  08-21 @ 07:00  --------------------------------------------------------  IN: 471.1 mL / OUT: 190 mL / NET: 281.1 mL    08-21 @ 07:01  -  08-21 @ 08:58  --------------------------------------------------------  IN: 31 mL / OUT: 0 mL / NET: 31 mL      Height (cm): 170.2 (08-20 @ 18:30)    PHYSICAL EXAM:  Constitutional: on oxygen face mask  Neck: No JVD  Respiratory: CTAB,   Cardiovascular: S1, S2, RRR  Gastrointestinal: BS+, soft, NT/ND  Extremities: No cyanosis or clubbing. No peripheral edema  :  No crook.   Skin: No rashes    LABS:  08-21    132<L>  |  89<L>  |  56<H>  ----------------------------<  192<H>  5.3<H>   |  25  |  7.2<HH>    Ca    8.3<L>      21 Aug 2020 05:00  Phos  3.9     08-19  Mg     2.2     08-20    TPro  7.1  /  Alb  4.1  /  TBili  0.4  /  DBili      /  AST  21  /  ALT  15  /  AlkPhos  168<H>  08-21                          11.2   14.19 )-----------( 190      ( 21 Aug 2020 05:00 )             37.7       Urine Studies:      RADIOLOGY & ADDITIONAL STUDIES:  < from: CT Abdomen and Pelvis w/ Oral Cont (08.21.20 @ 04:49) >    1. No evidence of pneumoperitoneum or oral contrast leak.    2. Interval placement of right pigtail catheter in the pleural space.  Partially imaged known right pneumothorax, with marked decrease in the loculated right pleural effusion, with numerous septa noted. Right lower lobe consolidation. Correlate for right lower lobe pneumonia with empyema, in the appropriate clinical setting.    3. Circumferential urinary bladder wall thickening and surrounding inflammatory changes; correlation with urinalysis recommended to evaluate for cystitis.    < end of copied text >

## 2020-08-21 NOTE — PHYSICAL THERAPY INITIAL EVALUATION ADULT - SPECIFY REASON(S)
Spoke with Theodora MORENO, recommended to hold PT, as pt has been restless and is finally calm. Daughter ( from [facility] ) also at bedside requesting for PT not to see pt at this time.  Will f/u for PT .

## 2020-08-21 NOTE — PROGRESS NOTE ADULT - SUBJECTIVE AND OBJECTIVE BOX
Patient Information:  ADRIANNA GAINES / 69y / Male / MRN#:[id number]    Hospital Day: 4d    HPI:  70 y/o M with PMH of ESRD on HD MWF, CAD s/p pci with 7 stents and AICD placement in 2018, Heart failure with reduced EF (26% in 2018), DLD, HTN, COPD? on 2L home O2, DM II (well controlled with HbA1C in the 6-7 range), and BPH presents to the ED for SOB that has been worsening over the last 2 weeks. As per patient's daughter patient has had multiple friends pass away in the last few months and since then has complained that he needs home O2 (Daughter thinks this is anxiety driven). Patient's daughter reports that patient saturates in the low 90s at home without O2 and saturates at 100% on 2L. Two week ago he developed hypotension while at dialysis. He refused to go the hospital at that time. Since then he reports that his breathing has gotten worse and he requires more O2. A few days ago he requested that his daughter raise his home O2 to 3L. Before 2 week sago he was able to walk around the house without O2 but over the last two weeks he has required O2 to move around his house. He reports that his SOB is worse with walking and better when he sits down. He reports a dry cough at baseline but denies any chest pain, sore throat, wheezing, weight changes, fatigue, dizziness, or changes in urination. No travel history and no one at home is sick. At baseline patient walks with a cane. He lives with his daughter and she takes care of his medication for him. He sleeps on a recliner due to the HF and wears compression stockings. He is oliguric and urinates "a few drops" approximately 2 times a week.    In the ED vitals were stable and patient is breathing well on 3L NC. Labs were significant for elevated potassium and troponins likely secondary to ESRD. Chest xray reveals worsening right-sided pleural effusion with increased left lower lung airspace opacities. Nephrology was consulted and patient was dialyzed in the ED    Interval History:  Patient seen and examined at bedside. Upgraded to the ICU 8/20/2020    Past Medical History:  Heart failure with reduced ejection fraction  CAD (coronary artery disease)  BPH (benign prostatic hyperplasia)  Type 2 diabetes mellitus  End stage renal disease  Dyslipidemia  Hypertension    Past Surgical History:  No significant past surgical history    Allergies:  No Known Allergies    Medications:  PRN:  acetaminophen   Tablet .. 650 milliGRAM(s) Oral every 6 hours PRN Mild Pain (1 - 3)  dextrose 40% Gel 15 Gram(s) Oral once PRN Blood Glucose LESS THAN 70 milliGRAM(s)/deciliter  glucagon  Injectable 1 milliGRAM(s) IntraMuscular once PRN Glucose LESS THAN 70 milligrams/deciliter  melatonin 1 milliGRAM(s) Oral at bedtime PRN Insomnia    Standing:  aspirin  chewable 81 milliGRAM(s) Oral daily  atorvastatin 80 milliGRAM(s) Oral at bedtime  BACItracin   Ointment 1 Application(s) Topical daily  calcium acetate 667 milliGRAM(s) Oral three times a day with meals  chlorhexidine 4% Liquid 1 Application(s) Topical <User Schedule>  dextrose 5%. 1000 milliLiter(s) (50 mL/Hr) IV Continuous <Continuous>  dextrose 50% Injectable 12.5 Gram(s) IV Push once  dextrose 50% Injectable 25 Gram(s) IV Push once  dextrose 50% Injectable 25 Gram(s) IV Push once  heparin   Injectable 5000 Unit(s) SubCutaneous every 12 hours  insulin glargine Injectable (LANTUS) 22 Unit(s) SubCutaneous at bedtime  insulin lispro (HumaLOG) corrective regimen sliding scale   SubCutaneous three times a day before meals  insulin lispro Injectable (HumaLOG) 8 Unit(s) SubCutaneous three times a day before meals  meropenem  IVPB      meropenem  IVPB 500 milliGRAM(s) IV Intermittent once  midodrine 10 milliGRAM(s) Oral every 8 hours  multivitamin/minerals 1 Tablet(s) Oral daily  norepinephrine Infusion 0.05 MICROgram(s)/kG/Min (4.37 mL/Hr) IV Continuous <Continuous>  pantoprazole    Tablet 40 milliGRAM(s) Oral before breakfast  polyethylene glycol 3350 17 Gram(s) Oral daily  tamsulosin 0.8 milliGRAM(s) Oral at bedtime    Home:  alfuzosin 10 mg oral tablet, extended release: 1 tab(s) orally once a day on non-dialysis days  aspirin 81 mg oral tablet, chewable: 1 tab(s) orally once a day  calcium acetate 667 mg oral tablet: 1 tab(s) orally 3 times a day  cefdinir 300 mg oral capsule: 1 tab(s) orally 3 times a day after dialysis  Centrum Silver oral tablet: 1 tab(s) orally once a day  Crestor 20 mg oral tablet: 1 tab(s) orally once a day  Eliquis 5 mg oral tablet: 1 tab(s) orally 2 times a day  furosemide 40 mg oral tablet: 1 tab(s) orally once a day on non-dialysis days  isosorbide mononitrate 30 mg oral tablet, extended release: 1 tab(s) orally once a day on non-dialysis days  pantoprazole 40 mg oral delayed release tablet: 1 tab(s) orally once a day    Vitals:  T(C): 37.6, Max: 37.6 (08-21-20 @ 08:00)  T(F): 99.6, Max: 99.6 (08-21-20 @ 08:00)  HR: 60 (54 - 72)  BP: 130/50 (59/35 - 147/58)  RR: 21 (5 - 34)  SpO2: 97% (90% - 100%)    Physical Exam:  General: Awake, Alert. Not in acute distress.  Heart: Regular rate and rhythm; S1, S2; No murmurs.  Lungs: Clear to auscultation bilaterally.  Abdomen: Soft, nontender, nondistended.  Extremities: No edema in upper or lower extremities.  Neuro: AAOx3, No focal deficits.    Labs:  CBC (08-21 @ 05:00)                        Hgb: 11.2   WBC: 14.19 )-----------------( Plts: 190                              Hct: 37.7     Chem (08-21 @ 05:00)  Na: 132  |     Cl: 89     |  BUN: 56  -----------------------------------------< Gluc: 192    K: 5.3   |    HCO3: 25  |  Cr: 7.2    Ca 8.3 (08-21 @ 05:00)  Phos 3.9 (08-19 @ 20:06)  Mg 2.2 (08-20 @ 06:26)    LFTs (08-21 @ 05:00)  TPro 7.1  /  Alb 4.1  TBili 0.4  /  DBili     AST 21  /  ALT 15  /  AlkPhos 168    PT/INR (08-19 @ 20:06)  PT: 13.30; INR: 1.16   PTT: 33.0           ABG (08-20 @ 16:58)  pH, Arterial: 7.17 pH, Blood: X /  pCO2: 82 /  pO2: 66 / HCO3: 29 / Base Excess: -2.0 /  SaO2: 92       Microbiology:  Culture - Fungal, Body Fluid (collected 08-19 @ 16:50)  Source: .Body Fluid Pleural Fluid  Preliminary Report (08-20 @ 07:53):    Testing in progress    Culture - Body Fluid with Gram Stain (collected 08-19 @ 16:50)  Source: .Body Fluid Pleural Fluid  Gram Stain (08-20 @ 03:07):    polymorphonuclear leukocytes seen    No organisms seen    by cytocentrifuge  Preliminary Report (08-20 @ 19:03):    No growth        Radiology:    < from: Transthoracic Echocardiogram (08.19.20 @ 06:56) >   1. Left ventricular ejection fraction, by visual estimation, is 35 to 40%.   2. Left atrial enlargement.   3. LV Normal size and thickness. Septum and apex with severe hypokinesis. Anterior wall not well visualized, but appears hypokinetic.   4. Mildly enlarged right ventricle.   5. Right atrial enlargement.   6. Mild-moderate tricuspid regurgitation.   7. Dilatation of the aortic root.   8. Estimated pulmonary artery systolic pressure is 60.8mmHg assuming a right atrial pressure of 15 mmHg, which is consistent with severe pulmonary hypertension.    < end of copied text >      < from: Xray Chest 1 View-PORTABLE IMMEDIATE (08.17.20 @ 09:38) >  Worsening right-sided pleural effusion with increased left lower lung airspace opacities.    < end of copied text >    < from: CT Chest No Cont (08.18.20 @ 11:02) >  Large right pleural effusion with near complete collapse of the right lung. Large component of this effusion is subpulmonic. Partially loculated anteriorly at the apex.    New nonspecific 1.4 cm right cardiophrenic lymph node.    < end of copied text >    < from: Xray Kidney Ureter Bladder (08.20.20 @ 16:57) >    1.  Nonspecificnonobstructive bowel gas pattern.  2.  Partially imaged right basilar pneumothorax and right-sided pulmonary opacities.. Pigtail catheter overlying the right lower hemithorax. Cardiomegaly. Partially imaged pacer wires. Degenerative changes of the spine.    < end of copied text >    < from: CT Abdomen and Pelvis w/ Oral Cont (08.21.20 @ 04:49) >  1. No evidence of pneumoperitoneum or oral contrast leak.    2. Interval placement of right pigtail catheter in the pleural space.  Partially imaged known right pneumothorax, with marked decrease in the loculated right pleural effusion, with numerous septa noted. Right lower lobe consolidation. Correlate for right lower lobe pneumonia with empyema, in the appropriate clinical setting.    3. Circumferential urinary bladder wall thickening and surrounding inflammatory changes; correlation with urinalysis recommended to evaluate for cystitis.    < end of copied text > Patient Information:  ADRIANNA GAINES / 69y / Male / MRN#:[id number]    Hospital Day: 4d    HPI:  68 y/o M with PMH of ESRD on HD MWF, CAD s/p pci with 7 stents and AICD placement in 2018, Heart failure with reduced EF (26% in 2018), DLD, HTN, COPD? on 2L home O2, DM II (well controlled with HbA1C in the 6-7 range), and BPH presents to the ED for SOB that has been worsening over the last 2 weeks. As per patient's daughter patient has had multiple friends pass away in the last few months and since then has complained that he needs home O2 (Daughter thinks this is anxiety driven). Patient's daughter reports that patient saturates in the low 90s at home without O2 and saturates at 100% on 2L. Two week ago he developed hypotension while at dialysis. He refused to go the hospital at that time. Since then he reports that his breathing has gotten worse and he requires more O2. A few days ago he requested that his daughter raise his home O2 to 3L. Before 2 week sago he was able to walk around the house without O2 but over the last two weeks he has required O2 to move around his house. He reports that his SOB is worse with walking and better when he sits down. He reports a dry cough at baseline but denies any chest pain, sore throat, wheezing, weight changes, fatigue, dizziness, or changes in urination. No travel history and no one at home is sick. At baseline patient walks with a cane. He lives with his daughter and she takes care of his medication for him. He sleeps on a recliner due to the HF and wears compression stockings. He is oliguric and urinates "a few drops" approximately 2 times a week.    In the ED vitals were stable and patient is breathing well on 3L NC. Labs were significant for elevated potassium and troponins likely secondary to ESRD. Chest xray reveals worsening right-sided pleural effusion with increased left lower lung airspace opacities. Nephrology was consulted and patient was dialyzed in the ED    Interval History:  Patient seen and examined at bedside. Upgraded to the ICU 8/20/2020    Past Medical History:  Heart failure with reduced ejection fraction  CAD (coronary artery disease)  BPH (benign prostatic hyperplasia)  Type 2 diabetes mellitus  End stage renal disease  Dyslipidemia  Hypertension    Past Surgical History:  No significant past surgical history    Allergies:  No Known Allergies    Medications:  PRN:  acetaminophen   Tablet .. 650 milliGRAM(s) Oral every 6 hours PRN Mild Pain (1 - 3)  dextrose 40% Gel 15 Gram(s) Oral once PRN Blood Glucose LESS THAN 70 milliGRAM(s)/deciliter  glucagon  Injectable 1 milliGRAM(s) IntraMuscular once PRN Glucose LESS THAN 70 milligrams/deciliter  melatonin 1 milliGRAM(s) Oral at bedtime PRN Insomnia    Standing:  aspirin  chewable 81 milliGRAM(s) Oral daily  atorvastatin 80 milliGRAM(s) Oral at bedtime  BACItracin   Ointment 1 Application(s) Topical daily  calcium acetate 667 milliGRAM(s) Oral three times a day with meals  chlorhexidine 4% Liquid 1 Application(s) Topical <User Schedule>  dextrose 5%. 1000 milliLiter(s) (50 mL/Hr) IV Continuous <Continuous>  dextrose 50% Injectable 12.5 Gram(s) IV Push once  dextrose 50% Injectable 25 Gram(s) IV Push once  dextrose 50% Injectable 25 Gram(s) IV Push once  heparin   Injectable 5000 Unit(s) SubCutaneous every 12 hours  insulin glargine Injectable (LANTUS) 22 Unit(s) SubCutaneous at bedtime  insulin lispro (HumaLOG) corrective regimen sliding scale   SubCutaneous three times a day before meals  insulin lispro Injectable (HumaLOG) 8 Unit(s) SubCutaneous three times a day before meals  meropenem  IVPB      meropenem  IVPB 500 milliGRAM(s) IV Intermittent once  midodrine 10 milliGRAM(s) Oral every 8 hours  multivitamin/minerals 1 Tablet(s) Oral daily  norepinephrine Infusion 0.05 MICROgram(s)/kG/Min (4.37 mL/Hr) IV Continuous <Continuous>  pantoprazole    Tablet 40 milliGRAM(s) Oral before breakfast  polyethylene glycol 3350 17 Gram(s) Oral daily  tamsulosin 0.8 milliGRAM(s) Oral at bedtime    Home:  alfuzosin 10 mg oral tablet, extended release: 1 tab(s) orally once a day on non-dialysis days  aspirin 81 mg oral tablet, chewable: 1 tab(s) orally once a day  calcium acetate 667 mg oral tablet: 1 tab(s) orally 3 times a day  cefdinir 300 mg oral capsule: 1 tab(s) orally 3 times a day after dialysis  Centrum Silver oral tablet: 1 tab(s) orally once a day  Crestor 20 mg oral tablet: 1 tab(s) orally once a day  Eliquis 5 mg oral tablet: 1 tab(s) orally 2 times a day  furosemide 40 mg oral tablet: 1 tab(s) orally once a day on non-dialysis days  isosorbide mononitrate 30 mg oral tablet, extended release: 1 tab(s) orally once a day on non-dialysis days  pantoprazole 40 mg oral delayed release tablet: 1 tab(s) orally once a day    Vitals:  T(C): 37.6, Max: 37.6 (08-21-20 @ 08:00)  T(F): 99.6, Max: 99.6 (08-21-20 @ 08:00)  HR: 60 (54 - 72)  BP: 130/50 (59/35 - 147/58)  RR: 21 (5 - 34)  SpO2: 97% (90% - 100%)    Physical Exam:  General: Awake, Alert.  Heart: Regular rate and rhythm; S1, S2; No murmurs.  Lungs: Clear to auscultation bilaterally.  Abdomen: distended.  Extremities: No edema in upper or lower extremities.  Neuro: No focal deficits.    Labs:  CBC (08-21 @ 05:00)                        Hgb: 11.2   WBC: 14.19 )-----------------( Plts: 190                              Hct: 37.7     Chem (08-21 @ 05:00)  Na: 132  |     Cl: 89     |  BUN: 56  -----------------------------------------< Gluc: 192    K: 5.3   |    HCO3: 25  |  Cr: 7.2    Ca 8.3 (08-21 @ 05:00)  Phos 3.9 (08-19 @ 20:06)  Mg 2.2 (08-20 @ 06:26)    LFTs (08-21 @ 05:00)  TPro 7.1  /  Alb 4.1  TBili 0.4  /  DBili     AST 21  /  ALT 15  /  AlkPhos 168    PT/INR (08-19 @ 20:06)  PT: 13.30; INR: 1.16   PTT: 33.0           ABG (08-20 @ 16:58)  pH, Arterial: 7.17 pH, Blood: X /  pCO2: 82 /  pO2: 66 / HCO3: 29 / Base Excess: -2.0 /  SaO2: 92       Microbiology:  Culture - Fungal, Body Fluid (collected 08-19 @ 16:50)  Source: .Body Fluid Pleural Fluid  Preliminary Report (08-20 @ 07:53):    Testing in progress    Culture - Body Fluid with Gram Stain (collected 08-19 @ 16:50)  Source: .Body Fluid Pleural Fluid  Gram Stain (08-20 @ 03:07):    polymorphonuclear leukocytes seen    No organisms seen    by cytocentrifuge  Preliminary Report (08-20 @ 19:03):    No growth        Radiology:    < from: Transthoracic Echocardiogram (08.19.20 @ 06:56) >   1. Left ventricular ejection fraction, by visual estimation, is 35 to 40%.   2. Left atrial enlargement.   3. LV Normal size and thickness. Septum and apex with severe hypokinesis. Anterior wall not well visualized, but appears hypokinetic.   4. Mildly enlarged right ventricle.   5. Right atrial enlargement.   6. Mild-moderate tricuspid regurgitation.   7. Dilatation of the aortic root.   8. Estimated pulmonary artery systolic pressure is 60.8mmHg assuming a right atrial pressure of 15 mmHg, which is consistent with severe pulmonary hypertension.    < end of copied text >      < from: Xray Chest 1 View-PORTABLE IMMEDIATE (08.17.20 @ 09:38) >  Worsening right-sided pleural effusion with increased left lower lung airspace opacities.    < end of copied text >    < from: CT Chest No Cont (08.18.20 @ 11:02) >  Large right pleural effusion with near complete collapse of the right lung. Large component of this effusion is subpulmonic. Partially loculated anteriorly at the apex.    New nonspecific 1.4 cm right cardiophrenic lymph node.    < end of copied text >    < from: Xray Kidney Ureter Bladder (08.20.20 @ 16:57) >    1.  Nonspecificnonobstructive bowel gas pattern.  2.  Partially imaged right basilar pneumothorax and right-sided pulmonary opacities.. Pigtail catheter overlying the right lower hemithorax. Cardiomegaly. Partially imaged pacer wires. Degenerative changes of the spine.    < end of copied text >    < from: CT Abdomen and Pelvis w/ Oral Cont (08.21.20 @ 04:49) >  1. No evidence of pneumoperitoneum or oral contrast leak.    2. Interval placement of right pigtail catheter in the pleural space.  Partially imaged known right pneumothorax, with marked decrease in the loculated right pleural effusion, with numerous septa noted. Right lower lobe consolidation. Correlate for right lower lobe pneumonia with empyema, in the appropriate clinical setting.    3. Circumferential urinary bladder wall thickening and surrounding inflammatory changes; correlation with urinalysis recommended to evaluate for cystitis.    < end of copied text >

## 2020-08-21 NOTE — PROGRESS NOTE ADULT - ASSESSMENT
69M, PMH of ESRD on HD MWF, COPD, HFrEF, admitted for SOB.      #ESRD on HD  - was hypotensive and could not tolerate HD today   - for HD in AM 3h opti 200 UF 3l if tolerates   - IP noted, on phoslo keep for now   - hgb noted, no YASMINE   - d/c losartan  - might need  to start midodrine      #SOB - sp CT of chest with loculated effusion for thoracentesis/ s/p pig tail / sp Pneumothorax/ CTS and pulm on case   on cefpodoxime continue   # on face mask , repeat ABG     # will follow

## 2020-08-21 NOTE — PROGRESS NOTE ADULT - SUBJECTIVE AND OBJECTIVE BOX
SURGERY PROGRESS NOTE     ADRIANNA GAINES  69y  Male  Hospital day: 5    Large R sided pleural effusion s/p pigtail catheter placed by IR on 08/19 with ~2L of fluid drained, pt was put on BiPAP, yesterdays AM CXR showed R pleural effusion and a new onset 1.6 cm pneumothorax. Another CXR was done later in the day which shows possible worsening of pneumothorax.    OVERNIGHT EVENTS: no acute events     T(F): 97.3 (08-21-20 @ 00:00), Max: 98.9 (08-20-20 @ 04:55)  HR: 62 (08-21-20 @ 03:45) (54 - 64)  BP: 119/42 (08-21-20 @ 03:45) (59/35 - 136/56)  RR: 12 (08-21-20 @ 03:45) (6 - 31)  SpO2: 98% (08-21-20 @ 03:45) (90% - 100%)    DIET/FLUIDS: calcium acetate 667 milliGRAM(s) Oral three times a day with meals  dextrose 5%. 1000 milliLiter(s) IV Continuous <Continuous>  multivitamin/minerals 1 Tablet(s) Oral daily                                                                               DRAINS: Right side pigtail catheter     GI proph:  pantoprazole    Tablet 40 milliGRAM(s) Oral before breakfast    AC/ proph: aspirin  chewable 81 milliGRAM(s) Oral daily  heparin   Injectable 5000 Unit(s) SubCutaneous every 8 hours    ABx: cefpodoxime 200 milliGRAM(s) Oral <User Schedule>    LABS  POCT Blood Glucose.: 118 mg/dL (20 Aug 2020 21:33)  POCT Blood Glucose.: 128 mg/dL (20 Aug 2020 17:01)  POCT Blood Glucose.: 200 mg/dL (20 Aug 2020 11:11)  POCT Blood Glucose.: 202 mg/dL (20 Aug 2020 07:53)                          11.6   10.41 )-----------( 151      ( 20 Aug 2020 06:26 )             39.5       Auto Neutrophil %: 90.1 % (08-20-20 @ 06:26)  Auto Immature Granulocyte %: 0.8 % (08-20-20 @ 06:26)    08-20    138  |  95<L>  |  42<H>  ----------------------------<  223<H>  5.9<H>   |  28  |  6.0<HH>      Calcium, Total Serum: 8.9 mg/dL (08-20-20 @ 06:26)      LFTs:             7.2  | 0.6  | 18       ------------------[172     ( 20 Aug 2020 06:26 )  4.3  | x    | 16          Lipase:x      Amylase:x         Blood Gas Arterial, Lactate: 0.7 mmoL/L (08-20-20 @ 16:58)  Blood Gas Arterial, Lactate: 0.6 mmoL/L (08-20-20 @ 07:22)  Blood Gas Arterial, Lactate: 0.7 mmoL/L (08-20-20 @ 05:33)    ABG - ( 20 Aug 2020 16:58 )  pH: 7.17  /  pCO2: 82    /  pO2: 66    / HCO3: 29    / Base Excess: -2.0  /  SaO2: 92          ABG - ( 20 Aug 2020 07:22 )  pH: 7.18  /  pCO2: 79    /  pO2: 112   / HCO3: 29    / Base Excess: -0.9  /  SaO2: 98          ABG - ( 20 Aug 2020 05:33 )  pH: 7.14  /  pCO2: 88    /  pO2: 49    / HCO3: 30    / Base Excess: -1.3  /  SaO2: 84            Coags:     13.30  ----< 1.16    ( 19 Aug 2020 20:06 )     33.0          Culture - Fungal, Body Fluid (collected 19 Aug 2020 16:50)  Source: .Body Fluid Pleural Fluid  Preliminary Report (20 Aug 2020 07:53):    Testing in progress    Culture - Body Fluid with Gram Stain (collected 19 Aug 2020 16:50)  Source: .Body Fluid Pleural Fluid  Gram Stain (20 Aug 2020 03:07):    polymorphonuclear leukocytes seen    No organisms seen    by cytocentrifuge  Preliminary Report (20 Aug 2020 19:03):    No growth      RADIOLOGY & ADDITIONAL TESTS:  < from: Xray Chest 1 View- PORTABLE-Routine (08.20.20 @ 06:12) >  Impression:  New right lateral pneumothorax with an air gap measuring up to an 1.6 cm.  Decreased right pleural effusion/opacity. Stable patchy left lung opacities.  < end of copied text >    < from: Xray Chest 1 View-PORTABLE IMMEDIATE (08.20.20 @ 08:53) >  IMPRESSION:  Persistent right basilar pneumothorax.  < end of copied text >    < from: Xray Chest 1 View- PORTABLE-Urgent (08.20.20 @ 12:23) >  Impression:  Interval placement of a right pleural catheter with interval decrease in the right pleural effusion/opacity.  Unchanged patchy left lung opacities.  < end of copied text >    CXR 8/20 @ 1619, 2236, 2244. No read, all appear to be the same/stable      A/P: 69 year old male with right side pneumothorax s/p pigtail catheter placement on 8/19 for effusion.  -Continue pigtail to suction  -IR evaluation to confirm placement  -Strip pigtail to ensure patency  -Repeat CXR in AM    case d/w Dr. Zepeda

## 2020-08-21 NOTE — PROGRESS NOTE ADULT - ASSESSMENT
70 y/o M with PMH of ESRD on HD MWF, CAD s/p pci with 7 stents and AICD placement in 2018, Heart failure with reduced EF, DLD, HTN, COPD? on 2L home O2, DM II (well controlled with HbA1C in the 6-7 range), Afib on Eliquis (diagnosed in february) and BPH presents to the ED for SOB that has been worsening over the last 2 weeks.    #R sided pleural effusion  #Acute on chronic hypercapnic respiratory failure   #Trapped Lung  -Patient presented to the ED c/o Dyspnea. Had missed one HD session because he was feeling tired. s/p emergent HD in the ED for volume overload w/ 4 liters removed.  -CXR 8/17/2020: Worsening right-sided pleural effusion with increased left lower lung airspace opacities  -Review of previous images shows that the effusion is old  -CT 8/18/2020: Large right pleural effusion with near complete collapse of the right lung. Large component of this effusion is subpulmonic. Partially loculated anteriorly at the apex.  -s/p pigtail catheter placement by IR on 08/19 > Exudative, labs sent including cytopathology and flow cytometry, will f/u results. Suspicion of underlying mass/malignancy.  -pt was tachypneic, hypotensive 8/20/2020 uid drainage and HD and in acute distress, ABGs showed CO2 retention, was put on BiPAP  -Upgraded to the ICU 8/20/2020 due to hypercapnic resp failure and HoTN with concern for worsening PTX  Plan  - No PTX > likely trapped lung due to chronic effusion as per CT Surg  - Starting Merrem due to concern for infection due to elevated WBC and latest CT  - c/w BiPAP on/off and at night  - f/u CT Surg    #Abdominal distension  #Constipation  #Aerophagia leading to belching  #Vomiting  -patient hasn't had BM in 3 days, but is able to pass gas  -Vomiting infrequently but has been dry heaving since morning  -X-ray KUB 8/20/2020: Nonspecific, nonobstructive bowel gas pattern  -CT A/P 8/21/20: no bowel obstruction  Plan  - c/w Bowel regimen  - Simethicone for belching and distension  - Zofran PRN  - NPO except meds    #ESRD, dialysis dependent   -HD MWF, nephro following  -pt anuric, diuretics discontinued   -s/p HD in the ED on admission due to suspected volume overload due to CXR findings, subsequently dialyzed on 8/18 and 8/19  -Patient was unable to be dialyzed 8/20/2020 due to HoTN so received Lokelma   Plan  - HD tomorrow  - Monitor ruthy    #HFrEF  -TTE 8/19/2020: EF 35-40%, severe Pulm HTN\  -Anuric  -s/p HD in the ED on admission due to suspected volume overload due to CXR findings, subsequently dialyzed on 8/18 and 8/19  Plan  - Holding home metoprolol. Imdur, losartan  - Euvolemic on exam and imaging  - HD tomorrow    #CAD s/p PCI with 7 stents  #Elevated Troponins  -trop elevated on admission  -EKG: no ST changes noted  -Patient not c/o chest pain  Plan  - likely from ESRD  - c/w aspirin  - holding metoprolol due to HoTN    #Paroxysmal Afib  -Diagnosed in February  -on Eliquis at home, held on admission due to need for pigtail placement  -Currently normal rate and regular rhythm  Plan  - Holding metoprolol due to HoTN  - keep holding Eliquis     #DM II, uncontrolled.   -A1c 7.8  Plan  -on insulin (22/8) and sliding scale    #HTN: -holding losartan    #DLD: -c/w statin    #BPH: -on flomax    DVT ppx: Heparin subq, home eliquis held  GI ppx: Protonix  Diet: NPO  Code: DNR/DNI  Dispo: Acute  Pending: Clinical improvement 68 y/o M with PMH of ESRD on HD MWF, CAD s/p pci with 7 stents and AICD placement in 2018, Heart failure with reduced EF, DLD, HTN, COPD? on 2L home O2, DM II (well controlled with HbA1C in the 6-7 range), Afib on Eliquis (diagnosed in february) and BPH presents to the ED for SOB that has been worsening over the last 2 weeks.    #R sided pleural effusion  #Acute on chronic hypercapnic respiratory failure   #Trapped Lung  -Patient presented to the ED c/o Dyspnea. Had missed one HD session because he was feeling tired. s/p emergent HD in the ED for volume overload w/ 4 liters removed.  -CXR 8/17/2020: Worsening right-sided pleural effusion with increased left lower lung airspace opacities  -Review of previous images shows that the effusion is old  -CT 8/18/2020: Large right pleural effusion with near complete collapse of the right lung. Large component of this effusion is subpulmonic. Partially loculated anteriorly at the apex.  -s/p pigtail catheter placement by IR on 08/19 > Exudative. Suspicion of underlying mass/malignancy.  -pt was tachypneic, hypotensive 8/20/2020 uid drainage and HD and in acute distress, ABGs showed CO2 retention, was put on BiPAP  -Upgraded to the ICU 8/20/2020 due to hypercapnic resp failure and HoTN with concern for worsening PTX  Plan  - No PTX > likely trapped lung due to chronic effusion as per CT Surg  - Starting Merrem due to concern for infection due to elevated WBC and latest CT  - c/w BiPAP on/off and at night  - f/u cytology and flow  - f/u VBG  - f/u CT Surg    #Abdominal distension  #Constipation  #Aerophagia leading to belching  #Vomiting  -patient hasn't had BM in 3 days, but is able to pass gas  -Vomiting infrequently but has been dry heaving since morning  -X-ray KUB 8/20/2020: Nonspecific, nonobstructive bowel gas pattern  -CT A/P 8/21/20: no bowel obstruction  Plan  - c/w Bowel regimen  - Simethicone for belching and distension  - Zofran PRN  - NPO except meds    #ESRD, dialysis dependent   -HD MWF, nephro following  -pt anuric, diuretics discontinued   -s/p HD in the ED on admission due to suspected volume overload due to CXR findings, subsequently dialyzed on 8/18 and 8/19  -Patient was unable to be dialyzed 8/20/2020 due to HoTN so received Lokelma   Plan  - HD tomorrow  - Monitor ruthy    #HFrEF  -TTE 8/19/2020: EF 35-40%, severe Pulm HTN\  -Anuric  -s/p HD in the ED on admission due to suspected volume overload due to CXR findings, subsequently dialyzed on 8/18 and 8/19  Plan  - Holding home metoprolol. Imdur, losartan  - Euvolemic on exam and imaging  - HD tomorrow    #CAD s/p PCI with 7 stents  #Elevated Troponins  -trop elevated on admission  -EKG: no ST changes noted  -Patient not c/o chest pain  Plan  - likely from ESRD  - c/w aspirin  - holding metoprolol due to HoTN    #Paroxysmal Afib  -Diagnosed in February  -on Eliquis at home, held on admission due to need for pigtail placement  -Currently normal rate and regular rhythm  Plan  - Holding metoprolol due to HoTN  - keep holding Eliquis     #DM II, uncontrolled.   -A1c 7.8  Plan  -on insulin (22/8) and sliding scale    #HTN: -holding losartan    #DLD: -c/w statin    #BPH: -on flomax    DVT ppx: Heparin subq, home eliquis held  GI ppx: Protonix  Diet: NPO  Code: DNR/DNI  Dispo: Acute  Pending: Clinical improvement 68 y/o M with PMH of ESRD on HD MWF, CAD s/p pci with 7 stents and AICD placement in 2018, Heart failure with reduced EF, DLD, HTN, COPD? on 2L home O2, DM II (well controlled with HbA1C in the 6-7 range), Afib on Eliquis (diagnosed in february) and BPH presents to the ED for SOB that has been worsening over the last 2 weeks.    #R sided pleural effusion  #Acute on chronic hypercapnic respiratory failure   #Trapped Lung  -Patient presented to the ED c/o Dyspnea. Had missed one HD session because he was feeling tired. s/p emergent HD in the ED for volume overload w/ 4 liters removed.  -CXR 8/17/2020: Worsening right-sided pleural effusion with increased left lower lung airspace opacities  -Review of previous images shows that the effusion is old  -CT 8/18/2020: Large right pleural effusion with near complete collapse of the right lung. Large component of this effusion is subpulmonic. Partially loculated anteriorly at the apex.  -s/p pigtail catheter placement by IR on 08/19 > Exudative. Suspicion of underlying mass/malignancy.  -pt was tachypneic, hypotensive 8/20/2020 s/p ~2L fluid drainage during HD and in acute distress so HD had to be aborted, ABGs x2 showed CO2 retention, so was put on BiPAP  -Upgraded to the ICU 8/20/2020 due to hypercapnic resp failure and HoTN with concern for worsening PTX. Started on midodrine and now on Levophed.  -CT A/P 8/21/2020: Partially imaged known right pneumothorax, with marked decrease in the loculated right pleural effusion, with numerous septa noted. Right lower lobe consolidation. Correlate for right lower lobe pneumonia with empyema, in the appropriate clinical setting.  Plan  - No PTX > likely trapped lung due to chronic effusion as per CT Surg  - Starting Merrem due to concern for infection due to elevated WBC and latest CT result  - c/w BiPAP on/off and at night  - Wean off levophed  - f/u cytology and flow  - f/u VBG  - f/u CT Surg    #Abdominal distension  #Constipation  #Aerophagia leading to belching  #Vomiting  -patient hasn't had BM in 3 days, but is able to pass gas  -Vomiting infrequently but has been dry heaving since morning  -X-ray KUB 8/20/2020: Nonspecific, nonobstructive bowel gas pattern  -CT A/P 8/21/20: no bowel obstruction  Plan  - c/w Bowel regimen  - Simethicone for belching and distension  - Zofran PRN  - NPO except meds    #ESRD, dialysis dependent   -HD MWF, nephro following  -pt anuric, diuretics discontinued   -s/p HD in the ED on admission due to suspected volume overload due to CXR findings, subsequently dialyzed on 8/18 and 8/19  -Patient was unable to be dialyzed 8/20/2020 due to HoTN so received Lokelma   Plan  - HD tomorrow  - Monitor lytes    #HFrEF  -TTE 8/19/2020: EF 35-40%, severe Pulm HTN\  -Anuric  -s/p HD in the ED on admission due to suspected volume overload due to CXR findings, subsequently dialyzed on 8/18 and 8/19  Plan  - Holding home metoprolol. Imdur, losartan  - Euvolemic on exam and imaging  - HD tomorrow    #CAD s/p PCI with 7 stents  #Elevated Troponins  -trop elevated on admission  -EKG: no ST changes noted  -Patient not c/o chest pain  Plan  - likely from ESRD  - c/w aspirin  - holding metoprolol due to HoTN    #Paroxysmal Afib  -Diagnosed in February  -on Eliquis at home, held on admission due to need for pigtail placement  -Currently normal rate and regular rhythm  Plan  - Holding metoprolol due to HoTN  - keep holding Eliquis     #DM II, uncontrolled.   -A1c 7.8  Plan  -on insulin (22/8) and sliding scale    #HTN: -holding losartan    #DLD: -c/w statin    #BPH: -on flomax    DVT ppx: Heparin subq, home eliquis held  GI ppx: Protonix  Diet: NPO  Code: DNR/DNI  Dispo: Acute  Pending: Clinical improvement 68 y/o M with PMH of ESRD on HD MWF, CAD s/p pci with 7 stents and AICD placement in 2018, Heart failure with reduced EF, DLD, HTN, COPD? on 2L home O2, DM II (well controlled with HbA1C in the 6-7 range), Afib on Eliquis (diagnosed in february) and BPH presents to the ED for SOB that has been worsening over the last 2 weeks.    #R sided pleural effusion  #Acute on chronic hypercapnic respiratory failure   #Trapped Lung  -Patient presented to the ED c/o Dyspnea. Had missed one HD session because he was feeling tired. s/p emergent HD in the ED for volume overload w/ 4 liters removed.  -CXR 8/17/2020: Worsening right-sided pleural effusion with increased left lower lung airspace opacities  -Review of previous images shows that the effusion is old  -CT 8/18/2020: Large right pleural effusion with near complete collapse of the right lung. Large component of this effusion is subpulmonic. Partially loculated anteriorly at the apex.  -s/p pigtail catheter placement by IR on 08/19 > Exudative. Suspicion of underlying mass/malignancy.  -pt was tachypneic, hypotensive 8/20/2020 s/p ~2L fluid drainage during HD and in acute distress so HD had to be aborted, ABGs x2 showed CO2 retention, so was put on BiPAP  -Upgraded to the ICU 8/20/2020 due to hypercapnic resp failure and HoTN with concern for worsening PTX. Started on midodrine and now on Levophed.  -CT A/P 8/21/2020: Partially imaged known right pneumothorax, with marked decrease in the loculated right pleural effusion, with numerous septa noted. Right lower lobe consolidation. Correlate for right lower lobe pneumonia with empyema, in the appropriate clinical setting.  Plan  - No PTX > likely trapped lung due to chronic effusion as per CT Surg  - Starting Merrem due to concern for infection due to elevated WBC and latest CT result  - c/w BiPAP on/off and at night  - Wean off levophed  - f/u cytology and flow  - f/u VBG  - f/u CT Surg    #Abdominal distension  #Constipation  #Aerophagia leading to belching  #Vomiting  -patient hasn't had BM in 3 days, but is able to pass gas  -Vomiting infrequently but has been dry heaving since morning  -X-ray KUB 8/20/2020: Nonspecific, nonobstructive bowel gas pattern  -CT A/P 8/21/20: no bowel obstruction  Plan  - c/w Bowel regimen  - Simethicone for belching and distension  - Zofran PRN  - NPO except meds    #Back pain  -Severe pain per patient  Plan  - Dilaudid 0.5mg q6 PRN     #ESRD, dialysis dependent   -HD MWF, nephro following  -pt anuric, diuretics discontinued   -s/p HD in the ED on admission due to suspected volume overload due to CXR findings, subsequently dialyzed on 8/18 and 8/19  -Patient was unable to be dialyzed 8/20/2020 due to HoTN so received Lokelma   Plan  - HD tomorrow  - Monitor lytes    #HFrEF  -TTE 8/19/2020: EF 35-40%, severe Pulm HTN\  -Anuric  -s/p HD in the ED on admission due to suspected volume overload due to CXR findings, subsequently dialyzed on 8/18 and 8/19  Plan  - Holding home metoprolol. Imdur, losartan  - Euvolemic on exam and imaging  - HD tomorrow    #CAD s/p PCI with 7 stents  #Elevated Troponins  -trop elevated on admission  -EKG: no ST changes noted  -Patient not c/o chest pain  Plan  - likely from ESRD  - c/w aspirin  - holding metoprolol due to HoTN    #Paroxysmal Afib  -Diagnosed in February  -on Eliquis at home, held on admission due to need for pigtail placement  -Currently normal rate and regular rhythm  Plan  - Holding metoprolol due to HoTN  - keep holding Eliquis     #DM II, uncontrolled.   -A1c 7.8  Plan  -on insulin (22/8) and sliding scale    #HTN: -holding losartan    #DLD: -c/w statin    #BPH: -on flomax    DVT ppx: Heparin subq, home eliquis held  GI ppx: Protonix  Diet: NPO  Code: DNR/DNI  Dispo: Acute  Pending: Clinical improvement 68 y/o M with PMH of ESRD on HD MWF, CAD s/p pci with 7 stents and AICD placement in 2018, Heart failure with reduced EF, DLD, HTN, COPD? on 2L home O2, DM II (well controlled with HbA1C in the 6-7 range), Afib on Eliquis (diagnosed in february) and BPH presents to the ED for SOB that has been worsening over the last 2 weeks.    #R sided pleural effusion  #Acute on chronic hypercapnic respiratory failure   #Trapped Lung  -Patient presented to the ED c/o Dyspnea. Had missed one HD session because he was feeling tired. s/p emergent HD in the ED for volume overload w/ 4 liters removed.  -CXR 8/17/2020: Worsening right-sided pleural effusion with increased left lower lung airspace opacities  -Review of previous images shows that the effusion is old  -CT 8/18/2020: Large right pleural effusion with near complete collapse of the right lung. Large component of this effusion is subpulmonic. Partially loculated anteriorly at the apex.  -s/p pigtail catheter placement by IR on 08/19 > Exudative. Suspicion of underlying mass/malignancy.  -pt was tachypneic, hypotensive 8/20/2020 s/p ~2L fluid drainage during HD and in acute distress so HD had to be aborted, ABGs x2 showed CO2 retention, so was put on BiPAP  -Upgraded to the ICU 8/20/2020 due to hypercapnic resp failure and HoTN with concern for worsening PTX. Started on midodrine and now on Levophed.  -CT A/P 8/21/2020: Partially imaged known right pneumothorax, with marked decrease in the loculated right pleural effusion, with numerous septa noted. Right lower lobe consolidation. Correlate for right lower lobe pneumonia with empyema, in the appropriate clinical setting.  Plan  - No PTX > likely trapped lung due to chronic effusion as per CT Surg  - Starting Merrem due to concern for infection due to elevated WBC and latest CT result  - c/w BiPAP on/off and at night  - Wean off levophed  - f/u cytology and flow  - f/u VBG  - f/u CT Surg > Spoke w/ team and patient is not a candidate for VATS due to his current medical condition. They will continue to follow his CXRs.    #Abdominal distension  #Constipation  #Aerophagia leading to belching  #Vomiting  -patient hasn't had BM in 3 days, but is able to pass gas  -Vomiting infrequently but has been dry heaving since morning  -X-ray KUB 8/20/2020: Nonspecific, nonobstructive bowel gas pattern  -CT A/P 8/21/20: no bowel obstruction  Plan  - c/w Bowel regimen  - Simethicone for belching and distension  - Zofran PRN  - NPO except meds    #Back pain  -Severe pain per patient  Plan  - Dilaudid 0.5mg q6 PRN     #ESRD, dialysis dependent   -HD MWF, nephro following  -pt anuric, diuretics discontinued   -s/p HD in the ED on admission due to suspected volume overload due to CXR findings, subsequently dialyzed on 8/18 and 8/19  -Patient was unable to be dialyzed 8/20/2020 due to HoTN so received Lokelma   Plan  - HD tomorrow  - Monitor lytes    #HFrEF  -TTE 8/19/2020: EF 35-40%, severe Pulm HTN\  -Anuric  -s/p HD in the ED on admission due to suspected volume overload due to CXR findings, subsequently dialyzed on 8/18 and 8/19  Plan  - Holding home metoprolol. Imdur, losartan  - Euvolemic on exam and imaging  - HD tomorrow    #CAD s/p PCI with 7 stents  #Elevated Troponins  -trop elevated on admission  -EKG: no ST changes noted  -Patient not c/o chest pain  Plan  - likely from ESRD  - c/w aspirin  - holding metoprolol due to HoTN    #Paroxysmal Afib  -Diagnosed in February  -on Eliquis at home, held on admission due to need for pigtail placement  -Currently normal rate and regular rhythm  Plan  - Holding metoprolol due to HoTN  - keep holding Eliquis     #DM II, uncontrolled.   -A1c 7.8  Plan  -on insulin (22/8) and sliding scale    #HTN: -holding losartan    #DLD: -c/w statin    #BPH: -on flomax    DVT ppx: Heparin subq, home eliquis held  GI ppx: Protonix  Diet: NPO  Code: DNR/DNI  Dispo: Acute  Pending: Clinical improvement

## 2020-08-21 NOTE — PROGRESS NOTE ADULT - SUBJECTIVE AND OBJECTIVE BOX
Patient is a 69y old  Male who presents with a chief complaint of SOB (21 Aug 2020 08:58)        Over Night Events:  On Levophed 0.15.  On 40% O2.  Awake follows commands.          ROS:     All ROS are negative except HPI         PHYSICAL EXAM    ICU Vital Signs Last 24 Hrs  T(C): 37.6 (21 Aug 2020 08:00), Max: 37.6 (21 Aug 2020 08:00)  T(F): 99.6 (21 Aug 2020 08:00), Max: 99.6 (21 Aug 2020 08:00)  HR: 64 (21 Aug 2020 08:00) (54 - 72)  BP: 139/59 (21 Aug 2020 08:00) (59/35 - 147/58)  BP(mean): 98 (21 Aug 2020 08:00) (42 - 98)  ABP: --  ABP(mean): --  RR: 13 (21 Aug 2020 08:00) (5 - 31)  SpO2: 97% (21 Aug 2020 08:00) (90% - 100%)      CONSTITUTIONAL:  Well nourished.  NAD    ENT:   Airway patent,   Mouth with normal mucosa.   No thrush    EYES:   Pupils equal,   Round and reactive to light.    CARDIAC:   Normal rate,   Regular rhythm.    No edema      Vascular:  Normal systolic impulse  No Carotid bruits    RESPIRATORY:   No wheezing  Bilateral BS  Normal chest expansion  Not tachypneic,  No use of accessory muscles    GASTROINTESTINAL:  Abdomen soft,   Non-tender,   No guarding,   + BS    MUSCULOSKELETAL:   Range of motion is not limited,  No clubbing, cyanosis    NEUROLOGICAL:   Alert and oriented   No motor  deficits.    SKIN:   Skin normal color for race,   Warm and dry and intact.   No evidence of rash.    PSYCHIATRIC:   Normal mood and affect.   No apparent risk to self or others.    HEMATOLOGICAL:  No cervical  lymphadenopathy.  no inguinal lymphadenopathy      08-20-20 @ 07:01  -  08-21-20 @ 07:00  --------------------------------------------------------  IN:    norepinephrine Infusion: 261.8 mL    norepinephrine Infusion: 209.3 mL  Total IN: 471.1 mL    OUT:    Chest Tube: 190 mL  Total OUT: 190 mL    Total NET: 281.1 mL      08-21-20 @ 07:01  -  08-21-20 @ 09:21  --------------------------------------------------------  IN:    norepinephrine Infusion: 31 mL  Total IN: 31 mL    OUT:  Total OUT: 0 mL    Total NET: 31 mL          LABS:                            11.2   14.19 )-----------( 190      ( 21 Aug 2020 05:00 )             37.7                                               08-21    132<L>  |  89<L>  |  56<H>  ----------------------------<  192<H>  5.3<H>   |  25  |  7.2<HH>    21 Aug 2020 05:00    132<L>  |  89<L>  |  56<H>  ----------------------------<  192<H>  5.3<H>   |  25     |  7.2<HH>  20 Aug 2020 06:26    138    |  95<L>  |  42<H>  ----------------------------<  223<H>  5.9<H>   |  28     |  6.0<HH>    Ca    8.3<L>      21 Aug 2020 05:00  Ca    8.9        20 Aug 2020 06:26  Phos  3.9       19 Aug 2020 20:06  Mg     2.2       20 Aug 2020 06:26  Mg     2.1       19 Aug 2020 20:06    TPro  7.1    /  Alb  4.1    /  TBili  0.4    /  DBili  x      /  AST  21     /  ALT  15     /  AlkPhos  168<H>  21 Aug 2020 05:00  TPro  7.2    /  Alb  4.3    /  TBili  0.6    /  DBili  x      /  AST  18     /  ALT  16     /  AlkPhos  172<H>  20 Aug 2020 06:26      Ca    8.3<L>      21 Aug 2020 05:00  Phos  3.9     08-19  Mg     2.2     08-20    TPro  7.1  /  Alb  4.1  /  TBili  0.4  /  DBili  x   /  AST  21  /  ALT  15  /  AlkPhos  168<H>  08-21      PT/INR - ( 19 Aug 2020 20:06 )   PT: 13.30 sec;   INR: 1.16 ratio         PTT - ( 19 Aug 2020 20:06 )  PTT:33.0 sec                                                                                     LIVER FUNCTIONS - ( 21 Aug 2020 05:00 )  Alb: 4.1 g/dL / Pro: 7.1 g/dL / ALK PHOS: 168 U/L / ALT: 15 U/L / AST: 21 U/L / GGT: x                                                  Culture - Fungal, Body Fluid (collected 19 Aug 2020 16:50)  Source: .Body Fluid Pleural Fluid  Preliminary Report (20 Aug 2020 07:53):    Testing in progress    Culture - Body Fluid with Gram Stain (collected 19 Aug 2020 16:50)  Source: .Body Fluid Pleural Fluid  Gram Stain (20 Aug 2020 03:07):    polymorphonuclear leukocytes seen    No organisms seen    by cytocentrifuge  Preliminary Report (20 Aug 2020 19:03):    No growth                                                                                       ABG - ( 20 Aug 2020 16:58 )  pH, Arterial: 7.17  pH, Blood: x     /  pCO2: 82    /  pO2: 66    / HCO3: 29    / Base Excess: -2.0  /  SaO2: 92            MEDICATIONS  (STANDING):  aspirin  chewable 81 milliGRAM(s) Oral daily  atorvastatin 80 milliGRAM(s) Oral at bedtime  BACItracin   Ointment 1 Application(s) Topical daily  calcium acetate 667 milliGRAM(s) Oral three times a day with meals  cefpodoxime 200 milliGRAM(s) Oral <User Schedule>  chlorhexidine 4% Liquid 1 Application(s) Topical <User Schedule>  dextrose 5%. 1000 milliLiter(s) (50 mL/Hr) IV Continuous <Continuous>  dextrose 50% Injectable 12.5 Gram(s) IV Push once  dextrose 50% Injectable 25 Gram(s) IV Push once  dextrose 50% Injectable 25 Gram(s) IV Push once  heparin   Injectable 5000 Unit(s) SubCutaneous every 8 hours  insulin glargine Injectable (LANTUS) 22 Unit(s) SubCutaneous at bedtime  insulin lispro (HumaLOG) corrective regimen sliding scale   SubCutaneous three times a day before meals  insulin lispro Injectable (HumaLOG) 8 Unit(s) SubCutaneous three times a day before meals  midodrine 10 milliGRAM(s) Oral every 8 hours  multivitamin/minerals 1 Tablet(s) Oral daily  norepinephrine Infusion 0.05 MICROgram(s)/kG/Min (4.37 mL/Hr) IV Continuous <Continuous>  pantoprazole    Tablet 40 milliGRAM(s) Oral before breakfast  polyethylene glycol 3350 17 Gram(s) Oral daily  tamsulosin 0.8 milliGRAM(s) Oral at bedtime    MEDICATIONS  (PRN):  acetaminophen   Tablet .. 650 milliGRAM(s) Oral every 6 hours PRN Mild Pain (1 - 3)  dextrose 40% Gel 15 Gram(s) Oral once PRN Blood Glucose LESS THAN 70 milliGRAM(s)/deciliter  glucagon  Injectable 1 milliGRAM(s) IntraMuscular once PRN Glucose LESS THAN 70 milligrams/deciliter  melatonin 1 milliGRAM(s) Oral at bedtime PRN Insomnia      New X-rays reviewed:                                                                                  ECHO    CXR interpreted by me:

## 2020-08-21 NOTE — PROGRESS NOTE ADULT - ASSESSMENT
Impression:  Chronic right sided effusion SP Chest tube   Acute on chronic hypercapnic  Respiratory failure  HO [place] non compliant with CPAP  HF REF   Probable trapped lung       PLAN:     CNS: No depressants     HEENT: Oral care    PULMONARY:  HOB @ 45 degrees.  Aspiration precautions.  NIV on and off and during sleep.  Chest tube to water seal.  FU with CTS.  VBG.       CARDIOVASCULAR:  Avoid volume overload.  Wean Levophed     GI: GI prophylaxis.  NPO for now.      RENAL:  Follow up lytes.  Correct as needed.  JORDAN renal     INFECTIOUS DISEASE: Follow up cultures    HEMATOLOGICAL:  DVT prophylaxis.    ENDOCRINE:  Follow up FS.  Insulin protocol if needed.    MUSCULOSKELETAL:  Bed rest.      MICU for now     DW daughter

## 2020-08-22 NOTE — PROGRESS NOTE ADULT - SUBJECTIVE AND OBJECTIVE BOX
Patient is a 69y old  Male who presents with a chief complaint of SOB (22 Aug 2020 06:35)        Over Night Events:  Feels and looks better.  On Levophed 0.03.  For HD today         ROS:     All ROS are negative except HPI         PHYSICAL EXAM    ICU Vital Signs Last 24 Hrs  T(C): 36.7 (22 Aug 2020 07:30), Max: 36.9 (21 Aug 2020 16:00)  T(F): 98 (22 Aug 2020 07:30), Max: 98.5 (21 Aug 2020 16:00)  HR: 60 (22 Aug 2020 08:00) (58 - 72)  BP: 104/74 (22 Aug 2020 08:00) (88/38 - 136/61)  BP(mean): 66 (22 Aug 2020 08:00) (55 - 99)  ABP: --  ABP(mean): --  RR: 20 (22 Aug 2020 08:00) (6 - 42)  SpO2: 96% (22 Aug 2020 08:00) (89% - 99%)      CONSTITUTIONAL:  Well nourished.  NAD    ENT:   Airway patent,   Mouth with normal mucosa.   No thrush    EYES:   Pupils equal,   Round and reactive to light.    CARDIAC:   Normal rate,   Regular rhythm.    No edema      Vascular:  Normal systolic impulse  No Carotid bruits    RESPIRATORY:   No wheezing  Bilateral BS  Normal chest expansion  Not tachypneic,  No use of accessory muscles    GASTROINTESTINAL:  Abdomen soft,   Non-tender,   No guarding,   + BS    MUSCULOSKELETAL:   Range of motion is not limited,  No clubbing, cyanosis    NEUROLOGICAL:   Alert and oriented   No motor  deficits.    SKIN:   Skin normal color for race,   Warm and dry   No evidence of rash.    PSYCHIATRIC:   Normal mood and affect.   No apparent risk to self or others.    HEMATOLOGICAL:  No cervical  lymphadenopathy.  no inguinal lymphadenopathy      08-21-20 @ 07:01  -  08-22-20 @ 07:00  --------------------------------------------------------  IN:    IV PiggyBack: 50 mL    norepinephrine Infusion: 148 mL  Total IN: 198 mL    OUT:    Chest Tube: 270 mL  Total OUT: 270 mL    Total NET: -72 mL      08-22-20 @ 07:01  -  08-22-20 @ 08:42  --------------------------------------------------------  IN:    norepinephrine Infusion: 2.6 mL  Total IN: 2.6 mL    OUT:  Total OUT: 0 mL    Total NET: 2.6 mL          LABS:                            9.6    6.70  )-----------( 120      ( 22 Aug 2020 04:30 )             32.2                                               08-22    133<L>  |  91<L>  |  71<HH>  ----------------------------<  95  5.0   |  25  |  8.6<HH>    22 Aug 2020 04:30    133<L>  |  91<L>  |  71<HH>  ----------------------------<  95     5.0     |  25     |  8.6<HH>  21 Aug 2020 05:00    132<L>  |  89<L>  |  56<H>  ----------------------------<  192<H>  5.3<H>   |  25     |  7.2<HH>    Ca    8.1<L>      22 Aug 2020 04:30  Ca    8.3<L>      21 Aug 2020 05:00  Phos  7.5<H>     22 Aug 2020 04:30  Mg     2.1       22 Aug 2020 04:30    TPro  6.0    /  Alb  3.4<L>  /  TBili  0.3    /  DBili  x      /  AST  17     /  ALT  11     /  AlkPhos  134<H>  22 Aug 2020 04:30  TPro  7.1    /  Alb  4.1    /  TBili  0.4    /  DBili  x      /  AST  21     /  ALT  15     /  AlkPhos  168<H>  21 Aug 2020 05:00      Ca    8.1<L>      22 Aug 2020 04:30  Phos  7.5     08-22  Mg     2.1     08-22    TPro  6.0  /  Alb  3.4<L>  /  TBili  0.3  /  DBili  x   /  AST  17  /  ALT  11  /  AlkPhos  134<H>  08-22                                                 CARDIAC MARKERS ( 21 Aug 2020 23:30 )  x     / 0.33 ng/mL / x     / x     / x                                                LIVER FUNCTIONS - ( 22 Aug 2020 04:30 )  Alb: 3.4 g/dL / Pro: 6.0 g/dL / ALK PHOS: 134 U/L / ALT: 11 U/L / AST: 17 U/L / GGT: x                                                  Culture - Fungal, Body Fluid (collected 19 Aug 2020 16:50)  Source: .Body Fluid Pleural Fluid  Preliminary Report (20 Aug 2020 07:53):    Testing in progress    Culture - Body Fluid with Gram Stain (collected 19 Aug 2020 16:50)  Source: .Body Fluid Pleural Fluid  Gram Stain (20 Aug 2020 03:07):    polymorphonuclear leukocytes seen    No organisms seen    by cytocentrifuge  Preliminary Report (20 Aug 2020 19:03):    No growth                                                                                       ABG - ( 20 Aug 2020 16:58 )  pH, Arterial: 7.17  pH, Blood: x     /  pCO2: 82    /  pO2: 66    / HCO3: 29    / Base Excess: -2.0  /  SaO2: 92                  MEDICATIONS  (STANDING):  aspirin  chewable 81 milliGRAM(s) Oral daily  atorvastatin 80 milliGRAM(s) Oral at bedtime  BACItracin   Ointment 1 Application(s) Topical daily  calcium acetate 667 milliGRAM(s) Oral three times a day with meals  chlorhexidine 4% Liquid 1 Application(s) Topical <User Schedule>  dextrose 5%. 1000 milliLiter(s) (50 mL/Hr) IV Continuous <Continuous>  dextrose 50% Injectable 12.5 Gram(s) IV Push once  dextrose 50% Injectable 25 Gram(s) IV Push once  dextrose 50% Injectable 25 Gram(s) IV Push once  heparin   Injectable 5000 Unit(s) SubCutaneous every 12 hours  insulin glargine Injectable (LANTUS) 22 Unit(s) SubCutaneous at bedtime  insulin lispro (HumaLOG) corrective regimen sliding scale   SubCutaneous three times a day before meals  insulin lispro Injectable (HumaLOG) 8 Unit(s) SubCutaneous three times a day before meals  meropenem  IVPB      meropenem  IVPB 500 milliGRAM(s) IV Intermittent every 24 hours  midodrine 10 milliGRAM(s) Oral every 8 hours  multivitamin/minerals 1 Tablet(s) Oral daily  norepinephrine Infusion 0.05 MICROgram(s)/kG/Min (4.37 mL/Hr) IV Continuous <Continuous>  pantoprazole    Tablet 40 milliGRAM(s) Oral before breakfast  polyethylene glycol 3350 17 Gram(s) Oral daily  senna 2 Tablet(s) Oral at bedtime  simethicone 80 milliGRAM(s) Chew four times a day  tamsulosin 0.8 milliGRAM(s) Oral at bedtime    MEDICATIONS  (PRN):  acetaminophen   Tablet .. 650 milliGRAM(s) Oral every 6 hours PRN Mild Pain (1 - 3)  dextrose 40% Gel 15 Gram(s) Oral once PRN Blood Glucose LESS THAN 70 milliGRAM(s)/deciliter  glucagon  Injectable 1 milliGRAM(s) IntraMuscular once PRN Glucose LESS THAN 70 milligrams/deciliter  HYDROmorphone  Injectable 0.5 milliGRAM(s) IV Push every 6 hours PRN Severe Pain (7 - 10)  melatonin 1 milliGRAM(s) Oral at bedtime PRN Insomnia      New X-rays reviewed:                                                                                  ECHO    CXR interpreted by me:  Unchanged

## 2020-08-22 NOTE — PROGRESS NOTE ADULT - SUBJECTIVE AND OBJECTIVE BOX
seen and examined  on venti mask   on levophed       PAST HISTORY  --------------------------------------------------------------------------------  No significant changes to PMH, PSH, FHx, SHx, unless otherwise noted    ALLERGIES & MEDICATIONS  --------------------------------------------------------------------------------  Allergies    No Known Allergies    Intolerances      Standing Inpatient Medications  aspirin  chewable 81 milliGRAM(s) Oral daily  atorvastatin 80 milliGRAM(s) Oral at bedtime  BACItracin   Ointment 1 Application(s) Topical daily  calcium acetate 667 milliGRAM(s) Oral three times a day with meals  chlorhexidine 4% Liquid 1 Application(s) Topical <User Schedule>  dextrose 5%. 1000 milliLiter(s) IV Continuous <Continuous>  dextrose 50% Injectable 12.5 Gram(s) IV Push once  dextrose 50% Injectable 25 Gram(s) IV Push once  dextrose 50% Injectable 25 Gram(s) IV Push once  heparin   Injectable 5000 Unit(s) SubCutaneous every 12 hours  insulin glargine Injectable (LANTUS) 22 Unit(s) SubCutaneous at bedtime  insulin lispro (HumaLOG) corrective regimen sliding scale   SubCutaneous three times a day before meals  insulin lispro Injectable (HumaLOG) 8 Unit(s) SubCutaneous three times a day before meals  meropenem  IVPB      meropenem  IVPB 500 milliGRAM(s) IV Intermittent every 24 hours  midodrine 10 milliGRAM(s) Oral every 8 hours  multivitamin/minerals 1 Tablet(s) Oral daily  norepinephrine Infusion 0.05 MICROgram(s)/kG/Min IV Continuous <Continuous>  pantoprazole    Tablet 40 milliGRAM(s) Oral before breakfast  polyethylene glycol 3350 17 Gram(s) Oral daily  senna 2 Tablet(s) Oral at bedtime  simethicone 80 milliGRAM(s) Chew four times a day  tamsulosin 0.8 milliGRAM(s) Oral at bedtime    PRN Inpatient Medications  acetaminophen   Tablet .. 650 milliGRAM(s) Oral every 6 hours PRN  dextrose 40% Gel 15 Gram(s) Oral once PRN  glucagon  Injectable 1 milliGRAM(s) IntraMuscular once PRN  HYDROmorphone  Injectable 0.5 milliGRAM(s) IV Push every 6 hours PRN  melatonin 1 milliGRAM(s) Oral at bedtime PRN      VITALS/PHYSICAL EXAM  --------------------------------------------------------------------------------  T(C): 36.1 (08-22-20 @ 04:00), Max: 37.6 (08-21-20 @ 08:00)  HR: 60 (08-22-20 @ 06:15) (58 - 72)  BP: 106/45 (08-22-20 @ 06:15) (88/38 - 147/58)  RR: 13 (08-22-20 @ 06:15) (7 - 42)  SpO2: 99% (08-22-20 @ 06:15) (89% - 99%)  Wt(kg): --  Height (cm): 170.2 (08-20-20 @ 18:30)      08-20-20 @ 07:01  -  08-21-20 @ 07:00  --------------------------------------------------------  IN: 471.1 mL / OUT: 190 mL / NET: 281.1 mL    08-21-20 @ 07:01  -  08-22-20 @ 06:36  --------------------------------------------------------  IN: 196 mL / OUT: 270 mL / NET: -74 mL      Physical Exam:  	Gen: on venti mask   	Pulm: ignacia BS  B/L  	CV: S1S2; no rub  	Abd: +distended  	Vascular access:av     LABS/STUDIES  --------------------------------------------------------------------------------              9.6    6.70  >-----------<  120      [08-22-20 @ 04:30]              32.2     133  |  91  |  71  ----------------------------<  95      [08-22-20 @ 04:30]  5.0   |  25  |  8.6        Ca     8.1     [08-22-20 @ 04:30]      Mg     2.1     [08-22-20 @ 04:30]      Phos  7.5     [08-22-20 @ 04:30]    TPro  6.0  /  Alb  3.4  /  TBili  0.3  /  DBili  x   /  AST  17  /  ALT  11  /  AlkPhos  134  [08-22-20 @ 04:30]        Troponin 0.33      [08-21-20 @ 23:30]    Creatinine Trend:  SCr 8.6 [08-22 @ 04:30]  SCr 7.2 [08-21 @ 05:00]  SCr 6.0 [08-20 @ 06:26]  SCr 4.9 [08-19 @ 20:06]  SCr 6.1 [08-19 @ 04:30]        PTH -- (Ca 8.9)      [08-18-20 @ 05:24]   237  PTH -- (Ca 8.7)      [08-17-20 @ 15:04]   203  HbA1c 7.1      [10-13-18 @ 05:46]

## 2020-08-22 NOTE — PROGRESS NOTE ADULT - SUBJECTIVE AND OBJECTIVE BOX
SURGERY PROGRESS NOTE     ADRIANNA GAINES  69y  Male  Hospital day 6    Large R sided pleural effusion s/p pigtail catheter placed by IR on 08/19 with ~2L of fluid drained, pt was put on BiPAP, after, following CXRs with pneumothorax    OVERNIGHT EVENTS: no acute events     T(F): 96.8 (08-22-20 @ 00:00), Max: 99.6 (08-21-20 @ 08:00)  HR: 60 (08-22-20 @ 00:00) (58 - 72)  BP: 95/40 (08-22-20 @ 00:00) (92/42 - 147/58)    RR: 14 (08-22-20 @ 00:00) (5 - 42)  SpO2: 96% (08-22-20 @ 00:00) (89% - 100%)    DIET/FLUIDS: calcium acetate 667 milliGRAM(s) Oral three times a day with meals  dextrose 5%. 1000 milliLiter(s) IV Continuous <Continuous>  multivitamin/minerals 1 Tablet(s) Oral daily       GI proph:  pantoprazole    Tablet 40 milliGRAM(s) Oral before breakfast    AC/ proph: aspirin  chewable 81 milliGRAM(s) Oral daily  heparin   Injectable 5000 Unit(s) SubCutaneous every 12 hours    ABx: meropenem  IVPB      meropenem  IVPB 500 milliGRAM(s) IV Intermittent every 24 hours      LABS  Labs:  CAPILLARY BLOOD GLUCOSE      POCT Blood Glucose.: 71 mg/dL (21 Aug 2020 22:43)  POCT Blood Glucose.: 81 mg/dL (21 Aug 2020 20:52)  POCT Blood Glucose.: 103 mg/dL (21 Aug 2020 16:17)  POCT Blood Glucose.: 208 mg/dL (21 Aug 2020 11:33)  POCT Blood Glucose.: 182 mg/dL (21 Aug 2020 05:42)                          11.2   14.19 )-----------( 190      ( 21 Aug 2020 05:00 )             37.7       Auto Neutrophil %: 84.9 % (08-21-20 @ 05:00)  Auto Immature Granulocyte %: 0.8 % (08-21-20 @ 05:00)    08-21    132<L>  |  89<L>  |  56<H>  ----------------------------<  192<H>  5.3<H>   |  25  |  7.2<HH>      Calcium, Total Serum: 8.3 mg/dL (08-21-20 @ 05:00)      LFTs:             7.1  | 0.4  | 21       ------------------[168     ( 21 Aug 2020 05:00 )  4.1  | x    | 15          Lipase:x      Amylase:x         Blood Gas Venous - Lactate: 1.0 mmoL/L (08-21-20 @ 10:00)  Blood Gas Arterial, Lactate: 0.7 mmoL/L (08-20-20 @ 16:58)  Blood Gas Arterial, Lactate: 0.6 mmoL/L (08-20-20 @ 07:22)  Blood Gas Arterial, Lactate: 0.7 mmoL/L (08-20-20 @ 05:33)    ABG - ( 20 Aug 2020 16:58 )  pH: 7.17  /  pCO2: 82    /  pO2: 66    / HCO3: 29    / Base Excess: -2.0  /  SaO2: 92              ABG - ( 20 Aug 2020 07:22 )  pH: 7.18  /  pCO2: 79    /  pO2: 112   / HCO3: 29    / Base Excess: -0.9  /  SaO2: 98              ABG - ( 20 Aug 2020 05:33 )  pH: 7.14  /  pCO2: 88    /  pO2: 49    / HCO3: 30    / Base Excess: -1.3  /  SaO2: 84                Coags:    CARDIAC MARKERS ( 21 Aug 2020 23:30 )  x     / 0.33 ng/mL / x     / x     / x                  Culture - Fungal, Body Fluid (collected 19 Aug 2020 16:50)  Source: .Body Fluid Pleural Fluid  Preliminary Report (20 Aug 2020 07:53):    Testing in progress    Culture - Body Fluid with Gram Stain (collected 19 Aug 2020 16:50)  Source: .Body Fluid Pleural Fluid  Gram Stain (20 Aug 2020 03:07):    polymorphonuclear leukocytes seen    No organisms seen    by cytocentrifuge  Preliminary Report (20 Aug 2020 19:03):    No growth      RADIOLOGY & ADDITIONAL TESTS:    < from: Xray Chest 1 View- PORTABLE-Routine (08.21.20 @ 06:25) >    Impression:    No significant interval change    < end of copied text >      A/P: 69 year old male with right side pneumothorax s/p pigtail catheter placement on 8/19 for effusion.  -Repeat CXR in AM

## 2020-08-22 NOTE — PROGRESS NOTE ADULT - ASSESSMENT
69M, PMH of ESRD on HD MWF, COPD, HFrEF, admitted for SOB.      #ESRD on HD  -hd today, standard bath , uf 2 liters as tolerated   - IP noted, on phoslo keep for now   - hgb noted, no YASMINE   - BP noted , on midodrine and levophed   #SOB - sp CT of chest with loculated effusion for thoracentesis/ s/p pig tail / sp Pneumothorax/ CTS and pulm on case   # repeat troponin levels   # will follow

## 2020-08-22 NOTE — PROGRESS NOTE ADULT - ASSESSMENT
Impression:  Chronic right sided effusion SP Chest tube   Acute on chronic hypercapnic  Respiratory failure  HO [place] non compliant with CPAP  HF REF   Probable trapped lung       PLAN:     CNS: No depressants     HEENT: Oral care    PULMONARY:  HOB @ 45 degrees.  Aspiration precautions.  NIV during sleep adn PRN during the day.  Chest tube to water seal.         CARDIOVASCULAR:  Avoid volume overload.  Wean Levophed     GI: GI prophylaxis.  Feeding     RENAL:  Follow up lytes.  Correct as needed.  DW renal     INFECTIOUS DISEASE: Follow up cultures.  Continue ABX.      HEMATOLOGICAL:  DVT prophylaxis.    ENDOCRINE:  Follow up FS.  Insulin protocol if needed.    MUSCULOSKELETAL:  Bed rest.      MICU for now     DW daughter

## 2020-08-22 NOTE — PHYSICAL THERAPY INITIAL EVALUATION ADULT - PERTINENT HX OF CURRENT PROBLEM, REHAB EVAL
h
Large R sided pleural effusion s/p pigtail catheter placed by IR on 08/19 with ~2L of fluid drained, pt was put on BiPAP, after, following CXRs with pneumothorax

## 2020-08-23 NOTE — PROGRESS NOTE ADULT - ASSESSMENT
70 y/o M with PMH of ESRD on HD MWF, CAD s/p pci with 7 stents and AICD placement in 2018, Heart failure with reduced EF, DLD, HTN, COPD? on 2L home O2, DM II (well controlled with HbA1C in the 6-7 range), Afib on Eliquis (diagnosed in february) and BPH presents to the ED for SOB that has been worsening over the last 2 weeks.    #R sided pleural effusion  #Acute on chronic hypercapnic respiratory failure   #Trapped Lung  -Presented to ED c/o Dyspnea  -Missed one HD session s/p emergent HD in the ED for volume overload w/ 4 liters removed  -CXR 8/17/2020: Worsening right-sided pleural effusion with increased left lower lung airspace opacities  -CT 8/18/2020: Large right pleural effusion with near complete collapse of the right lung. Large component of this effusion is subpulmonic. Partially loculated anteriorly at the apex.  -s/p pigtail catheter placement by IR on 08/19 > Exudative. Suspicion of underlying mass/malignancy.  -pt was tachypneic, hypotensive 8/20/2020 s/p ~2L fluid drainage during HD and in acute distress so HD had to be aborted, ABGs x2 showed CO2 retention, placed on BiPAP  -Upgraded to the ICU 8/20/2020 due to hypercapnic resp failure and HoTN with concern for worsening PTX. Started on midodrine and Levophed  -CT A/P 8/21/2020: Partially imaged known right pneumothorax, with marked decrease in the loculated right pleural effusion, with numerous septa noted. Right lower lobe consolidation. Correlate for right lower lobe pneumonia with empyema, in the appropriate clinical setting.  Plan  - No PTX > likely trapped lung due to chronic effusion as per CT Surg  - Starting Merrem due to concern for infection due to elevated WBC and latest CT result  - c/w BiPAP on/off and at night  - Weaned off levophed  - c/w midodrine   - f/u cytology and flow  - f/u VBG  - f/u CT Surg, patient is not a candidate for VATS due to his current medical condition, will continue to follow his CXR    #Abdominal distension  #Constipation  #Aerophagia leading to belching  #Vomiting  -patient hasn't had BM in 3 days, but is able to pass gas  -Vomiting infrequently but has been dry heaving  -X-ray KUB 8/20/2020: Nonspecific, nonobstructive bowel gas pattern  -CT A/P 8/21/20: no bowel obstruction  - c/w Bowel regimen  - Zofran PRN  - NPO except meds    #Back pain  -Severe pain per patient  - Dilaudid 0.5mg q6 PRN     #ESRD, dialysis dependent   -HD MWF, nephro following  -pt anuric, diuretics discontinued   -s/p HD in the ED on admission, subsequently dialyzed on 8/18 and 8/19, 8/22  -Patient was unable to be dialyzed 8/20/2020 due to HoTN, received icomply   - Monitor lytes    #HFrEF  -TTE 8/19/2020: EF 35-40%, severe Pulm HTN\par-Anuric  -s/p HD in the ED on admission, 8/18, 8/19, and 8/22  - Holding home metoprolol, Imdur, Losartan  - Euvolemic on exam and imaging    #CAD s/p PCI with 7 stents  #Elevated Troponins  -trop elevated on admission  -EKG: no ST changes noted  -Patient not c/o chest pain  - likely from ESRD  -c/w aspirin  -holding metoprolol due to HoTN    #Paroxysmal Afib  -Diagnosed in February  -on Eliquis at home, held on admission due to need for pigtail placement  -Currently normal rate and regular rhythm  - Holding metoprolol due to HoTN  - keep holding Eliquis     #DM II- uncontrolled  -A1c 7.8  -c/w basal/bolus insulin regimen    #HTN: holding home losartan    #DLD: c/w statin    #BPH: c/w Flomax    DVT ppx: Heparin subq, home Eliquis held  GI ppx: Protonix  Diet: Dysphagia 1   Dispo: Downgrade to stepdown unit   DNR/DNI

## 2020-08-23 NOTE — PROGRESS NOTE ADULT - ASSESSMENT
Impression:  Chronic right sided effusion SP Chest tube   Acute on chronic hypercapnic  Respiratory failure  HO [place] non compliant with CPAP  HF REF   Probable trapped lung SP pig tail       PLAN:     CNS: No depressants     HEENT: Oral care    PULMONARY:  HOB @ 45 degrees.  Aspiration precautions.  NIV during sleep adn PRN during the day.  Chest tube to water seal.         CARDIOVASCULAR:  Avoid volume overload.      GI: GI prophylaxis.  Feeding     RENAL:  Follow up lytes.  Correct as needed.  DW renal     INFECTIOUS DISEASE: Follow up cultures.  Continue ABX.  Possible DC if cultures negative     HEMATOLOGICAL:  DVT prophylaxis.    ENDOCRINE:  Follow up FS.  Insulin protocol if needed.    MUSCULOSKELETAL:  Bed rest.      DC TLC     Step down     DW daughter

## 2020-08-23 NOTE — PROGRESS NOTE ADULT - SUBJECTIVE AND OBJECTIVE BOX
Patient is a 69y old  Male who presents with a chief complaint of SOB (23 Aug 2020 08:26)        Over Night Events:  Off pressors.  Tolerated NIV during sleep on 3 liters O2.  Chest tube drainage 80 cc.  SP HD yesterday         ROS:     All ROS are negative except HPI         PHYSICAL EXAM    ICU Vital Signs Last 24 Hrs  T(C): 36.7 (23 Aug 2020 08:00), Max: 36.8 (22 Aug 2020 12:00)  T(F): 98 (23 Aug 2020 08:00), Max: 98.2 (22 Aug 2020 12:00)  HR: 70 (23 Aug 2020 08:30) (58 - 70)  BP: 80/40 (23 Aug 2020 08:30) (80/40 - 119/54)  BP(mean): 57 (23 Aug 2020 08:30) (55 - 86)  ABP: --  ABP(mean): --  RR: 18 (23 Aug 2020 08:30) (6 - 33)  SpO2: 100% (23 Aug 2020 08:30) (80% - 100%)      CONSTITUTIONAL:  Well nourished.  NAD    ENT:   Airway patent,   Mouth with normal mucosa.   No thrush    EYES:   Pupils equal,   Round and reactive to light.    CARDIAC:   Normal rate,   Regular rhythm.    No edema      Vascular:  Normal systolic impulse  No Carotid bruits    RESPIRATORY:   No wheezing  Bilateral BS  Normal chest expansion  Not tachypneic,  No use of accessory muscles    GASTROINTESTINAL:  Abdomen soft,   Non-tender,   No guarding,   + BS    MUSCULOSKELETAL:   Range of motion is not limited,  No clubbing, cyanosis    NEUROLOGICAL:   Alert and oriented   No motor  deficits.    SKIN:   Skin normal color for race,   Warm and dry and intact.   No evidence of rash.    PSYCHIATRIC:   Normal mood and affect.   No apparent risk to self or others.    HEMATOLOGICAL:  No cervical  lymphadenopathy.  no inguinal lymphadenopathy      08-22-20 @ 07:01  -  08-23-20 @ 07:00  --------------------------------------------------------  IN:    norepinephrine Infusion: 27.8 mL  Total IN: 27.8 mL    OUT:    Chest Tube: 230 mL    Other: 2500 mL  Total OUT: 2730 mL    Total NET: -2702.2 mL          LABS:                            9.7    5.82  )-----------( 119      ( 23 Aug 2020 04:30 )             32.4                                               08-23    137  |  95<L>  |  40<H>  ----------------------------<  102<H>  4.5   |  30  |  5.9<HH>    23 Aug 2020 04:30    137    |  95<L>  |  40<H>  ----------------------------<  102<H>  4.5     |  30     |  5.9<HH>  22 Aug 2020 04:30    133<L>  |  91<L>  |  71<HH>  ----------------------------<  95     5.0     |  25     |  8.6<HH>    Ca    8.2<L>      23 Aug 2020 04:30  Ca    8.1<L>      22 Aug 2020 04:30  Phos  5.4<H>     23 Aug 2020 04:30  Phos  7.5<H>     22 Aug 2020 04:30  Mg     2.1       23 Aug 2020 04:30  Mg     2.1       22 Aug 2020 04:30    TPro  6.4    /  Alb  3.7    /  TBili  0.3    /  DBili  x      /  AST  17     /  ALT  14     /  AlkPhos  144<H>  23 Aug 2020 04:30  TPro  6.0    /  Alb  3.4<L>  /  TBili  0.3    /  DBili  x      /  AST  17     /  ALT  11     /  AlkPhos  134<H>  22 Aug 2020 04:30      Ca    8.2<L>      23 Aug 2020 04:30  Phos  5.4     08-23  Mg     2.1     08-23    TPro  6.4  /  Alb  3.7  /  TBili  0.3  /  DBili  x   /  AST  17  /  ALT  14  /  AlkPhos  144<H>  08-23                                                 CARDIAC MARKERS ( 21 Aug 2020 23:30 )  x     / 0.33 ng/mL / x     / x     / x                                                LIVER FUNCTIONS - ( 23 Aug 2020 04:30 )  Alb: 3.7 g/dL / Pro: 6.4 g/dL / ALK PHOS: 144 U/L / ALT: 14 U/L / AST: 17 U/L / GGT: x                                                                                                                                       MEDICATIONS  (STANDING):  aspirin  chewable 81 milliGRAM(s) Oral daily  atorvastatin 80 milliGRAM(s) Oral at bedtime  BACItracin   Ointment 1 Application(s) Topical daily  calcium acetate 667 milliGRAM(s) Oral three times a day with meals  chlorhexidine 4% Liquid 1 Application(s) Topical <User Schedule>  dextrose 5%. 1000 milliLiter(s) (50 mL/Hr) IV Continuous <Continuous>  dextrose 50% Injectable 12.5 Gram(s) IV Push once  dextrose 50% Injectable 25 Gram(s) IV Push once  dextrose 50% Injectable 25 Gram(s) IV Push once  heparin   Injectable 5000 Unit(s) SubCutaneous every 12 hours  insulin glargine Injectable (LANTUS) 22 Unit(s) SubCutaneous at bedtime  insulin lispro (HumaLOG) corrective regimen sliding scale   SubCutaneous three times a day before meals  insulin lispro Injectable (HumaLOG) 8 Unit(s) SubCutaneous three times a day before meals  meropenem  IVPB      meropenem  IVPB 500 milliGRAM(s) IV Intermittent every 24 hours  midodrine 10 milliGRAM(s) Oral every 8 hours  multivitamin/minerals 1 Tablet(s) Oral daily  norepinephrine Infusion 0.05 MICROgram(s)/kG/Min (4.37 mL/Hr) IV Continuous <Continuous>  pantoprazole    Tablet 40 milliGRAM(s) Oral before breakfast  polyethylene glycol 3350 17 Gram(s) Oral daily  senna 2 Tablet(s) Oral at bedtime  simethicone 80 milliGRAM(s) Chew four times a day  tamsulosin 0.8 milliGRAM(s) Oral at bedtime    MEDICATIONS  (PRN):  acetaminophen   Tablet .. 650 milliGRAM(s) Oral every 6 hours PRN Mild Pain (1 - 3)  dextrose 40% Gel 15 Gram(s) Oral once PRN Blood Glucose LESS THAN 70 milliGRAM(s)/deciliter  glucagon  Injectable 1 milliGRAM(s) IntraMuscular once PRN Glucose LESS THAN 70 milligrams/deciliter  melatonin 1 milliGRAM(s) Oral at bedtime PRN Insomnia  morphine  - Injectable 2 milliGRAM(s) IV Push every 4 hours PRN Moderate Pain (4 - 6)      New X-rays reviewed:                                                                                  ECHO    CXR interpreted by me:  Unchanged

## 2020-08-23 NOTE — PROGRESS NOTE ADULT - ASSESSMENT
69M, PMH of ESRD on HD MWF, COPD, HFrEF, admitted for SOB.      #ESRD on HD  -hd today, standard bath , uf 2 liters as tolerated   - IP noted, on phoslo keep for now   - hgb noted, no YASMINE   - BP noted , on midodrine and levophed   #SOB - sp CT of chest with loculated effusion for thoracentesis/ s/p pig tail / sp Pneumothorax/ CTS and pulm on case   # repeat troponin levels   # will follow 69M, PMH of ESRD on HD MWF, COPD, HFrEF, admitted for SOB.      #ESRD on HD  - sp HD yesterday / no need for HD today / will reassess in AM   - IP noted, on phoslo keep for now   - hgb noted, no YASMINE  yet   - BP noted , on midodrine and levophed   #SOB - sp CT of chest with loculated effusion for thoracentesis/ s/p pig tail / sp Pneumothorax/ CTS and pulm on case on no antibx   # repeat troponin levels   # will follow

## 2020-08-23 NOTE — CHART NOTE - NSCHARTNOTEFT_GEN_A_CORE
MICU Transfer Note    Transfer from: MICU   HPI:  68 y/o M with PMH of ESRD on HD MWF, CAD s/p pci with 7 stents and AICD placement in 2018, Heart failure with reduced EF (26% in 2018), DLD, HTN, COPD? on 2L home O2, DM II (well controlled with HbA1C in the 6-7 range), and BPH presents to the ED for SOB that has been worsening over the last 2 weeks. As per patient's daughter patient has had multiple friends pass away in the last few months and since then has complained that he needs home O2 (Daughter thinks this is anxiety driven). Patient's daughter reports that patient saturates in the low 90s at home without O2 and saturates at 100% on 2L. Two week ago he developed hypotension while at dialysis. He refused to go the hospital at that time. Since then he reports that his breathing has gotten worse and he requires more O2. A few days ago he requested that his daughter raise his home O2 to 3L. Before 2 week sago he was able to walk around the house without O2 but over the last two weeks he has required O2 to move around his house. He reports that his SOB is worse with walking and better when he sits down. He reports a dry cough at baseline but denies any chest pain, sore throat, wheezing, weight changes, fatigue, dizziness, or changes in urination. No travel history and no one at home is sick. At baseline patient walks with a cane. He lives with his daughter and she takes care of his medication for him. He sleeps on a recliner due to the HF and wears compression stockings. He is oliguric and urinates "a few drops" approximately 2 times a week.    In the ED vitals were stable and patient is breathing well on 3L NC. Labs were significant for elevated potassium and troponins likely secondary to ESRD. Chest xray reveals worsening right-sided pleural effusion with increased left lower lung airspace opacities. Nephrology was consulted and patient was dialyzed in the ED (17 Aug 2020 14:27)    MICU COURSE:  Patient was upgraded from floors to MICU on 8/20 due to hypercapnic respiratory failure and hypotension not responsive to fluids. Patient was placed on Levophed and Midodrine, was seen by CT surgery to confirm placement of pigtail, without interventions. Patient received HD on 8/22, has been weaned to low dose Levophed and currently stable for downgrade to step down unit.     ASSESSMENT & PLAN:   68 y/o M with PMH of ESRD on HD MWF, CAD s/p pci with 7 stents and AICD placement in 2018, Heart failure with reduced EF, DLD, HTN, COPD? on 2L home O2, DM II (well controlled with HbA1C in the 6-7 range), Afib on Eliquis (diagnosed in february) and BPH presents to the ED for SOB that has been worsening over the last 2 weeks.    #R sided pleural effusion  #Acute on chronic hypercapnic respiratory failure   #Trapped Lung  -Presented to ED c/o Dyspnea  -Missed one HD session s/p emergent HD in the ED for volume overload w/ 4 liters removed  -CXR 8/17/2020: Worsening right-sided pleural effusion with increased left lower lung airspace opacities  -CT 8/18/2020: Large right pleural effusion with near complete collapse of the right lung. Large component of this effusion is subpulmonic. Partially loculated anteriorly at the apex.  -s/p pigtail catheter placement by IR on 08/19 > Exudative. Suspicion of underlying mass/malignancy.  -pt was tachypneic, hypotensive 8/20/2020 s/p ~2L fluid drainage during HD and in acute distress so HD had to be aborted, ABGs x2 showed CO2 retention, placed on BiPAP  -Upgraded to the ICU 8/20/2020 due to hypercapnic resp failure and HoTN with concern for worsening PTX. Started on midodrine and Levophed  -CT A/P 8/21/2020: Partially imaged known right pneumothorax, with marked decrease in the loculated right pleural effusion, with numerous septa noted. Right lower lobe consolidation. Correlate for right lower lobe pneumonia with empyema, in the appropriate clinical setting.  Plan  - No PTX > likely trapped lung due to chronic effusion as per CT Surg  - Starting Merrem due to concern for infection due to elevated WBC and latest CT result  - c/w BiPAP on/off and at night  - Weaned off levophed  - c/w midodrine   - f/u cytology and flow  - f/u VBG  - f/u CT Surg, patient is not a candidate for VATS due to his current medical condition, will continue to follow his CXR    #Abdominal distension  #Constipation  #Aerophagia leading to belching  #Vomiting  -patient hasn't had BM in 3 days, but is able to pass gas  -Vomiting infrequently but has been dry heaving  -X-ray KUB 8/20/2020: Nonspecific, nonobstructive bowel gas pattern  -CT A/P 8/21/20: no bowel obstruction  - bowel regimen  - Zofran PRN  - NPO except meds    #Back pain  -Severe pain per patient  -Dilaudid 0.5mg q6 PRN     #ESRD, dialysis dependent   -HD MWF, nephro following  -pt anuric, diuretics discontinued   -s/p HD in the ED on admission, subsequently dialyzed on 8/18 and 8/19, 8/22  -Patient was unable to be dialyzed 8/20/2020 due to HoTN, received [facility]   - Monitor lyTrubates    #HFrEF  -TTE 8/19/2020: EF 35-40%, severe Pulm HTN  Anuric  -s/p HD in the ED on admission, 8/18, 8/19, and 8/22  - Holding home metoprolol, Imdur, Losartan  - Euvolemic on exam and imaging    #CAD s/p PCI with 7 stents  #Elevated Troponins  -trop elevated on admission  -EKG: no ST changes noted  -Patient not c/o chest pain  - likely from ESRD  -c/w aspirin  -holding metoprolol due to HoTN    #Paroxysmal Afib  -Diagnosed in February  -on Eliquis at home, held on admission due to need for pigtail placement  -Currently normal rate and regular rhythm  - Holding metoprolol due to HoTN  - keep holding Eliquis     #DM II- uncontrolled  -A1c 7.8  -c/w basal/bolus insulin regimen    #HTN: holding home losartan    #DLD: c/w statin    #BPH: c/w Flomax    DVT ppx: Heparin subq, home Eliquis held  GI ppx: Protonix  Diet: Dysphagia 1   Dispo: Downgrade to stepdown unit   DNR/DNI      For Follow-Up:  c/w midodrine, f/u cytology and flow, f/u CT surgery, f/u nephro         Vital Signs Last 24 Hrs  T(C): 36.2 (23 Aug 2020 12:16), Max: 36.7 (22 Aug 2020 20:00)  T(F): 97.2 (23 Aug 2020 12:16), Max: 98.1 (22 Aug 2020 20:00)  HR: 63 (23 Aug 2020 12:16) (58 - 70)  BP: 91/53 (23 Aug 2020 12:16) (80/40 - 110/53)  BP(mean): 66 (23 Aug 2020 09:00) (57 - 75)  RR: 18 (23 Aug 2020 12:16) (6 - 33)  SpO2: 99% (23 Aug 2020 12:16) (94% - 100%)  I&O's Summary    22 Aug 2020 07:01  -  23 Aug 2020 07:00  --------------------------------------------------------  IN: 27.8 mL / OUT: 2730 mL / NET: -2702.2 mL          MEDICATIONS  (STANDING):  aspirin  chewable 81 milliGRAM(s) Oral daily  atorvastatin 80 milliGRAM(s) Oral at bedtime  BACItracin   Ointment 1 Application(s) Topical daily  calcium acetate 667 milliGRAM(s) Oral three times a day with meals  chlorhexidine 4% Liquid 1 Application(s) Topical <User Schedule>  dextrose 5%. 1000 milliLiter(s) (50 mL/Hr) IV Continuous <Continuous>  dextrose 50% Injectable 12.5 Gram(s) IV Push once  dextrose 50% Injectable 25 Gram(s) IV Push once  dextrose 50% Injectable 25 Gram(s) IV Push once  heparin   Injectable 5000 Unit(s) SubCutaneous every 12 hours  insulin glargine Injectable (LANTUS) 22 Unit(s) SubCutaneous at bedtime  insulin lispro (HumaLOG) corrective regimen sliding scale   SubCutaneous three times a day before meals  insulin lispro Injectable (HumaLOG) 8 Unit(s) SubCutaneous three times a day before meals  meropenem  IVPB      meropenem  IVPB 500 milliGRAM(s) IV Intermittent every 24 hours  midodrine 10 milliGRAM(s) Oral every 8 hours  multivitamin/minerals 1 Tablet(s) Oral daily  norepinephrine Infusion 0.05 MICROgram(s)/kG/Min (4.37 mL/Hr) IV Continuous <Continuous>  pantoprazole    Tablet 40 milliGRAM(s) Oral before breakfast  polyethylene glycol 3350 17 Gram(s) Oral daily  senna 2 Tablet(s) Oral at bedtime  simethicone 80 milliGRAM(s) Chew four times a day  tamsulosin 0.8 milliGRAM(s) Oral at bedtime    MEDICATIONS  (PRN):  acetaminophen   Tablet .. 650 milliGRAM(s) Oral every 6 hours PRN Mild Pain (1 - 3)  dextrose 40% Gel 15 Gram(s) Oral once PRN Blood Glucose LESS THAN 70 milliGRAM(s)/deciliter  glucagon  Injectable 1 milliGRAM(s) IntraMuscular once PRN Glucose LESS THAN 70 milligrams/deciliter  melatonin 1 milliGRAM(s) Oral at bedtime PRN Insomnia  traMADol 25 milliGRAM(s) Oral every 6 hours PRN Moderate Pain (4 - 6)        LABS                                            9.7                   Neurophils% (auto):   85.1   (08-23 @ 04:30):    5.82 )-----------(119          Lymphocytes% (auto):  6.0                                           32.4                   Eosinphils% (auto):   1.7      Manual%: Neutrophils x    ; Lymphocytes x    ; Eosinophils x    ; Bands%: x    ; Blasts x                                    137    |  95     |  40                  Calcium: 8.2   / iCa: x      (08-23 @ 04:30)    ----------------------------<  102       Magnesium: 2.1                              4.5     |  30     |  5.9              Phosphorous: 5.4      TPro  6.4    /  Alb  3.7    /  TBili  0.3    /  DBili  x      /  AST  17     /  ALT  14     /  AlkPhos  144    23 Aug 2020 04:30

## 2020-08-23 NOTE — OCCUPATIONAL THERAPY INITIAL EVALUATION ADULT - RANGE OF MOTION EXAMINATION, UPPER EXTREMITY
Left UE Active ROM was WNL (within normal limits)/RUE shoulder flexion limited to 90* 2* to chest tube and R central line as well as pt c/o some pain with ROM @chest tube site, elbow/wrist/digits WFL

## 2020-08-23 NOTE — PROGRESS NOTE ADULT - ASSESSMENT
A/P: 69 year old male with right side pneumothorax s/p pigtail catheter placement on 8/19 for effusion.  -Repeat CXR in AM  - no acute surgical intervention at this time

## 2020-08-23 NOTE — PROGRESS NOTE ADULT - SUBJECTIVE AND OBJECTIVE BOX
SURGERY PROGRESS NOTE     ADRIANNA GAINES  69y  Male  Hospital day 7    Large R sided pleural effusion s/p pigtail catheter placed by IR on 08/19 with ~2L of fluid drained, pt was put on BiPAP, after, following CXRs with pneumothorax    OVERNIGHT EVENTS: no acute events     T(F): 96.8 (08-22-20 @ 00:00), Max: 99.6 (08-21-20 @ 08:00)  HR: 60 (08-22-20 @ 00:00) (58 - 72)  BP: 95/40 (08-22-20 @ 00:00) (92/42 - 147/58)    RR: 14 (08-22-20 @ 00:00) (5 - 42)  SpO2: 96% (08-22-20 @ 00:00) (89% - 100%)    DIET/FLUIDS: calcium acetate 667 milliGRAM(s) Oral three times a day with meals  dextrose 5%. 1000 milliLiter(s) IV Continuous <Continuous>  multivitamin/minerals 1 Tablet(s) Oral daily       GI proph:  pantoprazole    Tablet 40 milliGRAM(s) Oral before breakfast    AC/ proph: aspirin  chewable 81 milliGRAM(s) Oral daily  heparin   Injectable 5000 Unit(s) SubCutaneous every 12 hours    ABx: meropenem  IVPB      meropenem  IVPB 500 milliGRAM(s) IV Intermittent every 24 hours      LABS  Labs:  CAPILLARY BLOOD GLUCOSE      POCT Blood Glucose.: 71 mg/dL (21 Aug 2020 22:43)  POCT Blood Glucose.: 81 mg/dL (21 Aug 2020 20:52)  POCT Blood Glucose.: 103 mg/dL (21 Aug 2020 16:17)  POCT Blood Glucose.: 208 mg/dL (21 Aug 2020 11:33)  POCT Blood Glucose.: 182 mg/dL (21 Aug 2020 05:42)                          11.2   14.19 )-----------( 190      ( 21 Aug 2020 05:00 )             37.7       Auto Neutrophil %: 84.9 % (08-21-20 @ 05:00)  Auto Immature Granulocyte %: 0.8 % (08-21-20 @ 05:00)    08-21    132<L>  |  89<L>  |  56<H>  ----------------------------<  192<H>  5.3<H>   |  25  |  7.2<HH>      Calcium, Total Serum: 8.3 mg/dL (08-21-20 @ 05:00)      LFTs:             7.1  | 0.4  | 21       ------------------[168     ( 21 Aug 2020 05:00 )  4.1  | x    | 15          Lipase:x      Amylase:x         Blood Gas Venous - Lactate: 1.0 mmoL/L (08-21-20 @ 10:00)  Blood Gas Arterial, Lactate: 0.7 mmoL/L (08-20-20 @ 16:58)  Blood Gas Arterial, Lactate: 0.6 mmoL/L (08-20-20 @ 07:22)  Blood Gas Arterial, Lactate: 0.7 mmoL/L (08-20-20 @ 05:33)    ABG - ( 20 Aug 2020 16:58 )  pH: 7.17  /  pCO2: 82    /  pO2: 66    / HCO3: 29    / Base Excess: -2.0  /  SaO2: 92              ABG - ( 20 Aug 2020 07:22 )  pH: 7.18  /  pCO2: 79    /  pO2: 112   / HCO3: 29    / Base Excess: -0.9  /  SaO2: 98              ABG - ( 20 Aug 2020 05:33 )  pH: 7.14  /  pCO2: 88    /  pO2: 49    / HCO3: 30    / Base Excess: -1.3  /  SaO2: 84                Coags:    CARDIAC MARKERS ( 21 Aug 2020 23:30 )  x     / 0.33 ng/mL / x     / x     / x                  Culture - Fungal, Body Fluid (collected 19 Aug 2020 16:50)  Source: .Body Fluid Pleural Fluid  Preliminary Report (20 Aug 2020 07:53):    Testing in progress    Culture - Body Fluid with Gram Stain (collected 19 Aug 2020 16:50)  Source: .Body Fluid Pleural Fluid  Gram Stain (20 Aug 2020 03:07):    polymorphonuclear leukocytes seen    No organisms seen    by cytocentrifuge  Preliminary Report (20 Aug 2020 19:03):    No growth      RADIOLOGY & ADDITIONAL TESTS:    < from: Xray Chest 1 View- PORTABLE-Routine (08.21.20 @ 06:25) >    Impression:    No significant interval change    < end of copied text >

## 2020-08-23 NOTE — PROGRESS NOTE ADULT - SUBJECTIVE AND OBJECTIVE BOX
SUBJECTIVE:    Patient is a 69y old Male who presents with a chief complaint of SOB (23 Aug 2020 09:12)    Currently admitted to medicine with the primary diagnosis of SOB (shortness of breath)     Today is hospital day 6d. This morning he is sitting on chair. No acute events overnight.       PAST MEDICAL & SURGICAL HISTORY  Heart failure with reduced ejection fraction  CAD (coronary artery disease): s/p PCI and AICD placement  BPH (benign prostatic hyperplasia)  Type 2 diabetes mellitus  End stage renal disease  Dyslipidemia  Hypertension  No significant past surgical history    SOCIAL HISTORY:  Negative for smoking/alcohol/drug use.     ALLERGIES:  No Known Allergies    MEDICATIONS:  STANDING MEDICATIONS  aspirin  chewable 81 milliGRAM(s) Oral daily  atorvastatin 80 milliGRAM(s) Oral at bedtime  BACItracin   Ointment 1 Application(s) Topical daily  calcium acetate 667 milliGRAM(s) Oral three times a day with meals  chlorhexidine 4% Liquid 1 Application(s) Topical <User Schedule>  dextrose 5%. 1000 milliLiter(s) IV Continuous <Continuous>  dextrose 50% Injectable 12.5 Gram(s) IV Push once  dextrose 50% Injectable 25 Gram(s) IV Push once  dextrose 50% Injectable 25 Gram(s) IV Push once  heparin   Injectable 5000 Unit(s) SubCutaneous every 12 hours  insulin glargine Injectable (LANTUS) 22 Unit(s) SubCutaneous at bedtime  insulin lispro (HumaLOG) corrective regimen sliding scale   SubCutaneous three times a day before meals  insulin lispro Injectable (HumaLOG) 8 Unit(s) SubCutaneous three times a day before meals  meropenem  IVPB      meropenem  IVPB 500 milliGRAM(s) IV Intermittent every 24 hours  midodrine 10 milliGRAM(s) Oral every 8 hours  multivitamin/minerals 1 Tablet(s) Oral daily  norepinephrine Infusion 0.05 MICROgram(s)/kG/Min IV Continuous <Continuous>  pantoprazole    Tablet 40 milliGRAM(s) Oral before breakfast  polyethylene glycol 3350 17 Gram(s) Oral daily  senna 2 Tablet(s) Oral at bedtime  simethicone 80 milliGRAM(s) Chew four times a day  tamsulosin 0.8 milliGRAM(s) Oral at bedtime    PRN MEDICATIONS  acetaminophen   Tablet .. 650 milliGRAM(s) Oral every 6 hours PRN  dextrose 40% Gel 15 Gram(s) Oral once PRN  glucagon  Injectable 1 milliGRAM(s) IntraMuscular once PRN  melatonin 1 milliGRAM(s) Oral at bedtime PRN  traMADol 25 milliGRAM(s) Oral every 6 hours PRN    VITALS:   T(F): 97.2  HR: 63  BP: 91/53  RR: 18  SpO2: 99%    PHYSICAL EXAM:  GEN: NAD, sitting in chair  HEENT: Normocephalic, atraumatic, EOMI  LUNGS: CTAB  HEART: S1/S2 present.   ABD: Soft, distended. Bowel sounds present.  EXT: 2+ peripheral pulses.   NEURO: non focal       LABS:                        9.7    5.82  )-----------( 119      ( 23 Aug 2020 04:30 )             32.4     08-23    137  |  95<L>  |  40<H>  ----------------------------<  102<H>  4.5   |  30  |  5.9<HH>    Ca    8.2<L>      23 Aug 2020 04:30  Phos  5.4     08-23  Mg     2.1     08-23    TPro  6.4  /  Alb  3.7  /  TBili  0.3  /  DBili  x   /  AST  17  /  ALT  14  /  AlkPhos  144<H>  08-23    CARDIAC MARKERS ( 21 Aug 2020 23:30 )  x     / 0.33 ng/mL / x     / x     / x          RADIOLOGY:  < from: Xray Chest 1 View- PORTABLE-Routine (08.23.20 @ 06:22) >  EXAM:  XR CHEST PORTABLE ROUTINE 1V            PROCEDURE DATE:  08/23/2020        INTERPRETATION:  Clinical History/Reason For Exam: SOB    Comparison : Chest radiograph 8/22/2020.    Technique: XR CHEST    Findings:    Support devices: Right chest 2 chamber AICD. Right IJ central venous catheter stable position. Stable right basilar chest tube.    Cardiac/mediastinum/hilum: Stable cardiomegaly.    Lung parenchyma/Pleura: Stable bilateral opacities and right basilar pneumothorax.    Skeleton/soft tissues: Unchanged    Impression:    Stable bilateral opacities and right basilar pneumothorax.    Support devices as described.    CHRISTOPHER KEVIN M.D., ATTENDING RADIOLOGIST  This document has been electronically signed. Aug 23 2020  9:34AM    < end of copied text >

## 2020-08-23 NOTE — OCCUPATIONAL THERAPY INITIAL EVALUATION ADULT - EDEMA, REHAB EVAL
mild R UE edema noted especially distally in hand, pt repositioned with RUE elevated and RN/dtr educated on positioning

## 2020-08-23 NOTE — OCCUPATIONAL THERAPY INITIAL EVALUATION ADULT - PLANNED THERAPY INTERVENTIONS, OT EVAL
ADL retraining/motor coordination training/transfer training/balance training/cognitive, visual perceptual/fine motor coordination training/neuromuscular re-education/ROM/strengthening/stretching/bed mobility training

## 2020-08-23 NOTE — PROGRESS NOTE ADULT - SUBJECTIVE AND OBJECTIVE BOX
Nephrology progress note    THIS IS AN INCOMPLETE NOTE . FULL NOTE TO FOLLOW SHORTLY    Patient is seen and examined, events over the last 24 h noted .    Allergies:  No Known Allergies    Hospital Medications:   MEDICATIONS  (STANDING):  aspirin  chewable 81 milliGRAM(s) Oral daily  atorvastatin 80 milliGRAM(s) Oral at bedtime  BACItracin   Ointment 1 Application(s) Topical daily  calcium acetate 667 milliGRAM(s) Oral three times a day with meals  chlorhexidine 4% Liquid 1 Application(s) Topical <User Schedule>  dextrose 5%. 1000 milliLiter(s) (50 mL/Hr) IV Continuous <Continuous>  dextrose 50% Injectable 12.5 Gram(s) IV Push once  dextrose 50% Injectable 25 Gram(s) IV Push once  dextrose 50% Injectable 25 Gram(s) IV Push once  heparin   Injectable 5000 Unit(s) SubCutaneous every 12 hours  insulin glargine Injectable (LANTUS) 22 Unit(s) SubCutaneous at bedtime  insulin lispro (HumaLOG) corrective regimen sliding scale   SubCutaneous three times a day before meals  insulin lispro Injectable (HumaLOG) 8 Unit(s) SubCutaneous three times a day before meals  meropenem  IVPB      meropenem  IVPB 500 milliGRAM(s) IV Intermittent every 24 hours  midodrine 10 milliGRAM(s) Oral every 8 hours  multivitamin/minerals 1 Tablet(s) Oral daily  norepinephrine Infusion 0.05 MICROgram(s)/kG/Min (4.37 mL/Hr) IV Continuous <Continuous>  pantoprazole    Tablet 40 milliGRAM(s) Oral before breakfast  polyethylene glycol 3350 17 Gram(s) Oral daily  senna 2 Tablet(s) Oral at bedtime  simethicone 80 milliGRAM(s) Chew four times a day  tamsulosin 0.8 milliGRAM(s) Oral at bedtime        VITALS:  T(F): 97.9 (08-23-20 @ 04:00), Max: 98.2 (08-22-20 @ 12:00)  HR: 60 (08-23-20 @ 07:30)  BP: 104/56 (08-23-20 @ 07:30)  RR: 25 (08-23-20 @ 07:30)  SpO2: 96% (08-23-20 @ 07:30)  Wt(kg): --    08-21 @ 07:01  -  08-22 @ 07:00  --------------------------------------------------------  IN: 198 mL / OUT: 270 mL / NET: -72 mL    08-22 @ 07:01  -  08-23 @ 07:00  --------------------------------------------------------  IN: 27.8 mL / OUT: 2730 mL / NET: -2702.2 mL          PHYSICAL EXAM:  Constitutional: NAD  HEENT: anicteric sclera, oropharynx clear, MMM  Neck: No JVD  Respiratory: CTAB, no wheezes, rales or rhonchi  Cardiovascular: S1, S2, RRR  Gastrointestinal: BS+, soft, NT/ND  Extremities: No cyanosis or clubbing. No peripheral edema  :  No crook.   Skin: No rashes    LABS:  08-23    137  |  95<L>  |  40<H>  ----------------------------<  102<H>  4.5   |  30  |  5.9<HH>    Ca    8.2<L>      23 Aug 2020 04:30  Phos  5.4     08-23  Mg     2.1     08-23    TPro  6.4  /  Alb  3.7  /  TBili  0.3  /  DBili      /  AST  17  /  ALT  14  /  AlkPhos  144<H>  08-23                          9.7    5.82  )-----------( 119      ( 23 Aug 2020 04:30 )             32.4       Urine Studies:      RADIOLOGY & ADDITIONAL STUDIES: Nephrology progress note  Patient is seen and examined, events over the last 24 h noted .  sp HD yesterday   breathing better     Allergies:  No Known Allergies    Hospital Medications:   MEDICATIONS  (STANDING):    aspirin  chewable 81 milliGRAM(s) Oral daily  atorvastatin 80 milliGRAM(s) Oral at bedtime  BACItracin   Ointment 1 Application(s) Topical daily  calcium acetate 667 milliGRAM(s) Oral three times a day with meals  dextrose 5%. 1000 milliLiter(s) (50 mL/Hr) IV Continuous <Continuous>  heparin   Injectable 5000 Unit(s) SubCutaneous every 12 hours  insulin glargine Injectable (LANTUS) 22 Unit(s) SubCutaneous at bedtime  insulin lispro (HumaLOG) corrective regimen sliding scale   SubCutaneous three times a day before meals  insulin lispro Injectable (HumaLOG) 8 Unit(s) SubCutaneous three times a day before meals     meropenem  IVPB 500 milliGRAM(s) IV Intermittent every 24 hours  midodrine 10 milliGRAM(s) Oral every 8 hours  multivitamin/minerals 1 Tablet(s) Oral daily  norepinephrine Infusion 0.05 MICROgram(s)/kG/Min (4.37 mL/Hr) IV Continuous <Continuous>  pantoprazole    Tablet 40 milliGRAM(s) Oral before breakfast  polyethylene glycol 3350 17 Gram(s) Oral daily  senna 2 Tablet(s) Oral at bedtime  simethicone 80 milliGRAM(s) Chew four times a day  tamsulosin 0.8 milliGRAM(s) Oral at bedtime        VITALS:  T(F): 97.9 (08-23-20 @ 04:00), Max: 98.2 (08-22-20 @ 12:00)  HR: 60 (08-23-20 @ 07:30)  BP: 104/56 (08-23-20 @ 07:30)  RR: 25 (08-23-20 @ 07:30)  SpO2: 96% (08-23-20 @ 07:30)      08-21 @ 07:01  -  08-22 @ 07:00  --------------------------------------------------------  IN: 198 mL / OUT: 270 mL / NET: -72 mL    08-22 @ 07:01  -  08-23 @ 07:00  --------------------------------------------------------  IN: 27.8 mL / OUT: 2730 mL / NET: -2702.2 mL          PHYSICAL EXAM:  Constitutional: NAD  HEENT: anicteric sclera, oropharynx clear, MMM  Neck: No JVD  Respiratory: Crackles fine at base   Cardiovascular: S1, S2, RRR  Gastrointestinal: BS+, soft, NT/ND  Extremities: No cyanosis or clubbing. No peripheral edema  :  No crook.   Skin: No rashes    LABS:  08-23    137  |  95<L>  |  40<H>  ----------------------------<  102<H>  4.5   |  30  |  5.9<HH>    Ca    8.2<L>      23 Aug 2020 04:30  Phos  5.4     08-23  Mg     2.1     08-23    TPro  6.4  /  Alb  3.7  /  TBili  0.3  /  DBili      /  AST  17  /  ALT  14  /  AlkPhos  144<H>  08-23                          9.7    5.82  )-----------( 119      ( 23 Aug 2020 04:30 )             32.4       Urine Studies:      RADIOLOGY & ADDITIONAL STUDIES:

## 2020-08-23 NOTE — OCCUPATIONAL THERAPY INITIAL EVALUATION ADULT - SHORT TERM MEMORY, REHAB EVAL
impaired/repeats 3 out of 3 words, recalled 2 out of 3 words post 1 minute and 1 out of 3 post 5 minutes

## 2020-08-23 NOTE — OCCUPATIONAL THERAPY INITIAL EVALUATION ADULT - GENERAL OBSERVATIONS, REHAB EVAL
OT to hold secondary to medical status. OT to cont when appropriate
pt received semi simon in bed in NAD, dtr at bedside, pt with +R sided chest tube, +IV lock, +BP cuff, +tele, +pulse oxi, +R multiluman central line, agreeable to OT evaluation, left reclined in b/s recliner chair with dtr present,JOSH Sim aware

## 2020-08-24 NOTE — PROGRESS NOTE ADULT - ASSESSMENT
69M, PMH of ESRD on HD MWF, COPD, HFrEF, admitted for SOB.      #ESRD on HD  - sp HD saturday/ next ttt in AM   - IP noted, on phoslo keep for now   - hgb noted, no YASMINE  yet   - BP noted , on midodrine and levophed   #SOB - sp CT of chest with loculated effusion sp  thoracentesis/ s/p pig tail / sp Pneumothorax/ CTS and pulm on case on merrem fr now     # will follow

## 2020-08-24 NOTE — PROGRESS NOTE ADULT - SUBJECTIVE AND OBJECTIVE BOX
GENERAL SURGERY PROGRESS NOTE     ADRIANNA GAINES  69y  Male  Hospital day :7d  POD:  Procedure:   Large R sided pleural effusion s/p pigtail catheter placed by IR on 08/19 with ~2L of fluid drained, pt was put on BiPAP, after, following CXRs with pneumothorax    OVERNIGHT EVENTS: no acute events     T(F): 97 (08-23-20 @ 21:10), Max: 98 (08-23-20 @ 08:00)  HR: 60 (08-23-20 @ 21:10) (58 - 70)  BP: 98/52 (08-23-20 @ 21:10) (80/40 - 107/51)  RR: 18 (08-23-20 @ 21:10) (11 - 25)  SpO2: 98% (08-23-20 @ 21:10) (94% - 100%)    DIET/FLUIDS: calcium acetate 667 milliGRAM(s) Oral three times a day with meals  dextrose 5%. 1000 milliLiter(s) IV Continuous <Continuous>  multivitamin/minerals 1 Tablet(s) Oral daily         GI proph:  pantoprazole    Tablet 40 milliGRAM(s) Oral before breakfast    AC/ proph: aspirin  chewable 81 milliGRAM(s) Oral daily  heparin   Injectable 5000 Unit(s) SubCutaneous every 12 hours    ABx: meropenem  IVPB      meropenem  IVPB 500 milliGRAM(s) IV Intermittent every 24 hours      PHYSICAL EXAM:  GENERAL: NAD, well-appearing  CHEST/LUNG: Clear to auscultation bilaterally  HEART: Regular rate and rhythm  ABDOMEN: Soft, Nontender, Nondistended;   EXTREMITIES:  No clubbing, cyanosis, or edema      LABS  Labs:  CAPILLARY BLOOD GLUCOSE      POCT Blood Glucose.: 206 mg/dL (23 Aug 2020 21:22)  POCT Blood Glucose.: 108 mg/dL (23 Aug 2020 16:22)  POCT Blood Glucose.: 102 mg/dL (23 Aug 2020 12:07)  POCT Blood Glucose.: 107 mg/dL (23 Aug 2020 11:43)  POCT Blood Glucose.: 63 mg/dL (23 Aug 2020 06:55)                          9.7    5.82  )-----------( 119      ( 23 Aug 2020 04:30 )             32.4       Auto Neutrophil %: 85.1 % (08-23-20 @ 04:30)  Auto Immature Granulocyte %: 0.3 % (08-23-20 @ 04:30)    08-23    137  |  95<L>  |  40<H>  ----------------------------<  102<H>  4.5   |  30  |  5.9<HH>      Calcium, Total Serum: 8.2 mg/dL (08-23-20 @ 04:30)      LFTs:             6.4  | 0.3  | 17       ------------------[144     ( 23 Aug 2020 04:30 )  3.7  | x    | 14          Lipase:x      Amylase:x         Blood Gas Venous - Lactate: 1.0 mmoL/L (08-21-20 @ 10:00)    ABG - ( 20 Aug 2020 16:58 )  pH: 7.17  /  pCO2: 82    /  pO2: 66    / HCO3: 29    / Base Excess: -2.0  /  SaO2: 92              ABG - ( 20 Aug 2020 07:22 )  pH: 7.18  /  pCO2: 79    /  pO2: 112   / HCO3: 29    / Base Excess: -0.9  /  SaO2: 98              ABG - ( 20 Aug 2020 05:33 )  pH: 7.14  /  pCO2: 88    /  pO2: 49    / HCO3: 30    / Base Excess: -1.3  /  SaO2: 84              RADIOLOGY & ADDITIONAL TESTS:  < from: Xray Chest 1 View- PORTABLE-Routine (08.23.20 @ 06:22) >  Impression:    Stable bilateral opacities and right basilar pneumothorax.    Support devices as described.    < end of copied text >

## 2020-08-24 NOTE — PROGRESS NOTE ADULT - SUBJECTIVE AND OBJECTIVE BOX
Nephrology progress note    THIS IS AN INCOMPLETE NOTE . FULL NOTE TO FOLLOW SHORTLY    Patient is seen and examined, events over the last 24 h noted .    Allergies:  No Known Allergies    Hospital Medications:   MEDICATIONS  (STANDING):  aspirin  chewable 81 milliGRAM(s) Oral daily  atorvastatin 80 milliGRAM(s) Oral at bedtime  BACItracin   Ointment 1 Application(s) Topical daily  calcium acetate 667 milliGRAM(s) Oral three times a day with meals  chlorhexidine 4% Liquid 1 Application(s) Topical <User Schedule>  dextrose 5%. 1000 milliLiter(s) (50 mL/Hr) IV Continuous <Continuous>  dextrose 50% Injectable 12.5 Gram(s) IV Push once  dextrose 50% Injectable 25 Gram(s) IV Push once  dextrose 50% Injectable 25 Gram(s) IV Push once  heparin   Injectable 5000 Unit(s) SubCutaneous every 12 hours  insulin glargine Injectable (LANTUS) 22 Unit(s) SubCutaneous at bedtime  insulin lispro (HumaLOG) corrective regimen sliding scale   SubCutaneous three times a day before meals  insulin lispro Injectable (HumaLOG) 8 Unit(s) SubCutaneous three times a day before meals  meropenem  IVPB      meropenem  IVPB 500 milliGRAM(s) IV Intermittent every 24 hours  midodrine 10 milliGRAM(s) Oral every 8 hours  multivitamin/minerals 1 Tablet(s) Oral daily  pantoprazole    Tablet 40 milliGRAM(s) Oral before breakfast  polyethylene glycol 3350 17 Gram(s) Oral daily  senna 2 Tablet(s) Oral at bedtime  simethicone 80 milliGRAM(s) Chew four times a day  tamsulosin 0.8 milliGRAM(s) Oral at bedtime        VITALS:  T(F): 97.1 (08-24-20 @ 05:16), Max: 97.2 (08-23-20 @ 12:16)  HR: 60 (08-23-20 @ 21:10)  BP: 93/52 (08-24-20 @ 05:16)  RR: 18 (08-24-20 @ 05:16)  SpO2: 98% (08-24-20 @ 05:16)  Wt(kg): --    08-22 @ 07:01  -  08-23 @ 07:00  --------------------------------------------------------  IN: 27.8 mL / OUT: 2730 mL / NET: -2702.2 mL    08-23 @ 07:01  -  08-24 @ 07:00  --------------------------------------------------------  IN: 0 mL / OUT: 140 mL / NET: -140 mL          PHYSICAL EXAM:  Constitutional: NAD  HEENT: anicteric sclera, oropharynx clear, MMM  Neck: No JVD  Respiratory: CTAB, no wheezes, rales or rhonchi  Cardiovascular: S1, S2, RRR  Gastrointestinal: BS+, soft, NT/ND  Extremities: No cyanosis or clubbing. No peripheral edema  :  No crook.   Skin: No rashes    LABS:  08-23    137  |  95<L>  |  40<H>  ----------------------------<  102<H>  4.5   |  30  |  5.9<HH>    Ca    8.2<L>      23 Aug 2020 04:30  Phos  5.4     08-23  Mg     2.1     08-23    TPro  6.4  /  Alb  3.7  /  TBili  0.3  /  DBili      /  AST  17  /  ALT  14  /  AlkPhos  144<H>  08-23                          10.5   10.04 )-----------( 164      ( 24 Aug 2020 07:33 )             34.1       Urine Studies:      RADIOLOGY & ADDITIONAL STUDIES: Nephrology progress note  Patient is seen and examined, events over the last 24 h noted .    Allergies:  No Known Allergies    Hospital Medications:     MEDICATIONS  (STANDING):    aspirin  chewable 81 milliGRAM(s) Oral daily  atorvastatin 80 milliGRAM(s) Oral at bedtime  BACItracin   Ointment 1 Application(s) Topical daily  calcium acetate 667 milliGRAM(s) Oral three times a day with meals  dextrose 5%. 1000 milliLiter(s) (50 mL/Hr) IV Continuous <Continuous>  heparin   Injectable 5000 Unit(s) SubCutaneous every 12 hours  insulin glargine Injectable (LANTUS) 22 Unit(s) SubCutaneous at bedtime  insulin lispro (HumaLOG) corrective regimen sliding scale   SubCutaneous three times a day before meals  insulin lispro Injectable (HumaLOG) 8 Unit(s) SubCutaneous three times a day before meals      meropenem  IVPB 500 milliGRAM(s) IV Intermittent every 24 hours  midodrine 10 milliGRAM(s) Oral every 8 hours  multivitamin/minerals 1 Tablet(s) Oral daily  pantoprazole    Tablet 40 milliGRAM(s) Oral before breakfast  polyethylene glycol 3350 17 Gram(s) Oral daily  senna 2 Tablet(s) Oral at bedtime  simethicone 80 milliGRAM(s) Chew four times a day  tamsulosin 0.8 milliGRAM(s) Oral at bedtime        VITALS:  T(F): 97.1 (08-24-20 @ 05:16), Max: 97.2 (08-23-20 @ 12:16)  HR: 60 (08-23-20 @ 21:10)  BP: 93/52 (08-24-20 @ 05:16)  RR: 18 (08-24-20 @ 05:16)  SpO2: 98% (08-24-20 @ 05:16)      08-22 @ 07:01  -  08-23 @ 07:00  --------------------------------------------------------  IN: 27.8 mL / OUT: 2730 mL / NET: -2702.2 mL    08-23 @ 07:01  -  08-24 @ 07:00  --------------------------------------------------------  IN: 0 mL / OUT: 140 mL / NET: -140 mL          PHYSICAL EXAM:  Constitutional: NAD  Neck: No JVD  Respiratory: Crackles at base/ chest tube    Cardiovascular: S1, S2, RRR  Gastrointestinal: BS+, soft, NT/ND  Extremities: No cyanosis or clubbing. No peripheral edema  :  No crook.   Skin: No rashes    LABS:  08-24    129<L>  |  88<L>  |  58<H>  ----------------------------<  201<H>  5.3<H>   |  21  |  7.2<HH>    Ca    8.8      24 Aug 2020 07:33  Phos  4.7     08-24  Mg     2.3     08-24    TPro  6.8  /  Alb  3.8  /  TBili  0.8  /  DBili  x   /  AST  20  /  ALT  13  /  AlkPhos  149<H>  08-24 08-23    137  |  95<L>  |  40<H>  ----------------------------<  102<H>  4.5   |  30  |  5.9<HH>    Ca    8.2<L>      23 Aug 2020 04:30  Phos  5.4     08-23  Mg     2.1     08-23    TPro  6.4  /  Alb  3.7  /  TBili  0.3  /  DBili      /  AST  17  /  ALT  14  /  AlkPhos  144<H>  08-23                          10.5   10.04 )-----------( 164      ( 24 Aug 2020 07:33 )             34.1       Urine Studies:      RADIOLOGY & ADDITIONAL STUDIES:

## 2020-08-24 NOTE — DIETITIAN INITIAL EVALUATION ADULT. - ENERGY NEEDS
Energy: 0919-5224 kcal/day (30-35 kcal/kg IBW) d/t ESRD on HD    Protein: 81-87 g/day (1.2-1.3 g/kg IBW)    Fluids: per LIP

## 2020-08-24 NOTE — DIETITIAN INITIAL EVALUATION ADULT. - ADD RECOMMEND
Recommendation: Consult SLP services to optimize diet texture/consistency & determine whether diet can be advanced from pureed. Order Nepro q24hrs. Consider adding zofran as nausea/vomiting reported this admit. If po intake improves (at least 75% po intake observed), add Renal Replacement no protein restriction + Consistent Carbohydrate diet modifiers.

## 2020-08-24 NOTE — PHYSICAL THERAPY INITIAL EVALUATION ADULT - LEVEL OF INDEPENDENCE: GAIT, REHAB EVAL
unable to perform/1st standing trial pt aubrie standing ~5sec with Mod A & RW. 2nd Trial pt aubrie ~20sec with Mod A & RW. Pt unabe to weight shift at this time 2* c/o pain from CT site as well as left knee pain. Pt performed seated therex: ankle pumps 10x1 B/L, knee ext 5x2 B/L, hip flex 7x2 B/L. Pt with c/o weakness/fatigue.

## 2020-08-24 NOTE — PROGRESS NOTE ADULT - ASSESSMENT
Impression:    Chronic right sided effusion SP Chest tube/ trapped lung  RUL loculated effusion new  Acute on chronic hypercapnic  Respiratory failure  HO [place] non compliant with CPAP  HF REF         PLAN:     CNS: No depressants     HEENT: Oral care    PULMONARY:  HOB @ 45 degrees.  Aspiration precautions.  NIV during sleep adn PRN during the day.  dc pigtail, high risk for any surgical intervention    CARDIOVASCULAR:  Avoid volume overload.     GI: GI prophylaxis.  Feeding     RENAL:  Follow up lytes.  Correct as needed.  JORDAN renal     INFECTIOUS DISEASE: Follow up cultures.  Continue ABX.      HEMATOLOGICAL:  DVT prophylaxis.    ENDOCRINE:  Follow up FS.  Insulin protocol if needed.    MUSCULOSKELETAL:  Bed rest.      CEU    DW daughter

## 2020-08-24 NOTE — DIETITIAN INITIAL EVALUATION ADULT. - PHYSICAL APPEARANCE
BMI: 30.8 (using lowest wt this admit 89.2 kg 8/20). Latest wt 90.9 kg (8/22). 2+ edema (generalized). Alert. Last BM 8/21. Abd distention & constipation reported. Nausea & vomiting earlier this admit. Last emesis 8/21. No chewing/swallowing difficulty reported. Skin: Ecchymosis noted.

## 2020-08-24 NOTE — PHYSICAL THERAPY INITIAL EVALUATION ADULT - IMPAIRMENTS FOUND, PT EVAL
gait, locomotion, and balance/aerobic capacity/endurance/poor safety awareness/arousal, attention, and cognition/muscle strength

## 2020-08-24 NOTE — DIETITIAN INITIAL EVALUATION ADULT. - DIET TYPE
Dysphagia 1 Pureed diet with thin liquids ordered 8/22. Poor appetite this admit d/t abd discomfort & nausea - consuming 50% of meals at this time. Prior to this, pt was receiving a Consistent Carbohydrate diet with DASH/TLC & 1.2 L fluid restriction + Renal Replacement no protein restriction.

## 2020-08-24 NOTE — DIETITIAN INITIAL EVALUATION ADULT. - OTHER INFO
Pertinent Medical Information: p/w SOB - found to have right pleural effusion. Acute on chronic hypercapnic respiratory failure noted. ESRD on HD noted. Missed one HD session s/p emergent HD in the ED for volume overload w/ 4 liters removed per progress notes. Noted stable on NC 2 L. Abdominal distension & constipation noted. On bowel regimen. HFrEF noted. Uncontrolled DM noted.    Pertinent Subjective Information: Regular diet with fair po intake until two weeks PTP d/t increased SOB. NKFA. No supplements reported. Demonstrates understanding of basic DM & renal nutrition concepts. Does not demonstrate interest in nutrition education at this time. UBW reported to be 90.9 kg with no known h/o unintentional wt loss.

## 2020-08-24 NOTE — PROGRESS NOTE ADULT - ASSESSMENT
68 y/o M with PMH of ESRD on HD MWF, CAD s/p pci with 7 stents and AICD placement in 2018, Heart failure with reduced EF, DLD, HTN, COPD? on 2L home O2, DM II (well controlled with HbA1C in the 6-7 range), Afib on Eliquis (diagnosed in february) and BPH presents to the ED for SOB that has been worsening over the last 2 weeks.    #R sided pleural effusion  #Acute on chronic hypercapnic respiratory failure   #Trapped Lung    -Missed one HD session s/p emergent HD in the ED for volume overload w/ 4 liters removed  -CXR 8/17/2020: Worsening right-sided pleural effusion with increased left lower lung airspace opacities  -CT 8/18/2020: Large right pleural effusion with near complete collapse of the right lung. Large component of this effusion is subpulmonic. Partially loculated anteriorly at the apex.  -s/p pigtail catheter placement by IR on 08/19 > Exudative. Suspicion of underlying mass/malignancy.  - chest tube will be removed by IR  - Patient is stable on NC 2 L didn't need bipap yesterday    - Starting Merrem due to concern for infection due to elevated WBC and latest CT result since 08/22  - c/w midodrine   - f/u cytology and flow      #Abdominal distension #Constipation  -senna  -miralax    #ESRD, dialysis dependent   -HD MWF, nephro following  - Tomorrow dialysis     #HFrEF  -TTE 8/19/2020: EF 35-40%, severe Pulm HTN\par-Anuric    #CAD s/p PCI with 7 stents  #Elevated Troponins  -trop elevated on admission  -EKG: no ST changes noted  -Patient not c/o chest pain  - likely from ESRD  -c/w aspirin    #Paroxysmal Afib  -Diagnosed in February  -on Eliquis at home, held on admission due to need for pigtail placement  -Currently normal rate and regular rhythm  - Holding metoprolol due to HoTN  - keep holding Eliquis     #DM II- uncontrolled  -A1c 7.8  -c/w basal/bolus insulin regimen    #HTN: holding home losartan    #DLD: c/w statin    #BPH: c/w Flomax    DVT ppx: Heparin subq, home Eliquis held  GI ppx: Protonix  Diet: Dysphagia 1   Dispo: Downgrade to stepdown unit   DNR/DNI 70 y/o M with PMH of ESRD on HD MWF, CAD s/p pci with 7 stents and AICD placement in 2018, Heart failure with reduced EF, DLD, HTN, COPD? on 2L home O2, DM II (well controlled with HbA1C in the 6-7 range), Afib on Eliquis (diagnosed in february) and BPH presents to the ED for SOB that has been worsening over the last 2 weeks.    #R sided pleural effusion  #Acute on chronic hypercapnic respiratory failure   #Trapped Lung    -Missed one HD session s/p emergent HD in the ED for volume overload w/ 4 liters removed  -CXR 8/17/2020: Worsening right-sided pleural effusion with increased left lower lung airspace opacities  -CT 8/18/2020: Large right pleural effusion with near complete collapse of the right lung. Large component of this effusion is subpulmonic. Partially loculated anteriorly at the apex.  -s/p pigtail catheter placement by IR on 08/19 > Exudative. Suspicion of underlying mass/malignancy.  - chest tube will be removed by IR  - Patient is stable on NC 2 L didn't need bipap yesterday    - Starting Merrem due to concern for infection due to elevated WBC and latest CT result since 08/22  - c/w midodrine   - f/u cytology and flow  - Pleural fluid : cultures negative cell count 994 N56% L 36%    #Abdominal distension #Constipation  -senna  -miralax    #ESRD, dialysis dependent   -HD MWF, nephro following  - Tomorrow dialysis     #HFrEF  -TTE 8/19/2020: EF 35-40%, severe Pulm HTN\par-Anuric    #CAD s/p PCI with 7 stents  #Elevated Troponins  -trop elevated on admission  -EKG: no ST changes noted  -Patient not c/o chest pain  - likely from ESRD  -c/w aspirin    #Paroxysmal Afib  -Diagnosed in February  -on Eliquis at home, held on admission due to need for pigtail placement  -Currently normal rate and regular rhythm  - Holding metoprolol due to HoTN  - keep holding Eliquis     #DM II- uncontrolled  -A1c 7.8  -c/w basal/bolus insulin regimen    #HTN: holding home losartan    #DLD: c/w statin    #BPH: c/w Flomax    DVT ppx: Heparin subq, home Eliquis held  GI ppx: Protonix  Diet: Dysphagia 1   Dispo: Downgrade to stepdown unit   DNR/DNI 70 y/o M with PMH of ESRD on HD MWF, CAD s/p pci with 7 stents and AICD placement in 2018, Heart failure with reduced EF, DLD, HTN, COPD? on 2L home O2, DM II (well controlled with HbA1C in the 6-7 range), Afib on Eliquis (diagnosed in february) and BPH presents to the ED for SOB that has been worsening over the last 2 weeks.    #R sided pleural effusion  #Acute on chronic hypercapnic respiratory failure   #Trapped Lung    -Missed one HD session s/p emergent HD in the ED for volume overload w/ 4 liters removed  -CXR 8/17/2020: Worsening right-sided pleural effusion with increased left lower lung airspace opacities  -CT 8/18/2020: Large right pleural effusion with near complete collapse of the right lung. Large component of this effusion is subpulmonic. Partially loculated anteriorly at the apex.  -s/p pigtail catheter placement by IR on 08/19 > Exudative. Suspicion of underlying mass/malignancy.  - chest tube will be removed by IR  - Patient is stable on NC 2 L didn't need bipap yesterday    - Starting Merrem due to concern for infection due to elevated WBC and latest CT result since 08/22  - c/w midodrine   - f/u cytology and flow  - Pleural fluid : cultures negative cell count 994 N56% L 36%    #Abdominal distension #Constipation  -senna  -miralax    #ESRD, dialysis dependent   -HD MWF, nephro following  - Tomorrow dialysis     #HFrEF  -TTE 8/19/2020: EF 35-40%, severe Pulm HTN\par-Anuric    #CAD s/p PCI with 7 stents  #Elevated Troponins  -trop elevated on admission  -EKG: no ST changes noted  -Patient not c/o chest pain  - likely from ESRD  -c/w aspirin    #Paroxysmal Afib  -Diagnosed in February  -on Eliquis at home, held on admission due to need for pigtail placement  -Currently normal rate and regular rhythm  - Holding metoprolol due to HoTN  - keep holding Eliquis     #DM II- uncontrolled  -A1c 7.8  -c/w basal/bolus insulin regimen    #HTN: holding home losartan    #DLD: c/w statin    #BPH: c/w Flomax    DVT ppx: Heparin subq, home Eliquis held  GI ppx: Protonix  Diet: Dysphagia 1   Dispo: Downgrade to stepdown unit   DNR/DNI    Attending Attestation  Pt seen and examined. Case and Plan discussed and agree with above note as reviewed.  Will follow 70 y/o M with PMH of ESRD on HD MWF, CAD s/p pci with 7 stents and AICD placement in 2018, Heart failure with reduced EF, DLD, HTN, COPD? on 2L home O2, DM II (well controlled with HbA1C in the 6-7 range), Afib on Eliquis (diagnosed in february) and BPH presents to the ED for SOB that has been worsening over the last 2 weeks.    #R sided pleural effusion  #Acute on chronic hypercapnic respiratory failure   #Trapped Lung      -CXR 8/17/2020: Worsening right-sided pleural effusion with increased left lower lung airspace opacities  -CT 8/18/2020: Large right pleural effusion with near complete collapse of the right lung. Large component of this effusion is subpulmonic. Partially loculated anteriorly at the apex.  -s/p pigtail catheter placement by IR on 08/19 > Exudative. Suspicion of underlying mass/malignancy.  - chest tube removed by IR yesterday   - Patient is stable on NC didn't need bipap yesterday    - Starting Meronem due to concern for infection due to elevated WBC and latest CT result since 08/22  - c/w midodrine   - f/u cytology and flow  - Pleural fluid : cultures negative cell count 994 N56% L 36%    #Abdominal distension #Constipation  -senna  -miralax  -had bowel movement yesterday    #ESRD, dialysis dependent   -HD MWF, nephro following  - Today dialysis     #HFrEF   -TTE 8/19/2020: EF 35-40%, severe Pulm HTN\par-Anuric    #Hypotension  -TSh and cortisol level ordered  -cw midodrine    #CAD s/p PCI with 7 stents  #Elevated Troponins  -trop elevated on admission  -EKG: no ST changes noted  -Patient not c/o chest pain  - likely from ESRD  -c/w aspirin    #Paroxysmal Afib  -Diagnosed in February  -on Eliquis at home, held on admission due to need for pigtail placement  -Currently normal rate and regular rhythm  - Holding metoprolol due to HoTN  - keep holding Eliquis     #DM II- uncontrolled  -A1c 7.8  -c/w basal/bolus insulin regimen    #HTN: holding home losartan    #DLD: c/w statin    #BPH: c/w Flomax    DVT ppx: Heparin subq, home Eliquis held  GI ppx: Protonix  Diet: Dysphagia 1   Dispo: Downgrade to stepdown unit   DNR/DNI

## 2020-08-24 NOTE — PROGRESS NOTE ADULT - SUBJECTIVE AND OBJECTIVE BOX
OVERNIGHT EVENTS: events noted, sitting on the chair on NC, no bipap overnight, cyto reviewed, afebrile    Vital Signs Last 24 Hrs  T(C): 36.2 (24 Aug 2020 05:16), Max: 36.2 (23 Aug 2020 12:16)  T(F): 97.1 (24 Aug 2020 05:16), Max: 97.2 (23 Aug 2020 12:16)  HR: 60 (23 Aug 2020 21:10) (60 - 63)  BP: 93/52 (24 Aug 2020 05:16) (91/53 - 98/52)  RR: 18 (24 Aug 2020 05:16) (18 - 18)  SpO2: 98% (24 Aug 2020 05:16) (98% - 99%)    PHYSICAL EXAMINATION:    GENERAL: comfortable    HEENT: Head is normocephalic and atraumatic. Extraocular muscles are intact. Mucous membranes are moist.    NECK: Supple.    LUNGS: dec bs r side    HEART: Regular rate and rhythm without murmur.    ABDOMEN: Soft, nontender, and nondistended.      EXTREMITIES: Without any cyanosis, clubbing, rash, lesions or edema.    NEUROLOGIC: Grossly intact.    SKIN: No ulceration or induration present.      LABS:                        10.5   10.04 )-----------( 164      ( 24 Aug 2020 07:33 )             34.1     08-23    137  |  95<L>  |  40<H>  ----------------------------<  102<H>  4.5   |  30  |  5.9<HH>    Ca    8.2<L>      23 Aug 2020 04:30  Phos  5.4     08-23  Mg     2.1     08-23    TPro  6.4  /  Alb  3.7  /  TBili  0.3  /  DBili  x   /  AST  17  /  ALT  14  /  AlkPhos  144<H>  08-23          CARDIAC MARKERS ( 24 Aug 2020 07:33 )  x     / 0.46 ng/mL / x     / x     / x                      08-23-20 @ 07:01  -  08-24-20 @ 07:00  --------------------------------------------------------  IN: 0 mL / OUT: 140 mL / NET: -140 mL        MICROBIOLOGY:      MEDICATIONS  (STANDING):  aspirin  chewable 81 milliGRAM(s) Oral daily  atorvastatin 80 milliGRAM(s) Oral at bedtime  BACItracin   Ointment 1 Application(s) Topical daily  calcium acetate 667 milliGRAM(s) Oral three times a day with meals  chlorhexidine 4% Liquid 1 Application(s) Topical <User Schedule>  dextrose 5%. 1000 milliLiter(s) (50 mL/Hr) IV Continuous <Continuous>  dextrose 50% Injectable 12.5 Gram(s) IV Push once  dextrose 50% Injectable 25 Gram(s) IV Push once  dextrose 50% Injectable 25 Gram(s) IV Push once  heparin   Injectable 5000 Unit(s) SubCutaneous every 12 hours  insulin glargine Injectable (LANTUS) 22 Unit(s) SubCutaneous at bedtime  insulin lispro (HumaLOG) corrective regimen sliding scale   SubCutaneous three times a day before meals  insulin lispro Injectable (HumaLOG) 8 Unit(s) SubCutaneous three times a day before meals  meropenem  IVPB      meropenem  IVPB 500 milliGRAM(s) IV Intermittent every 24 hours  midodrine 10 milliGRAM(s) Oral every 8 hours  multivitamin/minerals 1 Tablet(s) Oral daily  pantoprazole    Tablet 40 milliGRAM(s) Oral before breakfast  polyethylene glycol 3350 17 Gram(s) Oral daily  senna 2 Tablet(s) Oral at bedtime  simethicone 80 milliGRAM(s) Chew four times a day  tamsulosin 0.8 milliGRAM(s) Oral at bedtime    MEDICATIONS  (PRN):  acetaminophen   Tablet .. 650 milliGRAM(s) Oral every 6 hours PRN Mild Pain (1 - 3)  dextrose 40% Gel 15 Gram(s) Oral once PRN Blood Glucose LESS THAN 70 milliGRAM(s)/deciliter  glucagon  Injectable 1 milliGRAM(s) IntraMuscular once PRN Glucose LESS THAN 70 milligrams/deciliter  melatonin 1 milliGRAM(s) Oral at bedtime PRN Insomnia  traMADol 25 milliGRAM(s) Oral every 6 hours PRN Moderate Pain (4 - 6)      RADIOLOGY & ADDITIONAL STUDIES:

## 2020-08-24 NOTE — PROGRESS NOTE ADULT - SUBJECTIVE AND OBJECTIVE BOX
24H events:    Patient is a 69y old Male who presents with a chief complaint of SOB found to have right pleural effusion for which he underwent chest tube drainage.  Chest tube drain 650 cc over 24 hrs  Patient is complaining of chest and back discomfort related to chest tube  Shortness of breath has improved  No abdominal pain, tolerating thin liquids  No major event overnight      PAST MEDICAL & SURGICAL HISTORY  Heart failure with reduced ejection fraction  CAD (coronary artery disease): s/p PCI and AICD placement  BPH (benign prostatic hyperplasia)  Type 2 diabetes mellitus  End stage renal disease  Dyslipidemia  Hypertension  No significant past surgical history    SOCIAL HISTORY:  Social History:  Former 2 pack a day smoker of unknown duration  Drinks one glass of wine a night  No drug use (17 Aug 2020 14:27)      ALLERGIES:  No Known Allergies    MEDICATIONS:  STANDING MEDICATIONS  aspirin  chewable 81 milliGRAM(s) Oral daily  atorvastatin 80 milliGRAM(s) Oral at bedtime  BACItracin   Ointment 1 Application(s) Topical daily  calcium acetate 667 milliGRAM(s) Oral three times a day with meals  chlorhexidine 4% Liquid 1 Application(s) Topical <User Schedule>  dextrose 5%. 1000 milliLiter(s) IV Continuous <Continuous>  dextrose 50% Injectable 12.5 Gram(s) IV Push once  dextrose 50% Injectable 25 Gram(s) IV Push once  dextrose 50% Injectable 25 Gram(s) IV Push once  heparin   Injectable 5000 Unit(s) SubCutaneous every 12 hours  insulin glargine Injectable (LANTUS) 22 Unit(s) SubCutaneous at bedtime  insulin lispro (HumaLOG) corrective regimen sliding scale   SubCutaneous three times a day before meals  insulin lispro Injectable (HumaLOG) 8 Unit(s) SubCutaneous three times a day before meals  meropenem  IVPB      meropenem  IVPB 500 milliGRAM(s) IV Intermittent every 24 hours  midodrine 10 milliGRAM(s) Oral every 8 hours  multivitamin/minerals 1 Tablet(s) Oral daily  pantoprazole    Tablet 40 milliGRAM(s) Oral before breakfast  polyethylene glycol 3350 17 Gram(s) Oral daily  senna 2 Tablet(s) Oral at bedtime  simethicone 80 milliGRAM(s) Chew four times a day  tamsulosin 0.8 milliGRAM(s) Oral at bedtime    PRN MEDICATIONS  acetaminophen   Tablet .. 650 milliGRAM(s) Oral every 6 hours PRN  dextrose 40% Gel 15 Gram(s) Oral once PRN  glucagon  Injectable 1 milliGRAM(s) IntraMuscular once PRN  melatonin 1 milliGRAM(s) Oral at bedtime PRN  traMADol 25 milliGRAM(s) Oral every 6 hours PRN    VITALS:   T(F): 97.8  HR: 60  BP: 97/52  RR: 189  SpO2: 96%    PHYSICAL EXAM:  GENERAL: NAD  HEAD:  Atraumatic, Normocephalic  EYES: EOMI  NECK: Supple  NERVOUS SYSTEM: grossly intact, limited right arm motion due to chest tube  CHEST/LUNG: decreased bilateral air entry, clear kungs  HEART: Regular rate and rhythm; No murmurs, rubs, or gallops  ABDOMEN: Soft, Nontender, distended; Bowel sounds present  EXTREMITIES:  2+ Peripheral Pulses, No clubbing, cyanosis, or edema, left AVF with + thrill   LYMPH: No lymphadenopathy noted  SKIN: stasis dermatitis bilateral legs, bilateral mild pitting edema  LABS:                        10.5   10.04 )-----------( 164      ( 24 Aug 2020 07:33 )             34.1     08-24    129<L>  |  88<L>  |  58<H>  ----------------------------<  201<H>  5.3<H>   |  21  |  7.2<HH>    Ca    8.8      24 Aug 2020 07:33  Phos  4.7     08-24  Mg     2.3     08-24    TPro  6.8  /  Alb  3.8  /  TBili  0.8  /  DBili  x   /  AST  20  /  ALT  13  /  AlkPhos  149<H>  08-24          Troponin T, Serum: 0.46 ng/mL <HH> (08-24-20 @ 07:33)      CARDIAC MARKERS ( 24 Aug 2020 07:33 )  x     / 0.46 ng/mL / x     / x     / x          RADIOLOGY:    < from: Transthoracic Echocardiogram (08.19.20 @ 06:56) >   1. Left ventricular ejection fraction, by visual estimation, is 35 to 40%.   2. Left atrial enlargement.   3. LV Normal size and thickness. Septum and apex with severe hypokinesis. Anterior wall not well visualized, but appears hypokinetic.   4. Mildly enlarged right ventricle.   5. Right atrial enlargement.   6. Mild-moderate tricuspid regurgitation.   7. Dilatation of the aortic root.   8. Estimated pulmonary artery systolic pressure is 60.8mmHg assuming a right atrial pressure of 15 mmHg, which is consistent with severe pulmonary hypertension.    < from: Xray Chest 1 View- PORTABLE-Routine (08.24.20 @ 05:53) >  Unchanged right hydropneumothorax. Stable bilateral opacities.    < from: CT Abdomen and Pelvis w/ Oral Cont (08.21.20 @ 04:49) >  1. No evidence of pneumoperitoneum or oral contrast leak.    2. Interval placement of right pigtail catheter in the pleural space.  Partially imaged known right pneumothorax, with marked decrease in the loculated right pleural effusion, with numerous septa noted. Right lower lobe consolidation. Correlate for right lower lobe pneumonia with empyema, in the appropriate clinical setting.    3. Circumferential urinary bladder wall thickening and surrounding inflammatory changes; correlation with urinalysis recommended to evaluate for cystitis. 24H events:    Patient is a 69y old Male who presents with a chief complaint of SOB found to have right pleural effusion for which he underwent chest tube drainage.  Chest tube drain 650 cc over 24 hrs  Patient is complaining of chest and back discomfort related to chest tube  Shortness of breath has improved  No abdominal pain, tolerating thin liquids  No major event overnight      PAST MEDICAL & SURGICAL HISTORY  Heart failure with reduced ejection fraction  CAD (coronary artery disease): s/p PCI and AICD placement  BPH (benign prostatic hyperplasia)  Type 2 diabetes mellitus  End stage renal disease  Dyslipidemia  Hypertension  No significant past surgical history    SOCIAL HISTORY:  Social History:  Former 2 pack a day smoker of unknown duration  Drinks one glass of wine a night  No drug use (17 Aug 2020 14:27)      ALLERGIES:  No Known Allergies    MEDICATIONS:  STANDING MEDICATIONS  aspirin  chewable 81 milliGRAM(s) Oral daily  atorvastatin 80 milliGRAM(s) Oral at bedtime  BACItracin   Ointment 1 Application(s) Topical daily  calcium acetate 667 milliGRAM(s) Oral three times a day with meals  chlorhexidine 4% Liquid 1 Application(s) Topical <User Schedule>  dextrose 5%. 1000 milliLiter(s) IV Continuous <Continuous>  dextrose 50% Injectable 12.5 Gram(s) IV Push once  dextrose 50% Injectable 25 Gram(s) IV Push once  dextrose 50% Injectable 25 Gram(s) IV Push once  heparin   Injectable 5000 Unit(s) SubCutaneous every 12 hours  insulin glargine Injectable (LANTUS) 22 Unit(s) SubCutaneous at bedtime  insulin lispro (HumaLOG) corrective regimen sliding scale   SubCutaneous three times a day before meals  insulin lispro Injectable (HumaLOG) 8 Unit(s) SubCutaneous three times a day before meals      meropenem  IVPB 500 milliGRAM(s) IV Intermittent every 24 hours  midodrine 10 milliGRAM(s) Oral every 8 hours  multivitamin/minerals 1 Tablet(s) Oral daily  pantoprazole    Tablet 40 milliGRAM(s) Oral before breakfast  polyethylene glycol 3350 17 Gram(s) Oral daily  senna 2 Tablet(s) Oral at bedtime  simethicone 80 milliGRAM(s) Chew four times a day  tamsulosin 0.8 milliGRAM(s) Oral at bedtime    PRN MEDICATIONS  acetaminophen   Tablet .. 650 milliGRAM(s) Oral every 6 hours PRN  dextrose 40% Gel 15 Gram(s) Oral once PRN  glucagon  Injectable 1 milliGRAM(s) IntraMuscular once PRN  melatonin 1 milliGRAM(s) Oral at bedtime PRN  traMADol 25 milliGRAM(s) Oral every 6 hours PRN    VITALS:   T(F): 97.8  HR: 60  BP: 97/52  RR: 189  SpO2: 96%    PHYSICAL EXAM:  GENERAL: NAD  HEAD:  Atraumatic, Normocephalic  EYES: EOMI  NECK: Supple  NERVOUS SYSTEM: grossly intact, limited right arm motion due to chest tube  CHEST/LUNG: decreased bilateral air entry, clear kungs  HEART: Regular rate and rhythm; No murmurs, rubs, or gallops  ABDOMEN: Soft, Nontender, distended; Bowel sounds present  EXTREMITIES:  2+ Peripheral Pulses, No clubbing, cyanosis, or edema, left AVF with + thrill   LYMPH: No lymphadenopathy noted  SKIN: stasis dermatitis bilateral legs, bilateral mild pitting edema  LABS:                        10.5   10.04 )-----------( 164      ( 24 Aug 2020 07:33 )             34.1     08-24    129<L>  |  88<L>  |  58<H>  ----------------------------<  201<H>  5.3<H>   |  21  |  7.2<HH>    Ca    8.8      24 Aug 2020 07:33  Phos  4.7     08-24  Mg     2.3     08-24    TPro  6.8  /  Alb  3.8  /  TBili  0.8  /  DBili  x   /  AST  20  /  ALT  13  /  AlkPhos  149<H>  08-24    Troponin T, Serum: 0.46 ng/mL <HH> (08-24-20 @ 07:33)    CARDIAC MARKERS ( 24 Aug 2020 07:33 )  x     / 0.46 ng/mL / x     / x     / x          RADIOLOGY:    < from: Transthoracic Echocardiogram (08.19.20 @ 06:56) >   1. Left ventricular ejection fraction, by visual estimation, is 35 to 40%.   2. Left atrial enlargement.   3. LV Normal size and thickness. Septum and apex with severe hypokinesis. Anterior wall not well visualized, but appears hypokinetic.   4. Mildly enlarged right ventricle.   5. Right atrial enlargement.   6. Mild-moderate tricuspid regurgitation.   7. Dilatation of the aortic root.   8. Estimated pulmonary artery systolic pressure is 60.8mmHg assuming a right atrial pressure of 15 mmHg, which is consistent with severe pulmonary hypertension.    < from: Xray Chest 1 View- PORTABLE-Routine (08.24.20 @ 05:53) >  Unchanged right hydropneumothorax. Stable bilateral opacities.    < from: CT Abdomen and Pelvis w/ Oral Cont (08.21.20 @ 04:49) >  1. No evidence of pneumoperitoneum or oral contrast leak.    2. Interval placement of right pigtail catheter in the pleural space.  Partially imaged known right pneumothorax, with marked decrease in the loculated right pleural effusion, with numerous septa noted. Right lower lobe consolidation. Correlate for right lower lobe pneumonia with empyema, in the appropriate clinical setting.    3. Circumferential urinary bladder wall thickening and surrounding inflammatory changes; correlation with urinalysis recommended to evaluate for cystitis. 24H events:    Patient is a 69y old Male who presents with a chief complaint of SOB found to have right pleural effusion for which he underwent chest tube drainage.  Chest tube drain 650 cc over 24 hrs  Chest tube removed yesterday  Shortness of breath has improved and chest discomfort after chest tube removal   No abdominal pain, tolerating thin liquids  Patient vomited today in early morning  Patient will be dialyzed today ; patient had bowel movement early morning    No major event overnight except for sBP 80 mmhg improved with midodrine      PAST MEDICAL & SURGICAL HISTORY  Heart failure with reduced ejection fraction  CAD (coronary artery disease): s/p PCI and AICD placement  BPH (benign prostatic hyperplasia)  Type 2 diabetes mellitus  End stage renal disease  Dyslipidemia  Hypertension  No significant past surgical history    SOCIAL HISTORY:  Social History:  Former 2 pack a day smoker of unknown duration  Drinks one glass of wine a night  No drug use (17 Aug 2020 14:27)      ALLERGIES:  No Known Allergies    MEDICATIONS:  STANDING MEDICATIONS  aspirin  chewable 81 milliGRAM(s) Oral daily  atorvastatin 80 milliGRAM(s) Oral at bedtime  BACItracin   Ointment 1 Application(s) Topical daily  calcium acetate 667 milliGRAM(s) Oral three times a day with meals  chlorhexidine 4% Liquid 1 Application(s) Topical <User Schedule>  dextrose 5%. 1000 milliLiter(s) IV Continuous <Continuous>  dextrose 50% Injectable 12.5 Gram(s) IV Push once  dextrose 50% Injectable 25 Gram(s) IV Push once  dextrose 50% Injectable 25 Gram(s) IV Push once  heparin   Injectable 5000 Unit(s) SubCutaneous every 12 hours  insulin glargine Injectable (LANTUS) 22 Unit(s) SubCutaneous at bedtime  insulin lispro (HumaLOG) corrective regimen sliding scale   SubCutaneous three times a day before meals  insulin lispro Injectable (HumaLOG) 8 Unit(s) SubCutaneous three times a day before meals      meropenem  IVPB 500 milliGRAM(s) IV Intermittent every 24 hours  midodrine 10 milliGRAM(s) Oral every 8 hours  multivitamin/minerals 1 Tablet(s) Oral daily  pantoprazole    Tablet 40 milliGRAM(s) Oral before breakfast  polyethylene glycol 3350 17 Gram(s) Oral daily  senna 2 Tablet(s) Oral at bedtime  simethicone 80 milliGRAM(s) Chew four times a day  tamsulosin 0.8 milliGRAM(s) Oral at bedtime    PRN MEDICATIONS  acetaminophen   Tablet .. 650 milliGRAM(s) Oral every 6 hours PRN  dextrose 40% Gel 15 Gram(s) Oral once PRN  glucagon  Injectable 1 milliGRAM(s) IntraMuscular once PRN  melatonin 1 milliGRAM(s) Oral at bedtime PRN  traMADol 25 milliGRAM(s) Oral every 6 hours PRN    VITALS:   T(F): 97.8  HR: 60  BP: 97/52  RR: 189  SpO2: 96%    PHYSICAL EXAM:  GENERAL: NAD  HEAD:  Atraumatic, Normocephalic  EYES: EOMI  NECK: Supple  NERVOUS SYSTEM: grossly intact, limited right arm motion due to chest tube  CHEST/LUNG: decreased bilateral air entry, clear lungs  HEART: Regular rate and rhythm; No murmurs, rubs, or gallops  ABDOMEN: Soft, Nontender, distended; Bowel sounds present  EXTREMITIES:  2+ Peripheral Pulses, No clubbing, cyanosis, or edema, left AVF with + thrill   LYMPH: No lymphadenopathy noted  SKIN: stasis dermatitis bilateral legs, bilateral mild pitting edema    LABS:                        10.5   10.04 )-----------( 164      ( 24 Aug 2020 07:33 )             34.1     08-24    129<L>  |  88<L>  |  58<H>  ----------------------------<  201<H>  5.3<H>   |  21  |  7.2<HH>    Ca    8.8      24 Aug 2020 07:33  Phos  4.7     08-24  Mg     2.3     08-24    TPro  6.8  /  Alb  3.8  /  TBili  0.8  /  DBili  x   /  AST  20  /  ALT  13  /  AlkPhos  149<H>  08-24    Troponin T, Serum: 0.46 ng/mL <HH> (08-24-20 @ 07:33)    CARDIAC MARKERS ( 24 Aug 2020 07:33 )  x     / 0.46 ng/mL / x     / x     / x          RADIOLOGY:    < from: Transthoracic Echocardiogram (08.19.20 @ 06:56) >   1. Left ventricular ejection fraction, by visual estimation, is 35 to 40%.   2. Left atrial enlargement.   3. LV Normal size and thickness. Septum and apex with severe hypokinesis. Anterior wall not well visualized, but appears hypokinetic.   4. Mildly enlarged right ventricle.   5. Right atrial enlargement.   6. Mild-moderate tricuspid regurgitation.   7. Dilatation of the aortic root.   8. Estimated pulmonary artery systolic pressure is 60.8mmHg assuming a right atrial pressure of 15 mmHg, which is consistent with severe pulmonary hypertension.    < from: Xray Chest 1 View- PORTABLE-Routine (08.24.20 @ 05:53) >  Unchanged right hydropneumothorax. Stable bilateral opacities.    < from: CT Abdomen and Pelvis w/ Oral Cont (08.21.20 @ 04:49) >  1. No evidence of pneumoperitoneum or oral contrast leak.    2. Interval placement of right pigtail catheter in the pleural space.  Partially imaged known right pneumothorax, with marked decrease in the loculated right pleural effusion, with numerous septa noted. Right lower lobe consolidation. Correlate for right lower lobe pneumonia with empyema, in the appropriate clinical setting.    3. Circumferential urinary bladder wall thickening and surrounding inflammatory changes; correlation with urinalysis recommended to evaluate for cystitis.

## 2020-08-24 NOTE — PHYSICAL THERAPY INITIAL EVALUATION ADULT - GENERAL OBSERVATIONS, REHAB EVAL
10:00-11:00 Pt encountered sitting in b/s chair with IV lock, right sided CT to H2O seal, 2L/m O2 NC, in NAD. BP: 112/56, HR: 66, SpO2 95% on O2. Pt without c/o. Pt requesting daughter (RANDA Handy) to be present. Daughter called & came. Pt left same as found with IV lock, CT to H2O seal, in NAD. BP:95/51 HR: 61, SPO2 95% on O2. RN aware.
PT attempted to see pt for IE.  Pt was receiving HD bedside.  Pt was weak and lethargic.  Son was at bed side, who stated, that before this episode his father was amb with SC and was able to negotiate stairs with assistance.  Pt also has a RW at home but not using it.  PT will hold the IE at this time since pt is not medically appropriate at this time.  Will follow

## 2020-08-24 NOTE — DIETITIAN INITIAL EVALUATION ADULT. - PERTINENT MEDS FT
heparin, insulin glargine, insulin lispro, senna, simethicone, miralax, atorvastatin, calcium acetate, MVI, protonix

## 2020-08-25 NOTE — PROGRESS NOTE ADULT - SUBJECTIVE AND OBJECTIVE BOX
24H events:  Patient is a 69y old Male who presents with a chief complaint of SOB found to have right pleural effusion for which he underwent chest tube drainage.  Chest tube drain 650 cc over 24 hrs  Patient is complaining of chest and back discomfort related to chest tube  Shortness of breath has improved  No abdominal pain, tolerating thin liquids  No major event overnight    PAST MEDICAL & SURGICAL HISTORY  Heart failure with reduced ejection fraction  CAD (coronary artery disease): s/p PCI and AICD placement  BPH (benign prostatic hyperplasia)  Type 2 diabetes mellitus  End stage renal disease  Dyslipidemia  Hypertension  No significant past surgical history    SOCIAL HISTORY:  Social History:  Former 2 pack a day smoker of unknown duration  Drinks one glass of wine a night  No drug use (17 Aug 2020 14:27)      ALLERGIES:  No Known Allergies    MEDICATIONS:  STANDING MEDICATIONS  aspirin  chewable 81 milliGRAM(s) Oral daily  atorvastatin 80 milliGRAM(s) Oral at bedtime  BACItracin   Ointment 1 Application(s) Topical daily  calcium acetate 667 milliGRAM(s) Oral three times a day with meals  chlorhexidine 4% Liquid 1 Application(s) Topical <User Schedule>  dextrose 5%. 1000 milliLiter(s) IV Continuous <Continuous>  dextrose 50% Injectable 12.5 Gram(s) IV Push once  dextrose 50% Injectable 25 Gram(s) IV Push once  dextrose 50% Injectable 25 Gram(s) IV Push once  heparin   Injectable 5000 Unit(s) SubCutaneous every 12 hours  insulin glargine Injectable (LANTUS) 22 Unit(s) SubCutaneous at bedtime  insulin lispro (HumaLOG) corrective regimen sliding scale   SubCutaneous three times a day before meals  insulin lispro Injectable (HumaLOG) 8 Unit(s) SubCutaneous three times a day before meals  meropenem  IVPB      meropenem  IVPB 500 milliGRAM(s) IV Intermittent every 24 hours  midodrine 10 milliGRAM(s) Oral every 8 hours  multivitamin/minerals 1 Tablet(s) Oral daily  pantoprazole    Tablet 40 milliGRAM(s) Oral before breakfast  polyethylene glycol 3350 17 Gram(s) Oral daily  senna 2 Tablet(s) Oral at bedtime  simethicone 80 milliGRAM(s) Chew four times a day  tamsulosin 0.8 milliGRAM(s) Oral at bedtime    PRN MEDICATIONS  acetaminophen   Tablet .. 650 milliGRAM(s) Oral every 6 hours PRN  dextrose 40% Gel 15 Gram(s) Oral once PRN  glucagon  Injectable 1 milliGRAM(s) IntraMuscular once PRN  melatonin 1 milliGRAM(s) Oral at bedtime PRN    VITALS:   T(F): 97  HR: 62  BP: 100/52  RR: 18  SpO2: 100%    PHYSICAL EXAM:    GENERAL: NAD  HEAD:  Atraumatic, Normocephalic  EYES: EOMI  NECK: Supple  NERVOUS SYSTEM: grossly intact, limited right arm motion due to chest tube  CHEST/LUNG: decreased bilateral air entry, clear kungs  HEART: Regular rate and rhythm; No murmurs, rubs, or gallops  ABDOMEN: Soft, Nontender, distended; Bowel sounds present  EXTREMITIES:  2+ Peripheral Pulses, No clubbing, cyanosis, or edema, left AVF with + thrill   LYMPH: No lymphadenopathy noted  SKIN: stasis dermatitis bilateral legs, bilateral mild pitting edema      LABS:                        10.2   7.93  )-----------( 164      ( 25 Aug 2020 06:18 )             33.7     08-25    134<L>  |  93<L>  |  77<HH>  ----------------------------<  122<H>  4.1   |  22  |  9.3<HH>    Ca    8.9      25 Aug 2020 06:18  Phos  4.7     08-24  Mg     2.3     08-24    TPro  6.8  /  Alb  3.8  /  TBili  0.8  /  DBili  x   /  AST  20  /  ALT  13  /  AlkPhos  149<H>  08-24              CARDIAC MARKERS ( 24 Aug 2020 07:33 )  x     / 0.46 ng/mL / x     / x     / x          RADIOLOGY:    < from: Transthoracic Echocardiogram (08.19.20 @ 06:56) >   1. Left ventricular ejection fraction, by visual estimation, is 35 to 40%.   2. Left atrial enlargement.   3. LV Normal size and thickness. Septum and apex with severe hypokinesis. Anterior wall not well visualized, but appears hypokinetic.   4. Mildly enlarged right ventricle.   5. Right atrial enlargement.   6. Mild-moderate tricuspid regurgitation.   7. Dilatation of the aortic root.   8. Estimated pulmonary artery systolic pressure is 60.8mmHg assuming a right atrial pressure of 15 mmHg, which is consistent with severe pulmonary hypertension.    < from: Xray Chest 1 View- PORTABLE-Routine (08.24.20 @ 05:53) >  Unchanged right hydropneumothorax. Stable bilateral opacities.    < from: CT Abdomen and Pelvis w/ Oral Cont (08.21.20 @ 04:49) >  1. No evidence of pneumoperitoneum or oral contrast leak.    2. Interval placement of right pigtail catheter in the pleural space.  Partially imaged known right pneumothorax, with marked decrease in the loculated right pleural effusion, with numerous septa noted. Right lower lobe consolidation. Correlate for right lower lobe pneumonia with empyema, in the appropriate clinical setting.    3. Circumferential urinary bladder wall thickening and surrounding inflammatory changes; correlation with urinalysis recommended to evaluate for cystitis. 24H events:  Patient is a 69y old Male who presents with a chief complaint of SOB found to have right pleural effusion for which he underwent chest tube drainage.  Chest tube drain 650 cc over 24 hrs  Patient is complaining of chest and back discomfort related to chest tube  Shortness of breath has improved  No abdominal pain, tolerating thin liquids  No major event overnight    PAST MEDICAL & SURGICAL HISTORY  Heart failure with reduced ejection fraction  CAD (coronary artery disease): s/p PCI and AICD placement  BPH (benign prostatic hyperplasia)  Type 2 diabetes mellitus  End stage renal disease  Dyslipidemia  Hypertension  No significant past surgical history    SOCIAL HISTORY:  Social History:  Former 2 pack a day smoker of unknown duration  Drinks one glass of wine a night  No drug use (17 Aug 2020 14:27)      ALLERGIES:  No Known Allergies    MEDICATIONS:  STANDING MEDICATIONS  aspirin  chewable 81 milliGRAM(s) Oral daily  atorvastatin 80 milliGRAM(s) Oral at bedtime  BACItracin   Ointment 1 Application(s) Topical daily  calcium acetate 667 milliGRAM(s) Oral three times a day with meals  chlorhexidine 4% Liquid 1 Application(s) Topical <User Schedule>  dextrose 5%. 1000 milliLiter(s) IV Continuous <Continuous>  dextrose 50% Injectable 12.5 Gram(s) IV Push once  dextrose 50% Injectable 25 Gram(s) IV Push once  dextrose 50% Injectable 25 Gram(s) IV Push once  heparin   Injectable 5000 Unit(s) SubCutaneous every 12 hours  insulin glargine Injectable (LANTUS) 22 Unit(s) SubCutaneous at bedtime  insulin lispro (HumaLOG) corrective regimen sliding scale   SubCutaneous three times a day before meals  insulin lispro Injectable (HumaLOG) 8 Unit(s) SubCutaneous three times a day before meals     meropenem  IVPB 500 milliGRAM(s) IV Intermittent every 24 hours  midodrine 10 milliGRAM(s) Oral every 8 hours  multivitamin/minerals 1 Tablet(s) Oral daily  pantoprazole    Tablet 40 milliGRAM(s) Oral before breakfast  polyethylene glycol 3350 17 Gram(s) Oral daily  senna 2 Tablet(s) Oral at bedtime  simethicone 80 milliGRAM(s) Chew four times a day  tamsulosin 0.8 milliGRAM(s) Oral at bedtime    PRN MEDICATIONS  acetaminophen   Tablet .. 650 milliGRAM(s) Oral every 6 hours PRN  dextrose 40% Gel 15 Gram(s) Oral once PRN  glucagon  Injectable 1 milliGRAM(s) IntraMuscular once PRN  melatonin 1 milliGRAM(s) Oral at bedtime PRN    VITALS:   T(F): 97  HR: 62  BP: 100/52  RR: 18  SpO2: 100%    PHYSICAL EXAM:    GENERAL: NAD  HEAD:  Atraumatic, Normocephalic  EYES: EOMI  NECK: Supple  NERVOUS SYSTEM: grossly intact, limited right arm motion due to chest tube  CHEST/LUNG: decreased bilateral air entry, clear kungs  HEART: Regular rate and rhythm; No murmurs, rubs, or gallops  ABDOMEN: Soft, Nontender, distended; Bowel sounds present  EXTREMITIES:  2+ Peripheral Pulses, No clubbing, cyanosis, or edema, left AVF with + thrill   LYMPH: No lymphadenopathy noted  SKIN: stasis dermatitis bilateral legs, bilateral mild pitting edema      LABS:                        10.2   7.93  )-----------( 164      ( 25 Aug 2020 06:18 )             33.7     08-25    134<L>  |  93<L>  |  77<HH>  ----------------------------<  122<H>  4.1   |  22  |  9.3<HH>    Ca    8.9      25 Aug 2020 06:18  Phos  4.7     08-24  Mg     2.3     08-24    TPro  6.8  /  Alb  3.8  /  TBili  0.8  /  DBili  x   /  AST  20  /  ALT  13  /  AlkPhos  149<H>  08-24      CARDIAC MARKERS ( 24 Aug 2020 07:33 )  x     / 0.46 ng/mL / x     / x     / x          RADIOLOGY:    < from: Transthoracic Echocardiogram (08.19.20 @ 06:56) >   1. Left ventricular ejection fraction, by visual estimation, is 35 to 40%.   2. Left atrial enlargement.   3. LV Normal size and thickness. Septum and apex with severe hypokinesis. Anterior wall not well visualized, but appears hypokinetic.   4. Mildly enlarged right ventricle.   5. Right atrial enlargement.   6. Mild-moderate tricuspid regurgitation.   7. Dilatation of the aortic root.   8. Estimated pulmonary artery systolic pressure is 60.8mmHg assuming a right atrial pressure of 15 mmHg, which is consistent with severe pulmonary hypertension.    < from: Xray Chest 1 View- PORTABLE-Routine (08.24.20 @ 05:53) >  Unchanged right hydropneumothorax. Stable bilateral opacities.    < from: CT Abdomen and Pelvis w/ Oral Cont (08.21.20 @ 04:49) >  1. No evidence of pneumoperitoneum or oral contrast leak.    2. Interval placement of right pigtail catheter in the pleural space.  Partially imaged known right pneumothorax, with marked decrease in the loculated right pleural effusion, with numerous septa noted. Right lower lobe consolidation. Correlate for right lower lobe pneumonia with empyema, in the appropriate clinical setting.    3. Circumferential urinary bladder wall thickening and surrounding inflammatory changes; correlation with urinalysis recommended to evaluate for cystitis.

## 2020-08-25 NOTE — PROGRESS NOTE ADULT - ASSESSMENT
will continue to follow   call ct surgery team as needed No surgical intervention at this time.  Can sign off from CT Surgery [facility]  Please do not hesitate to recall if needed.

## 2020-08-25 NOTE — PROGRESS NOTE ADULT - SUBJECTIVE AND OBJECTIVE BOX
Nephrology progress note  Patient is seen and examined, events over the last 24 h noted .  Chest tube out     Allergies:  No Known Allergies    Hospital Medications:   MEDICATIONS  (STANDING):    aspirin  chewable 81 milliGRAM(s) Oral daily  atorvastatin 80 milliGRAM(s) Oral at bedtime  BACItracin   Ointment 1 Application(s) Topical daily  calcium acetate 667 milliGRAM(s) Oral three times a day with meals  dextrose 5%. 1000 milliLiter(s) (50 mL/Hr) IV Continuous <Continuous>  heparin   Injectable 5000 Unit(s) SubCutaneous every 12 hours  insulin glargine Injectable (LANTUS) 22 Unit(s) SubCutaneous at bedtime  insulin lispro (HumaLOG) corrective regimen sliding scale   SubCutaneous three times a day before meals  insulin lispro Injectable (HumaLOG) 8 Unit(s) SubCutaneous three times a day before meals   meropenem  IVPB 500 milliGRAM(s) IV Intermittent every 24 hours  midodrine 10 milliGRAM(s) Oral every 8 hours  multivitamin/minerals 1 Tablet(s) Oral daily  pantoprazole    Tablet 40 milliGRAM(s) Oral before breakfast  polyethylene glycol 3350 17 Gram(s) Oral daily  senna 2 Tablet(s) Oral at bedtime  simethicone 80 milliGRAM(s) Chew four times a day  tamsulosin 0.8 milliGRAM(s) Oral at bedtime        VITALS:  T(F): 97 (08-25-20 @ 05:48), Max: 98.3 (08-24-20 @ 19:23)  HR: 62 (08-25-20 @ 08:25)  BP: 100/52 (08-25-20 @ 08:25)  RR: 18 (08-25-20 @ 08:25)  SpO2: 100% (08-25-20 @ 08:25)      08-23 @ 07:01  -  08-24 @ 07:00  --------------------------------------------------------  IN: 0 mL / OUT: 140 mL / NET: -140 mL    08-24 @ 07:01  -  08-25 @ 07:00  --------------------------------------------------------  IN: 360 mL / OUT: 0 mL / NET: 360 mL          PHYSICAL EXAM:  Constitutional: NAD  Neck: No JVD  Respiratory: CTAB, no wheezes, rales or rhonchi  Cardiovascular: S1, S2, RRR  Gastrointestinal: BS+, soft, NT/ND  Extremities: No cyanosis or clubbing. No peripheral edema  :  No crook.   Skin: No rashes    LABS:    08-25    134<L>  |  93<L>  |  77<HH>  ----------------------------<  122<H>  4.1   |  22  |  9.3<HH>    Ca    8.9      25 Aug 2020 06:18  Phos  4.7     08-24  Mg     2.3     08-24    TPro  6.8  /  Alb  3.8  /  TBili  0.8  /  DBili      /  AST  20  /  ALT  13  /  AlkPhos  149<H>  08-24                          10.2   7.93  )-----------( 164      ( 25 Aug 2020 06:18 )             33.7       Urine Studies:      RADIOLOGY & ADDITIONAL STUDIES:

## 2020-08-25 NOTE — PROGRESS NOTE ADULT - ASSESSMENT
68 y/o M with PMH of ESRD on HD MWF, CAD s/p pci with 7 stents and AICD placement in 2018, Heart failure with reduced EF, DLD, HTN, COPD? on 2L home O2, DM II (well controlled with HbA1C in the 6-7 range), Afib on Eliquis (diagnosed in february) and BPH presents to the ED for SOB that has been worsening over the last 2 weeks.    #R sided pleural effusion  #Acute on chronic hypercapnic respiratory failure   #Trapped Lung    -Missed one HD session s/p emergent HD in the ED for volume overload w/ 4 liters removed  -CXR 8/17/2020: Worsening right-sided pleural effusion with increased left lower lung airspace opacities  -CT 8/18/2020: Large right pleural effusion with near complete collapse of the right lung. Large component of this effusion is subpulmonic. Partially loculated anteriorly at the apex.  -s/p pigtail catheter placement by IR on 08/19 > Exudative. Suspicion of underlying mass/malignancy.  - chest tube will be removed by IR  - Patient is stable on NC 2 L didn't need bipap yesterday    - Starting Merrem due to concern for infection due to elevated WBC and latest CT result since 08/22  - c/w midodrine   - f/u cytology and flow  - Pleural fluid : cultures negative cell count 994 N56% L 36%    #Abdominal distension #Constipation  -senna  -miralax    #ESRD, dialysis dependent   -HD MWF, nephro following  - Tomorrow dialysis     #HFrEF  -TTE 8/19/2020: EF 35-40%, severe Pulm HTN\par-Anuric    #CAD s/p PCI with 7 stents  #Elevated Troponins  -trop elevated on admission  -EKG: no ST changes noted  -Patient not c/o chest pain  - likely from ESRD  -c/w aspirin    #Paroxysmal Afib  -Diagnosed in February  -on Eliquis at home, held on admission due to need for pigtail placement  -Currently normal rate and regular rhythm  - Holding metoprolol due to HoTN  - keep holding Eliquis     #DM II- uncontrolled  -A1c 7.8  -c/w basal/bolus insulin regimen    #HTN: holding home losartan    #DLD: c/w statin    #BPH: c/w Flomax    DVT ppx: Heparin subq, home Eliquis held  GI ppx: Protonix  Diet: Dysphagia 1   Dispo: Downgrade to stepdown unit   DNR/DNI    Attending Attestation  Pt seen and examined. Case and Plan discussed and agree with above note as reviewed.  Will follow 70 y/o M with PMH of ESRD on HD MWF, CAD s/p pci with 7 stents and AICD placement in 2018, Heart failure with reduced EF, DLD, HTN, COPD? on 2L home O2, DM II (well controlled with HbA1C in the 6-7 range), Afib on Eliquis (diagnosed in february) and BPH presents to the ED for SOB that has been worsening over the last 2 weeks.    #R sided loculated pleural effusion  #Acute on chronic hypercapnic respiratory failure   #Trapped Lung    -Missed one HD session s/p emergent HD in the ED for volume overload w/ 4 liters removed  -CXR 8/17/2020: Worsening right-sided pleural effusion with increased left lower lung airspace opacities  -CT 8/18/2020: Large right pleural effusion with near complete collapse of the right lung. Large component of this effusion is subpulmonic. Partially loculated anteriorly at the apex.  -s/p pigtail catheter placement by IR on 08/19 > Exudative. Suspicion of underlying mass/malignancy.  - chest tube will be removed by IR  - Patient is stable on NC 2 L didn't need bipap yesterday    - Starting Merrem due to concern for infection due to elevated WBC and latest CT result since 08/22  - c/w midodrine   - f/u cytology and flow  - Pleural fluid : cultures negative cell count 994 N56% L 36%    #Abdominal distension #Constipation  -senna  -miralax    #ESRD, dialysis dependent   -HD MWF, nephro following  - Tomorrow dialysis     #HFrEF  -TTE 8/19/2020: EF 35-40%, severe Pulm HTN\par-Anuric    #CAD s/p PCI with 7 stents  #Elevated Troponins  -trop elevated on admission  -EKG: no ST changes noted  -Patient not c/o chest pain  - likely from ESRD  -c/w aspirin    #Paroxysmal Afib  -Diagnosed in February  -on Eliquis at home, held on admission due to need for pigtail placement  -Currently normal rate and regular rhythm  - Holding metoprolol due to HoTN  - keep holding Eliquis     #DM II- uncontrolled  -A1c 7.8  -c/w basal/bolus insulin regimen    #HTN: holding home losartan    #DLD: c/w statin    #BPH: c/w Flomax    DVT ppx: Heparin subq, home Eliquis held  GI ppx: Protonix  Diet: Dysphagia 1   Dispo: Downgrade to stepdown unit   DNR/DNI    Attending Attestation  Pt seen and examined. Case and Plan discussed and agree with above note:  Acutely pt is ICU Status downgrade as continues to improve overall.   Today hypotensive SBP 80-90's lowest and started on Midodrine / No signs of sepsis however pt on Meropenem 500mg IV q24h.  ESRD - For HD today am / Nephrology following   Rt Loculated Pleural Effusion s/p CT s/p CT Sx care / Trapped Lung / Hypercapnic Resp Failure - resolved and Pulmonary following   Pt would benefit for [facility] however family wants pt to go home however PT will re-evaluate again tomorrow for disposition plan  c/w all other care and meds    MEDICATIONS  (STANDING):  aspirin  chewable 81 milliGRAM(s) Oral daily  atorvastatin 80 milliGRAM(s) Oral at bedtime  BACItracin   Ointment 1 Application(s) Topical daily  calcium acetate 667 milliGRAM(s) Oral three times a day with meals  chlorhexidine 4% Liquid 1 Application(s) Topical <User Schedule>  dextrose 5%. 1000 milliLiter(s) (50 mL/Hr) IV Continuous <Continuous>  dextrose 50% Injectable 12.5 Gram(s) IV Push once  dextrose 50% Injectable 25 Gram(s) IV Push once  dextrose 50% Injectable 25 Gram(s) IV Push once  heparin   Injectable 5000 Unit(s) SubCutaneous every 12 hours  insulin glargine Injectable (LANTUS) 22 Unit(s) SubCutaneous at bedtime  insulin lispro (HumaLOG) corrective regimen sliding scale   SubCutaneous three times a day before meals  insulin lispro Injectable (HumaLOG) 8 Unit(s) SubCutaneous three times a day before meals      meropenem  IVPB 500 milliGRAM(s) IV Intermittent every 24 hours  midodrine 10 milliGRAM(s) Oral every 8 hours  multivitamin/minerals 1 Tablet(s) Oral daily  pantoprazole    Tablet 40 milliGRAM(s) Oral before breakfast  polyethylene glycol 3350 17 Gram(s) Oral daily  senna 2 Tablet(s) Oral at bedtime  simethicone 80 milliGRAM(s) Chew four times a day  tamsulosin 0.8 milliGRAM(s) Oral at bedtime    MEDICATIONS  (PRN):  acetaminophen   Tablet .. 650 milliGRAM(s) Oral every 6 hours PRN Mild Pain (1 - 3)  dextrose 40% Gel 15 Gram(s) Oral once PRN Blood Glucose LESS THAN 70 milliGRAM(s)/deciliter  glucagon  Injectable 1 milliGRAM(s) IntraMuscular once PRN Glucose LESS THAN 70 milligrams/deciliter  melatonin 1 milliGRAM(s) Oral at bedtime PRN Insomnia    Activity as tolerated    DNR/DNI    Dispo: TBD [facility] vs Home w/ [facility]NS / PT to re-eval tomorrow

## 2020-08-25 NOTE — PROGRESS NOTE ADULT - ASSESSMENT
69M, PMH of ESRD on HD MWF, COPD, HFrEF, admitted for SOB.      #ESRD on HD  - for HD today 3h opti 160 2k UF 3l as tolerated   - IP noted, on phoslo keep for now   - hgb noted, no YASMINE  yet   - BP noted , on midodrine   #SOB - sp CT of chest with loculated effusion sp  thoracentesis/ s/p pig tail / sp Pneumothorax/ CTS and pulm on case on merrem fr now     # will follow

## 2020-08-25 NOTE — PROGRESS NOTE ADULT - SUBJECTIVE AND OBJECTIVE BOX
OVERNIGHT EVENTS: events noted, N, this am, pigtail dc    Vital Signs Last 24 Hrs  T(C): 36.1 (25 Aug 2020 05:48), Max: 36.8 (24 Aug 2020 19:23)  T(F): 97 (25 Aug 2020 05:48), Max: 98.3 (24 Aug 2020 19:23)  HR: 62 (25 Aug 2020 08:25) (60 - 62)  BP: 100/52 (25 Aug 2020 08:25) (80/46 - 113/56)  RR: 18 (25 Aug 2020 08:25) (18 - 20)  SpO2: 100% (25 Aug 2020 08:25) (94% - 100%)    PHYSICAL EXAMINATION:    GENERAL: ill looking    HEENT: Head is normocephalic and atraumatic. Extraocular muscles are intact. Mucous membranes are moist.    NECK: Supple.    LUNGS: dec bs r side    HEART: BERTIN 3/6    ABDOMEN: Soft, nontender, and nondistended.      EXTREMITIES: Without any cyanosis, clubbing, rash, lesions or edema.    NEUROLOGIC: Grossly intact.    SKIN: No ulceration or induration present.      LABS:                        10.2   7.93  )-----------( 164      ( 25 Aug 2020 06:18 )             33.7     08-25    134<L>  |  93<L>  |  77<HH>  ----------------------------<  122<H>  4.1   |  22  |  9.3<HH>    Ca    8.9      25 Aug 2020 06:18  Phos  4.7     08-24  Mg     2.3     08-24    TPro  6.8  /  Alb  3.8  /  TBili  0.8  /  DBili  x   /  AST  20  /  ALT  13  /  AlkPhos  149<H>  08-24          CARDIAC MARKERS ( 24 Aug 2020 07:33 )  x     / 0.46 ng/mL / x     / x     / x                      08-24-20 @ 07:01  -  08-25-20 @ 07:00  --------------------------------------------------------  IN: 360 mL / OUT: 0 mL / NET: 360 mL        MICROBIOLOGY:      MEDICATIONS  (STANDING):  aspirin  chewable 81 milliGRAM(s) Oral daily  atorvastatin 80 milliGRAM(s) Oral at bedtime  BACItracin   Ointment 1 Application(s) Topical daily  calcium acetate 667 milliGRAM(s) Oral three times a day with meals  chlorhexidine 4% Liquid 1 Application(s) Topical <User Schedule>  dextrose 5%. 1000 milliLiter(s) (50 mL/Hr) IV Continuous <Continuous>  dextrose 50% Injectable 12.5 Gram(s) IV Push once  dextrose 50% Injectable 25 Gram(s) IV Push once  dextrose 50% Injectable 25 Gram(s) IV Push once  heparin   Injectable 5000 Unit(s) SubCutaneous every 12 hours  insulin glargine Injectable (LANTUS) 22 Unit(s) SubCutaneous at bedtime  insulin lispro (HumaLOG) corrective regimen sliding scale   SubCutaneous three times a day before meals  insulin lispro Injectable (HumaLOG) 8 Unit(s) SubCutaneous three times a day before meals  meropenem  IVPB      meropenem  IVPB 500 milliGRAM(s) IV Intermittent every 24 hours  midodrine 10 milliGRAM(s) Oral every 8 hours  multivitamin/minerals 1 Tablet(s) Oral daily  pantoprazole    Tablet 40 milliGRAM(s) Oral before breakfast  polyethylene glycol 3350 17 Gram(s) Oral daily  senna 2 Tablet(s) Oral at bedtime  simethicone 80 milliGRAM(s) Chew four times a day  tamsulosin 0.8 milliGRAM(s) Oral at bedtime    MEDICATIONS  (PRN):  acetaminophen   Tablet .. 650 milliGRAM(s) Oral every 6 hours PRN Mild Pain (1 - 3)  dextrose 40% Gel 15 Gram(s) Oral once PRN Blood Glucose LESS THAN 70 milliGRAM(s)/deciliter  glucagon  Injectable 1 milliGRAM(s) IntraMuscular once PRN Glucose LESS THAN 70 milligrams/deciliter  melatonin 1 milliGRAM(s) Oral at bedtime PRN Insomnia      RADIOLOGY & ADDITIONAL STUDIES:

## 2020-08-25 NOTE — PROGRESS NOTE ADULT - SUBJECTIVE AND OBJECTIVE BOX
ADRIANNA GAINES  69y Male   [id number]    Hospital Day: 9  Post Operative Day:  Procedure:s/p right pigtail placement   Patient is a 69y old  Male who presents with a chief complaint of SOB (24 Aug 2020 13:19)    PAST MEDICAL & SURGICAL HISTORY:  Heart failure with reduced ejection fraction  CAD (coronary artery disease): s/p PCI and AICD placement  BPH (benign prostatic hyperplasia)  Type 2 diabetes mellitus  End stage renal disease  Dyslipidemia  Hypertension  No significant past surgical history      Events of the Last 24h:pigtail removed   Vital Signs Last 24 Hrs  T(C): 36.6 (24 Aug 2020 21:07), Max: 36.8 (24 Aug 2020 19:23)  T(F): 97.8 (24 Aug 2020 21:07), Max: 98.3 (24 Aug 2020 19:23)  HR: 60 (24 Aug 2020 21:07) (60 - 60)  BP: 102/53 (24 Aug 2020 21:07) (93/52 - 113/56)  BP(mean): --  RR: 20 (24 Aug 2020 21:07) (18 - 20)  SpO2: 94% (24 Aug 2020 21:07) (94% - 98%)        Diet, Dysphagia 1 Pureed-Thin Liquids (20 @ 09:29)      I&O's Summary    23 Aug 2020 07:  -  24 Aug 2020 07:00  --------------------------------------------------------  IN: 0 mL / OUT: 140 mL / NET: -140 mL    24 Aug 2020 07:  -  25 Aug 2020 01:37  --------------------------------------------------------  IN: 360 mL / OUT: 0 mL / NET: 360 mL     I&O's Detail    23 Aug 2020 07:  -  24 Aug 2020 07:00  --------------------------------------------------------  IN:  Total IN: 0 mL    OUT:    Chest Tube: 140 mL  Total OUT: 140 mL    Total NET: -140 mL      24 Aug 2020 07:01  -  25 Aug 2020 01:37  --------------------------------------------------------  IN:    Oral Fluid: 360 mL  Total IN: 360 mL    OUT:  Total OUT: 0 mL    Total NET: 360 mL          MEDICATIONS  (STANDING):  aspirin  chewable 81 milliGRAM(s) Oral daily  atorvastatin 80 milliGRAM(s) Oral at bedtime  BACItracin   Ointment 1 Application(s) Topical daily  calcium acetate 667 milliGRAM(s) Oral three times a day with meals  chlorhexidine 4% Liquid 1 Application(s) Topical <User Schedule>  dextrose 5%. 1000 milliLiter(s) (50 mL/Hr) IV Continuous <Continuous>  dextrose 50% Injectable 12.5 Gram(s) IV Push once  dextrose 50% Injectable 25 Gram(s) IV Push once  dextrose 50% Injectable 25 Gram(s) IV Push once  heparin   Injectable 5000 Unit(s) SubCutaneous every 12 hours  insulin glargine Injectable (LANTUS) 22 Unit(s) SubCutaneous at bedtime  insulin lispro (HumaLOG) corrective regimen sliding scale   SubCutaneous three times a day before meals  insulin lispro Injectable (HumaLOG) 8 Unit(s) SubCutaneous three times a day before meals  meropenem  IVPB      meropenem  IVPB 500 milliGRAM(s) IV Intermittent every 24 hours  midodrine 10 milliGRAM(s) Oral every 8 hours  multivitamin/minerals 1 Tablet(s) Oral daily  pantoprazole    Tablet 40 milliGRAM(s) Oral before breakfast  polyethylene glycol 3350 17 Gram(s) Oral daily  senna 2 Tablet(s) Oral at bedtime  simethicone 80 milliGRAM(s) Chew four times a day  tamsulosin 0.8 milliGRAM(s) Oral at bedtime    MEDICATIONS  (PRN):  acetaminophen   Tablet .. 650 milliGRAM(s) Oral every 6 hours PRN Mild Pain (1 - 3)  dextrose 40% Gel 15 Gram(s) Oral once PRN Blood Glucose LESS THAN 70 milliGRAM(s)/deciliter  glucagon  Injectable 1 milliGRAM(s) IntraMuscular once PRN Glucose LESS THAN 70 milligrams/deciliter  melatonin 1 milliGRAM(s) Oral at bedtime PRN Insomnia  traMADol 25 milliGRAM(s) Oral every 6 hours PRN Moderate Pain (4 - 6)      PHYSICAL EXAM:    GENERAL: NAD    HEENT: NCAT    CHEST/LUNGS: CTAB    HEART: RRR,  No murmurs, rubs, or gallops    ABDOMEN: SNTND +BS    EXTREMITIES:  FROM, No clubbing, cyanosis, or edema, palpable pulse    NEURO: No focal neurological deficits    SKIN: No rashes or lesions    INCISION/WOUNDS:                          10.5   10.04 )-----------( 164      ( 24 Aug 2020 07:33 )             34.1        CBC Full  -  ( 24 Aug 2020 07:33 )  WBC Count : 10.04 K/uL  RBC Count : 3.37 M/uL  Hemoglobin : 10.5 g/dL  Hematocrit : 34.1 %  Platelet Count - Automated : 164 K/uL  Mean Cell Volume : 101.2 fL  Mean Cell Hemoglobin : 31.2 pg  Mean Cell Hemoglobin Concentration : 30.8 g/dL  Auto Neutrophil # : 7.06 K/uL  Auto Lymphocyte # : 1.18 K/uL  Auto Monocyte # : 1.21 K/uL  Auto Eosinophil # : 0.45 K/uL  Auto Basophil # : 0.04 K/uL  Auto Neutrophil % : 70.2 %  Auto Lymphocyte % : 11.8 %  Auto Monocyte % : 12.1 %  Auto Eosinophil % : 4.5 %  Auto Basophil % : 0.4 %               129   |  88    |  58                 Ca: 8.8    BMP:   ----------------------------< 201    M.3   (20 @ 07:33)             5.3    |  21    | 7.2                Ph: 4.7      LFT:     TPro: 6.8 / Alb: 3.8 / TBili: 0.8 / DBili: x / AST: 20 / ALT: 13 / AlkPhos: 149   (20 @ 07:33)    LIVER FUNCTIONS - ( 24 Aug 2020 07:33 )  Alb: 3.8 g/dL / Pro: 6.8 g/dL / ALK PHOS: 149 U/L / ALT: 13 U/L / AST: 20 U/L / GGT: x             CARDIAC MARKERS ( 24 Aug 2020 07:33 )  x     / 0.46 ng/mL / x     / x     / x            < from: Xray Chest 1 View- PORTABLE-Urgent (20 @ 13:27) >  Impression:    Interval removal of the right pigtail catheter. Right hydropneumothorax with increased right basilar lucency.    Unchanged bilateral opacities.    < end of copied text >

## 2020-08-25 NOTE — PROGRESS NOTE ADULT - ASSESSMENT
Impression:    Chronic right sided effusion SP Chest tube/ trapped lung. S/P dc pigatil  RUL loculated effusion new  Acute on chronic hypercapnic  Respiratory failure  HO [place] non compliant with CPAP  HF REF   Hypotension no evidence of sepsis        PLAN:     CNS: No depressants     HEENT: Oral care    PULMONARY:  HOB @ 45 degrees.  Aspiration precautions.  NIV during sleep adn PRN during the day.  f.up CXR    CARDIOVASCULAR:  Avoid volume overload.     GI: GI prophylaxis.  Feeding     RENAL:  Follow up lytes.  Correct as needed.  JORDAN renal     INFECTIOUS DISEASE: Follow up cultures.  finish course, procal    HEMATOLOGICAL:  DVT prophylaxis. LE doppler    ENDOCRINE:  Follow up FS.  Insulin protocol if needed. cortisol level    MUSCULOSKELETAL:  Bed rest.      CEU    DW daughter

## 2020-08-26 NOTE — CONSULT NOTE ADULT - ASSESSMENT
Consult Summary    Patient is a 69 yr old male with PMH of ESRD on HD MWF, CAD s/p pci with 7 stents and AICD placement in 2018, Heart failure with reduced EF, DLD, HTN, COPD? on 2L home O2, DM II (well controlled with HbA1C in the 6-7 range), Afib on Eliquis (diagnosed in february) and BPH admitted with R pleural effusion s/p drain placement.     Family asked for possible palliative care at home, possible goals of care meeting with team to discuss condition and options going forward for the patient.     Morphine Equivalent Daily Dose (MEDD): 0      Recommendations:  DNR/DNI  Awaiting family to let us know time of family meeting for GOC discussion  Ongoing medical management   Palliative will follow       Please Call [phone] PRN Consult Summary    Patient is a 69 yr old male with PMH of ESRD on HD MWF, CAD s/p pci with 7 stents and AICD placement in 2018, Heart failure with reduced EF, DLD, HTN, COPD? on 2L home O2, DM II (well controlled with HbA1C in the 6-7 range), Afib on Eliquis (diagnosed in february) and BPH admitted with R pleural effusion s/p drain placement.     Family asked for possible palliative care at home, possible goals of care meeting with team to discuss condition and options going forward for the patient.     Morphine Equivalent Daily Dose (MEDD): 0    Recommendations:  DNR/DNI  Awaiting family to let us know time of family meeting for GOC discussion  Ongoing medical management   Palliative will follow     Time spent discussing ACP 25 minutes   Please Call [phone] PRN

## 2020-08-26 NOTE — PROGRESS NOTE ADULT - ASSESSMENT
69M, PMH of ESRD on HD MWF, COPD, HFrEF, admitted for SOB.      #ESRD on HD  - sp HD yesterday could not UF because of low BP  - IP noted, on phoslo keep for now   - hgb noted, no YASMINE  yet   - BP noted , on midodrine continue / has severe valvular disease and pulm HTN   #SOB - sp CT of chest with loculated effusion sp  thoracentesis/ s/p pig tail / sp Pneumothorax/ CTS and pulm on case on merrem for now     # will follow

## 2020-08-26 NOTE — CONSULT NOTE ADULT - SUBJECTIVE AND OBJECTIVE BOX
REQUESTED OF: DR. MICHELLE    Chart reviewed, Hospital Day 9    ADRIANNA GAINES 69y Male  HPI:  68 y/o M with PMH of ESRD on HD MWF, CAD s/p pci with 7 stents and AICD placement in 2018, Heart failure with reduced EF (26% in 2018), DLD, HTN, COPD? on 2L home O2, DM II (well controlled with HbA1C in the 6-7 range), and BPH presents to the ED for SOB that has been worsening over the last 2 weeks. As per patient's daughter patient has had multiple friends pass away in the last few months and since then has complained that he needs home O2 (Daughter thinks this is anxiety driven). Patient's daughter reports that patient saturates in the low 90s at home without O2 and saturates at 100% on 2L. Two week ago he developed hypotension while at dialysis. He refused to go the hospital at that time. Since then he reports that his breathing has gotten worse and he requires more O2. A few days ago he requested that his daughter raise his home O2 to 3L. Before 2 week sago he was able to walk around the house without O2 but over the last two weeks he has required O2 to move around his house. He reports that his SOB is worse with walking and better when he sits down. He reports a dry cough at baseline but denies any chest pain, sore throat, wheezing, weight changes, fatigue, dizziness, or changes in urination. No travel history and no one at home is sick. At baseline patient walks with a cane. He lives with his daughter and she takes care of his medication for him. He sleeps on a recliner due to the HF and wears compression stockings. He is oliguric and urinates "a few drops" approximately 2 times a week.    In the ED vitals were stable and patient is breathing well on 3L NC. Labs were significant for elevated potassium and troponins likely secondary to ESRD. Chest xray reveals worsening right-sided pleural effusion with increased left lower lung airspace opacities. Nephrology was consulted and patient was dialyzed in the ED (17 Aug 2020 14:27)        PAST MEDICAL & SURGICAL HISTORY:  Heart failure with reduced ejection fraction  CAD (coronary artery disease): s/p PCI and AICD placement  BPH (benign prostatic hyperplasia)  Type 2 diabetes mellitus  End stage renal disease  Dyslipidemia  Hypertension  No significant past surgical history      Subjective and Objective:  Today,  Discussed with resident     Focused Palliative Care Evaluation: patient sleeping during exam, unable to review systems with patient                   Symptoms:                                      Pain                                     Dyspnea                                     N/V                                     Appetite                                     Anxiety                                     Other _____________________                     Support Devices: nasal cannula             PHYSICAL EXAM:      Constitutional: NAD, sleeping in bed, appears stated age    Respiratory: No distress    Extremities: No upper extremity swelling, WNL    Neurological: Unable to assess, asleep    Skin: See nursing assessment    Musculoskeletal: moving all extremities    Psychiatric: unable to assess       T(C): 36.3, Max: 36.3 (22:06)  HR: 60 (60 - 65)  BP: 104/60 (103/52 - 112/56)  RR: 18 (18 - 18)  SpO2: 98% (98% - 99%)      LABS/STUDIES:  08-26    140  |  100  |  39<H>  ----------------------------<  161<H>  3.8   |  29  |  6.2<HH>    Ca    8.6      26 Aug 2020 06:24  Phos  7.0     08-26    TPro  x   /  Alb  3.6  /  TBili  x   /  DBili  x   /  AST  x   /  ALT  x   /  AlkPhos  x   08-26                            9.7    7.75  )-----------( 152      ( 26 Aug 2020 06:24 )             32.6       MEDICATIONS  (STANDING):  apixaban 2.5 milliGRAM(s) Oral every 12 hours  aspirin  chewable 81 milliGRAM(s) Oral daily  atorvastatin 80 milliGRAM(s) Oral at bedtime  BACItracin   Ointment 1 Application(s) Topical daily  calcium acetate 667 milliGRAM(s) Oral three times a day with meals  chlorhexidine 4% Liquid 1 Application(s) Topical <User Schedule>  dextrose 5%. 1000 milliLiter(s) (50 mL/Hr) IV Continuous <Continuous>  dextrose 50% Injectable 12.5 Gram(s) IV Push once  dextrose 50% Injectable 25 Gram(s) IV Push once  dextrose 50% Injectable 25 Gram(s) IV Push once  insulin glargine Injectable (LANTUS) 22 Unit(s) SubCutaneous at bedtime  insulin lispro (HumaLOG) corrective regimen sliding scale   SubCutaneous three times a day before meals  meropenem  IVPB      meropenem  IVPB 500 milliGRAM(s) IV Intermittent every 24 hours  midodrine 10 milliGRAM(s) Oral every 8 hours  multivitamin/minerals 1 Tablet(s) Oral daily  pantoprazole    Tablet 40 milliGRAM(s) Oral before breakfast  polyethylene glycol 3350 17 Gram(s) Oral daily  senna 2 Tablet(s) Oral at bedtime  simethicone 80 milliGRAM(s) Chew four times a day  tamsulosin 0.8 milliGRAM(s) Oral at bedtime    MEDICATIONS  (PRN):  acetaminophen   Tablet .. 650 milliGRAM(s) Oral every 6 hours PRN Mild Pain (1 - 3)  dextrose 40% Gel 15 Gram(s) Oral once PRN Blood Glucose LESS THAN 70 milliGRAM(s)/deciliter  glucagon  Injectable 1 milliGRAM(s) IntraMuscular once PRN Glucose LESS THAN 70 milligrams/deciliter  melatonin 1 milliGRAM(s) Oral at bedtime PRN Insomnia          iStop: This report was requested by: Stephany Becerra | Reference #: [id number]        PPS  Level   40%       Note PPS = Palliative Performance Scale; (c)2001, [facility] Society       Range from 100% meaning Full ambulation/self-care/intake/Level of Consicous                                                                              to        10% meaning Bedbound/Unable to do any activity/extensive disease /Total Care/ No PO intake/ LOC=Full/drowsy/+/-confusion        (0% = death)                     Prior to acute illness, patient's functionality reportedly REQUESTED OF: DR. MICHELLE    Chart reviewed, Hospital Day 9    ADRIANNA GANIES 69y Male  HPI:  68 y/o M with PMH of ESRD on HD MWF, CAD s/p pci with 7 stents and AICD placement in 2018, Heart failure with reduced EF (26% in 2018), DLD, HTN, COPD? on 2L home O2, DM II (well controlled with HbA1C in the 6-7 range), and BPH presents to the ED for SOB that has been worsening over the last 2 weeks. As per patient's daughter patient has had multiple friends pass away in the last few months and since then has complained that he needs home O2 (Daughter thinks this is anxiety driven). Patient's daughter reports that patient saturates in the low 90s at home without O2 and saturates at 100% on 2L. Two week ago he developed hypotension while at dialysis. He refused to go the hospital at that time. Since then he reports that his breathing has gotten worse and he requires more O2. A few days ago he requested that his daughter raise his home O2 to 3L. Before 2 week sago he was able to walk around the house without O2 but over the last two weeks he has required O2 to move around his house. He reports that his SOB is worse with walking and better when he sits down. He reports a dry cough at baseline but denies any chest pain, sore throat, wheezing, weight changes, fatigue, dizziness, or changes in urination. No travel history and no one at home is sick. At baseline patient walks with a cane. He lives with his daughter and she takes care of his medication for him. He sleeps on a recliner due to the HF and wears compression stockings. He is oliguric and urinates "a few drops" approximately 2 times a week.    In the ED vitals were stable and patient is breathing well on 3L NC. Labs were significant for elevated potassium and troponins likely secondary to ESRD. Chest xray reveals worsening right-sided pleural effusion with increased left lower lung airspace opacities. Nephrology was consulted and patient was dialyzed in the ED (17 Aug 2020 14:27)    Patient was seen and examined at bedside. He was sleeping but woke up to voice. Denied pain, nausea, vomiting, but c/o fatigue and weakness. He does constipation and pending KUB. Patient stated he was tired and would like to rest.   Spoke to daughter Rozina, introduced palliative care. Daughter would like to have a family meeting to discuss further.     PAST MEDICAL & SURGICAL HISTORY:  Heart failure with reduced ejection fraction  CAD (coronary artery disease): s/p PCI and AICD placement  BPH (benign prostatic hyperplasia)  Type 2 diabetes mellitus  End stage renal disease  Dyslipidemia  Hypertension  No significant past surgical history    Subjective and Objective: As above   Today,  Discussed with resident and patient's family     Focused Palliative Care Evaluation: patient sleeping during exam, unable to review systems with patient                   Symptoms:                                      Pain         no                                     Dyspnea   no                                     N/V         no                                     Appetite  no                                     Anxiety    no                                      Other _____________________                     Support Devices: nasal cannula      PHYSICAL EXAM:    Constitutional: NAD, sleeping in bed, appears stated age    HEENT: normocephalic, atraumatic, moist mucous membranes    Eyes: conjunctiva and sclera normal     Respiratory: No distress, Decreased BS at right bases     GI: soft, mildly distended, not tender     Extremities: No upper extremity swelling, WNL    Neurological: Unable to assess, asleep    Skin: See nursing assessment    Musculoskeletal: moving all extremities    Psychiatric: awake and able to respond       T(C): 36.3, Max: 36.3 (22:06)  HR: 60 (60 - 65)  BP: 104/60 (103/52 - 112/56)  RR: 18 (18 - 18)  SpO2: 98% (98% - 99%)      LABS/STUDIES:  08-26    140  |  100  |  39<H>  ----------------------------<  161<H>  3.8   |  29  |  6.2<HH>    Ca    8.6      26 Aug 2020 06:24  Phos  7.0     08-26    TPro  x   /  Alb  3.6  /  TBili  x   /  DBili  x   /  AST  x   /  ALT  x   /  AlkPhos  x   08-26                            9.7    7.75  )-----------( 152      ( 26 Aug 2020 06:24 )             32.6       MEDICATIONS  (STANDING):  apixaban 2.5 milliGRAM(s) Oral every 12 hours  aspirin  chewable 81 milliGRAM(s) Oral daily  atorvastatin 80 milliGRAM(s) Oral at bedtime  BACItracin   Ointment 1 Application(s) Topical daily  calcium acetate 667 milliGRAM(s) Oral three times a day with meals  chlorhexidine 4% Liquid 1 Application(s) Topical <User Schedule>  dextrose 5%. 1000 milliLiter(s) (50 mL/Hr) IV Continuous <Continuous>  dextrose 50% Injectable 12.5 Gram(s) IV Push once  dextrose 50% Injectable 25 Gram(s) IV Push once  dextrose 50% Injectable 25 Gram(s) IV Push once  insulin glargine Injectable (LANTUS) 22 Unit(s) SubCutaneous at bedtime  insulin lispro (HumaLOG) corrective regimen sliding scale   SubCutaneous three times a day before meals  meropenem  IVPB      meropenem  IVPB 500 milliGRAM(s) IV Intermittent every 24 hours  midodrine 10 milliGRAM(s) Oral every 8 hours  multivitamin/minerals 1 Tablet(s) Oral daily  pantoprazole    Tablet 40 milliGRAM(s) Oral before breakfast  polyethylene glycol 3350 17 Gram(s) Oral daily  senna 2 Tablet(s) Oral at bedtime  simethicone 80 milliGRAM(s) Chew four times a day  tamsulosin 0.8 milliGRAM(s) Oral at bedtime    MEDICATIONS  (PRN):  acetaminophen   Tablet .. 650 milliGRAM(s) Oral every 6 hours PRN Mild Pain (1 - 3)  dextrose 40% Gel 15 Gram(s) Oral once PRN Blood Glucose LESS THAN 70 milliGRAM(s)/deciliter  glucagon  Injectable 1 milliGRAM(s) IntraMuscular once PRN Glucose LESS THAN 70 milligrams/deciliter  melatonin 1 milliGRAM(s) Oral at bedtime PRN Insomnia      iStop: This report was requested by: Stephany Becerra | Reference #: [id number]    PPS  Level   40%       Note PPS = Palliative Performance Scale; (c)2001, [facility] Society       Range from 100% meaning Full ambulation/self-care/intake/Level of Consicous                                                                              to        10% meaning Bedbound/Unable to do any activity/extensive disease /Total Care/ No PO intake/ LOC=Full/drowsy/+/-confusion        (0% = death)                     Prior to acute illness, patient's functionality reportedly

## 2020-08-26 NOTE — PROGRESS NOTE ADULT - SUBJECTIVE AND OBJECTIVE BOX
OVERNIGHT EVENTS: events noted, co N,. abd distended states had BM, and passed gas    Vital Signs Last 24 Hrs  T(C): 36.3 (25 Aug 2020 22:06), Max: 36.3 (25 Aug 2020 22:06)  T(F): 97.3 (25 Aug 2020 22:06), Max: 97.3 (25 Aug 2020 22:06)  HR: 62 (26 Aug 2020 05:30) (60 - 62)  BP: 103/52 (26 Aug 2020 05:30) (95/52 - 112/56)  RR: 18 (26 Aug 2020 05:30) (18 - 18)  SpO2: 99% (26 Aug 2020 05:30) (99% - 99%)    PHYSICAL EXAMINATION:    GENERAL: ill looking    HEENT: Head is normocephalic and atraumatic.     NECK: Supple.    LUNGS: Clear to auscultation without wheezing, rales or rhonchi; respirations unlabored    HEART: Regular rate and rhythm without murmur.    ABDOMEN: Soft, distended    EXTREMITIES: Without any cyanosis, clubbing, rash, lesions or edema.    NEUROLOGIC: Grossly intact.    SKIN: No ulceration or induration present.      LABS:                        9.7    7.75  )-----------( 152      ( 26 Aug 2020 06:24 )             32.6     08-26    140  |  100  |  39<H>  ----------------------------<  161<H>  3.8   |  29  |  6.2<HH>    Ca    8.6      26 Aug 2020 06:24  Phos  7.0     08-26    TPro  x   /  Alb  3.6  /  TBili  x   /  DBili  x   /  AST  x   /  ALT  x   /  AlkPhos  x   08-26          CARDIAC MARKERS ( 26 Aug 2020 06:24 )  x     / 0.36 ng/mL / x     / x     / x      CARDIAC MARKERS ( 25 Aug 2020 16:10 )  x     / 0.37 ng/mL / x     / x     / x                Procalcitonin, Serum: 0.62 ng/mL (08-25-20 @ 16:10)          MICROBIOLOGY:      MEDICATIONS  (STANDING):  apixaban 2.5 milliGRAM(s) Oral every 12 hours  aspirin  chewable 81 milliGRAM(s) Oral daily  atorvastatin 80 milliGRAM(s) Oral at bedtime  BACItracin   Ointment 1 Application(s) Topical daily  calcium acetate 667 milliGRAM(s) Oral three times a day with meals  chlorhexidine 4% Liquid 1 Application(s) Topical <User Schedule>  dextrose 5%. 1000 milliLiter(s) (50 mL/Hr) IV Continuous <Continuous>  dextrose 50% Injectable 12.5 Gram(s) IV Push once  dextrose 50% Injectable 25 Gram(s) IV Push once  dextrose 50% Injectable 25 Gram(s) IV Push once  insulin glargine Injectable (LANTUS) 22 Unit(s) SubCutaneous at bedtime  insulin lispro (HumaLOG) corrective regimen sliding scale   SubCutaneous three times a day before meals  insulin lispro Injectable (HumaLOG) 8 Unit(s) SubCutaneous three times a day before meals  meropenem  IVPB      meropenem  IVPB 500 milliGRAM(s) IV Intermittent every 24 hours  midodrine 10 milliGRAM(s) Oral every 8 hours  multivitamin/minerals 1 Tablet(s) Oral daily  pantoprazole    Tablet 40 milliGRAM(s) Oral before breakfast  polyethylene glycol 3350 17 Gram(s) Oral daily  senna 2 Tablet(s) Oral at bedtime  simethicone 80 milliGRAM(s) Chew four times a day  tamsulosin 0.8 milliGRAM(s) Oral at bedtime    MEDICATIONS  (PRN):  acetaminophen   Tablet .. 650 milliGRAM(s) Oral every 6 hours PRN Mild Pain (1 - 3)  dextrose 40% Gel 15 Gram(s) Oral once PRN Blood Glucose LESS THAN 70 milliGRAM(s)/deciliter  glucagon  Injectable 1 milliGRAM(s) IntraMuscular once PRN Glucose LESS THAN 70 milligrams/deciliter  melatonin 1 milliGRAM(s) Oral at bedtime PRN Insomnia      RADIOLOGY & ADDITIONAL STUDIES:

## 2020-08-26 NOTE — PROGRESS NOTE ADULT - SUBJECTIVE AND OBJECTIVE BOX
Nephrology progress note    THIS IS AN INCOMPLETE NOTE . FULL NOTE TO FOLLOW SHORTLY    Patient is seen and examined, events over the last 24 h noted .    Allergies:  No Known Allergies    Hospital Medications:   MEDICATIONS  (STANDING):  apixaban 2.5 milliGRAM(s) Oral every 12 hours  aspirin  chewable 81 milliGRAM(s) Oral daily  atorvastatin 80 milliGRAM(s) Oral at bedtime  BACItracin   Ointment 1 Application(s) Topical daily  calcium acetate 667 milliGRAM(s) Oral three times a day with meals  chlorhexidine 4% Liquid 1 Application(s) Topical <User Schedule>  dextrose 5%. 1000 milliLiter(s) (50 mL/Hr) IV Continuous <Continuous>  dextrose 50% Injectable 12.5 Gram(s) IV Push once  dextrose 50% Injectable 25 Gram(s) IV Push once  dextrose 50% Injectable 25 Gram(s) IV Push once  insulin glargine Injectable (LANTUS) 22 Unit(s) SubCutaneous at bedtime  insulin lispro (HumaLOG) corrective regimen sliding scale   SubCutaneous three times a day before meals  insulin lispro Injectable (HumaLOG) 8 Unit(s) SubCutaneous three times a day before meals  meropenem  IVPB      meropenem  IVPB 500 milliGRAM(s) IV Intermittent every 24 hours  midodrine 10 milliGRAM(s) Oral every 8 hours  multivitamin/minerals 1 Tablet(s) Oral daily  pantoprazole    Tablet 40 milliGRAM(s) Oral before breakfast  polyethylene glycol 3350 17 Gram(s) Oral daily  senna 2 Tablet(s) Oral at bedtime  simethicone 80 milliGRAM(s) Chew four times a day  tamsulosin 0.8 milliGRAM(s) Oral at bedtime        VITALS:  T(F): 97.3 (08-25-20 @ 22:06), Max: 97.3 (08-25-20 @ 22:06)  HR: 62 (08-26-20 @ 05:30)  BP: 103/52 (08-26-20 @ 05:30)  RR: 18 (08-26-20 @ 05:30)  SpO2: 99% (08-26-20 @ 05:30)  Wt(kg): --    08-24 @ 07:01  -  08-25 @ 07:00  --------------------------------------------------------  IN: 360 mL / OUT: 0 mL / NET: 360 mL          PHYSICAL EXAM:  Constitutional: NAD  HEENT: anicteric sclera, oropharynx clear, MMM  Neck: No JVD  Respiratory: CTAB, no wheezes, rales or rhonchi  Cardiovascular: S1, S2, RRR  Gastrointestinal: BS+, soft, NT/ND  Extremities: No cyanosis or clubbing. No peripheral edema  :  No crook.   Skin: No rashes    LABS:  08-26    140  |  100  |  39<H>  ----------------------------<  161<H>  3.8   |  29  |  6.2<HH>    Ca    8.6      26 Aug 2020 06:24  Phos  7.0     08-26    TPro      /  Alb  3.6  /  TBili      /  DBili      /  AST      /  ALT      /  AlkPhos      08-26                          9.7    7.75  )-----------( 152      ( 26 Aug 2020 06:24 )             32.6       Urine Studies:      RADIOLOGY & ADDITIONAL STUDIES: Nephrology progress note    Patient is seen and examined, events over the last 24 h noted .  was hypotensive yesterday during HD ttt     Allergies:  No Known Allergies    Hospital Medications:   MEDICATIONS  (STANDING):    apixaban 2.5 milliGRAM(s) Oral every 12 hours  aspirin  chewable 81 milliGRAM(s) Oral daily  atorvastatin 80 milliGRAM(s) Oral at bedtime  BACItracin   Ointment 1 Application(s) Topical daily  calcium acetate 667 milliGRAM(s) Oral three times a day with meals  dextrose 5%. 1000 milliLiter(s) (50 mL/Hr) IV Continuous <Continuous>  insulin glargine Injectable (LANTUS) 22 Unit(s) SubCutaneous at bedtime  insulin lispro (HumaLOG) corrective regimen sliding scale   SubCutaneous three times a day before meals  insulin lispro Injectable (HumaLOG) 8 Unit(s) SubCutaneous three times a day before meals   meropenem  IVPB 500 milliGRAM(s) IV Intermittent every 24 hours  midodrine 10 milliGRAM(s) Oral every 8 hours  multivitamin/minerals 1 Tablet(s) Oral daily  pantoprazole    Tablet 40 milliGRAM(s) Oral before breakfast  polyethylene glycol 3350 17 Gram(s) Oral daily  senna 2 Tablet(s) Oral at bedtime  simethicone 80 milliGRAM(s) Chew four times a day  tamsulosin 0.8 milliGRAM(s) Oral at bedtime        VITALS:  T(F): 97.3 (08-25-20 @ 22:06), Max: 97.3 (08-25-20 @ 22:06)  HR: 62 (08-26-20 @ 05:30)  BP: 103/52 (08-26-20 @ 05:30)  RR: 18 (08-26-20 @ 05:30)  SpO2: 99% (08-26-20 @ 05:30)      08-24 @ 07:01  -  08-25 @ 07:00  --------------------------------------------------------  IN: 360 mL / OUT: 0 mL / NET: 360 mL          PHYSICAL EXAM:  Constitutional: NAD  Neck: No JVD  Respiratory: CTAB, no wheezes, rales or rhonchi  Cardiovascular: S1, S2, RRR  Gastrointestinal: BS+, soft, NT/ND  Extremities: No cyanosis or clubbing. No peripheral edema  :  No crook.   Skin: No rashes    LABS:  08-26    140  |  100  |  39<H>  ----------------------------<  161<H>  3.8   |  29  |  6.2<HH>    Ca    8.6      26 Aug 2020 06:24  Phos  7.0     08-26    TPro      /  Alb  3.6  /  TBili      /  DBili      /  AST      /  ALT      /  AlkPhos      08-26                          9.7    7.75  )-----------( 152      ( 26 Aug 2020 06:24 )             32.6       Urine Studies:      RADIOLOGY & ADDITIONAL STUDIES:  < from: Xray Chest 1 View- PORTABLE-Routine (08.26.20 @ 06:36) >    IMPRESSION:    Unchanged right hydropneumothorax and bilateral opacities.    < end of copied text >    < from: Transthoracic Echocardiogram (08.19.20 @ 06:56) >   1. Left ventricular ejection fraction, by visual estimation, is 35 to 40%.   2. Left atrial enlargement.   3. LV Normal size and thickness. Septum and apex with severe hypokinesis. Anterior wall not well visualized, but appears hypokinetic.   4. Mildly enlarged right ventricle.   5. Right atrial enlargement.   6. Mild-moderate tricuspid regurgitation.   7. Dilatation of the aortic root.   8. Estimated pulmonary artery systolic pressure is 60.8mmHg assuming a right atrial pressure of 15 mmHg, which is consistent with severe pulmonary hypertension.    < end of copied text >

## 2020-08-26 NOTE — PROGRESS NOTE ADULT - ASSESSMENT
Impression:    Chronic right sided effusion SP Chest tube/ trapped lung. S/P dc pigatil  RUL loculated effusion new  Acute on chronic hypercapnic  Respiratory failure  HO [place] non compliant with CPAP  HF REF   Hypotension no evidence of sepsis  N/ abd distention        PLAN:     CNS: No depressants , avoid opiates    HEENT: Oral care    PULMONARY:  HOB @ 45 degrees.  Aspiration precautions.  NIV during sleep adn PRN during the day.      CARDIOVASCULAR:  Avoid volume overload.     GI: GI prophylaxis.  Feeding ,flat plate, rectal exam    RENAL:  Follow up lytes.  Correct as needed.  JORDAN renal     INFECTIOUS DISEASE: Follow up cultures.  finish course, procal reviewed    HEMATOLOGICAL:  DVT prophylaxis. LE doppler    ENDOCRINE:  Follow up FS.  Insulin protocol if needed.    MUSCULOSKELETAL:  Bed rest.      CEU    DW daughter

## 2020-08-26 NOTE — PROGRESS NOTE ADULT - ASSESSMENT
70 y/o M with PMH of ESRD on HD MWF, CAD s/p pci with 7 stents and AICD placement in 2018, Heart failure with reduced EF, DLD, HTN, COPD? on 2L home O2, DM II (well controlled with HbA1C in the 6-7 range), Afib on Eliquis (diagnosed in february) and BPH presents to the ED for SOB that has been worsening over the last 2 weeks.    #R sided loculated pleural effusion  #Acute on chronic hypercapnic respiratory failure   #Trapped Lung    -Missed one HD session s/p emergent HD in the ED for volume overload w/ 4 liters removed  -CXR 8/17/2020: Worsening right-sided pleural effusion with increased left lower lung airspace opacities  -CT 8/18/2020: Large right pleural effusion with near complete collapse of the right lung. Large component of this effusion is subpulmonic. Partially loculated anteriorly at the apex.  -s/p pigtail catheter placement by IR on 08/19 > Exudative. Suspicion of underlying mass/malignancy.  - chest tube will be removed by IR  - Patient is stable on NC 2 L didn't need bipap yesterday    - Starting Merrem due to concern for infection due to elevated WBC and latest CT result since 08/22  - c/w midodrine   - f/u cytology and flow  - Pleural fluid : cultures negative cell count 994 N56% L 36%    #Abdominal distension #Constipation  -senna  -miralax    #ESRD, dialysis dependent   -HD MWF, nephro following  - Tomorrow dialysis     #HFrEF  -TTE 8/19/2020: EF 35-40%, severe Pulm HTN\par-Anuric    #CAD s/p PCI with 7 stents  #Elevated Troponins  -trop elevated on admission  -EKG: no ST changes noted  -Patient not c/o chest pain  - likely from ESRD  -c/w aspirin    #Paroxysmal Afib  -Diagnosed in February  -on Eliquis at home, held on admission due to need for pigtail placement  -Currently normal rate and regular rhythm  - Holding metoprolol due to HoTN  - keep holding Eliquis     #DM II- uncontrolled  -A1c 7.8  -c/w basal/bolus insulin regimen    #HTN: holding home losartan    #DLD: c/w statin    #BPH: c/w Flomax    DVT ppx: Heparin subq, home Eliquis held  GI ppx: Protonix  Diet: Dysphagia 1   Dispo: Downgrade to stepdown unit   DNR/DNI 68 y/o M with PMH of ESRD on HD MWF, CAD s/p pci with 7 stents and AICD placement in 2018, Heart failure with reduced EF, DLD, HTN, COPD? on 2L home O2, DM II (well controlled with HbA1C in the 6-7 range), Afib on Eliquis (diagnosed in february) and BPH presents to the ED for SOB that has been worsening over the last 2 weeks.    #R sided loculated pleural effusion  #Acute on chronic hypercapnic respiratory failure   #Trapped Lung    -Missed one HD session s/p emergent HD in the ED for volume overload w/ 4 liters removed  -CXR 8/17/2020: Worsening right-sided pleural effusion with increased left lower lung airspace opacities  -CT 8/18/2020: Large right pleural effusion with near complete collapse of the right lung. Large component of this effusion is subpulmonic. Partially loculated anteriorly at the apex.  -s/p pigtail catheter placement by IR on 08/19 > Exudative. Suspicion of underlying mass/malignancy.  - chest tube removed by IR  - Patient is stable on NC 3 L   - Starting Merrem due to concern for infection due to elevated WBC and latest CT result since 08/22. Will be stopped after 7 days course  - c/w midodrine   - Pleural fluid : cultures negative cell count 994 N56% L 36%    #Abdominal distension #Constipation # vomiting  -senna  -miralax  -Last bowel movement yesterday;  - KUB and ALMA DELIA to be done today    #ESRD, dialysis dependent   - HD MWF, nephro following  - dialysis done yesterday    #HFrEF  -TTE 8/19/2020: EF 35-40%, severe Pulm HTN\par-Anuric    #CAD s/p PCI with 7 stents  #Elevated Troponins last one 0.3  -trop elevated on admission  -EKG: no ST changes noted  -Patient not c/o chest pain  - likely from ESRD  -c/w aspirin    #Paroxysmal Afib  -Diagnosed in February  -on Eliquis at home, held on admission due to need for pigtail placement; resumed yesterday 2.5 mg q 12  - Currently normal rate and regular rhythm  - Holding metoprolol due to HoTN    #DM II- uncontrolled  -A1c 7.8  -c/w basal/bolus insulin regimen    #HTN: holding home losartan    #DLD: c/w statin    #BPH: c/w Flomax    DVT ppx: eliquis 2.5 q 12  GI ppx: Protonix  Diet: NPO until obstruction R/O  DNR/DNI 68 y/o M with PMH of ESRD on HD MWF, CAD s/p pci with 7 stents and AICD placement in 2018, Heart failure with reduced EF, DLD, HTN, COPD? on 2L home O2, DM II (well controlled with HbA1C in the 6-7 range), Afib on Eliquis (diagnosed in february) and BPH presents to the ED for SOB that has been worsening over the last 2 weeks.    #R sided loculated pleural effusion  #Acute on chronic hypercapnic respiratory failure   #Trapped Lung    -Missed one HD session s/p emergent HD in the ED for volume overload w/ 4 liters removed  -CXR 8/17/2020: Worsening right-sided pleural effusion with increased left lower lung airspace opacities  -CT 8/18/2020: Large right pleural effusion with near complete collapse of the right lung. Large component of this effusion is subpulmonic. Partially loculated anteriorly at the apex.  -s/p pigtail catheter placement by IR on 08/19 > Exudative. Suspicion of underlying mass/malignancy.  - chest tube removed by IR  - Patient is stable on NC 3 L   - Starting Merrem due to concern for infection due to elevated WBC and latest CT result since 08/22. Will be stopped after 7 days course  - c/w midodrine   - Pleural fluid : cultures negative cell count 994 N56% L 36%    #Abdominal distension #Constipation # vomiting  -senna  -miralax  -Last bowel movement yesterday;  - KUB and ALMA DELIA to be done today    #ESRD, dialysis dependent   - HD MWF, nephro following  - dialysis done yesterday    #HFrEF  -TTE 8/19/2020: EF 35-40%, severe Pulm HTN\par-Anuric    #CAD s/p PCI with 7 stents  #Elevated Troponins last one 0.3  -trop elevated on admission  -EKG: no ST changes noted  -Patient not c/o chest pain  - likely from ESRD  -c/w aspirin    #Paroxysmal Afib  -Diagnosed in February  -on Eliquis at home, held on admission due to need for pigtail placement; resumed yesterday 2.5 mg q 12  - Currently normal rate and regular rhythm  - Holding metoprolol due to HoTN    #DM II- uncontrolled  -A1c 7.8  -c/w basal/bolus insulin regimen    #HTN: holding home losartan    #DLD: c/w statin    #BPH: c/w Flomax    DVT ppx: eliquis 2.5 q 12  GI ppx: Protonix  Diet: NPO until obstruction R/O  DNR/DNI    Attending Attestation:    Pt has been seen and examined. Case and Plan discussed at rounds w/ Intern.  Pt sounds depressed but no suicidal ideation.  Pt w/ Distended Abdomen   Otherwise none tender + flatus   KUB 8/26/2020  Multiple mildly dilated loops of small bowel, possibly reflecting ileus or bowel obstruction in the appropriate clinical setting.   Copious colonic stool.   - Get Surgery evaluation   - Get repeat CT A/P with Oral Constrast  - Get Lactic Acid level   - Keep NPO today   - Avoid Opioids   - Electrolytes are WNL  - No need to check stool for C.Diff at this time - Monitor clinically degree of diarrhea and if any high WBC/Fever/Hypotension  - No d/c today                          9.7    7.75  )-----------( 152      ( 26 Aug 2020 06:24 )             32.6     08-26    140  |  100  |  39<H>  ----------------------------<  161<H>  3.8   |  29  |  6.2<HH>    Ca    8.6      26 Aug 2020 06:24  Phos  7.0     08-26    TPro  x   /  Alb  3.6  /  TBili  x   /  DBili  x   /  AST  x   /  ALT  x   /  AlkPhos  x   08-26      Complete Meropenem Course for Sepsis/PNA? stop after 7 days total course (8/27/2020)    Functional Status: Will need [facility]    Dispo: [facility] / Acute 68 y/o M with PMH of ESRD on HD MWF, CAD s/p pci with 7 stents and AICD placement in 2018, Heart failure with reduced EF, DLD, HTN, COPD? on 2L home O2, DM II (well controlled with HbA1C in the 6-7 range), Afib on Eliquis (diagnosed in february) and BPH presents to the ED for SOB that has been worsening over the last 2 weeks.    #R sided loculated pleural effusion  #Acute on chronic hypercapnic respiratory failure   #Trapped Lung    -Missed one HD session s/p emergent HD in the ED for volume overload w/ 4 liters removed  -CXR 8/17/2020: Worsening right-sided pleural effusion with increased left lower lung airspace opacities  -CT 8/18/2020: Large right pleural effusion with near complete collapse of the right lung. Large component of this effusion is subpulmonic. Partially loculated anteriorly at the apex.  -s/p pigtail catheter placement by IR on 08/19 > Exudative. Suspicion of underlying mass/malignancy.  - chest tube removed by IR  - Patient is stable on NC 3 L   - Starting Merrem due to concern for infection due to elevated WBC and latest CT result since 08/22. Will be stopped after 7 days course  - c/w midodrine   - Pleural fluid : cultures negative cell count 994 N56% L 36%    #Abdominal distension #Constipation # vomiting  -senna  -miralax  -Last bowel movement yesterday;  - KUB and ALMA DELIA to be done today    #ESRD, dialysis dependent   - HD MWF, nephro following  - dialysis done yesterday    #HFrEF  -TTE 8/19/2020: EF 35-40%, severe Pulm HTN\par-Anuric    #CAD s/p PCI with 7 stents  #Elevated Troponins last one 0.3  -trop elevated on admission  -EKG: no ST changes noted  -Patient not c/o chest pain  - likely from ESRD  -c/w aspirin    #Paroxysmal Afib  -Diagnosed in February  -on Eliquis at home, held on admission due to need for pigtail placement; resumed yesterday 2.5 mg q 12  - Currently normal rate and regular rhythm  - Holding metoprolol due to HoTN    #DM II- uncontrolled  -A1c 7.8  -c/w basal/bolus insulin regimen    #HTN: holding home losartan    #DLD: c/w statin    #BPH: c/w Flomax    DVT ppx: eliquis 2.5 q 12  GI ppx: Protonix  Diet: NPO until obstruction R/O  DNR/DNI    Attending Attestation:    Pt has been seen and examined. Case and Plan discussed at rounds w/ Intern.  Pt sounds depressed but no suicidal ideation.  Pt w/ Distended Abdomen   Otherwise none tender + flatus   KUB 8/26/2020  Multiple mildly dilated loops of small bowel, possibly reflecting ileus or bowel obstruction in the appropriate clinical setting.   Copious colonic stool.   - Get Surgery evaluation and CT A/P oral contrast if elevated LA   - Get Lactic Acid level   - Keep NPO today   - Avoid Opioids   - Electrolytes are WNL  - c/w Laxatives to relive Severe Constipation    - Repeat KUB  - Serial Abdominal Exam - if status changes get Sx Cons.                         9.7    7.75  )-----------( 152      ( 26 Aug 2020 06:24 )             32.6     08-26    140  |  100  |  39<H>  ----------------------------<  161<H>  3.8   |  29  |  6.2<HH>    Ca    8.6      26 Aug 2020 06:24  Phos  7.0     08-26    TPro  x   /  Alb  3.6  /  TBili  x   /  DBili  x   /  AST  x   /  ALT  x   /  AlkPhos  x   08-26    Complete Meropenem Course for Sepsis/PNA? stop after 7 days total course (8/27/2020)    Functional Status: Will need [facility] - Daughter to decide HCP     DNR/DNI     Dispo: [facility] / Acute / Palliative Consult per Daughter request

## 2020-08-26 NOTE — CONSULT NOTE ADULT - ATTENDING COMMENTS
69 y.o male with PMH of ESRD on HD MWF, CAD s/p PCI with 7 stents and AICD In 2018, HFrEF, DLD, HTN, COPD on home O2 2 L, DM, Afib on Eliquis and BPH came with ED with shortness of breath. Hospital course complicated by right sided loculated pleural effusion, acute on chronic hypercapnic respiratory failure and trapped lung. He is s/p catheter placement on 8/19 with exudative findings and then chest tube was removed. Currently receiving antibiotics for infection. Patient also had constipation and receiving bowel regimen for that.   he was seen and examined at bedside as above. Also spoke to daughter Rozina. She would like to have a family meeting to understand the options that patient has.    DNR/DNI  Awaiting family to let us know time of family meeting for GOC discussion  Ongoing medical management   Palliative will follow

## 2020-08-26 NOTE — PROGRESS NOTE ADULT - SUBJECTIVE AND OBJECTIVE BOX
24H events:    Patient is a 69y old Male who presents with a chief complaint of SOB found to have right pleural effusion for which he underwent chest tube drainage.  No abdominal pain, tolerating thin liquids  No major event overnight  His BP has been stable after dialysis yesterday  Patient has vomited yesterday morning at early morning today, moderate amount. He denies nausea and chest pain    PAST MEDICAL & SURGICAL HISTORY  Heart failure with reduced ejection fraction  CAD (coronary artery disease): s/p PCI and AICD placement  BPH (benign prostatic hyperplasia)  Type 2 diabetes mellitus  End stage renal disease  Dyslipidemia  Hypertension  No significant past surgical history    SOCIAL HISTORY:  Social History:  Former 2 pack a day smoker of unknown duration  Drinks one glass of wine a night  No drug use (17 Aug 2020 14:27)      ALLERGIES:  No Known Allergies    MEDICATIONS:  STANDING MEDICATIONS  apixaban 2.5 milliGRAM(s) Oral every 12 hours  aspirin  chewable 81 milliGRAM(s) Oral daily  atorvastatin 80 milliGRAM(s) Oral at bedtime  BACItracin   Ointment 1 Application(s) Topical daily  calcium acetate 667 milliGRAM(s) Oral three times a day with meals  chlorhexidine 4% Liquid 1 Application(s) Topical <User Schedule>  dextrose 5%. 1000 milliLiter(s) IV Continuous <Continuous>  dextrose 50% Injectable 12.5 Gram(s) IV Push once  dextrose 50% Injectable 25 Gram(s) IV Push once  dextrose 50% Injectable 25 Gram(s) IV Push once  insulin glargine Injectable (LANTUS) 22 Unit(s) SubCutaneous at bedtime  insulin lispro (HumaLOG) corrective regimen sliding scale   SubCutaneous three times a day before meals  insulin lispro Injectable (HumaLOG) 8 Unit(s) SubCutaneous three times a day before meals  meropenem  IVPB      meropenem  IVPB 500 milliGRAM(s) IV Intermittent every 24 hours  midodrine 10 milliGRAM(s) Oral every 8 hours  multivitamin/minerals 1 Tablet(s) Oral daily  pantoprazole    Tablet 40 milliGRAM(s) Oral before breakfast  polyethylene glycol 3350 17 Gram(s) Oral daily  senna 2 Tablet(s) Oral at bedtime  simethicone 80 milliGRAM(s) Chew four times a day  tamsulosin 0.8 milliGRAM(s) Oral at bedtime    PRN MEDICATIONS  acetaminophen   Tablet .. 650 milliGRAM(s) Oral every 6 hours PRN  dextrose 40% Gel 15 Gram(s) Oral once PRN  glucagon  Injectable 1 milliGRAM(s) IntraMuscular once PRN  melatonin 1 milliGRAM(s) Oral at bedtime PRN    VITALS:   T(F): 97.3  HR: 62  BP: 103/52  RR: 18  SpO2: 99%    PHYSICAL EXAM:  GENERAL: NAD  HEAD:  Atraumatic, Normocephalic  EYES: EOMI  NECK: Supple  NERVOUS SYSTEM: grossly intact  CHEST/LUNG: decreased bilateral air entry, clear lungs  HEART: Regular rate and rhythm; No murmurs, rubs, or gallops  ABDOMEN: Soft, Nontender, distended; Bowel sounds present  EXTREMITIES:  2+ Peripheral Pulses, No clubbing, cyanosis, or edema, left AVF with + thrill   LYMPH: No lymphadenopathy noted  SKIN: stasis dermatitis bilateral legs    LABS:                        9.7    7.75  )-----------( 152      ( 26 Aug 2020 06:24 )             32.6     08-26    140  |  100  |  39<H>  ----------------------------<  161<H>  3.8   |  29  |  6.2<HH>    Ca    8.6      26 Aug 2020 06:24  Phos  7.0     08-26    TPro  x   /  Alb  3.6  /  TBili  x   /  DBili  x   /  AST  x   /  ALT  x   /  AlkPhos  x   08-26          Troponin T, Serum: 0.36 ng/mL <HH> (08-26-20 @ 06:24)  Troponin T, Serum: 0.37 ng/mL <HH> (08-25-20 @ 16:10)      CARDIAC MARKERS ( 26 Aug 2020 06:24 )  x     / 0.36 ng/mL / x     / x     / x      CARDIAC MARKERS ( 25 Aug 2020 16:10 )  x     / 0.37 ng/mL / x     / x     / x          RADIOLOGY:      Echocardiogram (08.19.20 @ 06:56) > TTE   1. Left ventricular ejection fraction, by visual estimation, is 35 to 40%.   2. Left atrial enlargement.   3. LV Normal size and thickness. Septum and apex with severe hypokinesis. Anterior wall not well visualized, but appears hypokinetic.   4. Mildly enlarged right ventricle.   5. Right atrial enlargement.   6. Mild-moderate tricuspid regurgitation.   7. Dilatation of the aortic root.   8. Estimated pulmonary artery systolic pressure is 60.8mmHg assuming a right atrial pressure of 15 mmHg, which is consistent with severe pulmonary hypertension.    < from: Xray Chest 1 View- PORTABLE-Routine (08.24.20 @ 05:53) >  Unchanged right hydropneumothorax. Stable bilateral opacities.    < from: CT Abdomen and Pelvis w/ Oral Cont (08.21.20 @ 04:49) >  1. No evidence of pneumoperitoneum or oral contrast leak.    2. Interval placement of right pigtail catheter in the pleural space.  Partially imaged known right pneumothorax, with marked decrease in the loculated right pleural effusion, with numerous septa noted. Right lower lobe consolidation. Correlate for right lower lobe pneumonia with empyema, in the appropriate clinical setting.    3. Circumferential urinary bladder wall thickening and surrounding inflammatory changes; correlation with urinalysis recommended to evaluate for cystitis.      < from: [facility] Duplex Lower Ext Vein Scan, Bilat (08.25.20 @ 16:00) >  No evidence of deep venous thrombosis or superficial thrombophlebitis in the bilateral lower extremities.    < end of copied text > 24H events:    Patient is a 69y old Male who presents with a chief complaint of SOB found to have right pleural effusion for which he underwent chest tube drainage.  No major event overnight  His BP has been stable after dialysis yesterday  Patient has vomited yesterday morning and early morning today, moderate amount. He denies nausea, abdominal and chest pain    PAST MEDICAL & SURGICAL HISTORY  Heart failure with reduced ejection fraction  CAD (coronary artery disease): s/p PCI and AICD placement  BPH (benign prostatic hyperplasia)  Type 2 diabetes mellitus  End stage renal disease  Dyslipidemia  Hypertension  No significant past surgical history    SOCIAL HISTORY:  Social History:  Former 2 pack a day smoker of unknown duration  Drinks one glass of wine a night  No drug use (17 Aug 2020 14:27)      ALLERGIES:  No Known Allergies    MEDICATIONS:  STANDING MEDICATIONS  apixaban 2.5 milliGRAM(s) Oral every 12 hours  aspirin  chewable 81 milliGRAM(s) Oral daily  atorvastatin 80 milliGRAM(s) Oral at bedtime  BACItracin   Ointment 1 Application(s) Topical daily  calcium acetate 667 milliGRAM(s) Oral three times a day with meals  chlorhexidine 4% Liquid 1 Application(s) Topical <User Schedule>  dextrose 5%. 1000 milliLiter(s) IV Continuous <Continuous>  dextrose 50% Injectable 12.5 Gram(s) IV Push once  dextrose 50% Injectable 25 Gram(s) IV Push once  dextrose 50% Injectable 25 Gram(s) IV Push once  insulin glargine Injectable (LANTUS) 22 Unit(s) SubCutaneous at bedtime  insulin lispro (HumaLOG) corrective regimen sliding scale   SubCutaneous three times a day before meals  insulin lispro Injectable (HumaLOG) 8 Unit(s) SubCutaneous three times a day before meals  meropenem  IVPB      meropenem  IVPB 500 milliGRAM(s) IV Intermittent every 24 hours  midodrine 10 milliGRAM(s) Oral every 8 hours  multivitamin/minerals 1 Tablet(s) Oral daily  pantoprazole    Tablet 40 milliGRAM(s) Oral before breakfast  polyethylene glycol 3350 17 Gram(s) Oral daily  senna 2 Tablet(s) Oral at bedtime  simethicone 80 milliGRAM(s) Chew four times a day  tamsulosin 0.8 milliGRAM(s) Oral at bedtime    PRN MEDICATIONS  acetaminophen   Tablet .. 650 milliGRAM(s) Oral every 6 hours PRN  dextrose 40% Gel 15 Gram(s) Oral once PRN  glucagon  Injectable 1 milliGRAM(s) IntraMuscular once PRN  melatonin 1 milliGRAM(s) Oral at bedtime PRN    VITALS:   T(F): 97.3  HR: 62  BP: 103/52  RR: 18  SpO2: 99%    PHYSICAL EXAM:  GENERAL: NAD  HEAD:  Atraumatic, Normocephalic  EYES: EOMI  NECK: Supple  NERVOUS SYSTEM: grossly intact  CHEST/LUNG: decreased bilateral air entry, clear lungs  HEART: Regular rate and rhythm; No murmurs, rubs, or gallops  ABDOMEN: Soft, Nontender, distended; Bowel sounds present  EXTREMITIES:  2+ Peripheral Pulses, No clubbing, cyanosis, or edema, left AVF with + thrill   LYMPH: No lymphadenopathy noted  SKIN: stasis dermatitis bilateral legs    LABS:                        9.7    7.75  )-----------( 152      ( 26 Aug 2020 06:24 )             32.6     08-26    140  |  100  |  39<H>  ----------------------------<  161<H>  3.8   |  29  |  6.2<HH>    Ca    8.6      26 Aug 2020 06:24  Phos  7.0     08-26    TPro  x   /  Alb  3.6  /  TBili  x   /  DBili  x   /  AST  x   /  ALT  x   /  AlkPhos  x   08-26          Troponin T, Serum: 0.36 ng/mL <HH> (08-26-20 @ 06:24)  Troponin T, Serum: 0.37 ng/mL <HH> (08-25-20 @ 16:10)      CARDIAC MARKERS ( 26 Aug 2020 06:24 )  x     / 0.36 ng/mL / x     / x     / x      CARDIAC MARKERS ( 25 Aug 2020 16:10 )  x     / 0.37 ng/mL / x     / x     / x          RADIOLOGY:      Echocardiogram (08.19.20 @ 06:56) > TTE   1. Left ventricular ejection fraction, by visual estimation, is 35 to 40%.   2. Left atrial enlargement.   3. LV Normal size and thickness. Septum and apex with severe hypokinesis. Anterior wall not well visualized, but appears hypokinetic.   4. Mildly enlarged right ventricle.   5. Right atrial enlargement.   6. Mild-moderate tricuspid regurgitation.   7. Dilatation of the aortic root.   8. Estimated pulmonary artery systolic pressure is 60.8mmHg assuming a right atrial pressure of 15 mmHg, which is consistent with severe pulmonary hypertension.    < from: Xray Chest 1 View- PORTABLE-Routine (08.24.20 @ 05:53) >  Unchanged right hydropneumothorax. Stable bilateral opacities.    < from: CT Abdomen and Pelvis w/ Oral Cont (08.21.20 @ 04:49) >  1. No evidence of pneumoperitoneum or oral contrast leak.    2. Interval placement of right pigtail catheter in the pleural space.  Partially imaged known right pneumothorax, with marked decrease in the loculated right pleural effusion, with numerous septa noted. Right lower lobe consolidation. Correlate for right lower lobe pneumonia with empyema, in the appropriate clinical setting.    3. Circumferential urinary bladder wall thickening and surrounding inflammatory changes; correlation with urinalysis recommended to evaluate for cystitis.      < from: [facility] Duplex Lower Ext Vein Scan, Bilat (08.25.20 @ 16:00) >  No evidence of deep venous thrombosis or superficial thrombophlebitis in the bilateral lower extremities.    < end of copied text > 24H events:    Patient is a 69y old Male who presents with a chief complaint of SOB found to have right pleural effusion for which he underwent chest tube drainage.  No major event overnight  His BP has been stable after dialysis yesterday  Patient has vomited yesterday morning and early morning today, moderate amount. He denies nausea, abdominal and chest pain    PAST MEDICAL & SURGICAL HISTORY  Heart failure with reduced ejection fraction  CAD (coronary artery disease): s/p PCI and AICD placement  BPH (benign prostatic hyperplasia)  Type 2 diabetes mellitus  End stage renal disease  Dyslipidemia  Hypertension  No significant past surgical history    SOCIAL HISTORY:  Social History:  Former 2 pack a day smoker of unknown duration  Drinks one glass of wine a night  No drug use (17 Aug 2020 14:27)      ALLERGIES:  No Known Allergies    MEDICATIONS:  STANDING MEDICATIONS  apixaban 2.5 milliGRAM(s) Oral every 12 hours  aspirin  chewable 81 milliGRAM(s) Oral daily  atorvastatin 80 milliGRAM(s) Oral at bedtime  BACItracin   Ointment 1 Application(s) Topical daily  calcium acetate 667 milliGRAM(s) Oral three times a day with meals  chlorhexidine 4% Liquid 1 Application(s) Topical <User Schedule>  dextrose 5%. 1000 milliLiter(s) IV Continuous <Continuous>  dextrose 50% Injectable 12.5 Gram(s) IV Push once  dextrose 50% Injectable 25 Gram(s) IV Push once  dextrose 50% Injectable 25 Gram(s) IV Push once  insulin glargine Injectable (LANTUS) 22 Unit(s) SubCutaneous at bedtime  insulin lispro (HumaLOG) corrective regimen sliding scale   SubCutaneous three times a day before meals  insulin lispro Injectable (HumaLOG) 8 Unit(s) SubCutaneous three times a day before meals     meropenem  IVPB 500 milliGRAM(s) IV Intermittent every 24 hours  midodrine 10 milliGRAM(s) Oral every 8 hours  multivitamin/minerals 1 Tablet(s) Oral daily  pantoprazole    Tablet 40 milliGRAM(s) Oral before breakfast  polyethylene glycol 3350 17 Gram(s) Oral daily  senna 2 Tablet(s) Oral at bedtime  simethicone 80 milliGRAM(s) Chew four times a day  tamsulosin 0.8 milliGRAM(s) Oral at bedtime    PRN MEDICATIONS  acetaminophen   Tablet .. 650 milliGRAM(s) Oral every 6 hours PRN  dextrose 40% Gel 15 Gram(s) Oral once PRN  glucagon  Injectable 1 milliGRAM(s) IntraMuscular once PRN  melatonin 1 milliGRAM(s) Oral at bedtime PRN    VITALS:   T(F): 97.3  HR: 62  BP: 103/52  RR: 18  SpO2: 99%    PHYSICAL EXAM:  GENERAL: NAD  HEAD:  Atraumatic, Normocephalic  EYES: EOMI  NECK: Supple  NERVOUS SYSTEM: grossly intact  CHEST/LUNG: decreased bilateral air entry, clear lungs  HEART: Regular rate and rhythm; No murmurs, rubs, or gallops  ABDOMEN: Soft, Nontender, distended; Bowel sounds present  EXTREMITIES:  2+ Peripheral Pulses, No clubbing, cyanosis, or edema, left AVF with + thrill   LYMPH: No lymphadenopathy noted  SKIN: stasis dermatitis bilateral legs    LABS:                        9.7    7.75  )-----------( 152      ( 26 Aug 2020 06:24 )             32.6     08-26    140  |  100  |  39<H>  ----------------------------<  161<H>  3.8   |  29  |  6.2<HH>    Ca    8.6      26 Aug 2020 06:24  Phos  7.0     08-26    TPro  x   /  Alb  3.6  /  TBili  x   /  DBili  x   /  AST  x   /  ALT  x   /  AlkPhos  x   08-26    Troponin T, Serum: 0.36 ng/mL <HH> (08-26-20 @ 06:24)  Troponin T, Serum: 0.37 ng/mL <HH> (08-25-20 @ 16:10)    CARDIAC MARKERS ( 26 Aug 2020 06:24 )  x     / 0.36 ng/mL / x     / x     / x      CARDIAC MARKERS ( 25 Aug 2020 16:10 )  x     / 0.37 ng/mL / x     / x     / x          RADIOLOGY:    Echocardiogram (08.19.20 @ 06:56) > TTE   1. Left ventricular ejection fraction, by visual estimation, is 35 to 40%.   2. Left atrial enlargement.   3. LV Normal size and thickness. Septum and apex with severe hypokinesis. Anterior wall not well visualized, but appears hypokinetic.   4. Mildly enlarged right ventricle.   5. Right atrial enlargement.   6. Mild-moderate tricuspid regurgitation.   7. Dilatation of the aortic root.   8. Estimated pulmonary artery systolic pressure is 60.8mmHg assuming a right atrial pressure of 15 mmHg, which is consistent with severe pulmonary hypertension.    < from: Xray Chest 1 View- PORTABLE-Routine (08.24.20 @ 05:53) >  Unchanged right hydropneumothorax. Stable bilateral opacities.    < from: CT Abdomen and Pelvis w/ Oral Cont (08.21.20 @ 04:49) >  1. No evidence of pneumoperitoneum or oral contrast leak.    2. Interval placement of right pigtail catheter in the pleural space.  Partially imaged known right pneumothorax, with marked decrease in the loculated right pleural effusion, with numerous septa noted. Right lower lobe consolidation. Correlate for right lower lobe pneumonia with empyema, in the appropriate clinical setting.    3. Circumferential urinary bladder wall thickening and surrounding inflammatory changes; correlation with urinalysis recommended to evaluate for cystitis.    < from: [facility] Duplex Lower Ext Vein Scan, Bilat (08.25.20 @ 16:00) >  No evidence of deep venous thrombosis or superficial thrombophlebitis in the bilateral lower extremities.    < end of copied text >

## 2020-08-27 NOTE — PROGRESS NOTE ADULT - SUBJECTIVE AND OBJECTIVE BOX
70yMale with diagnosis: SOB (SHORTNESS OF BREATH);PLEURAL EFFUSION      Patient seen and examined at bedside. He was awake, appeared comfortable. Patient had a BM after enema. He denied pain, nausea, vomiting but appeared fatigued and weak. Daughter Rozina at bedside. stated she still needs to discuss with family regarding family meeting.       PHYSICAL EXAM    T(C): , Max: 36.7 (05:32)  T(F): 98  HR: 60 (58 - 63)  BP: 108/48 (87/52 - 119/58)  RR: 20 (18 - 22)  SpO2: 100% (97% - 100%)    Constitutional: NAD, sleeping in bed, appears stated age    HEENT: normocephalic, atraumatic, moist mucous membranes    Eyes: conjunctiva and sclera normal     Respiratory: No distress, Decreased BS at right bases     GI: soft, mildly distended, not tender     Extremities: No upper extremity swelling, WNL    Neurological: Unable to assess, asleep      LABS:                          9.4    7.51  )-----------( 169      ( 27 Aug 2020 08:09 )             31.8                                                                                      08-27    138  |  98  |  45<H>  ----------------------------<  92  4.0   |  27  |  7.5<HH>    Ca    8.5      27 Aug 2020 08:09  Phos  7.0     08-27    TPro  6.0  /  Alb  3.5  /  TBili  0.3  /  DBili  x   /  AST  21  /  ALT  10  /  AlkPhos  128<H>  08-27    Xray KUB 8/27: No significant change in multiple mildly dilated loops of small bowel and copious colonic stool. Osseous structures are stable.    MEDICATIONS  (STANDING):  apixaban 2.5 milliGRAM(s) Oral every 12 hours  aspirin  chewable 81 milliGRAM(s) Oral daily  atorvastatin 80 milliGRAM(s) Oral at bedtime  BACItracin   Ointment 1 Application(s) Topical daily  calcium acetate 1334 milliGRAM(s) Oral three times a day with meals  chlorhexidine 4% Liquid 1 Application(s) Topical <User Schedule>  dextrose 5%. 1000 milliLiter(s) (50 mL/Hr) IV Continuous <Continuous>  dextrose 50% Injectable 12.5 Gram(s) IV Push once  dextrose 50% Injectable 25 Gram(s) IV Push once  dextrose 50% Injectable 25 Gram(s) IV Push once  insulin lispro (HumaLOG) corrective regimen sliding scale   SubCutaneous three times a day before meals  meropenem  IVPB      meropenem  IVPB 500 milliGRAM(s) IV Intermittent every 24 hours  midodrine 10 milliGRAM(s) Oral every 8 hours  multivitamin/minerals 1 Tablet(s) Oral daily  pantoprazole    Tablet 40 milliGRAM(s) Oral before breakfast  polyethylene glycol 3350 17 Gram(s) Oral daily  senna 2 Tablet(s) Oral at bedtime  simethicone 80 milliGRAM(s) Chew four times a day  tamsulosin 0.8 milliGRAM(s) Oral at bedtime    MEDICATIONS  (PRN):  acetaminophen   Tablet .. 650 milliGRAM(s) Oral every 6 hours PRN Mild Pain (1 - 3)  dextrose 40% Gel 15 Gram(s) Oral once PRN Blood Glucose LESS THAN 70 milliGRAM(s)/deciliter  glucagon  Injectable 1 milliGRAM(s) IntraMuscular once PRN Glucose LESS THAN 70 milligrams/deciliter  melatonin 1 milliGRAM(s) Oral at bedtime PRN Insomnia

## 2020-08-27 NOTE — PROGRESS NOTE ADULT - SUBJECTIVE AND OBJECTIVE BOX
24H events:    Patient is a 69y old Male who presents with a chief complaint of SOB found to have right pleural effusion for which he underwent chest tube drainage.  No major event overnight  His BP has been stable after dialysis yesterday  Patient has vomited yesterday morning and early morning today, moderate amount. He denies nausea, abdominal and chest pain    PAST MEDICAL & SURGICAL HISTORY  Heart failure with reduced ejection fraction  CAD (coronary artery disease): s/p PCI and AICD placement  BPH (benign prostatic hyperplasia)  Type 2 diabetes mellitus  End stage renal disease  Dyslipidemia  Hypertension  No significant past surgical history    SOCIAL HISTORY:  Social History:  Former 2 pack a day smoker of unknown duration  Drinks one glass of wine a night  No drug use (17 Aug 2020 14:27)      ALLERGIES:  No Known Allergies    MEDICATIONS:    MEDICATIONS  (STANDING):  apixaban 2.5 milliGRAM(s) Oral every 12 hours  aspirin  chewable 81 milliGRAM(s) Oral daily  atorvastatin 80 milliGRAM(s) Oral at bedtime  BACItracin   Ointment 1 Application(s) Topical daily  calcium acetate 667 milliGRAM(s) Oral three times a day with meals  chlorhexidine 4% Liquid 1 Application(s) Topical <User Schedule>  dextrose 5%. 1000 milliLiter(s) (50 mL/Hr) IV Continuous <Continuous>  dextrose 50% Injectable 12.5 Gram(s) IV Push once  dextrose 50% Injectable 25 Gram(s) IV Push once  dextrose 50% Injectable 25 Gram(s) IV Push once  insulin lispro (HumaLOG) corrective regimen sliding scale   SubCutaneous three times a day before meals   meropenem  IVPB 500 milliGRAM(s) IV Intermittent every 24 hours  midodrine 10 milliGRAM(s) Oral every 8 hours  multivitamin/minerals 1 Tablet(s) Oral daily  pantoprazole    Tablet 40 milliGRAM(s) Oral before breakfast  polyethylene glycol 3350 17 Gram(s) Oral daily  senna 2 Tablet(s) Oral at bedtime  simethicone 80 milliGRAM(s) Chew four times a day  tamsulosin 0.8 milliGRAM(s) Oral at bedtime    MEDICATIONS  (PRN):  acetaminophen   Tablet .. 650 milliGRAM(s) Oral every 6 hours PRN Mild Pain (1 - 3)  dextrose 40% Gel 15 Gram(s) Oral once PRN Blood Glucose LESS THAN 70 milliGRAM(s)/deciliter  glucagon  Injectable 1 milliGRAM(s) IntraMuscular once PRN Glucose LESS THAN 70 milligrams/deciliter  melatonin 1 milliGRAM(s) Oral at bedtime PRN Insomnia    VITALS:   T(C): 36.7 (27 Aug 2020 05:32), Max: 36.7 (27 Aug 2020 05:32)  T(F): 98 (27 Aug 2020 05:32), Max: 98 (27 Aug 2020 05:32)  HR: 60 (27 Aug 2020 11:25) (58 - 63)  BP: 108/48 (27 Aug 2020 11:25) (87/52 - 119/58)  BP(mean): 83 (26 Aug 2020 21:39) (83 - 83)  RR: 20 (27 Aug 2020 11:25) (18 - 22)  SpO2: 100% (27 Aug 2020 11:25) (97% - 100%)    PHYSICAL EXAM:  GENERAL: NAD  HEAD:  Atraumatic, Normocephalic  EYES: EOMI  NECK: Supple  NERVOUS SYSTEM: grossly intact  CHEST/LUNG: decreased bilateral air entry, clear lungs  HEART: Regular rate and rhythm; No murmurs, rubs, or gallops  ABDOMEN: Soft, Nontender, distended; Bowel sounds present  EXTREMITIES:  2+ Peripheral Pulses, No clubbing, cyanosis, or edema, left AVF with + thrill   LYMPH: No lymphadenopathy noted  SKIN: stasis dermatitis bilateral legs    LABS:                          9.4    7.51  )-----------( 169      ( 27 Aug 2020 08:09 )             31.8       08-27    138  |  98  |  45<H>  ----------------------------<  92  4.0   |  27  |  7.5<HH>    Ca    8.5      27 Aug 2020 08:09  Phos  7.0     08-27    TPro  6.0  /  Alb  3.5  /  TBili  0.3  /  DBili  x   /  AST  21  /  ALT  10  /  AlkPhos  128<H>  08-27    TPro  x   /  Alb  3.6  /  TBili  x   /  DBili  x   /  AST  x   /  ALT  x   /  AlkPhos  x   08-26    Troponin T, Serum: 0.36 ng/mL <HH> (08-26-20 @ 06:24)  Troponin T, Serum: 0.37 ng/mL <HH> (08-25-20 @ 16:10)    CARDIAC MARKERS ( 26 Aug 2020 06:24 )  x     / 0.36 ng/mL / x     / x     / x      CARDIAC MARKERS ( 25 Aug 2020 16:10 )  x     / 0.37 ng/mL / x     / x     / x          RADIOLOGY:    Echocardiogram (08.19.20 @ 06:56) > TTE   1. Left ventricular ejection fraction, by visual estimation, is 35 to 40%.   2. Left atrial enlargement.   3. LV Normal size and thickness. Septum and apex with severe hypokinesis. Anterior wall not well visualized, but appears hypokinetic.   4. Mildly enlarged right ventricle.   5. Right atrial enlargement.   6. Mild-moderate tricuspid regurgitation.   7. Dilatation of the aortic root.   8. Estimated pulmonary artery systolic pressure is 60.8mmHg assuming a right atrial pressure of 15 mmHg, which is consistent with severe pulmonary hypertension.    < from: Xray Chest 1 View- PORTABLE-Routine (08.24.20 @ 05:53) >  Unchanged right hydropneumothorax. Stable bilateral opacities.    < from: CT Abdomen and Pelvis w/ Oral Cont (08.21.20 @ 04:49) >  1. No evidence of pneumoperitoneum or oral contrast leak.    2. Interval placement of right pigtail catheter in the pleural space.  Partially imaged known right pneumothorax, with marked decrease in the loculated right pleural effusion, with numerous septa noted. Right lower lobe consolidation. Correlate for right lower lobe pneumonia with empyema, in the appropriate clinical setting.    3. Circumferential urinary bladder wall thickening and surrounding inflammatory changes; correlation with urinalysis recommended to evaluate for cystitis.    < from: [facility] Duplex Lower Ext Vein Scan, Bilat (08.25.20 @ 16:00) >  No evidence of deep venous thrombosis or superficial thrombophlebitis in the bilateral lower extremities.    < end of copied text >

## 2020-08-27 NOTE — PROGRESS NOTE ADULT - SUBJECTIVE AND OBJECTIVE BOX
24H events:  Patient is a 69y old Male who presents with a chief complaint of SOB found to have right pleural effusion for which he underwent chest tube drainage.  No major event overnight;   His abdominal distention ameliorated after fleet enema yesterday; Patient is asking for food; he denies nausea and vomiting   Patient is stable clinically     PAST MEDICAL & SURGICAL HISTORY  Heart failure with reduced ejection fraction  CAD (coronary artery disease): s/p PCI and AICD placement  BPH (benign prostatic hyperplasia)  Type 2 diabetes mellitus  End stage renal disease  Dyslipidemia  Hypertension  No significant past surgical history    SOCIAL HISTORY:  Social History:  Former 2 pack a day smoker of unknown duration  Drinks one glass of wine a night  No drug use (17 Aug 2020 14:27)      ALLERGIES:  No Known Allergies    MEDICATIONS:  STANDING MEDICATIONS  apixaban 2.5 milliGRAM(s) Oral every 12 hours  aspirin  chewable 81 milliGRAM(s) Oral daily  atorvastatin 80 milliGRAM(s) Oral at bedtime  BACItracin   Ointment 1 Application(s) Topical daily  calcium acetate 1334 milliGRAM(s) Oral three times a day with meals  chlorhexidine 4% Liquid 1 Application(s) Topical <User Schedule>  dextrose 5%. 1000 milliLiter(s) IV Continuous <Continuous>  dextrose 50% Injectable 12.5 Gram(s) IV Push once  dextrose 50% Injectable 25 Gram(s) IV Push once  dextrose 50% Injectable 25 Gram(s) IV Push once  insulin lispro (HumaLOG) corrective regimen sliding scale   SubCutaneous three times a day before meals  meropenem  IVPB      meropenem  IVPB 500 milliGRAM(s) IV Intermittent every 24 hours  midodrine 10 milliGRAM(s) Oral every 8 hours  multivitamin/minerals 1 Tablet(s) Oral daily  pantoprazole    Tablet 40 milliGRAM(s) Oral before breakfast  polyethylene glycol 3350 17 Gram(s) Oral daily  senna 2 Tablet(s) Oral at bedtime  simethicone 80 milliGRAM(s) Chew four times a day  tamsulosin 0.8 milliGRAM(s) Oral at bedtime    PRN MEDICATIONS  acetaminophen   Tablet .. 650 milliGRAM(s) Oral every 6 hours PRN  dextrose 40% Gel 15 Gram(s) Oral once PRN  glucagon  Injectable 1 milliGRAM(s) IntraMuscular once PRN  melatonin 1 milliGRAM(s) Oral at bedtime PRN    VITALS:   T(F): 97.1  HR: 83  BP: 118/58  RR: 20  SpO2: 100%    PHYSICAL EXAM:  GENERAL: NAD  HEAD:  Atraumatic, Normocephalic  EYES: EOMI  NECK: Supple  NERVOUS SYSTEM: grossly intact  CHEST/LUNG: decreased bilateral air entry, clear lungs  HEART: Regular rate and rhythm; No murmurs, rubs, or gallops  ABDOMEN: Soft, Nontender, distended; Bowel sounds present  EXTREMITIES:  2+ Peripheral Pulses, No clubbing, cyanosis, or edema, left AVF with + thrill   LYMPH: No lymphadenopathy noted  SKIN: stasis dermatitis bilateral legs      LABS:                        9.4    7.51  )-----------( 169      ( 27 Aug 2020 08:09 )             31.8     08-27    138  |  98  |  45<H>  ----------------------------<  92  4.0   |  27  |  7.5<HH>    Ca    8.5      27 Aug 2020 08:09  Phos  7.0     08-27    TPro  6.0  /  Alb  3.5  /  TBili  0.3  /  DBili  x   /  AST  21  /  ALT  10  /  AlkPhos  128<H>  08-27          Lactate, Blood: 0.8 mmol/L (08-27-20 @ 08:09)  Lactate, Blood: 0.8 mmol/L (08-26-20 @ 18:20)      Culture - Blood (collected 25 Aug 2020 16:10)  Source: .Blood None  Preliminary Report (27 Aug 2020 01:02):    No growth to date.      CARDIAC MARKERS ( 26 Aug 2020 06:24 )  x     / 0.36 ng/mL / x     / x     / x      CARDIAC MARKERS ( 25 Aug 2020 16:10 )  x     / 0.37 ng/mL / x     / x     / x          RADIOLOGY:  Echocardiogram (08.19.20 @ 06:56) > TTE   1. Left ventricular ejection fraction, by visual estimation, is 35 to 40%.   2. Left atrial enlargement.   3. LV Normal size and thickness. Septum and apex with severe hypokinesis. Anterior wall not well visualized, but appears hypokinetic.   4. Mildly enlarged right ventricle.   5. Right atrial enlargement.   6. Mild-moderate tricuspid regurgitation.   7. Dilatation of the aortic root.   8. Estimated pulmonary artery systolic pressure is 60.8mmHg assuming a right atrial pressure of 15 mmHg, which is consistent with severe pulmonary hypertension.    < from: Xray Chest 1 View- PORTABLE-Routine (08.24.20 @ 05:53) >  Unchanged right hydropneumothorax. Stable bilateral opacities.    < from: CT Abdomen and Pelvis w/ Oral Cont (08.21.20 @ 04:49) >  1. No evidence of pneumoperitoneum or oral contrast leak.    2. Interval placement of right pigtail catheter in the pleural space.  Partially imaged known right pneumothorax, with marked decrease in the loculated right pleural effusion, with numerous septa noted. Right lower lobe consolidation. Correlate for right lower lobe pneumonia with empyema, in the appropriate clinical setting.    3. Circumferential urinary bladder wall thickening and surrounding inflammatory changes; correlation with urinalysis recommended to evaluate for cystitis.    < from: [facility] Duplex Lower Ext Vein Scan, Bilat (08.25.20 @ 16:00) >  No evidence of deep venous thrombosis or superficial thrombophlebitis in the bilateral lower extremities. 24H events:  Patient is a 69y old Male who presents with a chief complaint of SOB found to have right pleural effusion for which he underwent chest tube drainage.  No major event overnight;   His abdominal distention ameliorated after fleet enema yesterday; patient diet is advanced to soft , tolerating food no more vomiting   Patient is stable clinically ; no major complaint     PAST MEDICAL & SURGICAL HISTORY  Heart failure with reduced ejection fraction  CAD (coronary artery disease): s/p PCI and AICD placement  BPH (benign prostatic hyperplasia)  Type 2 diabetes mellitus  End stage renal disease  Dyslipidemia  Hypertension  No significant past surgical history    SOCIAL HISTORY:  Social History:  Former 2 pack a day smoker of unknown duration  Drinks one glass of wine a night  No drug use (17 Aug 2020 14:27)      ALLERGIES:  No Known Allergies    MEDICATIONS:  STANDING MEDICATIONS  apixaban 2.5 milliGRAM(s) Oral every 12 hours  aspirin  chewable 81 milliGRAM(s) Oral daily  atorvastatin 80 milliGRAM(s) Oral at bedtime  BACItracin   Ointment 1 Application(s) Topical daily  calcium acetate 1334 milliGRAM(s) Oral three times a day with meals  chlorhexidine 4% Liquid 1 Application(s) Topical <User Schedule>  dextrose 5%. 1000 milliLiter(s) IV Continuous <Continuous>  dextrose 50% Injectable 12.5 Gram(s) IV Push once  dextrose 50% Injectable 25 Gram(s) IV Push once  dextrose 50% Injectable 25 Gram(s) IV Push once  insulin lispro (HumaLOG) corrective regimen sliding scale   SubCutaneous three times a day before meals  meropenem  IVPB      meropenem  IVPB 500 milliGRAM(s) IV Intermittent every 24 hours  midodrine 10 milliGRAM(s) Oral every 8 hours  multivitamin/minerals 1 Tablet(s) Oral daily  pantoprazole    Tablet 40 milliGRAM(s) Oral before breakfast  polyethylene glycol 3350 17 Gram(s) Oral daily  senna 2 Tablet(s) Oral at bedtime  simethicone 80 milliGRAM(s) Chew four times a day  tamsulosin 0.8 milliGRAM(s) Oral at bedtime    PRN MEDICATIONS  acetaminophen   Tablet .. 650 milliGRAM(s) Oral every 6 hours PRN  dextrose 40% Gel 15 Gram(s) Oral once PRN  glucagon  Injectable 1 milliGRAM(s) IntraMuscular once PRN  melatonin 1 milliGRAM(s) Oral at bedtime PRN    VITALS:   T(F): 97.1  HR: 83  BP: 118/58  RR: 20  SpO2: 100%    PHYSICAL EXAM:  GENERAL: NAD  HEAD:  Atraumatic, Normocephalic  EYES: EOMI  NECK: Supple  NERVOUS SYSTEM: grossly intact  CHEST/LUNG: decreased bilateral air entry mainly at right , clear lungs  HEART: Regular rate and rhythm; No murmurs, rubs, or gallops  ABDOMEN: Soft, Nontender, distended; Bowel sounds present  EXTREMITIES:  2+ Peripheral Pulses, No clubbing, cyanosis, or edema, left AVF with + thrill   LYMPH: No lymphadenopathy noted  SKIN: stasis dermatitis bilateral legs      LABS:                        9.4    7.51  )-----------( 169      ( 27 Aug 2020 08:09 )             31.8     08-27    138  |  98  |  45<H>  ----------------------------<  92  4.0   |  27  |  7.5<HH>    Ca    8.5      27 Aug 2020 08:09  Phos  7.0     08-27    TPro  6.0  /  Alb  3.5  /  TBili  0.3  /  DBili  x   /  AST  21  /  ALT  10  /  AlkPhos  128<H>  08-27          Lactate, Blood: 0.8 mmol/L (08-27-20 @ 08:09)  Lactate, Blood: 0.8 mmol/L (08-26-20 @ 18:20)      Culture - Blood (collected 25 Aug 2020 16:10)  Source: .Blood None  Preliminary Report (27 Aug 2020 01:02):    No growth to date.      CARDIAC MARKERS ( 26 Aug 2020 06:24 )  x     / 0.36 ng/mL / x     / x     / x      CARDIAC MARKERS ( 25 Aug 2020 16:10 )  x     / 0.37 ng/mL / x     / x     / x          RADIOLOGY:  Echocardiogram (08.19.20 @ 06:56) > TTE   1. Left ventricular ejection fraction, by visual estimation, is 35 to 40%.   2. Left atrial enlargement.   3. LV Normal size and thickness. Septum and apex with severe hypokinesis. Anterior wall not well visualized, but appears hypokinetic.   4. Mildly enlarged right ventricle.   5. Right atrial enlargement.   6. Mild-moderate tricuspid regurgitation.   7. Dilatation of the aortic root.   8. Estimated pulmonary artery systolic pressure is 60.8mmHg assuming a right atrial pressure of 15 mmHg, which is consistent with severe pulmonary hypertension.    < from: Xray Chest 1 View- PORTABLE-Routine (08.24.20 @ 05:53) >  Unchanged right hydropneumothorax. Stable bilateral opacities.    < from: CT Abdomen and Pelvis w/ Oral Cont (08.21.20 @ 04:49) >  1. No evidence of pneumoperitoneum or oral contrast leak.    2. Interval placement of right pigtail catheter in the pleural space.  Partially imaged known right pneumothorax, with marked decrease in the loculated right pleural effusion, with numerous septa noted. Right lower lobe consolidation. Correlate for right lower lobe pneumonia with empyema, in the appropriate clinical setting.    3. Circumferential urinary bladder wall thickening and surrounding inflammatory changes; correlation with urinalysis recommended to evaluate for cystitis.    < from: [facility] Duplex Lower Ext Vein Scan, Bilat (08.25.20 @ 16:00) >  No evidence of deep venous thrombosis or superficial thrombophlebitis in the bilateral lower extremities.

## 2020-08-27 NOTE — DISCHARGE NOTE NURSING/CASE MANAGEMENT/SOCIAL WORK - PATIENT PORTAL LINK FT
You can access the FollowMyHealth Patient Portal offered by [facility] by registering at the following website: [email or web address]. By joining [facility]’s FollowMyHealth portal, you will also be able to view your health information using other applications (apps) compatible with our system.

## 2020-08-27 NOTE — PROGRESS NOTE ADULT - ASSESSMENT
Impression:    Chronic right sided effusion SP Chest tube/ trapped lung. S/P dc pigatil. / worsening CXR  RUL loculated effusion new  Acute on chronic hypercapnic  Respiratory failure  HO [place] non compliant with CPAP  HF REF   Hypotension no evidence of sepsis  N/ abd distention/ SP flat plate        PLAN:     CNS: No depressants , avoid opiates    HEENT: Oral care    PULMONARY:  HOB @ 45 degrees.  Aspiration precautions.  repeat CXR    CARDIOVASCULAR:  Avoid volume overload.     GI: GI prophylaxis.  Feeding clear    RENAL:  Follow up lytes.  Correct as needed.     INFECTIOUS DISEASE: Follow up cultures.  finish course,     HEMATOLOGICAL:  DVT prophylaxis.     ENDOCRINE:  Follow up FS.  Insulin protocol if needed.    MUSCULOSKELETAL:  Bed rest.      CEU  palliative care jacquelyn  DW daughter

## 2020-08-27 NOTE — PROGRESS NOTE ADULT - SUBJECTIVE AND OBJECTIVE BOX
OVERNIGHT EVENTS: events noted, flat plate reviewed, s/p fleet enema, + bm    Vital Signs Last 24 Hrs  T(C): 36.7 (27 Aug 2020 05:32), Max: 36.7 (27 Aug 2020 05:32)  T(F): 98 (27 Aug 2020 05:32), Max: 98 (27 Aug 2020 05:32)  HR: 63 (27 Aug 2020 08:25) (58 - 63)  BP: 99/60 (27 Aug 2020 08:25) (87/52 - 119/58)  BP(mean): 83 (26 Aug 2020 21:39) (83 - 83)  RR: 22 (27 Aug 2020 08:25) (18 - 22)  SpO2: 100% (27 Aug 2020 08:25) (97% - 100%)    PHYSICAL EXAMINATION:    GENERAL: ill looking    HEENT: Head is normocephalic and atraumatic.     NECK: Supple.    LUNGS: dec bs r bases    HEART: Regular rate and rhythm without murmur.    ABDOMEN: Soft, mildly distended    EXTREMITIES: Without any cyanosis, clubbing, rash, lesions or edema.    NEUROLOGIC: Grossly intact.    SKIN: No ulceration or induration present.      LABS:                        9.4    7.51  )-----------( 169      ( 27 Aug 2020 08:09 )             31.8     08-27    138  |  98  |  45<H>  ----------------------------<  92  4.0   |  27  |  7.5<HH>    Ca    8.5      27 Aug 2020 08:09  Phos  7.0     08-27    TPro  6.0  /  Alb  3.5  /  TBili  0.3  /  DBili  x   /  AST  21  /  ALT  10  /  AlkPhos  128<H>  08-27          CARDIAC MARKERS ( 26 Aug 2020 06:24 )  x     / 0.36 ng/mL / x     / x     / x      CARDIAC MARKERS ( 25 Aug 2020 16:10 )  x     / 0.37 ng/mL / x     / x     / x              Lactate, Blood: 0.8 mmol/L (08-27-20 @ 08:09)  Lactate, Blood: 0.8 mmol/L (08-26-20 @ 18:20)    Procalcitonin, Serum: 0.62 ng/mL (08-25-20 @ 16:10)          MICROBIOLOGY:  Culture Results:   No growth to date. (08-25 @ 16:10)      MEDICATIONS  (STANDING):  apixaban 2.5 milliGRAM(s) Oral every 12 hours  aspirin  chewable 81 milliGRAM(s) Oral daily  atorvastatin 80 milliGRAM(s) Oral at bedtime  BACItracin   Ointment 1 Application(s) Topical daily  calcium acetate 667 milliGRAM(s) Oral three times a day with meals  chlorhexidine 4% Liquid 1 Application(s) Topical <User Schedule>  dextrose 5%. 1000 milliLiter(s) (50 mL/Hr) IV Continuous <Continuous>  dextrose 50% Injectable 12.5 Gram(s) IV Push once  dextrose 50% Injectable 25 Gram(s) IV Push once  dextrose 50% Injectable 25 Gram(s) IV Push once  insulin lispro (HumaLOG) corrective regimen sliding scale   SubCutaneous three times a day before meals  meropenem  IVPB      meropenem  IVPB 500 milliGRAM(s) IV Intermittent every 24 hours  midodrine 10 milliGRAM(s) Oral every 8 hours  multivitamin/minerals 1 Tablet(s) Oral daily  pantoprazole    Tablet 40 milliGRAM(s) Oral before breakfast  polyethylene glycol 3350 17 Gram(s) Oral daily  saline laxative (FLEET) Rectal Enema 1 Enema Rectal once  senna 2 Tablet(s) Oral at bedtime  simethicone 80 milliGRAM(s) Chew four times a day  tamsulosin 0.8 milliGRAM(s) Oral at bedtime    MEDICATIONS  (PRN):  acetaminophen   Tablet .. 650 milliGRAM(s) Oral every 6 hours PRN Mild Pain (1 - 3)  dextrose 40% Gel 15 Gram(s) Oral once PRN Blood Glucose LESS THAN 70 milliGRAM(s)/deciliter  glucagon  Injectable 1 milliGRAM(s) IntraMuscular once PRN Glucose LESS THAN 70 milligrams/deciliter  melatonin 1 milliGRAM(s) Oral at bedtime PRN Insomnia      RADIOLOGY & ADDITIONAL STUDIES:

## 2020-08-27 NOTE — PROGRESS NOTE ADULT - ASSESSMENT
70 y/o M with PMH of ESRD on HD MWF, CAD s/p pci with 7 stents and AICD placement in 2018, Heart failure with reduced EF, DLD, HTN, COPD? on 2L home O2, DM II (well controlled with HbA1C in the 6-7 range), Afib on Eliquis (diagnosed in february) and BPH presents to the ED for SOB that has been worsening over the last 2 weeks.    #R sided loculated pleural effusion  #Acute on chronic hypercapnic respiratory failure   #Trapped Lung    -Missed one HD session s/p emergent HD in the ED for volume overload w/ 4 liters removed  -CXR 8/17/2020: Worsening right-sided pleural effusion with increased left lower lung airspace opacities  -CT 8/18/2020: Large right pleural effusion with near complete collapse of the right lung. Large component of this effusion is subpulmonic. Partially loculated anteriorly at the apex.  -s/p pigtail catheter placement by IR on 08/19 > Exudative. Suspicion of underlying mass/malignancy.  - chest tube removed by IR  - Patient is stable on NC 3 L   - received 7 days of meronem; to be stopped tomorrow  - c/w midodrine ; if needed low dose pressor   - Pleural fluid : cultures negative cell count 994 N56% L 36%    #Abdominal distension #Constipation # vomiting  -senna  -miralax  - KUB today ameliorated  - Given another fleet today  - liquids resumed    #ESRD, dialysis dependent   - HD MWF, nephro following  - Phospho low increased    #HFrEF  -TTE 8/19/2020: EF 35-40%, severe Pulm HTN\par-Anuric    #CAD s/p PCI with 7 stents  #Elevated Troponins last one 0.3  -trop elevated on admission  -EKG: no ST changes noted  -Patient not c/o chest pain  - likely from ESRD  -c/w aspirin    #Paroxysmal Afib  -Diagnosed in February  -on Eliquis at home, held on admission due to need for pigtail placement; resumed yesterday 2.5 mg q 12  - Currently normal rate and regular rhythm  - Holding metoprolol due to HoTN    #DM II- uncontrolled  -A1c 7.8  -c/w basal/bolus insulin regimen    #HTN: holding home losartan    #DLD: c/w statin    #BPH: c/w Flomax    DVT ppx: eliquis 2.5 q 12  GI ppx: Protonix  Diet: liquids  DNR/DNI 70 y/o M with PMH of ESRD on HD MWF, CAD s/p pci with 7 stents and AICD placement in 2018, Heart failure with reduced EF, DLD, HTN, COPD? on 2L home O2, DM II (well controlled with HbA1C in the 6-7 range), Afib on Eliquis (diagnosed in february) and BPH presents to the ED for SOB that has been worsening over the last 2 weeks.    #R sided loculated pleural effusion  #Acute on chronic hypercapnic respiratory failure   #Trapped Lung    -Missed one HD session s/p emergent HD in the ED for volume overload w/ 4 liters removed  -CXR 8/17/2020: Worsening right-sided pleural effusion with increased left lower lung airspace opacities  -CT 8/18/2020: Large right pleural effusion with near complete collapse of the right lung. Large component of this effusion is subpulmonic. Partially loculated anteriorly at the apex.  -s/p pigtail catheter placement by IR on 08/19  - chest tube removed by IR  - Patient is stable on NC 3 L   - received 7 days of meronem; stopped  - c/w midodrine   - Pleural fluid : cultures negative cell count 994 N56% L 36%    #Abdominal distension #Constipation # vomiting  -senna  -miralax  - KUB ameliorated  - Given another fleet yesterday  - soft resumed    #ESRD, dialysis dependent   - HD MWF, nephro following  - Phospho low increased  -tomorrow dialysis    #HFrEF  -TTE 8/19/2020: EF 35-40%, severe Pulm HTN\par-Anuric    #CAD s/p PCI with 7 stents  #Elevated Troponins last one 0.3  -trop elevated on admission  -EKG: no ST changes noted  -Patient not c/o chest pain  - likely from ESRD  -c/w aspirin    #Paroxysmal Afib  -Diagnosed in February  -on Eliquis at home, held on admission due to need for pigtail placement; resumed yesterday 2.5 mg q 12  - Currently normal rate and regular rhythm  - Holding metoprolol due to HoTN    #DM II- uncontrolled  -A1c 7.8  -c/w basal/bolus insulin regimen    #HTN: holding home losartan    #DLD: c/w statin    #BPH: c/w Flomax    DVT ppx: eliquis 2.5 q 12  GI ppx: Protonix  Diet: liquids  DNR/DNI

## 2020-08-27 NOTE — CHART NOTE - NSCHARTNOTEFT_GEN_A_CORE
Registered Dietitian Follow-Up     Patient Profile Reviewed                           Yes [x]   No []     Nutrition History Previously Obtained        Yes [x]  No []       Pertinent Medical Interventions:  1. R sided loculated pleural effusion; Acute on chronic hypercapnic respiratory failure; trapped lung--chest tube removed by IR. abx started 8/22 d/t concern for infection, stop after 7days.   2. Abd distention--KUB 8/26 & 8/27 demonstrate Multiple mildly dilated loops of small bowel, possibly reflecting ileus or bowel obstruction 2/2 copious colonic stool.   3. ESRD on HD--HD today, Phoslo in place 2/2/2 per Nephro.      Diet order: Clear liquids (8/27). NPO yesterday. Pt off unit at HD at time of assessment. Spoke to RN who notes diet changed to clears today and pt tolerating ok, diet to remain until obstruction resolved. RN to administer fleet enema today as well. Recs at end of document d/w LIP (Dr. Baez).      Anthropometrics:  - Ht. 170.18cm   - Wt. 90.9kg (8/22). relatively stable   93.2kg (8/19)   - BMI 31.4 using lowest wt   - IBW 67.3kg      Pertinent Lab Data: (8/27/2020) RBC 3.04, H/H 9.4/31.8, BUN 45, Cr 7.5, GFR 7, PO4 7.0      Pertinent Meds: eliquis, aspirin, abx, humalog (SSI), senna, simethicone, proamatine, miralax, atorvastatin, phoslo, multivitamin/minerals, protonix     Physical Findings:  - Appearance: off unit at HD   - GI function: episode of vomiting 8/25 and 8/26, none today as per RN. distended abdomen. LBM 8/26   - Tubes: none   - Oral/Mouth cavity: previously on pureed diet but no SLP assessment. ?reasoning   - Skin: 2+ edema b/l arms.      Nutrition Requirements  Weight Used: 67.3kg (continued from RD initial assessment)      Energy: 0787-8259 kcal/day (30-35 kcal/kg IBW) d/t ESRD on HD  Protein: 81-87 g/day (1.2-1.3 g/kg IBW)  Fluids: per LIP     Nutrient Intake: not meeting needs d/t nutritionally insufficient diet order      [x] Previous Nutrition Diagnosis: Inadequate Oral Intake            [x] Ongoing          [] Resolved     Nutrition Intervention: coordination of care, meals and snacks   Recs:  1. If clear liquid diet to remain, add Ensure clear BID and Sugar free prosource Gelatin BID to optimize intake.   2. If pt demonstrates difficulty swallowing c/s SLP to assess.   3. As medically able, adv diet to low fiber, Renal, CHO Consistent.      Goal/Expected Outcome: As diet adv, pt to tolerate at least 50-60% meals. f/u in 4 days.      Indicator/Monitoring: diet order, energy intake, nutrition related labs, body composition, NFPF (PO/ GI tolerance)

## 2020-08-27 NOTE — CONSULT NOTE ADULT - SUBJECTIVE AND OBJECTIVE BOX
Patient is a 70y old  Male who presents with a chief complaint of SOB (27 Aug 2020 14:55)      HPI:  70 y/o M with PMH of ESRD on HD MWF, CAD s/p pci with 7 stents and AICD placement in 2018, Heart failure with reduced EF (26% in 2018), DLD, HTN, COPD? on 2L home O2, DM II (well controlled with HbA1C in the 6-7 range), and BPH presents to the ED for SOB that has been worsening over the last 2 weeks. As per patient's daughter patient has had multiple friends pass away in the last few months and since then has complained that he needs home O2 (Daughter thinks this is anxiety driven). Patient's daughter reports that patient saturates in the low 90s at home without O2 and saturates at 100% on 2L. Two week ago he developed hypotension while at dialysis. He refused to go the hospital at that time. Since then he reports that his breathing has gotten worse and he requires more O2. A few days ago he requested that his daughter raise his home O2 to 3L. Before 2 week sago he was able to walk around the house without O2 but over the last two weeks he has required O2 to move around his house. He reports that his SOB is worse with walking and better when he sits down. He reports a dry cough at baseline but denies any chest pain, sore throat, wheezing, weight changes, fatigue, dizziness, or changes in urination. No travel history and no one at home is sick. At baseline patient walks with a cane. He lives with his daughter and she takes care of his medication for him. He sleeps on a recliner due to the HF and wears compression stockings. He is oliguric and urinates "a few drops" approximately 2 times a week.    In the ED vitals were stable and patient is breathing well on 3L NC. Labs were significant for elevated potassium and troponins likely secondary to ESRD. Chest xray reveals worsening right-sided pleural effusion with increased left lower lung airspace opacities. Nephrology was consulted and patient was dialyzed in the ED (17 Aug 2020 14:27)      PAST MEDICAL & SURGICAL HISTORY:  Heart failure with reduced ejection fraction  CAD (coronary artery disease): s/p PCI and AICD placement  BPH (benign prostatic hyperplasia)  Type 2 diabetes mellitus  End stage renal disease  Dyslipidemia  Hypertension  No significant past surgical history      PREVIOUS DIAGNOSTIC TESTING:      ECHO  FINDINGS:  < from: Transthoracic Echocardiogram (08.19.20 @ 06:56) >    Summary:   1. Left ventricular ejection fraction, by visual estimation, is 35 to 40%.   2. Left atrial enlargement.   3. LV Normal size and thickness. Septum and apex with severe hypokinesis. Anterior wall not well visualized, but appears hypokinetic.   4. Mildly enlarged right ventricle.   5. Right atrial enlargement.   6. Mild-moderate tricuspid regurgitation.   7. Dilatation of the aortic root.   8. Estimated pulmonary artery systolic pressure is 60.8mmHg assuming a right atrial pressure of 15 mmHg, which is consistent with severe pulmonary hypertension.    PHYSICIAN INTERPRETATION:  Left Ventricle: Left ventricular ejection fraction, by visual estimation, is 35 to 40%. Normal size and thickness.Septum and apex with severe hypokinesis. Anterior wall not well visualized, but appears hypokinetic.  Right Ventricle: The right ventricular size is mildly enlarged. RV systolic function is reduced.  Left Atrium: Left atrial enlargement.  Right Atrium: Right atrial enlargement.  Pericardium: There is no evidence of pericardial effusion.  Mitral Valve: Mildly thickened with adequate leaflet motion.  Tricuspid Valve: Mild-moderate tricuspid regurgitation is visualized. Estimated pulmonary artery systolic pressure is 60.8 mmHg assuming a right atrial pressure of 15 mmHg, which is consistent with severe pulmonary hypertension. Mildly thickened with adequate leaflet motion.  Aortic Valve: Mildly thickened with adequate leaflet motion.  Pulmonic Valve: Structurally normal pulmonic valve, with normal leaflet excursion.  Aorta: There is dilatation of the aortic root.  Venous: The inferior vena cava was dilated, with respiratory size variation less than 50%.  Additional Comments: A pacer wire is visualized in the right ventricle and right atrium.    < end of copied text >      MEDICATIONS  (STANDING):  apixaban 2.5 milliGRAM(s) Oral every 12 hours  aspirin  chewable 81 milliGRAM(s) Oral daily  atorvastatin 80 milliGRAM(s) Oral at bedtime  BACItracin   Ointment 1 Application(s) Topical daily  calcium acetate 1334 milliGRAM(s) Oral three times a day with meals  chlorhexidine 4% Liquid 1 Application(s) Topical <User Schedule>  dextrose 5%. 1000 milliLiter(s) (50 mL/Hr) IV Continuous <Continuous>  dextrose 50% Injectable 12.5 Gram(s) IV Push once  dextrose 50% Injectable 25 Gram(s) IV Push once  dextrose 50% Injectable 25 Gram(s) IV Push once  insulin lispro (HumaLOG) corrective regimen sliding scale   SubCutaneous three times a day before meals  meropenem  IVPB      meropenem  IVPB 500 milliGRAM(s) IV Intermittent every 24 hours  midodrine 10 milliGRAM(s) Oral every 8 hours  multivitamin/minerals 1 Tablet(s) Oral daily  pantoprazole    Tablet 40 milliGRAM(s) Oral before breakfast  polyethylene glycol 3350 17 Gram(s) Oral daily  senna 2 Tablet(s) Oral at bedtime  simethicone 80 milliGRAM(s) Chew four times a day  tamsulosin 0.8 milliGRAM(s) Oral at bedtime    MEDICATIONS  (PRN):  acetaminophen   Tablet .. 650 milliGRAM(s) Oral every 6 hours PRN Mild Pain (1 - 3)  dextrose 40% Gel 15 Gram(s) Oral once PRN Blood Glucose LESS THAN 70 milliGRAM(s)/deciliter  glucagon  Injectable 1 milliGRAM(s) IntraMuscular once PRN Glucose LESS THAN 70 milligrams/deciliter  melatonin 1 milliGRAM(s) Oral at bedtime PRN Insomnia      FAMILY HISTORY:  FH: type 2 diabetes: sister  FH: HTN (hypertension): Sister      SOCIAL HISTORY:  CIGARETTES:  Former smoker        REVIEW OF SYSTEMS:  As per HPI, otherwise negative        Vital Signs Last 24 Hrs  T(C): 36.2 (27 Aug 2020 14:30), Max: 36.7 (27 Aug 2020 05:32)  T(F): 97.1 (27 Aug 2020 14:30), Max: 98 (27 Aug 2020 05:32)  HR: 83 (27 Aug 2020 14:30) (58 - 83)  BP: 118/58 (27 Aug 2020 14:30) (87/52 - 119/58)  BP(mean): 83 (26 Aug 2020 21:39) (83 - 83)  RR: 20 (27 Aug 2020 14:30) (18 - 22)  SpO2: 100% (27 Aug 2020 11:25) (97% - 100%)        PHYSICAL EXAM:  GENERAL: NAD, AAO x 3  HEAD:  Atraumatic, Normocephalic  EYES: EOMI, PERRL, conjunctiva and sclera clear  ENMT: Moist mucous membranes  NECK: Supple, No JVD, No bruits  NERVOUS SYSTEM:  Alert & Oriented X3, Good concentration; No focal deficits  CHEST/LUNG: decreased BS bilaterally; No rales  HEART: Regular rate and rhythm; Normal S1-S2, 2/6 systolic murmur  ABDOMEN: Soft, Nontender, Nondistended  EXTREMITIES:   No clubbing, cyanosis, trace edema  SKIN: No rashes or lesions    INTERPRETATION OF TELEMETRY: Paced    ECG:  < from: 12 Lead ECG (08.21.20 @ 22:44) >    Diagnosis Line Ventricular-paced rhythm  Abnormal ECG    < end of copied text >    I&O's Detail    27 Aug 2020 07:01  -  27 Aug 2020 17:30  --------------------------------------------------------  IN:  Total IN: 0 mL    OUT:    Other: 600 mL  Total OUT: 600 mL    Total NET: -600 mL          LABS:                        9.4    7.51  )-----------( 169      ( 27 Aug 2020 08:09 )             31.8     08-27    138  |  98  |  45<H>  ----------------------------<  92  4.0   |  27  |  7.5<HH>    Ca    8.5      27 Aug 2020 08:09  Phos  7.0     08-27    TPro  6.0  /  Alb  3.5  /  TBili  0.3  /  DBili  x   /  AST  21  /  ALT  10  /  AlkPhos  128<H>  08-27    CARDIAC MARKERS ( 26 Aug 2020 06:24 )  x     / 0.36 ng/mL / x     / x     / x              I&O's Summary    27 Aug 2020 07:01  -  27 Aug 2020 17:30  --------------------------------------------------------  IN: 0 mL / OUT: 600 mL / NET: -600 mL          RADIOLOGY & ADDITIONAL STUDIES:

## 2020-08-27 NOTE — PROGRESS NOTE ADULT - ASSESSMENT
69M, PMH of ESRD on HD MWF, COPD, HFrEF, admitted for SOB.      #ESRD on HD  - hd today, 3 k bath, uf 1 liter as tolerated   - IP noted, increase phoslo to 2/2/2  - hgb noted, no YASMINE  yet   - BP noted , on midodrine continue / has severe valvular disease and pulm HTN   #SOB - sp CT of chest with loculated effusion sp  thoracentesis/ s/p pig tail / sp Pneumothorax/ CTS and pulm on case on merrem for now   # palliative care notes appreciated   # will follow

## 2020-08-27 NOTE — PROGRESS NOTE ADULT - ASSESSMENT
69 y.o male with PMH of ESRD on HD MWF, CAD s/p PCI with 7 stents and AICD In 2018, HFrEF, DLD, HTN, COPD on home O2 2 L, DM, Afib on Eliquis and BPH came with ED with shortness of breath. Hospital course complicated by right sided loculated pleural effusion, acute on chronic hypercapnic respiratory failure and trapped lung. He is s/p catheter placement on 8/19 with exudative findings and then chest tube was removed. Currently receiving antibiotics for infection. Patient also had constipation and receiving bowel regimen for that.   Patient was seen and examined as above, daughter Rozina at bedside, states she needs to discuss with her family regarding meeting. Discussed with primary team also, they will re address     DNR/DNI  Awaiting family to let us know time of family meeting for GOC discussion  Ongoing medical management   Palliative will follow    Call [phone] PRN

## 2020-08-27 NOTE — PROGRESS NOTE ADULT - ASSESSMENT
70 y/o M with PMH of ESRD on HD MWF, CAD s/p pci with 7 stents and AICD placement in 2018, Heart failure with reduced EF, DLD, HTN, COPD? on 2L home O2, DM II (well controlled with HbA1C in the 6-7 range), Afib on Eliquis (diagnosed in february) and BPH presents to the ED for SOB that has been worsening over the last 2 weeks.    #R sided loculated pleural effusion  #Acute on chronic hypercapnic respiratory failure   #Trapped Lung    -CXR 8/17/2020: Worsening right-sided pleural effusion with increased left lower lung airspace opacities  -CT 8/18/2020: Large right pleural effusion with near complete collapse of the right lung. Large component of this effusion is subpulmonic. Partially loculated anteriorly at the apex.  -s/p pigtail catheter placement by IR on 08/19 > Exudative. Suspicion of underlying mass/malignancy.  - chest tube removed by IR a few days ago   - Now on 3L NC   - Complete Gideon today and d/c if okay w/ Pulmonary (Day 7)   - Pleural fluid : cultures negative cell count 994 N56% L 36%    #Abdominal distension   - Severe Constipation w/ ?Ileus   - Tolerating Oral hence advance diet as tolerated  - Repeat KUB   - c/w Senna / Miralax     #ESRD - HD MWF  #HFrEF - TTE 8/19/2020: EF 35-40%, severe PAHTN    #CAD s/p PCI with 7 stents  #Elevated Troponins last one 0.3  - asymptomatic likely 2/2 ESRD  - c/w ASA / Statins     TTE Below: 8/19/20  Summary:   1. Left ventricular ejection fraction, by visual estimation, is 35 to 40%.   2. Left atrial enlargement.   3. LV Normal size and thickness. Septum and apex with severe hypokinesis. Anterior wall not well visualized, but appears hypokinetic.   4. Mildly enlarged right ventricle.   5. Right atrial enlargement.   6. Mild-moderate tricuspid regurgitation.   7. Dilatation of the aortic root.   8. Estimated pulmonary artery systolic pressure is 60.8mmHg assuming a right atrial pressure of 15 mmHg, which is consistent with severe pulmonary hypertension.    #Paroxysmal Afib  - Diagnosed in February  - Resumed Eliquis 2.5 mg q 12  - NSR at the moment     #DM II- uncontrolled  -A1c 7.8  -c/w basal/bolus insulin regimen    #HTN: holding home losartan    #DLD: c/w statin    #BPH: c/w Flomax    DVT ppx: eliquis 2.5 q 12  GI ppx: Protonix  Diet: Advance to cardiac diet     DNR/DNI    GOC Discussion Pending Family Meeting being arranged by Palliative Team     Dispo: Maximum assist to stand - 2 persons assist - High Fall Risk. TBD. Start working on d/c planning

## 2020-08-27 NOTE — CONSULT NOTE ADULT - ASSESSMENT
CAD s/p PCI  ischemic CM  CHF - systolic, chronic  ESRD on HD  hypotension  Pleural effusion    C/w RRT as per nephrology  C/w midodrine for BP support  Decrease tamsulosin to 0.4 mg   Pulmonary f/u  Advance diet if tolerated

## 2020-08-28 NOTE — PROGRESS NOTE ADULT - ASSESSMENT
69 y.o male with PMH of ESRD on HD MWF, CAD s/p PCI with 7 stents and AICD In 2018, HFrEF, DLD, HTN, COPD on home O2 2 L, DM, Afib on Eliquis and BPH came with ED with shortness of breath. Hospital course complicated by right sided loculated pleural effusion, acute on chronic hypercapnic respiratory failure and trapped lung. He is s/p catheter placement on 8/19 with exudative findings and then chest tube was removed. Currently receiving antibiotics for infection. Patient also had constipation and receiving bowel regimen for that.   Family would like to continue with current medical care.     DNR/DNI  Ongoing medical management   Palliative will sign off. Please re consult if needed.     Call [phone] PRN

## 2020-08-28 NOTE — PROGRESS NOTE ADULT - SUBJECTIVE AND OBJECTIVE BOX
Nephrology progress note    THIS IS AN INCOMPLETE NOTE . FULL NOTE TO FOLLOW SHORTLY    Patient is seen and examined, events over the last 24 h noted .    Allergies:  No Known Allergies    Hospital Medications:   MEDICATIONS  (STANDING):  apixaban 2.5 milliGRAM(s) Oral every 12 hours  aspirin  chewable 81 milliGRAM(s) Oral daily  atorvastatin 80 milliGRAM(s) Oral at bedtime  BACItracin   Ointment 1 Application(s) Topical daily  calcium acetate 1334 milliGRAM(s) Oral three times a day with meals  chlorhexidine 4% Liquid 1 Application(s) Topical <User Schedule>  dextrose 5%. 1000 milliLiter(s) (50 mL/Hr) IV Continuous <Continuous>  dextrose 50% Injectable 12.5 Gram(s) IV Push once  dextrose 50% Injectable 25 Gram(s) IV Push once  dextrose 50% Injectable 25 Gram(s) IV Push once  insulin lispro (HumaLOG) corrective regimen sliding scale   SubCutaneous three times a day before meals  meropenem  IVPB      meropenem  IVPB 500 milliGRAM(s) IV Intermittent every 24 hours  midodrine 10 milliGRAM(s) Oral every 8 hours  multivitamin/minerals 1 Tablet(s) Oral daily  pantoprazole    Tablet 40 milliGRAM(s) Oral before breakfast  polyethylene glycol 3350 17 Gram(s) Oral daily  senna 2 Tablet(s) Oral at bedtime  simethicone 80 milliGRAM(s) Chew four times a day  tamsulosin 0.4 milliGRAM(s) Oral at bedtime        VITALS:  T(F): 98.3 (08-28-20 @ 06:14), Max: 98.5 (08-27-20 @ 18:00)  HR: 60 (08-28-20 @ 06:14)  BP: 98/50 (08-28-20 @ 06:14)  RR: 20 (08-28-20 @ 06:14)  SpO2: 100% (08-28-20 @ 06:14)  Wt(kg): --    08-27 @ 07:01  -  08-28 @ 07:00  --------------------------------------------------------  IN: 0 mL / OUT: 600 mL / NET: -600 mL          PHYSICAL EXAM:  Constitutional: NAD  HEENT: anicteric sclera, oropharynx clear, MMM  Neck: No JVD  Respiratory: CTAB, no wheezes, rales or rhonchi  Cardiovascular: S1, S2, RRR  Gastrointestinal: BS+, soft, NT/ND  Extremities: No cyanosis or clubbing. No peripheral edema  :  No crook.   Skin: No rashes    LABS:  08-27    138  |  98  |  45<H>  ----------------------------<  92  4.0   |  27  |  7.5<HH>    Ca    8.5      27 Aug 2020 08:09  Phos  7.0     08-27    TPro  6.0  /  Alb  3.5  /  TBili  0.3  /  DBili      /  AST  21  /  ALT  10  /  AlkPhos  128<H>  08-27                          9.4    7.51  )-----------( 169      ( 27 Aug 2020 08:09 )             31.8       Urine Studies:      RADIOLOGY & ADDITIONAL STUDIES: Nephrology progress note    Patient is seen and examined, events over the last 24 h noted .  sitting in chair  lethargic still     Allergies:  No Known Allergies    Hospital Medications:   MEDICATIONS  (STANDING):    apixaban 2.5 milliGRAM(s) Oral every 12 hours  aspirin  chewable 81 milliGRAM(s) Oral daily  atorvastatin 80 milliGRAM(s) Oral at bedtime  BACItracin   Ointment 1 Application(s) Topical daily  calcium acetate 1334 milliGRAM(s) Oral three times a day with meals  dextrose 5%. 1000 milliLiter(s) (50 mL/Hr) IV Continuous <Continuous>  insulin lispro (HumaLOG) corrective regimen sliding scale   SubCutaneous three times a day before meals  meropenem  IVPB 500 milliGRAM(s) IV Intermittent every 24 hours  midodrine 10 milliGRAM(s) Oral every 8 hours  multivitamin/minerals 1 Tablet(s) Oral daily  pantoprazole    Tablet 40 milliGRAM(s) Oral before breakfast  polyethylene glycol 3350 17 Gram(s) Oral daily  senna 2 Tablet(s) Oral at bedtime  simethicone 80 milliGRAM(s) Chew four times a day  tamsulosin 0.4 milliGRAM(s) Oral at bedtime        VITALS:  T(F): 98.3 (08-28-20 @ 06:14), Max: 98.5 (08-27-20 @ 18:00)  HR: 60 (08-28-20 @ 06:14)  BP: 98/50 (08-28-20 @ 06:14)  RR: 20 (08-28-20 @ 06:14)  SpO2: 100% (08-28-20 @ 06:14)      08-27 @ 07:01  -  08-28 @ 07:00  --------------------------------------------------------  IN: 0 mL / OUT: 600 mL / NET: -600 mL          PHYSICAL EXAM:  Constitutional: lethargic weak   Neck: No JVD  Respiratory: CTAB, no wheezes, rales or rhonchi  Cardiovascular: S1, S2, RRR  Gastrointestinal: BS+, soft, NT/ND  Extremities: No cyanosis or clubbing. No peripheral edema  :  No crook.   Skin: No rashes    LABS:  08-27    138  |  98  |  45<H>  ----------------------------<  92  4.0   |  27  |  7.5<HH>    Ca    8.5      27 Aug 2020 08:09  Phos  7.0     08-27    TPro  6.0  /  Alb  3.5  /  TBili  0.3  /  DBili      /  AST  21  /  ALT  10  /  AlkPhos  128<H>  08-27                          9.4    7.51  )-----------( 169      ( 27 Aug 2020 08:09 )             31.8       Urine Studies:      RADIOLOGY & ADDITIONAL STUDIES:

## 2020-08-28 NOTE — DISCHARGE NOTE PROVIDER - NSDCMRMEDTOKEN_GEN_ALL_CORE_FT
alfuzosin 10 mg oral tablet, extended release: 1 tab(s) orally once a day on non-dialysis days  aspirin 81 mg oral tablet, chewable: 1 tab(s) orally once a day  calcium acetate 667 mg oral tablet: 1 tab(s) orally 3 times a day  cefdinir 300 mg oral capsule: 1 tab(s) orally 3 times a day after dialysis  Centrum Silver oral tablet: 1 tab(s) orally once a day  Crestor 20 mg oral tablet: 1 tab(s) orally once a day  Eliquis 5 mg oral tablet: 1 tab(s) orally 2 times a day  furosemide 40 mg oral tablet: 1 tab(s) orally once a day on non-dialysis days  isosorbide mononitrate 30 mg oral tablet, extended release: 1 tab(s) orally once a day on non-dialysis days  losartan 25 mg oral tablet: 1 tab(s) orally once a day  metoprolol tartrate 25 mg oral tablet: 1 tab(s) orally 2 times a day on non-dialysis days  pantoprazole 40 mg oral delayed release tablet: 1 tab(s) orally once a day

## 2020-08-28 NOTE — PROGRESS NOTE ADULT - SUBJECTIVE AND OBJECTIVE BOX
24H events:  Patient is a 69y old Male who presents with a chief complaint of SOB found to have right pleural effusion for which he underwent chest tube drainage.  No major event overnight;   His abdominal distention ameliorated after fleet enema yesterday; Patient is asking for food; he denies nausea and vomiting   Patient is stable clinically     PAST MEDICAL & SURGICAL HISTORY  Heart failure with reduced ejection fraction  CAD (coronary artery disease): s/p PCI and AICD placement  BPH (benign prostatic hyperplasia)  Type 2 diabetes mellitus  End stage renal disease  Dyslipidemia  Hypertension  No significant past surgical history    SOCIAL HISTORY:  Social History:  Former 2 pack a day smoker of unknown duration  Drinks one glass of wine a night  No drug use (17 Aug 2020 14:27)      ALLERGIES:  No Known Allergies    MEDICATIONS:  STANDING MEDICATIONS  apixaban 2.5 milliGRAM(s) Oral every 12 hours  aspirin  chewable 81 milliGRAM(s) Oral daily  atorvastatin 80 milliGRAM(s) Oral at bedtime  BACItracin   Ointment 1 Application(s) Topical daily  calcium acetate 1334 milliGRAM(s) Oral three times a day with meals  chlorhexidine 4% Liquid 1 Application(s) Topical <User Schedule>  dextrose 5%. 1000 milliLiter(s) IV Continuous <Continuous>  dextrose 50% Injectable 12.5 Gram(s) IV Push once  dextrose 50% Injectable 25 Gram(s) IV Push once  dextrose 50% Injectable 25 Gram(s) IV Push once  insulin lispro (HumaLOG) corrective regimen sliding scale   SubCutaneous three times a day before meals  midodrine 10 milliGRAM(s) Oral every 8 hours  multivitamin/minerals 1 Tablet(s) Oral daily  pantoprazole    Tablet 40 milliGRAM(s) Oral before breakfast  polyethylene glycol 3350 17 Gram(s) Oral two times a day  senna 2 Tablet(s) Oral at bedtime  simethicone 80 milliGRAM(s) Chew four times a day  tamsulosin 0.4 milliGRAM(s) Oral at bedtime    PRN MEDICATIONS  acetaminophen   Tablet .. 650 milliGRAM(s) Oral every 6 hours PRN  dextrose 40% Gel 15 Gram(s) Oral once PRN  glucagon  Injectable 1 milliGRAM(s) IntraMuscular once PRN  melatonin 1 milliGRAM(s) Oral at bedtime PRN    VITALS:   T(F): 98.3  HR: 60  BP: 98/50  RR: 20  SpO2: 100%    PHYSICAL EXAM:  GENERAL: NAD  HEAD:  Atraumatic, Normocephalic  EYES: EOMI  NECK: Supple  NERVOUS SYSTEM: grossly intact  CHEST/LUNG: decreased bilateral air entry, clear lungs  HEART: Regular rate and rhythm; No murmurs, rubs, or gallops  ABDOMEN: Soft, Nontender, distended; Bowel sounds present  EXTREMITIES:  2+ Peripheral Pulses, No clubbing, cyanosis, or edema, left AVF with + thrill   LYMPH: No lymphadenopathy noted  SKIN: stasis dermatitis bilateral legs    LABS:                        9.4    7.51  )-----------( 169      ( 27 Aug 2020 08:09 )             31.8     08-27    138  |  98  |  45<H>  ----------------------------<  92  4.0   |  27  |  7.5<HH>    Ca    8.5      27 Aug 2020 08:09  Phos  7.0     08-27    TPro  6.0  /  Alb  3.5  /  TBili  0.3  /  DBili  x   /  AST  21  /  ALT  10  /  AlkPhos  128<H>  08-27              Culture - Blood (collected 25 Aug 2020 16:10)  Source: .Blood None  Preliminary Report (27 Aug 2020 01:02):    No growth to date.          RADIOLOGY:    Echocardiogram (08.19.20 @ 06:56) > TTE   1. Left ventricular ejection fraction, by visual estimation, is 35 to 40%.   2. Left atrial enlargement.   3. LV Normal size and thickness. Septum and apex with severe hypokinesis. Anterior wall not well visualized, but appears hypokinetic.   4. Mildly enlarged right ventricle.   5. Right atrial enlargement.   6. Mild-moderate tricuspid regurgitation.   7. Dilatation of the aortic root.   8. Estimated pulmonary artery systolic pressure is 60.8mmHg assuming a right atrial pressure of 15 mmHg, which is consistent with severe pulmonary hypertension.    < from: Xray Chest 1 View- PORTABLE-Routine (08.24.20 @ 05:53) >  Unchanged right hydropneumothorax. Stable bilateral opacities.    < from: CT Abdomen and Pelvis w/ Oral Cont (08.21.20 @ 04:49) >  1. No evidence of pneumoperitoneum or oral contrast leak.    2. Interval placement of right pigtail catheter in the pleural space.  Partially imaged known right pneumothorax, with marked decrease in the loculated right pleural effusion, with numerous septa noted. Right lower lobe consolidation. Correlate for right lower lobe pneumonia with empyema, in the appropriate clinical setting.    3. Circumferential urinary bladder wall thickening and surrounding inflammatory changes; correlation with urinalysis recommended to evaluate for cystitis.    < from: [facility] Duplex Lower Ext Vein Scan, Bilat (08.25.20 @ 16:00) >  No evidence of deep venous thrombosis or superficial thrombophlebitis in the bilateral lower extremities.

## 2020-08-28 NOTE — PROGRESS NOTE ADULT - SUBJECTIVE AND OBJECTIVE BOX
Progress Note:  Provider Speciality                            Hospitalist      ADRIANNA GAINES [id number] 70y Male     CHIEF PRESENTING COMPLAINT:  Patient is a 70y old  Male who presents with a chief complaint of SOB (28 Aug 2020 08:34)        SUBJECTIVE:  Patient was seen and examined at bedside.   sitting up in chair/ daughter at bedside. patient reports doing better than before/ breathing is better. he is passing gas and had a BM today morning.   tolerating clear diet well.   No significant overnight events reported.     HISTORY OF PRESENTING ILLNESS:  HPI:  70 y/o M with PMH of ESRD on HD MWF, CAD s/p pci with 7 stents and AICD placement in 2018, Heart failure with reduced EF (26% in 2018), DLD, HTN, COPD? on 2L home O2, DM II (well controlled with HbA1C in the 6-7 range), and BPH presents to the ED for SOB that has been worsening over the last 2 weeks. As per patient's daughter patient has had multiple friends pass away in the last few months and since then has complained that he needs home O2 (Daughter thinks this is anxiety driven). Patient's daughter reports that patient saturates in the low 90s at home without O2 and saturates at 100% on 2L. Two week ago he developed hypotension while at dialysis. He refused to go the hospital at that time. Since then he reports that his breathing has gotten worse and he requires more O2. A few days ago he requested that his daughter raise his home O2 to 3L. Before 2 week sago he was able to walk around the house without O2 but over the last two weeks he has required O2 to move around his house. He reports that his SOB is worse with walking and better when he sits down. He reports a dry cough at baseline but denies any chest pain, sore throat, wheezing, weight changes, fatigue, dizziness, or changes in urination. No travel history and no one at home is sick. At baseline patient walks with a cane. He lives with his daughter and she takes care of his medication for him. He sleeps on a recliner due to the HF and wears compression stockings. He is oliguric and urinates "a few drops" approximately 2 times a week.    In the ED vitals were stable and patient is breathing well on 3L NC. Labs were significant for elevated potassium and troponins likely secondary to ESRD. Chest xray reveals worsening right-sided pleural effusion with increased left lower lung airspace opacities. Nephrology was consulted and patient was dialyzed in the ED (17 Aug 2020 14:27)        REVIEW OF SYSTEMS:    At least 10 systems were reviewed in ROS. All systems reviewed  are within normal limits except for the complaints as described in Subjective.    PAST MEDICAL & SURGICAL HISTORY:  PAST MEDICAL & SURGICAL HISTORY:  Heart failure with reduced ejection fraction  CAD (coronary artery disease): s/p PCI and AICD placement  BPH (benign prostatic hyperplasia)  Type 2 diabetes mellitus  End stage renal disease  Dyslipidemia  Hypertension  No significant past surgical history          VITAL SIGNS:  Vital Signs Last 24 Hrs  T(C): 36.8 (28 Aug 2020 06:14), Max: 36.9 (27 Aug 2020 18:00)  T(F): 98.3 (28 Aug 2020 06:14), Max: 98.5 (27 Aug 2020 18:00)  HR: 60 (28 Aug 2020 06:14) (60 - 83)  BP: 98/50 (28 Aug 2020 06:14) (87/47 - 118/58)  BP(mean): 71 (27 Aug 2020 18:45) (65 - 71)  RR: 20 (28 Aug 2020 06:14) (20 - 20)  SpO2: 100% (28 Aug 2020 06:14) (100% - 100%)          PHYSICAL EXAMINATION:    General: Awake, alert, Not in acute distress  HEENT:   EOMI, atraumatic  Heart: S1+S2 audible, no murmur  Lungs: bilateral  fair air entry, no wheezing, no crepitations.  Abdomen: Soft, non-tender, distended , +ve BS.   CNS: AAO  , no focal deficits.  Extremities:  No edema /chronic skin hyperpigmentation LE's.            CONSULTS:  Consultant(s) Notes Reviewed by me.   Care Discussed with Consultants/Other Providers where required.        MEDICATIONS:  MEDICATIONS  (STANDING):  apixaban 2.5 milliGRAM(s) Oral every 12 hours  aspirin  chewable 81 milliGRAM(s) Oral daily  atorvastatin 80 milliGRAM(s) Oral at bedtime  BACItracin   Ointment 1 Application(s) Topical daily  calcium acetate 1334 milliGRAM(s) Oral three times a day with meals  chlorhexidine 4% Liquid 1 Application(s) Topical <User Schedule>  dextrose 5%. 1000 milliLiter(s) (50 mL/Hr) IV Continuous <Continuous>  dextrose 50% Injectable 12.5 Gram(s) IV Push once  dextrose 50% Injectable 25 Gram(s) IV Push once  dextrose 50% Injectable 25 Gram(s) IV Push once  insulin lispro (HumaLOG) corrective regimen sliding scale   SubCutaneous three times a day before meals  midodrine 10 milliGRAM(s) Oral every 8 hours  multivitamin/minerals 1 Tablet(s) Oral daily  pantoprazole    Tablet 40 milliGRAM(s) Oral before breakfast  polyethylene glycol 3350 17 Gram(s) Oral daily  senna 2 Tablet(s) Oral at bedtime  simethicone 80 milliGRAM(s) Chew four times a day  tamsulosin 0.4 milliGRAM(s) Oral at bedtime    MEDICATIONS  (PRN):  acetaminophen   Tablet .. 650 milliGRAM(s) Oral every 6 hours PRN Mild Pain (1 - 3)  dextrose 40% Gel 15 Gram(s) Oral once PRN Blood Glucose LESS THAN 70 milliGRAM(s)/deciliter  glucagon  Injectable 1 milliGRAM(s) IntraMuscular once PRN Glucose LESS THAN 70 milligrams/deciliter  melatonin 1 milliGRAM(s) Oral at bedtime PRN Insomnia      LABOROTORY DATA/MICROBIOLOGY/I & O's:                        9.4    7.51  )-----------( 169      ( 27 Aug 2020 08:09 )             31.8     08-27    138  |  98  |  45<H>  ----------------------------<  92  4.0   |  27  |  7.5<HH>    Ca    8.5      27 Aug 2020 08:09  Phos  7.0     08-27    TPro  6.0  /  Alb  3.5  /  TBili  0.3  /  DBili  x   /  AST  21  /  ALT  10  /  AlkPhos  128<H>  08-27        CAPILLARY BLOOD GLUCOSE      POCT Blood Glucose.: 127 mg/dL (28 Aug 2020 07:58)  POCT Blood Glucose.: 151 mg/dL (27 Aug 2020 21:29)  POCT Blood Glucose.: 152 mg/dL (27 Aug 2020 16:42)                08-27-20 @ 07:01  -  08-28-20 @ 07:00  --------------------------------------------------------  IN: 0 mL / OUT: 600 mL / NET: -600 mL              ASSESSMENT/Plan:  70 y/o M with PMH of ESRD on HD MWF, CAD s/p pci with 7 stents and AICD placement in 2018, Heart failure with reduced EF, DLD, HTN, COPD? on 2L home O2, DM II (well controlled with HbA1C in the 6-7 range), Afib on Eliquis (diagnosed in february) and BPH presents to the ED for SOB that has been worsening over the last 2 weeks.    Acute on chronic hypercapnic respiratory failure with chronic R sided pleural effusion with new R sided loculated pleural effusion/Trapped Lung:  CT 8/18/2020: Large right pleural effusion with near complete collapse of the right lung. Large component of this effusion is subpulmonic. Partially loculated anteriorly at the apex.  -s/p pigtail catheter placement by IR on 08/19 and successful removal of catheter. Suspicion of underlying mass/malignancy.  repeat CXR 8/27 shows unchanged R hydropneumothorax and b/l opacities.    SPO2 100% on 3L NC O2/ can wean down to maintain SPO2>94%.  Pleural fluid : cultures negative cell count 994 N56% L 36%  completed course of meropenem for 7 days.     Abdominal distension/Severe Constipation:   patient is passing gas and had a bm today morning.   tolerating clear diet well/ agree with advancing  diet and monitor for tolerance   Repeat KUB shows colonic stool burden with multiple mildly  dilated bowel loops / but patient is passing gas/ had a bm and tolerating diet/ would advance diet and monitor patient for tolerance.   c/w Senna / Miralax- increased to twice daily.      ESRD - HD MWF/ per nephro patient should have HD tomorrow before discharge.     HFrEF with ischemic cardioimyopathy:   TTE 8/19/2020: EF 35-40%, severe PAHTN  Cardio eval noted. no changes in meds/ not on BB or ACEI due to hypotension.    Elevated Troponins likely  due to ESRD with h/o CAD s/p PCI with 7 stents:  asymptomatic.   Cardio eval noted. continue current medical management   c/w ASA / Statins       Paroxysmal Afib:  rate controlled.   c/w  Eliquis 2.5 mg q 12    hypotension:   BP on low side/ off losartan/ c/w midodrine.       DM II- uncontrolled  -A1c 7.8  -c/w basal/bolus insulin regimen    HTN: holding home losartan    #DLD: c/w statin    #BPH: c/w Flomax    DVT ppx: eliquis 2.5 q 12  GI ppx: Protonix       DNR/DNI    Patient is 2 person assist/ high risk for fall but Daughter wants to take patient home and understands the risk of fall/ requests home PT arrangement/ SW to arrange home PT>           Progress note handoff:   pending: HD tomorrow/ home PT arrangement/ tolerance of diet.  disposition: home with home PT  discussion: plan of care/ discharge with patient and daughter at bedside/ satisfied.

## 2020-08-28 NOTE — PROGRESS NOTE ADULT - ASSESSMENT
69M, PMH of ESRD on HD MWF, COPD, HFrEF, admitted for SOB.      #ESRD on HD  - sp HD yesterday / no need for HD today/ next ttt in AM / orders in   - IP noted, on phoslo 2/2/2 increased yesterday   - hgb noted, no YASMINE  yet   - BP noted , on midodrine continue / has severe valvular disease and pulm HTN   #SOB - sp CT of chest with loculated effusion sp  thoracentesis/ s/p pig tail / sp Pneumothorax/ CTS and pulm on case on merrem for now   # palliative care notes appreciated   # will follow

## 2020-08-28 NOTE — DISCHARGE NOTE PROVIDER - PROVIDER TOKENS
PROVIDER:[TOKEN:[[id number]],FOLLOWUP:[1 week]],PROVIDER:[TOKEN:[[id number]],FOLLOWUP:[1 week]],PROVIDER:[TOKEN:[[id number]],FOLLOWUP:[2 weeks]] PROVIDER:[TOKEN:[[id number]],FOLLOWUP:[1 week]],PROVIDER:[TOKEN:[[id number]],FOLLOWUP:[1 week]],PROVIDER:[TOKEN:[[id number]],FOLLOWUP:[2 weeks]],PROVIDER:[TOKEN:[[id number]],FOLLOWUP:[1-3 days]]

## 2020-08-28 NOTE — DISCHARGE NOTE PROVIDER - NSDCFUSCHEDAPPT_GEN_ALL_CORE_FT
ADRIANNA GAINES ; 10/14/2020 ; NPP Cardio [address] [facility] ADRIANNA GAINES ; 10/14/2020 ; NPP Cardio [address] [facility] ADRIANNA GAINES ; 10/14/2020 ; NPP Cardio [address] [facility] ADRIANNA GAINES ; 09/03/2020 ; NPP Otolaryng [address] [facility]  ADRIANNA GAINES ; 10/14/2020 ; NPP Cardio [address] [facility] ADRIANNA GAINES ; 09/03/2020 ; NPP Otolaryng [address] [facility]  ADRIANNA GAINES ; 10/14/2020 ; NPP Cardio [address] [facility] ADRIANNA GAINES ; 09/03/2020 ; NPP Otolaryng [address] [facility]  ADRIANNA GAINES ; 10/14/2020 ; NPP Cardio [address] [facility] ADRIANNA GAINES ; 09/03/2020 ; NPP Otolaryng [address] [facility]  ADRIANNA GAINES ; 10/14/2020 ; NPP Cardio [address] [facility] ADRIANNA GAINES ; 09/03/2020 ; NPP Otolaryng [address] [facility]  ADRIANNA GAINES ; 10/14/2020 ; NPP Cardio [address] [facility]

## 2020-08-28 NOTE — DISCHARGE NOTE PROVIDER - CARE PROVIDERS DIRECT ADDRESSES
,DirectAddress_Unknown,[email or web address],DirectAddress_Unknown ,DirectAddress_Unknown,[email or web address],DirectAddress_Unknown,[email or web address]

## 2020-08-28 NOTE — DISCHARGE NOTE PROVIDER - NSDCCPCAREPLAN_GEN_ALL_CORE_FT
PRINCIPAL DISCHARGE DIAGNOSIS  Diagnosis: SOB (shortness of breath)  Assessment and Plan of Treatment: from right pleural effusion        SECONDARY DISCHARGE DIAGNOSES  Diagnosis: Pleural effusion  Assessment and Plan of Treatment: underwent chest tube insertion  chest tube removed  effusion might reccur PRINCIPAL DISCHARGE DIAGNOSIS  Diagnosis: SOB (shortness of breath)  Assessment and Plan of Treatment: Likely from right pleural effusion. You underwent chest tube insertion and fluid was removed. You were also treated empirically for pneumonia with a 7 days course of antibiotic. Effusion might reccur, please follow with  PMD, nephrologist and pulmonologist        SECONDARY DISCHARGE DIAGNOSES  Diagnosis: Ileus  Assessment and Plan of Treatment: You were constipated, likely a side effect of pain medications. Please continue your laxatives and seek medical attention in case of severe constipation or abdomnial pain PRINCIPAL DISCHARGE DIAGNOSIS  Diagnosis: SOB (shortness of breath)  Assessment and Plan of Treatment: You developed difficulty breathing like due to fluid in your lungs. You underwent chest tube insertion and fluid was removed. You were also treated empirically for pneumonia with a 7 days course of antibiotic. Effusion might reccur, please follow with  PMD, nephrologist and pulmonologist        SECONDARY DISCHARGE DIAGNOSES  Diagnosis: Ileus  Assessment and Plan of Treatment: You were constipated, likely a side effect of pain medications. Please continue your laxatives and seek medical attention in case of severe constipation or abdomnial pain PRINCIPAL DISCHARGE DIAGNOSIS  Diagnosis: SOB (shortness of breath)  Assessment and Plan of Treatment: You developed difficulty breathing like due to fluid in your lungs. You underwent chest tube insertion and fluid was removed. You were also treated empirically for pneumonia with a 7 days course of antibiotic. Effusion might reccur, please follow with  PMD, nephrologist and pulmonologist        SECONDARY DISCHARGE DIAGNOSES  Diagnosis: Epistaxis  Assessment and Plan of Treatment: You developed a nose bleed that did not resolve with pressure. ENT placed a balloon and make sure to keep it in place until your appointment on Thursday to remove it.    Diagnosis: Ileus  Assessment and Plan of Treatment: You were constipated, likely a side effect of pain medications. Please continue your laxatives and seek medical attention in case of severe constipation or abdomnial pain PRINCIPAL DISCHARGE DIAGNOSIS  Diagnosis: SOB (shortness of breath)  Assessment and Plan of Treatment: You developed difficulty breathing like due to fluid in your lungs. You underwent chest tube insertion and fluid was removed. You were also treated empirically for pneumonia with a 7 days course of antibiotic. Effusion might reccur, please follow with  PMD, nephrologist and pulmonologist        SECONDARY DISCHARGE DIAGNOSES  Diagnosis: Epistaxis  Assessment and Plan of Treatment: You developed a nose bleed that did not resolve with pressure. ENT placed a balloon and make sure to keep it in place until your appointment on Thursday to remove it. We stopped your blood thinner medication Eliquis due to the bleeding, MAKE SURE YOU RESTART TAKING THE MEDICATION IN 3 DAYS, IT IS IMPORTANT YOU DO THIS TO PROTECT THE STENTS IN YOUR HEART.    Diagnosis: Ileus  Assessment and Plan of Treatment: You were constipated, likely a side effect of pain medications. Please continue your laxatives and seek medical attention in case of severe constipation or abdomnial pain PRINCIPAL DISCHARGE DIAGNOSIS  Diagnosis: SOB (shortness of breath)  Assessment and Plan of Treatment: You developed difficulty breathing like due to fluid in your lungs. You underwent chest tube insertion and fluid was removed. You were also treated empirically for pneumonia with a 7 days course of antibiotic. Effusion might reccur, please follow with  PMD, nephrologist and pulmonologist        SECONDARY DISCHARGE DIAGNOSES  Diagnosis: Epistaxis  Assessment and Plan of Treatment: You developed a nose bleed that did not resolve with pressure. ENT placed a balloon and make sure to keep it in place until your appointment on Thursday to remove it. We stopped your blood thinner medication Eliquis and Aspirin due to the bleeding, MAKE SURE YOU RESTART TAKING THE MEDICATION IN 3 DAYS AFTER THE BALLOON IS REMOVED AND THERE ARE NO SIGNS OF BLEEDING, IT IS IMPORTANT YOU DO THIS TO PROTECT THE STENTS IN YOUR HEART.    Diagnosis: Ileus  Assessment and Plan of Treatment: You were constipated, likely a side effect of pain medications. Please continue your laxatives and seek medical attention in case of severe constipation or abdomnial pain

## 2020-08-28 NOTE — DISCHARGE NOTE PROVIDER - HOSPITAL COURSE
70 yo male with multiple comorbidities including ESRD on HD , CAD sp PCI and AICD placement    presented for SOB, found to have right pleural effusion likely from HF with under trapped lung for which chest tube was inserted    Patient hospital course was not complicated    He received 7 days of meronem for possible pneumonia    Cultures came back negative    He was constipated (ileus found on KUb) most likely due to opiates     He received several enemas KUB and abdominal distention have improved    PT rehab helping patient ambulating    chest tube removed    Patient off antibiotics    underwent dialysis with suboptimal fluid removal due to low blood pressure    Patient BP maintained with midodrine    Home antihypertensive medications stopped         Patient needs physiotherapy at home    family refused rehab transfer    Withhold anti hypertensive at home; BP monitoring Mr. Hilton is a 70 yo male patient with PMH of ESRD on HD , CAD sp PCI and AICD placement presented for SOB, found to have right pleural effusion likely from HF with underlying trapped lung for which chest tube was inserted    Patient hospital course was not complicated    He received 7 days of meronem for possible pneumonia. Cultures came back negative    He was constipated (ileus found on KUb) most likely due to opiates     He received several enemas KUB and abdominal distention have improved    PT rehab helping patient ambulating    chest tube removed    Patient off antibiotics    underwent dialysis with suboptimal fluid removal due to low blood pressure    Patient BP maintained with midodrine    Home antihypertensive medications stopped         Patient needs physiotherapy at home    family refused rehab transfer    Withhold anti hypertensive at home; BP monitoring 68 y/o M with PMH of ESRD on HD MWF, CAD s/p pci with 7 stents and AICD placement in 2018, Heart failure with reduced EF (26% in 2018), DLD, HTN, COPD? on 2L home O2, DM II (well controlled with HbA1C in the 6-7 range), and BPH presents to the ED for SOB that has been worsening over the last 2 weeks. As per patient's daughter patient has had multiple friends pass away in the last few months and since then has complained that he needs home O2 (Daughter thinks this is anxiety driven). Patient's daughter reports that patient saturates in the low 90s at home without O2 and saturates at 100% on 2L. Two week ago he developed hypotension while at dialysis. He refused to go the hospital at that time. Since then he reports that his breathing has gotten worse and he requires more O2. A few days ago he requested that his daughter raise his home O2 to 3L. Before 2 week sago he was able to walk around the house without O2 but over the last two weeks he has required O2 to move around his house. He reports that his SOB is worse with walking and better when he sits down. He reports a dry cough at baseline but denies any chest pain, sore throat, wheezing, weight changes, fatigue, dizziness, or changes in urination. No travel history and no one at home is sick. At baseline patient walks with a cane. He lives with his daughter and she takes care of his medication for him. He sleeps on a recliner due to the HF and wears compression stockings. He is oliguric and urinates "a few drops" approximately 2 times a week.        In the ED vitals were stable and patient is breathing well on 3L NC. Labs were significant for elevated potassium and troponins likely secondary to ESRD. Chest xray reveals worsening right-sided pleural effusion with increased left lower lung airspace opacities. Nephrology was consulted and patient was dialyzed in the ED        MICU COURSE:    Patient was upgraded from floors to MICU on 8/20 due to hypercapnic respiratory failure and hypotension not responsive to fluids. Patient was placed on Levophed and Midodrine, was seen by CT surgery to confirm placement of pigtail, without interventions. Patient received HD on 8/22, has been weaned to low dose Levophed and currently stable for downgrade to step down unit. Downgraded to floors and developed RUL effusion.        Patient was placed on meropenem for possible pneumonia although cultures  were negative, the abx course was completed. Pleural fluid analysis was transudative likely from volume overload from ESRD and diuresis with dialysis was successful in resolving the patient's shortness of breath. Patient also developed abdominal distension and constipation, CT scan with contrast showed no obstruction and laxatives were successful in treating the constipation. Pulmonary cleared patient for discharge and outpatient follow up. 70 y/o M with PMH of ESRD on HD MWF, CAD s/p pci with 7 stents and AICD placement in 2018, Heart failure with reduced EF (26% in 2018), DLD, HTN, COPD? on 2L home O2, DM II (well controlled with HbA1C in the 6-7 range), and BPH presents to the ED for SOB that has been worsening over the last 2 weeks. As per patient's daughter patient has had multiple friends pass away in the last few months and since then has complained that he needs home O2 (Daughter thinks this is anxiety driven). Patient's daughter reports that patient saturates in the low 90s at home without O2 and saturates at 100% on 2L. Two week ago he developed hypotension while at dialysis. He refused to go the hospital at that time. Since then he reports that his breathing has gotten worse and he requires more O2. A few days ago he requested that his daughter raise his home O2 to 3L. Before 2 week sago he was able to walk around the house without O2 but over the last two weeks he has required O2 to move around his house. He reports that his SOB is worse with walking and better when he sits down. He reports a dry cough at baseline but denies any chest pain, sore throat, wheezing, weight changes, fatigue, dizziness, or changes in urination. No travel history and no one at home is sick. At baseline patient walks with a cane. He lives with his daughter and she takes care of his medication for him. He sleeps on a recliner due to the HF and wears compression stockings. He is oliguric and urinates "a few drops" approximately 2 times a week.        In the ED vitals were stable and patient is breathing well on 3L NC. Labs were significant for elevated potassium and troponins likely secondary to ESRD. Chest xray reveals worsening right-sided pleural effusion with increased left lower lung airspace opacities. Nephrology was consulted and patient was dialyzed in the ED        MICU COURSE:    Patient was upgraded from floors to MICU on 8/20 due to hypercapnic respiratory failure and hypotension not responsive to fluids. Patient was placed on Levophed and Midodrine, was seen by CT surgery to confirm placement of pigtail, without interventions. Patient received HD on 8/22, has been weaned to low dose Levophed and currently stable for downgrade to step down unit. Downgraded to floors and developed RUL effusion.        Patient was placed on meropenem for possible pneumonia although cultures  were negative, the abx course was completed. Pleural fluid analysis was transudative likely from volume overload from ESRD and diuresis with dialysis was successful in resolving the patient's shortness of breath. Patient also developed abdominal distension and constipation, CT scan with contrast showed no obstruction and laxatives were successful in treating the constipation. Pulmonary cleared patient for discharge and outpatient follow up. Patient developed right nasal epistaxis and ENT was consulted and inserted nasal balloon to tamponade. Patient was discharged and scheduled to follow up outpatient for balloon removal.

## 2020-08-28 NOTE — DISCHARGE NOTE PROVIDER - CARE PROVIDER_API CALL
[place] [address]  Phone: [phone]  Fax: [phone]  Follow Up Time: 1 week    Antonio Florence)  Internal Medicine; Nephrology  [address] [place] [address]  Phone: [phone]  Fax: [phone]  Follow Up Time: 1 week    Deepak Santana)  Critical Care Medicine; Internal Medicine; Pulmonary Disease; Sleep Medicine  [address] [place] [address]  Phone: [phone]  Fax: [phone]  Follow Up Time: 2 weeks [address] [place] [address]  Phone: [phone]  Fax: [phone]  Follow Up Time: 1 week    Antonio Florence)  Internal Medicine; Nephrology  [address] [place] [address]  Phone: [phone]  Fax: [phone]  Follow Up Time: 1 week    Deepak Santana)  Critical Care Medicine; Internal Medicine; Pulmonary Disease; Sleep Medicine  [address] [place] [address]  Phone: [phone]  Fax: [phone]  Follow Up Time: 2 weeks    Satya Calderón  OTOLARYNGOLOGY  [address] [facility], [address] [place] [address]  Phone: [phone]  Fax: [phone]  Follow Up Time: 1-3 days

## 2020-08-28 NOTE — PROGRESS NOTE ADULT - ASSESSMENT
68 y/o M with PMH of ESRD on HD MWF, CAD s/p pci with 7 stents and AICD placement in 2018, Heart failure with reduced EF, DLD, HTN, COPD? on 2L home O2, DM II (well controlled with HbA1C in the 6-7 range), Afib on Eliquis (diagnosed in february) and BPH presents to the ED for SOB that has been worsening over the last 2 weeks.    #R sided loculated pleural effusion  #Acute on chronic hypercapnic respiratory failure   #Trapped Lung    -Missed one HD session s/p emergent HD in the ED for volume overload w/ 4 liters removed  -CXR 8/17/2020: Worsening right-sided pleural effusion with increased left lower lung airspace opacities  -CT 8/18/2020: Large right pleural effusion with near complete collapse of the right lung. Large component of this effusion is subpulmonic. Partially loculated anteriorly at the apex.  -s/p pigtail catheter placement by IR on 08/19 > Exudative. Suspicion of underlying mass/malignancy.  - chest tube removed by IR  - Patient is stable on NC 3 L   - received 7 days of meronem; to be stopped tomorrow  - c/w midodrine ; if needed low dose pressor   - Pleural fluid : cultures negative cell count 994 N56% L 36%    #Abdominal distension #Constipation # vomiting  -senna  -miralax  - KUB today ameliorated  - Given another fleet today  - liquids resumed    #ESRD, dialysis dependent   - HD MWF, nephro following  - Phospho low increased    #HFrEF  -TTE 8/19/2020: EF 35-40%, severe Pulm HTN\par-Anuric    #CAD s/p PCI with 7 stents  #Elevated Troponins last one 0.3  -trop elevated on admission  -EKG: no ST changes noted  -Patient not c/o chest pain  - likely from ESRD  -c/w aspirin    #Paroxysmal Afib  -Diagnosed in February  -on Eliquis at home, held on admission due to need for pigtail placement; resumed yesterday 2.5 mg q 12  - Currently normal rate and regular rhythm  - Holding metoprolol due to HoTN    #DM II- uncontrolled  -A1c 7.8  -c/w basal/bolus insulin regimen    #HTN: holding home losartan    #DLD: c/w statin    #BPH: c/w Flomax    DVT ppx: eliquis 2.5 q 12  GI ppx: Protonix  Diet: liquids  DNR/DNI

## 2020-08-28 NOTE — PROGRESS NOTE ADULT - SUBJECTIVE AND OBJECTIVE BOX
OVERNIGHT EVENTS: events noted, feels better, BM+, want to go home    Vital Signs Last 24 Hrs  T(C): 36.8 (28 Aug 2020 06:14), Max: 36.9 (27 Aug 2020 18:00)  T(F): 98.3 (28 Aug 2020 06:14), Max: 98.5 (27 Aug 2020 18:00)  HR: 60 (28 Aug 2020 06:14) (60 - 72)  BP: 98/50 (28 Aug 2020 06:14) (87/47 - 108/56)  BP(mean): 71 (27 Aug 2020 18:45) (65 - 71)  RR: 20 (28 Aug 2020 06:14) (20 - 20)  SpO2: 100% (28 Aug 2020 06:14) (100% - 100%)    PHYSICAL EXAMINATION:    GENERAL: chronically ill looking    HEENT: Head is normocephalic and atraumatic.     NECK: Supple.    LUNGS: dec bs both bases    HEART: BERTIN 3/6    ABDOMEN: Soft, nontender, mildly distended    EXTREMITIES: Without any cyanosis, clubbing, rash, lesions or edema.    NEUROLOGIC: Grossly intact.    SKIN: No ulceration or induration present.      LABS:                        9.4    7.51  )-----------( 169      ( 27 Aug 2020 08:09 )             31.8     08-27    138  |  98  |  45<H>  ----------------------------<  92  4.0   |  27  |  7.5<HH>    Ca    8.5      27 Aug 2020 08:09  Phos  7.0     08-27    TPro  6.0  /  Alb  3.5  /  TBili  0.3  /  DBili  x   /  AST  21  /  ALT  10  /  AlkPhos  128<H>  08-27                    Procalcitonin, Serum: 0.62 ng/mL (08-25-20 @ 16:10)        08-27-20 @ 07:01  -  08-28-20 @ 07:00  --------------------------------------------------------  IN: 0 mL / OUT: 600 mL / NET: -600 mL        MICROBIOLOGY:  Culture Results:   No growth to date. (08-25 @ 16:10)      MEDICATIONS  (STANDING):  apixaban 2.5 milliGRAM(s) Oral every 12 hours  aspirin  chewable 81 milliGRAM(s) Oral daily  atorvastatin 80 milliGRAM(s) Oral at bedtime  BACItracin   Ointment 1 Application(s) Topical daily  calcium acetate 1334 milliGRAM(s) Oral three times a day with meals  chlorhexidine 4% Liquid 1 Application(s) Topical <User Schedule>  dextrose 5%. 1000 milliLiter(s) (50 mL/Hr) IV Continuous <Continuous>  dextrose 50% Injectable 12.5 Gram(s) IV Push once  dextrose 50% Injectable 25 Gram(s) IV Push once  dextrose 50% Injectable 25 Gram(s) IV Push once  insulin lispro (HumaLOG) corrective regimen sliding scale   SubCutaneous three times a day before meals  midodrine 10 milliGRAM(s) Oral every 8 hours  multivitamin/minerals 1 Tablet(s) Oral daily  pantoprazole    Tablet 40 milliGRAM(s) Oral before breakfast  polyethylene glycol 3350 17 Gram(s) Oral two times a day  senna 2 Tablet(s) Oral at bedtime  simethicone 80 milliGRAM(s) Chew four times a day  tamsulosin 0.4 milliGRAM(s) Oral at bedtime    MEDICATIONS  (PRN):  acetaminophen   Tablet .. 650 milliGRAM(s) Oral every 6 hours PRN Mild Pain (1 - 3)  dextrose 40% Gel 15 Gram(s) Oral once PRN Blood Glucose LESS THAN 70 milliGRAM(s)/deciliter  glucagon  Injectable 1 milliGRAM(s) IntraMuscular once PRN Glucose LESS THAN 70 milligrams/deciliter  melatonin 1 milliGRAM(s) Oral at bedtime PRN Insomnia      RADIOLOGY & ADDITIONAL STUDIES:

## 2020-08-28 NOTE — PROGRESS NOTE ADULT - ASSESSMENT
Impression:    Chronic right sided effusion SP Chest tube/ trapped lung. S/P dc pigatil  RUL loculated effusion new  Acute on chronic hypercapnic  Respiratory failure  HO [place] non compliant with CPAP  HF REF   Hypotension no evidence of sepsis  N/ abd distention/ SP flat plate        PLAN:     CNS: No depressants , avoid opiates    HEENT: Oral care    PULMONARY:  HOB @ 45 degrees.  Aspiration precautions.      CARDIOVASCULAR:  Avoid volume overload.     GI: GI prophylaxis.  Feeding clear, GI f/u[p    RENAL:  Follow up lytes.  Correct as needed.     INFECTIOUS DISEASE: Follow up cultures.  finish course,     HEMATOLOGICAL:  DVT prophylaxis.     ENDOCRINE:  Follow up FS.  Insulin protocol if needed.    MUSCULOSKELETAL:  Bed rest.      palliative care jacquelyn  DW daughter   pul [place] [facility] F/UP

## 2020-08-28 NOTE — PROGRESS NOTE ADULT - SUBJECTIVE AND OBJECTIVE BOX
70yMale with diagnosis: SOB (SHORTNESS OF BREATH);PLEURAL EFFUSION      Patient seen and examined at bedside. Patient appears comfortable. Family would like to continue with current medical care.    PHYSICAL EXAM    Constitutional: NAD, sleeping in bed, appears stated age    HEENT: normocephalic, atraumatic, moist mucous membranes    Eyes: conjunctiva and sclera normal     Respiratory: No distress, Decreased BS at right bases     GI: soft, mildly distended, not tender     Extremities: No upper extremity swelling, WNL    Neurological: able to wake up to voice and move extremities     T(C): 36.8 (08-28-20 @ 06:14)  HR: 60 (08-28-20 @ 06:14)  BP: 98/50 (08-28-20 @ 06:14)  RR: 20 (08-28-20 @ 06:14)  SpO2: 100% (08-28-20 @ 06:14)      IN: 0 mL / OUT: 600 mL / NET: -600 mL                              9.4    7.51  )-----------( 169      ( 27 Aug 2020 08:09 )             31.8       08-27    138  |  98  |  45<H>  ----------------------------<  92  4.0   |  27  |  7.5<HH>    Ca    8.5      27 Aug 2020 08:09  Phos  7.0     08-27    TPro  6.0  /  Alb  3.5  /  TBili  0.3  /  DBili  x   /  AST  21  /  ALT  10  /  AlkPhos  128<H>  08-27                Culture - Blood (collected 25 Aug 2020 16:10)  Source: .Blood None  Preliminary Report (27 Aug 2020 01:02):    No growth to date.    MEDICATIONS  (STANDING):  apixaban 2.5 milliGRAM(s) Oral every 12 hours  aspirin  chewable 81 milliGRAM(s) Oral daily  atorvastatin 80 milliGRAM(s) Oral at bedtime  BACItracin   Ointment 1 Application(s) Topical daily  calcium acetate 1334 milliGRAM(s) Oral three times a day with meals  chlorhexidine 4% Liquid 1 Application(s) Topical <User Schedule>  dextrose 5%. 1000 milliLiter(s) (50 mL/Hr) IV Continuous <Continuous>  dextrose 50% Injectable 12.5 Gram(s) IV Push once  dextrose 50% Injectable 25 Gram(s) IV Push once  dextrose 50% Injectable 25 Gram(s) IV Push once  insulin lispro (HumaLOG) corrective regimen sliding scale   SubCutaneous three times a day before meals  midodrine 10 milliGRAM(s) Oral every 8 hours  multivitamin/minerals 1 Tablet(s) Oral daily  pantoprazole    Tablet 40 milliGRAM(s) Oral before breakfast  polyethylene glycol 3350 17 Gram(s) Oral two times a day  senna 2 Tablet(s) Oral at bedtime  simethicone 80 milliGRAM(s) Chew four times a day  tamsulosin 0.4 milliGRAM(s) Oral at bedtime    MEDICATIONS  (PRN):  acetaminophen   Tablet .. 650 milliGRAM(s) Oral every 6 hours PRN Mild Pain (1 - 3)  dextrose 40% Gel 15 Gram(s) Oral once PRN Blood Glucose LESS THAN 70 milliGRAM(s)/deciliter  glucagon  Injectable 1 milliGRAM(s) IntraMuscular once PRN Glucose LESS THAN 70 milligrams/deciliter  melatonin 1 milliGRAM(s) Oral at bedtime PRN Insomnia

## 2020-08-28 NOTE — CHART NOTE - NSCHARTNOTEFT_GEN_A_CORE
70 y/o M with PMH of ESRD on HD MWF, CAD s/p pci with 7 stents and AICD placement in 2018, Heart failure with reduced EF, DLD, HTN, COPD? on 2L home O2, DM II (well controlled with HbA1C in the 6-7 range), Afib on Eliquis (diagnosed in february) and BPH presents to the ED for SOB that has been worsening over the last 2 weeks.    #R sided loculated pleural effusion  #Acute on chronic hypercapnic respiratory failure   #Trapped Lung    -Missed one HD session s/p emergent HD in the ED for volume overload w/ 4 liters removed  -CXR 8/17/2020: Worsening right-sided pleural effusion with increased left lower lung airspace opacities  -CT 8/18/2020: Large right pleural effusion with near complete collapse of the right lung. Large component of this effusion is subpulmonic. Partially loculated anteriorly at the apex.  -s/p pigtail catheter placement by IR on 08/19  - chest tube removed by IR  - Patient is stable on NC 3 L   - received 7 days of meronem; stopped  - c/w midodrine   - Pleural fluid : cultures negative cell count 994 N56% L 36%    #Abdominal distension #Constipation # vomiting  -senna  -miralax  - KUB ameliorated  - Given another fleet yesterday  - soft resumed    #ESRD, dialysis dependent   - HD MWF, nephro following  - Phospho low increased  -tomorrow dialysis    #HFrEF  -TTE 8/19/2020: EF 35-40%, severe Pulm HTN\par-Anuric    #CAD s/p PCI with 7 stents  #Elevated Troponins last one 0.3  -trop elevated on admission  -EKG: no ST changes noted  -Patient not c/o chest pain  - likely from ESRD  -c/w aspirin    #Paroxysmal Afib  -Diagnosed in February  -on Eliquis at home, held on admission due to need for pigtail placement; resumed yesterday 2.5 mg q 12  - Currently normal rate and regular rhythm  - Holding metoprolol due to HoTN    #DM II- uncontrolled  -A1c 7.8  -c/w basal/bolus insulin regimen    #HTN: holding home losartan    #DLD: c/w statin    #BPH: c/w Flomax    DVT ppx: eliquis 2.5 q 12  GI ppx: Protonix  Diet: soft  DNR/DNI

## 2020-08-29 NOTE — PROGRESS NOTE ADULT - ASSESSMENT
69M, PMH of ESRD on HD MWF, COPD, HFrEF, admitted for SOB.      #ESRD on HD  -hd today, standard bath , uf 2liters as tolerated   - IP noted, on phoslo 2/2/2  - hgb noted, no YASMINE  yet , check iron stores   - BP noted , on midodrine continue / has severe valvular disease and pulm HTN   #SOB - sp CT of chest with loculated effusion sp  thoracentesis/ s/p pig tail / sp Pneumothorax/ CTS and pulm on case  # palliative care notes appreciated   # will follow

## 2020-08-29 NOTE — PROGRESS NOTE ADULT - SUBJECTIVE AND OBJECTIVE BOX
Progress Note:  Provider Speciality                            Hospitalist      ADRIANNA GAINES MRN-[id number] 70y Male     CHIEF PRESENTING COMPLAINT:  Patient is a 70y old  Male who presents with a chief complaint of SOB (28 Aug 2020 08:34)        SUBJECTIVE:  Patient was seen and examined at bedside.   comfortable during HD/ reports he is uncomfortable with constipation/ had very small BM yesterday/ abd still distended significantly/ upset at same time to go home.   tolerating soft diet well per RN>   No significant overnight events reported.     HISTORY OF PRESENTING ILLNESS:  HPI:  70 y/o M with PMH of ESRD on HD MWF, CAD s/p pci with 7 stents and AICD placement in 2018, Heart failure with reduced EF (26% in 2018), DLD, HTN, COPD? on 2L home O2, DM II (well controlled with HbA1C in the 6-7 range), and BPH presents to the ED for SOB that has been worsening over the last 2 weeks. As per patient's daughter patient has had multiple friends pass away in the last few months and since then has complained that he needs home O2 (Daughter thinks this is anxiety driven). Patient's daughter reports that patient saturates in the low 90s at home without O2 and saturates at 100% on 2L. Two week ago he developed hypotension while at dialysis. He refused to go the hospital at that time. Since then he reports that his breathing has gotten worse and he requires more O2. A few days ago he requested that his daughter raise his home O2 to 3L. Before 2 week sago he was able to walk around the house without O2 but over the last two weeks he has required O2 to move around his house. He reports that his SOB is worse with walking and better when he sits down. He reports a dry cough at baseline but denies any chest pain, sore throat, wheezing, weight changes, fatigue, dizziness, or changes in urination. No travel history and no one at home is sick. At baseline patient walks with a cane. He lives with his daughter and she takes care of his medication for him. He sleeps on a recliner due to the HF and wears compression stockings. He is oliguric and urinates "a few drops" approximately 2 times a week.    In the ED vitals were stable and patient is breathing well on 3L NC. Labs were significant for elevated potassium and troponins likely secondary to ESRD. Chest xray reveals worsening right-sided pleural effusion with increased left lower lung airspace opacities. Nephrology was consulted and patient was dialyzed in the ED (17 Aug 2020 14:27)        REVIEW OF SYSTEMS:    At least 10 systems were reviewed in ROS. All systems reviewed  are within normal limits except for the complaints as described in Subjective.    PAST MEDICAL & SURGICAL HISTORY:  PAST MEDICAL & SURGICAL HISTORY:  Heart failure with reduced ejection fraction  CAD (coronary artery disease): s/p PCI and AICD placement  BPH (benign prostatic hyperplasia)  Type 2 diabetes mellitus  End stage renal disease  Dyslipidemia  Hypertension  No significant past surgical history          VITAL SIGNS:  Vital Signs Last 24 Hrs  T(C): 35.6 (29 Aug 2020 13:47), Max: 36.9 (28 Aug 2020 15:28)  T(F): 96.1 (29 Aug 2020 13:47), Max: 98.4 (28 Aug 2020 15:28)  HR: 60 (29 Aug 2020 13:47) (59 - 60)  BP: 133/60 (29 Aug 2020 13:47) (105/51 - 133/60)  BP(mean): --  RR: 20 (29 Aug 2020 13:47) (20 - 20)  SpO2: 97% (29 Aug 2020 00:44) (97% - 97%)          PHYSICAL EXAMINATION:    General: Awake, alert, Not in acute distress  HEENT:   EOMI, atraumatic  Heart: S1+S2 audible, no murmur  Lungs: bilateral  fair air entry, no wheezing, no crepitations.  Abdomen: Soft, non-tender, distension ++ve , +ve BS.   CNS: AAO  , no focal deficits.  Extremities:  No edema /chronic skin hyperpigmentation LE's.            CONSULTS:  Consultant(s) Notes Reviewed by me.   Care Discussed with Consultants/Other Providers where required.        MEDICATIONS:  MEDICATIONS  (STANDING):  apixaban 2.5 milliGRAM(s) Oral every 12 hours  aspirin  chewable 81 milliGRAM(s) Oral daily  atorvastatin 80 milliGRAM(s) Oral at bedtime  BACItracin   Ointment 1 Application(s) Topical daily  calcium acetate 1334 milliGRAM(s) Oral three times a day with meals  chlorhexidine 4% Liquid 1 Application(s) Topical <User Schedule>  dextrose 5%. 1000 milliLiter(s) (50 mL/Hr) IV Continuous <Continuous>  dextrose 50% Injectable 12.5 Gram(s) IV Push once  dextrose 50% Injectable 25 Gram(s) IV Push once  dextrose 50% Injectable 25 Gram(s) IV Push once  insulin lispro (HumaLOG) corrective regimen sliding scale   SubCutaneous three times a day before meals  midodrine 10 milliGRAM(s) Oral every 8 hours  multivitamin/minerals 1 Tablet(s) Oral daily  pantoprazole    Tablet 40 milliGRAM(s) Oral before breakfast  polyethylene glycol 3350 17 Gram(s) Oral daily  senna 2 Tablet(s) Oral at bedtime  simethicone 80 milliGRAM(s) Chew four times a day  tamsulosin 0.4 milliGRAM(s) Oral at bedtime    MEDICATIONS  (PRN):  acetaminophen   Tablet .. 650 milliGRAM(s) Oral every 6 hours PRN Mild Pain (1 - 3)  dextrose 40% Gel 15 Gram(s) Oral once PRN Blood Glucose LESS THAN 70 milliGRAM(s)/deciliter  glucagon  Injectable 1 milliGRAM(s) IntraMuscular once PRN Glucose LESS THAN 70 milligrams/deciliter  melatonin 1 milliGRAM(s) Oral at bedtime PRN Insomnia      LABOROTORY DATA/MICROBIOLOGY/I & O's:                        9.4    7.51  )-----------( 169      ( 27 Aug 2020 08:09 )             31.8     08-27    138  |  98  |  45<H>  ----------------------------<  92  4.0   |  27  |  7.5<HH>    Ca    8.5      27 Aug 2020 08:09  Phos  7.0     08-27    TPro  6.0  /  Alb  3.5  /  TBili  0.3  /  DBili  x   /  AST  21  /  ALT  10  /  AlkPhos  128<H>  08-27        CAPILLARY BLOOD GLUCOSE      POCT Blood Glucose.: 127 mg/dL (28 Aug 2020 07:58)  POCT Blood Glucose.: 151 mg/dL (27 Aug 2020 21:29)  POCT Blood Glucose.: 152 mg/dL (27 Aug 2020 16:42)                08-27-20 @ 07:01  -  08-28-20 @ 07:00  --------------------------------------------------------  IN: 0 mL / OUT: 600 mL / NET: -600 mL              ASSESSMENT/Plan:  70 y/o M with PMH of ESRD on HD MWF, CAD s/p pci with 7 stents and AICD placement in 2018, Heart failure with reduced EF, DLD, HTN, COPD? on 2L home O2, DM II (well controlled with HbA1C in the 6-7 range), Afib on Eliquis (diagnosed in february) and BPH presents to the ED for SOB that has been worsening over the last 2 weeks.    Acute on chronic hypercapnic respiratory failure with chronic R sided pleural effusion with new R sided loculated pleural effusion/Trapped Lung:  CT 8/18/2020: Large right pleural effusion with near complete collapse of the right lung. Large component of this effusion is subpulmonic. Partially loculated anteriorly at the apex.  -s/p pigtail catheter placement by IR on 08/19 and successful removal of catheter. Suspicion of underlying mass/malignancy.  repeat CXR 8/27 shows unchanged R hydropneumothorax and b/l opacities.    c/w NC O2  to maintain SPO2>94%.  Pleural fluid : cultures negative cell count 994 N56% L 36%  completed course of meropenem for 7 days.   pulm cleared for DC home and OP fu.     Abdominal distension/Severe Constipation:   patient is passing gas/ still reports significant constipation with abd. distension.   tolerating soft  diet well  fleet enema today/ c/w miralax bid/ senna/ repeat KUB today.       ESRD - HD MWF/had HD today.      HFrEF with ischemic cardioimyopathy:   TTE 8/19/2020: EF 35-40%, severe PAHTN  Cardio eval noted. no changes in meds/ not on BB or ACEI due to hypotension.    Elevated Troponins likely  due to ESRD with h/o CAD s/p PCI with 7 stents:  asymptomatic.   Cardio eval noted. continue current medical management   c/w ASA / Statins       Paroxysmal Afib:  rate controlled.   c/w  Eliquis 2.5 mg q 12    hypotension:   BP on low side/ off losartan/ c/w midodrine.       DM II- uncontrolled  -A1c 7.8  -c/w basal/bolus insulin regimen    HTN: holding home losartan    #DLD: c/w statin    #BPH: c/w Flomax    DVT ppx: eliquis 2.5 q 12  GI ppx: Protonix       DNR/DNI    Patient is 2 person assist/ high risk for fall but Daughter wants to take patient home and understands the risk of fall/ requests home PT arrangement/ SW to arrange home PT>           Progress note handoff:   pending: has significant abd. distension with constipation / fleet enema/ monitor for BM/ repeat KUB today  disposition: home with home PT/ SW aware to arrange home PT  discussion: plan of care with patient/ satisfied.

## 2020-08-29 NOTE — PROGRESS NOTE ADULT - SUBJECTIVE AND OBJECTIVE BOX
seen and examined  no distress   sitting on his chair         PAST HISTORY  --------------------------------------------------------------------------------  No significant changes to PMH, PSH, FHx, SHx, unless otherwise noted    ALLERGIES & MEDICATIONS  --------------------------------------------------------------------------------  Allergies    No Known Allergies    Intolerances      Standing Inpatient Medications  apixaban 2.5 milliGRAM(s) Oral every 12 hours  aspirin  chewable 81 milliGRAM(s) Oral daily  atorvastatin 80 milliGRAM(s) Oral at bedtime  BACItracin   Ointment 1 Application(s) Topical daily  calcium acetate 1334 milliGRAM(s) Oral three times a day with meals  chlorhexidine 4% Liquid 1 Application(s) Topical <User Schedule>  dextrose 5%. 1000 milliLiter(s) IV Continuous <Continuous>  dextrose 50% Injectable 12.5 Gram(s) IV Push once  dextrose 50% Injectable 25 Gram(s) IV Push once  dextrose 50% Injectable 25 Gram(s) IV Push once  insulin lispro (HumaLOG) corrective regimen sliding scale   SubCutaneous three times a day before meals  midodrine 10 milliGRAM(s) Oral every 8 hours  multivitamin/minerals 1 Tablet(s) Oral daily  pantoprazole    Tablet 40 milliGRAM(s) Oral before breakfast  polyethylene glycol 3350 17 Gram(s) Oral two times a day  senna 2 Tablet(s) Oral at bedtime  simethicone 80 milliGRAM(s) Chew four times a day  tamsulosin 0.4 milliGRAM(s) Oral at bedtime    PRN Inpatient Medications  acetaminophen   Tablet .. 650 milliGRAM(s) Oral every 6 hours PRN  dextrose 40% Gel 15 Gram(s) Oral once PRN  glucagon  Injectable 1 milliGRAM(s) IntraMuscular once PRN  melatonin 1 milliGRAM(s) Oral at bedtime PRN        VITALS/PHYSICAL EXAM  --------------------------------------------------------------------------------  T(C): 36.8 (08-29-20 @ 00:44), Max: 36.9 (08-28-20 @ 15:28)  HR: 60 (08-29-20 @ 00:44) (60 - 60)  BP: 112/53 (08-29-20 @ 00:44) (98/50 - 113/58)  RR: 20 (08-29-20 @ 00:44) (20 - 20)  SpO2: 97% (08-29-20 @ 00:44) (97% - 100%)  Wt(kg): --        08-27-20 @ 07:01  -  08-28-20 @ 07:00  --------------------------------------------------------  IN: 0 mL / OUT: 600 mL / NET: -600 mL    08-28-20 @ 07:01  -  08-29-20 @ 06:09  --------------------------------------------------------  IN: 250 mL / OUT: 0 mL / NET: 250 mL      Physical Exam:  	Gen: NAD  	Pulm: decrease BS  B/L  	CV: S1S2; no rub  	Abd: distended  	Vascular access: av     LABS/STUDIES  --------------------------------------------------------------------------------              9.4    7.51  >-----------<  169      [08-27-20 @ 08:09]              31.8     138  |  98  |  45  ----------------------------<  92      [08-27-20 @ 08:09]  4.0   |  27  |  7.5        Ca     8.5     [08-27-20 @ 08:09]      Phos  7.0     [08-27-20 @ 08:09]    TPro  6.0  /  Alb  3.5  /  TBili  0.3  /  DBili  x   /  AST  21  /  ALT  10  /  AlkPhos  128  [08-27-20 @ 08:09]          Creatinine Trend:  SCr 7.5 [08-27 @ 08:09]  SCr 6.2 [08-26 @ 06:24]  SCr 9.3 [08-25 @ 06:18]  SCr 7.2 [08-24 @ 07:33]  SCr 5.9 [08-23 @ 04:30]        PTH -- (Ca 8.9)      [08-18-20 @ 05:24]   237  PTH -- (Ca 8.7)      [08-17-20 @ 15:04]   203  HbA1c 7.1      [10-13-18 @ 05:46]  TSH 3.93      [08-25-20 @ 08:36]

## 2020-08-30 NOTE — PROGRESS NOTE ADULT - ASSESSMENT
ESRD pt , with significant cardiac disease , pleural effusions and pneumothorax , despite complicated situation patient wants to be discharged , discussed with patient.

## 2020-08-30 NOTE — PROGRESS NOTE ADULT - SUBJECTIVE AND OBJECTIVE BOX
[facility] FOLLOW UP NOTE  --------------------------------------------------------------------------------  Chief Complaint:    24 hour events/subjective:        PAST HISTORY  --------------------------------------------------------------------------------  No significant changes to PMH, PSH, FHx, SHx, unless otherwise noted    ALLERGIES & MEDICATIONS  --------------------------------------------------------------------------------  Allergies    No Known Allergies    Intolerances      Standing Inpatient Medications  apixaban 2.5 milliGRAM(s) Oral every 12 hours  aspirin  chewable 81 milliGRAM(s) Oral daily  atorvastatin 80 milliGRAM(s) Oral at bedtime  BACItracin   Ointment 1 Application(s) Topical daily  calcium acetate 1334 milliGRAM(s) Oral three times a day with meals  chlorhexidine 4% Liquid 1 Application(s) Topical <User Schedule>  dextrose 5%. 1000 milliLiter(s) IV Continuous <Continuous>  dextrose 50% Injectable 12.5 Gram(s) IV Push once  dextrose 50% Injectable 25 Gram(s) IV Push once  dextrose 50% Injectable 25 Gram(s) IV Push once  insulin lispro (HumaLOG) corrective regimen sliding scale   SubCutaneous three times a day before meals  midodrine 10 milliGRAM(s) Oral every 8 hours  multivitamin/minerals 1 Tablet(s) Oral daily  pantoprazole    Tablet 40 milliGRAM(s) Oral before breakfast  polyethylene glycol 3350 17 Gram(s) Oral two times a day  senna 2 Tablet(s) Oral at bedtime  simethicone 80 milliGRAM(s) Chew four times a day  tamsulosin 0.4 milliGRAM(s) Oral at bedtime    PRN Inpatient Medications  acetaminophen   Tablet .. 650 milliGRAM(s) Oral every 6 hours PRN  dextrose 40% Gel 15 Gram(s) Oral once PRN  glucagon  Injectable 1 milliGRAM(s) IntraMuscular once PRN  melatonin 1 milliGRAM(s) Oral at bedtime PRN      REVIEW OF SYSTEMS  --------------------------------------------------------------------------------  Gen: No weight changes, fatigue, fevers/chills, weakness  Skin: No rashes  Head/Eyes/Ears/Mouth: No headache; Normal hearing; Normal vision w/o blurriness; No sinus pain/discomfort, sore throat  Respiratory: No dyspnea, cough, wheezing, hemoptysis  CV: No chest pain, PND, orthopnea  GI: No abdominal pain, diarrhea, constipation, nausea, vomiting, melena, hematochezia  : No increased frequency, dysuria, hematuria, nocturia  MSK: No joint pain/swelling; no back pain; no edema  Neuro: No dizziness/lightheadedness, weakness, seizures, numbness, tingling  Heme: No easy bruising or bleeding  Endo: No heat/cold intolerance  Psych: No significant nervousness, anxiety, stress, depression    All other systems were reviewed and are negative, except as noted.    VITALS/PHYSICAL EXAM  --------------------------------------------------------------------------------  T(C): 36.8 (08-30-20 @ 08:00), Max: 37.3 (08-30-20 @ 00:00)  HR: 60 (08-30-20 @ 08:00) (60 - 60)  BP: 130/56 (08-30-20 @ 08:00) (98/55 - 133/60)  RR: 20 (08-30-20 @ 08:00) (20 - 20)  SpO2: --  Wt(kg): --        08-29-20 @ 07:01  -  08-30-20 @ 07:00  --------------------------------------------------------  IN: 480 mL / OUT: 2000 mL / NET: -1520 mL      Physical Exam:  	Gen: NAD, well-appearing  	HEENT: PERRL, supple neck, clear oropharynx  	Pulm: CTA B/L  	CV: RRR, S1S2; no rub  	Back: No spinal or CVA tenderness; no sacral edema  	Abd: +BS, soft, nontender/nondistended  	: No suprapubic tenderness  	UE: Warm, FROM, no clubbing, intact strength; no edema; no asterixis  	LE: Warm, FROM, no clubbing, intact strength; no edema  	Neuro: No focal deficits, intact gait  	Psych: Normal affect and mood  	Skin: Warm, without rashes  	Vascular access:    LABS/STUDIES  --------------------------------------------------------------------------------              8.9    6.31  >-----------<  161      [08-30-20 @ 06:20]              29.8     136  |  97  |  27  ----------------------------<  150      [08-30-20 @ 06:20]  4.3   |  28  |  5.4        Ca     8.6     [08-30-20 @ 06:20]      Mg     2.0     [08-30-20 @ 06:20]      Phos  2.9     [08-30-20 @ 06:20]    TPro  6.1  /  Alb  3.6  /  TBili  0.6  /  DBili  x   /  AST  23  /  ALT  13  /  AlkPhos  122  [08-30-20 @ 06:20]          Creatinine Trend:  SCr 5.4 [08-30 @ 06:20]  SCr 6.8 [08-29 @ 06:40]  SCr 7.5 [08-27 @ 08:09]  SCr 6.2 [08-26 @ 06:24]  SCr 9.3 [08-25 @ 06:18]        PTH -- (Ca 8.9)      [08-18-20 @ 05:24]   237  PTH -- (Ca 8.7)      [08-17-20 @ 15:04]   203  HbA1c 7.1      [10-13-18 @ 05:46]  TSH 3.93      [08-25-20 @ 08:36]

## 2020-08-30 NOTE — PROGRESS NOTE ADULT - SUBJECTIVE AND OBJECTIVE BOX
Progress Note:  Provider Speciality                            Hospitalist      ADRIANNA GAINES MRN-[id number] 70y Male     CHIEF PRESENTING COMPLAINT:  Patient is a 70y old  Male who presents with a chief complaint of SOB (28 Aug 2020 08:34)        SUBJECTIVE:  Patient was seen and examined at bedside.   sitting in chair/ looked uncomfortable and very upset that he wants to go home. although per RN- patient passed a lot of gas with some liquid with enema yesterday but no BM yet.   No significant overnight events reported.     HISTORY OF PRESENTING ILLNESS:  HPI:  68 y/o M with PMH of ESRD on HD MWF, CAD s/p pci with 7 stents and AICD placement in 2018, Heart failure with reduced EF (26% in 2018), DLD, HTN, COPD? on 2L home O2, DM II (well controlled with HbA1C in the 6-7 range), and BPH presents to the ED for SOB that has been worsening over the last 2 weeks. As per patient's daughter patient has had multiple friends pass away in the last few months and since then has complained that he needs home O2 (Daughter thinks this is anxiety driven). Patient's daughter reports that patient saturates in the low 90s at home without O2 and saturates at 100% on 2L. Two week ago he developed hypotension while at dialysis. He refused to go the hospital at that time. Since then he reports that his breathing has gotten worse and he requires more O2. A few days ago he requested that his daughter raise his home O2 to 3L. Before 2 week sago he was able to walk around the house without O2 but over the last two weeks he has required O2 to move around his house. He reports that his SOB is worse with walking and better when he sits down. He reports a dry cough at baseline but denies any chest pain, sore throat, wheezing, weight changes, fatigue, dizziness, or changes in urination. No travel history and no one at home is sick. At baseline patient walks with a cane. He lives with his daughter and she takes care of his medication for him. He sleeps on a recliner due to the HF and wears compression stockings. He is oliguric and urinates "a few drops" approximately 2 times a week.    In the ED vitals were stable and patient is breathing well on 3L NC. Labs were significant for elevated potassium and troponins likely secondary to ESRD. Chest xray reveals worsening right-sided pleural effusion with increased left lower lung airspace opacities. Nephrology was consulted and patient was dialyzed in the ED (17 Aug 2020 14:27)        REVIEW OF SYSTEMS:    At least 10 systems were reviewed in ROS. All systems reviewed  are within normal limits except for the complaints as described in Subjective.    PAST MEDICAL & SURGICAL HISTORY:  PAST MEDICAL & SURGICAL HISTORY:  Heart failure with reduced ejection fraction  CAD (coronary artery disease): s/p PCI and AICD placement  BPH (benign prostatic hyperplasia)  Type 2 diabetes mellitus  End stage renal disease  Dyslipidemia  Hypertension  No significant past surgical history          VITAL SIGNS:  Vital Signs Last 24 Hrs  T(C): 36.8 (30 Aug 2020 08:00), Max: 37.3 (30 Aug 2020 00:00)  T(F): 98.3 (30 Aug 2020 08:00), Max: 99.1 (30 Aug 2020 00:00)  HR: 60 (30 Aug 2020 08:00) (60 - 60)  BP: 130/56 (30 Aug 2020 08:00) (98/55 - 130/56)  BP(mean): --  RR: 20 (30 Aug 2020 08:00) (20 - 20)  SpO2: --        PHYSICAL EXAMINATION:    General: Awake, alert, Not in acute distress  HEENT:   EOMI, atraumatic  Heart: S1+S2 audible, no murmur  Lungs: bilateral  fair air entry, no wheezing, no crepitations.  Abdomen: Soft, non-tender, significant distension  , hyperactive BS.    CNS: AAO  , no focal deficits.  Extremities:  No edema /chronic skin hyperpigmentation LE's.            CONSULTS:  Consultant(s) Notes Reviewed by me.   Care Discussed with Consultants/Other Providers where required.        MEDICATIONS:  MEDICATIONS  (STANDING):  apixaban 2.5 milliGRAM(s) Oral every 12 hours  aspirin  chewable 81 milliGRAM(s) Oral daily  atorvastatin 80 milliGRAM(s) Oral at bedtime  BACItracin   Ointment 1 Application(s) Topical daily  calcium acetate 1334 milliGRAM(s) Oral three times a day with meals  chlorhexidine 4% Liquid 1 Application(s) Topical <User Schedule>  dextrose 5%. 1000 milliLiter(s) (50 mL/Hr) IV Continuous <Continuous>  dextrose 50% Injectable 12.5 Gram(s) IV Push once  dextrose 50% Injectable 25 Gram(s) IV Push once  dextrose 50% Injectable 25 Gram(s) IV Push once  insulin lispro (HumaLOG) corrective regimen sliding scale   SubCutaneous three times a day before meals  midodrine 10 milliGRAM(s) Oral every 8 hours  multivitamin/minerals 1 Tablet(s) Oral daily  pantoprazole    Tablet 40 milliGRAM(s) Oral before breakfast  polyethylene glycol 3350 17 Gram(s) Oral daily  senna 2 Tablet(s) Oral at bedtime  simethicone 80 milliGRAM(s) Chew four times a day  tamsulosin 0.4 milliGRAM(s) Oral at bedtime    MEDICATIONS  (PRN):  acetaminophen   Tablet .. 650 milliGRAM(s) Oral every 6 hours PRN Mild Pain (1 - 3)  dextrose 40% Gel 15 Gram(s) Oral once PRN Blood Glucose LESS THAN 70 milliGRAM(s)/deciliter  glucagon  Injectable 1 milliGRAM(s) IntraMuscular once PRN Glucose LESS THAN 70 milligrams/deciliter  melatonin 1 milliGRAM(s) Oral at bedtime PRN Insomnia      LABOROTORY DATA/MICROBIOLOGY/I & O's:                        9.4    7.51  )-----------( 169      ( 27 Aug 2020 08:09 )             31.8     08-27    138  |  98  |  45<H>  ----------------------------<  92  4.0   |  27  |  7.5<HH>    Ca    8.5      27 Aug 2020 08:09  Phos  7.0     08-27    TPro  6.0  /  Alb  3.5  /  TBili  0.3  /  DBili  x   /  AST  21  /  ALT  10  /  AlkPhos  128<H>  08-27        CAPILLARY BLOOD GLUCOSE      POCT Blood Glucose.: 127 mg/dL (28 Aug 2020 07:58)  POCT Blood Glucose.: 151 mg/dL (27 Aug 2020 21:29)  POCT Blood Glucose.: 152 mg/dL (27 Aug 2020 16:42)                08-27-20 @ 07:01  -  08-28-20 @ 07:00  --------------------------------------------------------  IN: 0 mL / OUT: 600 mL / NET: -600 mL              ASSESSMENT/Plan:  68 y/o M with PMH of ESRD on HD MWF, CAD s/p pci with 7 stents and AICD placement in 2018, Heart failure with reduced EF, DLD, HTN, COPD? on 2L home O2, DM II (well controlled with HbA1C in the 6-7 range), Afib on Eliquis (diagnosed in february) and BPH presents to the ED for SOB that has been worsening over the last 2 weeks.    Acute on chronic hypercapnic respiratory failure with chronic R sided pleural effusion with new R sided loculated pleural effusion/Trapped Lung:  CT 8/18/2020: Large right pleural effusion with near complete collapse of the right lung. Large component of this effusion is subpulmonic. Partially loculated anteriorly at the apex.  -s/p pigtail catheter placement by IR on 08/19 and successful removal of catheter. Suspicion of underlying mass/malignancy.  repeat CXR 8/27 shows unchanged R hydropneumothorax and b/l opacities.    c/w NC O2  to maintain SPO2>94%.  Pleural fluid : cultures negative cell count 994 N56% L 36%  completed course of meropenem for 7 days.   pulm cleared for DC home and OP fu.     Abdominal distension/Severe Constipation:   patient is passing gas/ no BM with enema yesterday/ has persistent  abd. distension.   discussed with radiologist on call , xray abdomen shows lucency underneath R hemidiaphragm/ suggests CT abd po contrast to r/o obstruction.  Keep NPO for now.   Surgery eval for suspected abd. obstruction.   c/w miralax bid/ senna    ESRD - HD MWF/nephro following.       HFrEF with ischemic cardioimyopathy:   TTE 8/19/2020: EF 35-40%, severe PAHTN  Cardio eval noted. no changes in meds/ not on BB or ACEI due to hypotension.    Elevated Troponins likely  due to ESRD with h/o CAD s/p PCI with 7 stents:  asymptomatic.   Cardio eval noted. continue current medical management   c/w ASA / Statins       Paroxysmal Afib:  rate controlled.   c/w  Eliquis 2.5 mg q 12    hypotension:   BP on low side/ off losartan/ c/w midodrine.       DM II- uncontrolled  -A1c 7.8  -c/w basal/bolus insulin regimen    HTN: holding home losartan    #DLD: c/w statin    #BPH: c/w Flomax    DVT ppx: eliquis 2.5 q 12  GI ppx: Protonix       DNR/DNI    Patient is 2 person assist/ high risk for fall but Daughter wants to take patient home and understands the risk of fall/ requests home PT arrangement/ SW to arrange home PT           Progress note handoff:   pending: improvement of abd distension/ constipation/ CT abd/ surgery eval   disposition: home with home PT/ SW aware to arrange home PT  discussion: plan of care with patient and Daughter Rozina/ satisfied.

## 2020-08-31 PROBLEM — I25.10 ATHEROSCLEROTIC HEART DISEASE OF NATIVE CORONARY ARTERY WITHOUT ANGINA PECTORIS: Chronic | Status: ACTIVE | Noted: 2018-10-12

## 2020-08-31 NOTE — CHART NOTE - NSCHARTNOTEFT_GEN_A_CORE
Registered Dietitian Follow-Up     Patient Profile Reviewed                           Yes [x]   No []     Nutrition History Previously Obtained        Yes [x]  No []       Pertinent Subjective Information: Pt appeared to be angry during RD visit due to pt was hungry and was awaiting LIP to upgrade diet from NPO to solid. Pt was NPO/clear liquid for ~4 days for abd distention      Pertinent Medical Interventions: Acute on chronic hypercapnic respiratory failure with chronic R sided pleural effusion with new R sided loculated pleural effusion/Trapped Lung. abdominal distension/severe constipation; pt passing gas and abd distention noted, sx eval for suspected abd obstruction. ESRD on HD.     Diet order: consistent carb renal no snacks- this diet order was just activated      Anthropometrics:  - Ht. 170.18 cm  - Wt. no new wts doc since last RD f/u  90.9kg (8/22). relatively stable   93.2kg (8/19)   - %wt change  - BMI 31.4 using lowest wt  - IBW 67.3 kg      Pertinent Lab Data: 8/31: RBC- 2.84, H/H- 8.8/29.6, POCT BG- 142, Na-134, chloride-95, BUN-37, cr- 6.3, glucose serum- 163, GFR-8; 8/18: [id number]     Pertinent Meds: Eliquis, lantus, humalog, miralax, senna, calcium acetate, simethicone, atorvastatin, MVI/mineral, protonix       Physical Findings:  - Appearance: AAOx4; +2 edema B/L arms   - GI function: distended abdomen, LBM 8/30  - Tubes: none   - Oral/Mouth cavity: : previously on pureed diet but no SLP assessment. ?reasoning   - Skin: ecchymosis and surgical incision      Nutrition Requirements  Weight Used: 67.3kg (continued from RD initial assessment)      Estimated Energy Needs    Continue [x]  Adjust []  4999-9737 kcal/day (30-35 kcal/kg IBW) d/t ESRD on HD     Estimated Protein Needs    Continue [x]  Adjust []  81-87 g/day (1.2-1.3 g/kg IBW)     Estimated Fluid Needs        Continue [x]  Adjust []  1000 ml+ urine outpt or per LIP      Nutrient Intake: unlikely meeting needs      [x] Previous Nutrition Diagnosis:  Inadequate Oral Intake            [x] Ongoing          [] Resolved     Nutrition Intervention meals and snacks, medical food supplement      Goal/Expected Outcome: Pt to consume at least 75% of meals in 3 days.     Indicator/Monitoring: RD to monitor energy intake, body comp, NFPF, glucose profile, renal/electrolytes profile    Recommendations: Continue current diet order as rec. Will assess need for po supplement upon next f/u. Consider changing MVI w/minerals to MVI w/o minerals. Recs discussed with LIP at [phone].

## 2020-08-31 NOTE — PROGRESS NOTE ADULT - ASSESSMENT
1. Epistaxis  - Today nasal packing placed with ENT. Need monitoring. D/C on hold for today. Will monitor till tomorrow     2. ESRD - On dialysis     3. Pleural effusion - on dialysis . stable . s/p pigtail and removal     4. Constipation - no complaints today     5. HFrEF with ischemic cardioimyopathy -  BB or ACEI due to hypotension.    6. Paroxysmal Afib, rate controlled.  Eliquis 2.5 mg q 12 - on hold     7. DM II- uncontrolled  -A1c 7.8  -c/w basal/bolus insulin regimen    8. HTN: holding home losartan    9. DLD: c/w statin    10. BPH: c/w Flomax    11. AD - DNR/DNI    Watch for epistaxis. D/W daughters. Daughters explained to patient to stay tonight . Will continue to monitor. CBC tomorrow

## 2020-08-31 NOTE — PROGRESS NOTE ADULT - SUBJECTIVE AND OBJECTIVE BOX
seen and examined  no distress   sitting on his chair        PAST HISTORY  --------------------------------------------------------------------------------  No significant changes to PMH, PSH, FHx, SHx, unless otherwise noted    ALLERGIES & MEDICATIONS  --------------------------------------------------------------------------------  Allergies    No Known Allergies    Intolerances      Standing Inpatient Medications  apixaban 2.5 milliGRAM(s) Oral every 12 hours  aspirin  chewable 81 milliGRAM(s) Oral daily  atorvastatin 80 milliGRAM(s) Oral at bedtime  BACItracin   Ointment 1 Application(s) Topical daily  calcium acetate 1334 milliGRAM(s) Oral three times a day with meals  chlorhexidine 4% Liquid 1 Application(s) Topical <User Schedule>  dextrose 5%. 1000 milliLiter(s) IV Continuous <Continuous>  dextrose 50% Injectable 12.5 Gram(s) IV Push once  dextrose 50% Injectable 25 Gram(s) IV Push once  dextrose 50% Injectable 25 Gram(s) IV Push once  insulin glargine Injectable (LANTUS) 10 Unit(s) SubCutaneous at bedtime  insulin lispro (HumaLOG) corrective regimen sliding scale   SubCutaneous three times a day before meals  midodrine 10 milliGRAM(s) Oral every 8 hours  multivitamin/minerals 1 Tablet(s) Oral daily  pantoprazole    Tablet 40 milliGRAM(s) Oral before breakfast  polyethylene glycol 3350 17 Gram(s) Oral two times a day  senna 2 Tablet(s) Oral at bedtime  simethicone 80 milliGRAM(s) Chew four times a day  tamsulosin 0.4 milliGRAM(s) Oral at bedtime    PRN Inpatient Medications  acetaminophen   Tablet .. 650 milliGRAM(s) Oral every 6 hours PRN  dextrose 40% Gel 15 Gram(s) Oral once PRN  glucagon  Injectable 1 milliGRAM(s) IntraMuscular once PRN  melatonin 1 milliGRAM(s) Oral at bedtime PRN      VITALS/PHYSICAL EXAM  --------------------------------------------------------------------------------  T(C): 36.7 (08-31-20 @ 00:00), Max: 36.7 (08-31-20 @ 00:00)  HR: 60 (08-31-20 @ 00:00) (60 - 60)  BP: 158/67 (08-31-20 @ 00:00) (121/58 - 158/67)  RR: 18 (08-31-20 @ 00:00) (18 - 18)  SpO2: 98% (08-30-20 @ 15:45) (98% - 98%)  Wt(kg): --        08-30-20 @ 07:01  -  08-31-20 @ 07:00  --------------------------------------------------------  IN: 1560 mL / OUT: 0 mL / NET: 1560 mL      Physical Exam:  	Gen: NAD  	Pulm: decrease BS  B/L  	CV: S1S2; no rub  	Abd: +distended  	LE:  edema  	Vascular access:av     LABS/STUDIES  --------------------------------------------------------------------------------              8.8    6.72  >-----------<  149      [08-31-20 @ 06:04]              29.6     134  |  95  |  37  ----------------------------<  163      [08-31-20 @ 06:04]  4.9   |  27  |  x         Ca     8.6     [08-31-20 @ 06:04]      Mg     1.9     [08-31-20 @ 06:04]      Phos  3.2     [08-31-20 @ 06:04]    TPro  6.3  /  Alb  3.6  /  TBili  0.8  /  DBili  x   /  AST  21  /  ALT  13  /  AlkPhos  121  [08-31-20 @ 06:04]          Creatinine Trend:  SCr 5.4 [08-30 @ 06:20]  SCr 6.8 [08-29 @ 06:40]  SCr 7.5 [08-27 @ 08:09]  SCr 6.2 [08-26 @ 06:24]  SCr 9.3 [08-25 @ 06:18]        PTH -- (Ca 8.9)      [08-18-20 @ 05:24]   237  PTH -- (Ca 8.7)      [08-17-20 @ 15:04]   203  HbA1c 7.1      [10-13-18 @ 05:46]  TSH 3.93      [08-25-20 @ 08:36] seen and examined  no distress   sitting on his chair        PAST HISTORY  --------------------------------------------------------------------------------  No significant changes to PMH, PSH, FHx, SHx, unless otherwise noted    ALLERGIES & MEDICATIONS  --------------------------------------------------------------------------------  Allergies    No Known Allergies    Intolerances      Standing Inpatient Medications  apixaban 2.5 milliGRAM(s) Oral every 12 hours  aspirin  chewable 81 milliGRAM(s) Oral daily  atorvastatin 80 milliGRAM(s) Oral at bedtime  BACItracin   Ointment 1 Application(s) Topical daily  calcium acetate 1334 milliGRAM(s) Oral three times a day with meals  chlorhexidine 4% Liquid 1 Application(s) Topical <User Schedule>  dextrose 5%. 1000 milliLiter(s) IV Continuous <Continuous>  dextrose 50% Injectable 12.5 Gram(s) IV Push once  dextrose 50% Injectable 25 Gram(s) IV Push once  dextrose 50% Injectable 25 Gram(s) IV Push once  insulin glargine Injectable (LANTUS) 10 Unit(s) SubCutaneous at bedtime  insulin lispro (HumaLOG) corrective regimen sliding scale   SubCutaneous three times a day before meals  midodrine 10 milliGRAM(s) Oral every 8 hours  multivitamin/minerals 1 Tablet(s) Oral daily  pantoprazole    Tablet 40 milliGRAM(s) Oral before breakfast  polyethylene glycol 3350 17 Gram(s) Oral two times a day  senna 2 Tablet(s) Oral at bedtime  simethicone 80 milliGRAM(s) Chew four times a day  tamsulosin 0.4 milliGRAM(s) Oral at bedtime    PRN Inpatient Medications  acetaminophen   Tablet .. 650 milliGRAM(s) Oral every 6 hours PRN  dextrose 40% Gel 15 Gram(s) Oral once PRN  glucagon  Injectable 1 milliGRAM(s) IntraMuscular once PRN  melatonin 1 milliGRAM(s) Oral at bedtime PRN      VITALS/PHYSICAL EXAM  --------------------------------------------------------------------------------  T(C): 36.7 (08-31-20 @ 00:00), Max: 36.7 (08-31-20 @ 00:00)  HR: 60 (08-31-20 @ 00:00) (60 - 60)  BP: 158/67 (08-31-20 @ 00:00) (121/58 - 158/67)  RR: 18 (08-31-20 @ 00:00) (18 - 18)  SpO2: 98% (08-30-20 @ 15:45) (98% - 98%)  Wt(kg): --        08-30-20 @ 07:01  -  08-31-20 @ 07:00  --------------------------------------------------------  IN: 1560 mL / OUT: 0 mL / NET: 1560 mL      Physical Exam:  	Gen: NAD  	Pulm: decrease BS  B/L  	CV: S1S2; no rub  	Abd: +distended  	LE:  edema  	Vascular access:av     LABS/STUDIES  --------------------------------------------------------------------------------              8.8    6.72  >-----------<  149      [08-31-20 @ 06:04]              29.6     134  |  95  |  37  ----------------------------<  163      [08-31-20 @ 06:04]  4.9   |  27  |  x         Ca     8.6     [08-31-20 @ 06:04]      Mg     1.9     [08-31-20 @ 06:04]      Phos  3.2     [08-31-20 @ 06:04]    TPro  6.3  /  Alb  3.6  /  TBili  0.8  /  DBili  x   /  AST  21  /  ALT  13  /  AlkPhos  121  [08-31-20 @ 06:04]          Creatinine Trend:  SCr 5.4 [08-30 @ 06:20]  SCr 6.8 [08-29 @ 06:40]  SCr 7.5 [08-27 @ 08:09]  SCr 6.2 [08-26 @ 06:24]  SCr 9.3 [08-25 @ 06:18]        PTH -- (Ca 8.9)      [08-18-20 @ 05:24]   237  PTH -- (Ca 8.7)      [08-17-20 @ 15:04]   203  HbA1c 7.1      [10-13-18 @ 05:46]  TSH 3.93      [08-25-20 @ 08:36]    < from: CT Abdomen and Pelvis w/ Oral Cont and w/ IV Cont (08.30.20 @ 17:50) >  1. No evidence of pneumoperitoneum or oral contrast leak.    2. interval removal of the right pleural pigtail catheter with persistent unchanged in size loculated hydropneumothorax. Persistent right lower lobe consolidation    3. Circumferential urinary unchanged bladder wall thickening and surrounding inflammatory changes; correlation with urinalysis recommended to evaluate for cystitis.    4. Moderate amount of stool in the colon may reflect evidence of constipation. No evidence of bowel obstruction.        < end of copied text >

## 2020-08-31 NOTE — PROGRESS NOTE ADULT - NSHPATTENDINGPLANDISCUSS_GEN_ALL_CORE
RN, Primary team and patient's family
ICU team
ICU team
TEAM
House staff and family
ICU team
RN, Primary team

## 2020-08-31 NOTE — CONSULT NOTE ADULT - SUBJECTIVE AND OBJECTIVE BOX
ENT: Pt is a 68y/o M on eliquis a/w respiratory failure. ENT consulted for epistaxis for the past hour not controlled with conservative management (ice pack, pressure). Pt seen and examined at bedside - states he has had one other episode of epistaxis one month prior when he was first placed on his blood thinner. This episode had resolved with ice pack + pressure. Today, he states that he has had a slow drip from his right nare. Denies any difficulty breathing, SOB, difficulty swallowing, or hemoptysis.     HPI:  70 y/o M with PMH of ESRD on HD MWF, CAD s/p pci with 7 stents and AICD placement in 2018, Heart failure with reduced EF (26% in 2018), DLD, HTN, COPD? on 2L home O2, DM II (well controlled with HbA1C in the 6-7 range), and BPH presents to the ED for SOB that has been worsening over the last 2 weeks. As per patient's daughter patient has had multiple friends pass away in the last few months and since then has complained that he needs home O2 (Daughter thinks this is anxiety driven). Patient's daughter reports that patient saturates in the low 90s at home without O2 and saturates at 100% on 2L. Two week ago he developed hypotension while at dialysis. He refused to go the hospital at that time. Since then he reports that his breathing has gotten worse and he requires more O2. A few days ago he requested that his daughter raise his home O2 to 3L. Before 2 week sago he was able to walk around the house without O2 but over the last two weeks he has required O2 to move around his house. He reports that his SOB is worse with walking and better when he sits down. He reports a dry cough at baseline but denies any chest pain, sore throat, wheezing, weight changes, fatigue, dizziness, or changes in urination. No travel history and no one at home is sick. At baseline patient walks with a cane. He lives with his daughter and she takes care of his medication for him. He sleeps on a recliner due to the HF and wears compression stockings. He is oliguric and urinates "a few drops" approximately 2 times a week.  In the ED vitals were stable and patient is breathing well on 3L NC. Labs were significant for elevated potassium and troponins likely secondary to ESRD. Chest xray reveals worsening right-sided pleural effusion with increased left lower lung airspace opacities. Nephrology was consulted and patient was dialyzed in the ED (17 Aug 2020 14:27)    PAST MEDICAL & SURGICAL HISTORY:  Heart failure with reduced ejection fraction  CAD (coronary artery disease): s/p PCI and AICD placement  BPH (benign prostatic hyperplasia)  Type 2 diabetes mellitus  End stage renal disease  Dyslipidemia  Hypertension  No significant past surgical history    Allergies  No Known Allergies    MEDICATIONS  (STANDING):  atorvastatin 80 milliGRAM(s) Oral at bedtime  BACItracin   Ointment 1 Application(s) Topical daily  calcium acetate 1334 milliGRAM(s) Oral three times a day with meals  chlorhexidine 4% Liquid 1 Application(s) Topical <User Schedule>  dextrose 5%. 1000 milliLiter(s) (50 mL/Hr) IV Continuous <Continuous>  dextrose 50% Injectable 12.5 Gram(s) IV Push once  dextrose 50% Injectable 25 Gram(s) IV Push once  dextrose 50% Injectable 25 Gram(s) IV Push once  insulin glargine Injectable (LANTUS) 10 Unit(s) SubCutaneous at bedtime  insulin lispro (HumaLOG) corrective regimen sliding scale   SubCutaneous three times a day before meals  midodrine 10 milliGRAM(s) Oral every 8 hours  multivitamin/minerals 1 Tablet(s) Oral daily  pantoprazole    Tablet 40 milliGRAM(s) Oral before breakfast  polyethylene glycol 3350 17 Gram(s) Oral two times a day  senna 2 Tablet(s) Oral at bedtime  simethicone 80 milliGRAM(s) Chew four times a day  tamsulosin 0.4 milliGRAM(s) Oral at bedtime    MEDICATIONS  (PRN):  acetaminophen   Tablet .. 650 milliGRAM(s) Oral every 6 hours PRN Mild Pain (1 - 3)  dextrose 40% Gel 15 Gram(s) Oral once PRN Blood Glucose LESS THAN 70 milliGRAM(s)/deciliter  glucagon  Injectable 1 milliGRAM(s) IntraMuscular once PRN Glucose LESS THAN 70 milligrams/deciliter  melatonin 1 milliGRAM(s) Oral at bedtime PRN Insomnia    FAMILY HISTORY:  FH: type 2 diabetes: sister  FH: HTN (hypertension): Sister    Social History:  Former 2 pack a day smoker of unknown duration  Drinks one glass of wine a night  No drug use (17 Aug 2020 14:27)    ROS:   ENT: all negative except as noted in HPI   CV: denies palpitations  Pulm: denies SOB, cough, hemoptysis  GI: denies change in apetite, indigestion, n/v  : denies pertinent urinary symptoms, urgency  Neuro: denies numbness/tingling, loss of sensation  Psych: denies anxiety  MS: denies muscle weakness, instability  Heme: denies easy bruising or bleeding  Endo: denies heat/cold intolerance, excessive sweating  Vascular: denies LE edema    Vital Signs Last 24 Hrs  T(C): 36.7 (31 Aug 2020 00:00), Max: 36.7 (31 Aug 2020 00:00)  T(F): 98 (31 Aug 2020 00:00), Max: 98 (31 Aug 2020 00:00)  HR: 60 (31 Aug 2020 00:00) (60 - 60)  BP: 158/67 (31 Aug 2020 00:00) (121/58 - 158/67)  RR: 18 (31 Aug 2020 00:00) (18 - 18)  SpO2: 98% (30 Aug 2020 15:45) (98% - 98%)                        8.8    6.72  )-----------( 149      ( 31 Aug 2020 06:04 )             29.6    08-31    134<L>  |  95<L>  |  37<H>  ----------------------------<  163<H>  4.9   |  27  |  6.3<HH>    Ca    8.6      31 Aug 2020 06:04  Phos  3.2     08-31  Mg     1.9     08-31  TPro  6.3  /  Alb  3.6  /  TBili  0.8  /  DBili  x   /  AST  21  /  ALT  13  /  AlkPhos  121<H>  08-31    PHYSICAL EXAM:  Gen: awake, alert, NAD. No drooling or pooling of secretions. No trismus.   Skin: No rashes, bruises, or lesions  HEENT: Head NC/AT. +humidified nasal cannula. Left nare patent, no blood or secretions noted. Right nare with gauze in place. Removed, with a long clot following it. Observed slow drip of blood. Cleaned nare and replaced with Surgicel + bacitracin . Oral cavity no erythema/edema. Tongue wnl. Uvula midline. Posterior oropharynx clear, no discharge/blood noted. Neck supple, trachea midline.   Resp: breathing easily, no stridor, no accessory muscle use   CV: no peripheral edema/cyanosis  GI: soft, nontender, nondistended  Neuro: A&Ox3  Psych: normal mood, normal affect

## 2020-08-31 NOTE — PROGRESS NOTE ADULT - SUBJECTIVE AND OBJECTIVE BOX
----------Daily Progress Note----------    HISTORY OF PRESENT ILLNESS:  Patient is a 70y old Male who presents with a chief complaint of SOB (31 Aug 2020 12:20)    Currently admitted to medicine with the primary diagnosis of SOB (shortness of breath)     Today is hospital day 14d.     INTERVAL HOSPITAL COURSE / OVERNIGHT EVENTS:    Patient was examined and seen at bedside. This morning he is resting comfortably in bed and reports no new issues or overnight events. Patient states his constipation has improved and had BM overnight. In the afternoon today, patient started having multiple episodes of epistaxis. Denies dizziness and pain.    Review of Systems: Otherwise unremarkable     <<<<<PAST MEDICAL & SURGICAL HISTORY>>>>>  Heart failure with reduced ejection fraction  CAD (coronary artery disease): s/p PCI and AICD placement  BPH (benign prostatic hyperplasia)  Type 2 diabetes mellitus  End stage renal disease  Dyslipidemia  Hypertension  No significant past surgical history    ALLERGIES  No Known Allergies    MEDICATIONS  STANDING MEDICATIONS  atorvastatin 80 milliGRAM(s) Oral at bedtime  BACItracin   Ointment 1 Application(s) Topical daily  calcium acetate 1334 milliGRAM(s) Oral three times a day with meals  chlorhexidine 4% Liquid 1 Application(s) Topical <User Schedule>  dextrose 5%. 1000 milliLiter(s) IV Continuous <Continuous>  dextrose 50% Injectable 12.5 Gram(s) IV Push once  dextrose 50% Injectable 25 Gram(s) IV Push once  dextrose 50% Injectable 25 Gram(s) IV Push once  insulin glargine Injectable (LANTUS) 10 Unit(s) SubCutaneous at bedtime  insulin lispro (HumaLOG) corrective regimen sliding scale   SubCutaneous three times a day before meals  midodrine 10 milliGRAM(s) Oral every 8 hours  multivitamin/minerals 1 Tablet(s) Oral daily  pantoprazole    Tablet 40 milliGRAM(s) Oral before breakfast  polyethylene glycol 3350 17 Gram(s) Oral two times a day  senna 2 Tablet(s) Oral at bedtime  simethicone 80 milliGRAM(s) Chew four times a day  tamsulosin 0.4 milliGRAM(s) Oral at bedtime    PRN MEDICATIONS  acetaminophen   Tablet .. 650 milliGRAM(s) Oral every 6 hours PRN  dextrose 40% Gel 15 Gram(s) Oral once PRN  glucagon  Injectable 1 milliGRAM(s) IntraMuscular once PRN  melatonin 1 milliGRAM(s) Oral at bedtime PRN    VITALS:  T(F): 98  HR: 60  BP: 158/67  RR: 18  SpO2: 98%    <<<<<PHYSICAL EXAM>>>>>  GENERAL: Well developed, well nourished and in no acute distress. Resting comfortably in bed.  HEENT: Normocephalic, atraumatic, mucous membrances moist, EOMI, PERRLA, bilateral sclera anicteric, no conjunctival injection  Neck: Supple, non-tender, no lymphadenopathy.  PULMONARY: Clear to auscultation bilaterally. No rales, ronchi, or wheezing.  CARDIOVASCULAR: Regular rate and rhythm, S1-S2, no murmurs  GASTROINTESTINAL: Soft, distended, non-tender to palpation. Bowel sounds present in all 4 quadarants.  SKIN/EXTREMITIES: No clubbing or edema  NEUROLOGIC/MUSCULOSKELETAL: AOx4, cranial nerves II-XII grossly intact. no focal deficits    <<<<<LABS>>>>>                        8.8    6.72  )-----------( 149      ( 31 Aug 2020 06:04 )             29.6     08-31    134<L>  |  95<L>  |  37<H>  ----------------------------<  163<H>  4.9   |  27  |  6.3<HH>    Ca    8.6      31 Aug 2020 06:04  Phos  3.2     08-31  Mg     1.9     08-31    TPro  6.3  /  Alb  3.6  /  TBili  0.8  /  DBili  x   /  AST  21  /  ALT  13  /  AlkPhos  121<H>  08-31            [id number]        <<<<<RADIOLOGY>>>>>    < from: CT Abdomen and Pelvis w/ Oral Cont and w/ IV Cont (08.30.20 @ 17:50) >  IMPRESSION:      1. No evidence of pneumoperitoneum or oral contrast leak.    2. interval removal of the right pleural pigtail catheter with persistent unchanged in size loculated hydropneumothorax. Persistent right lower lobe consolidation    3. Circumferential urinary unchanged bladder wall thickening and surrounding inflammatory changes; correlation with urinalysis recommended to evaluate for cystitis.    4. Moderate amount of stool in the colon may reflect evidence of constipation. No evidence of bowel obstruction. ----------Daily Progress Note----------    HISTORY OF PRESENT ILLNESS:  Patient is a 70y old Male who presents with a chief complaint of SOB (31 Aug 2020 12:20)    Currently admitted to medicine with the primary diagnosis of SOB (shortness of breath)     Today is hospital day 14d.     INTERVAL HOSPITAL COURSE / OVERNIGHT EVENTS:    Patient was examined and seen at bedside. This morning he is resting comfortably in bed and reports no new issues or overnight events. Patient states his constipation has improved and had BM overnight. In the afternoon today, patient started having multiple episodes of epistaxis. Denies dizziness and pain.    Review of Systems: Otherwise unremarkable     <<<<<PAST MEDICAL & SURGICAL HISTORY>>>>>  Heart failure with reduced ejection fraction  CAD (coronary artery disease): s/p PCI and AICD placement  BPH (benign prostatic hyperplasia)  Type 2 diabetes mellitus  End stage renal disease  Dyslipidemia  Hypertension  No significant past surgical history    ALLERGIES  No Known Allergies    MEDICATIONS  STANDING MEDICATIONS  atorvastatin 80 milliGRAM(s) Oral at bedtime  BACItracin   Ointment 1 Application(s) Topical daily  calcium acetate 1334 milliGRAM(s) Oral three times a day with meals  chlorhexidine 4% Liquid 1 Application(s) Topical <User Schedule>  dextrose 5%. 1000 milliLiter(s) IV Continuous <Continuous>  dextrose 50% Injectable 12.5 Gram(s) IV Push once  dextrose 50% Injectable 25 Gram(s) IV Push once  dextrose 50% Injectable 25 Gram(s) IV Push once  insulin glargine Injectable (LANTUS) 10 Unit(s) SubCutaneous at bedtime  insulin lispro (HumaLOG) corrective regimen sliding scale   SubCutaneous three times a day before meals  midodrine 10 milliGRAM(s) Oral every 8 hours  multivitamin/minerals 1 Tablet(s) Oral daily  pantoprazole    Tablet 40 milliGRAM(s) Oral before breakfast  polyethylene glycol 3350 17 Gram(s) Oral two times a day  senna 2 Tablet(s) Oral at bedtime  simethicone 80 milliGRAM(s) Chew four times a day  tamsulosin 0.4 milliGRAM(s) Oral at bedtime    PRN MEDICATIONS  acetaminophen   Tablet .. 650 milliGRAM(s) Oral every 6 hours PRN  dextrose 40% Gel 15 Gram(s) Oral once PRN  glucagon  Injectable 1 milliGRAM(s) IntraMuscular once PRN  melatonin 1 milliGRAM(s) Oral at bedtime PRN    VITALS:  T(F): 98  HR: 60  BP: 158/67  RR: 18  SpO2: 98%    <<<<<PHYSICAL EXAM>>>>>  GENERAL: Well developed, well nourished and in no acute distress. Resting comfortably in bed.  HEENT: Normocephalic, atraumatic, mucous membrances moist, EOMI, PERRLA, bilateral sclera anicteric, no conjunctival injection  Neck: Supple, non-tender, no lymphadenopathy.  PULMONARY: Clear to auscultation bilaterally. No rales, ronchi, or wheezing.  CARDIOVASCULAR: Regular rate and rhythm, S1-S2, no murmurs  GASTROINTESTINAL: Soft, distended, non-tender to palpation. Bowel sounds present in all 4 quadarants.  SKIN/EXTREMITIES: No clubbing or edema  NEUROLOGIC/MUSCULOSKELETAL: AOx4, cranial nerves II-XII grossly intact. no focal deficits    <<<<<LABS>>>>>                        8.8    6.72  )-----------( 149      ( 31 Aug 2020 06:04 )             29.6     08-31    134<L>  |  95<L>  |  37<H>  ----------------------------<  163<H>  4.9   |  27  |  6.3<HH>    Ca    8.6      31 Aug 2020 06:04  Phos  3.2     08-31  Mg     1.9     08-31    TPro  6.3  /  Alb  3.6  /  TBili  0.8  /  DBili  x   /  AST  21  /  ALT  13  /  AlkPhos  121<H>  08-31            [id number]        <<<<<RADIOLOGY>>>>>    < from: CT Abdomen and Pelvis w/ Oral Cont and w/ IV Cont (08.30.20 @ 17:50) >  IMPRESSION:      1. No evidence of pneumoperitoneum or oral contrast leak.    2. interval removal of the right pleural pigtail catheter with persistent unchanged in size loculated hydropneumothorax. Persistent right lower lobe consolidation    3. Circumferential urinary unchanged bladder wall thickening and surrounding inflammatory changes; correlation with urinalysis recommended to evaluate for cystitis.    4. Moderate amount of stool in the colon may reflect evidence of constipation. No evidence of bowel obstruction.      ASSESSMENT/PLAN    70 y/o M with PMH of ESRD on HD MWF, CAD s/p pci with 7 stents and AICD placement in 2018, Heart failure with reduced EF, DLD, HTN, COPD? on 2L home O2, DM II (well controlled with HbA1C in the 6-7 range), Afib on Eliquis (diagnosed in february) and BPH presents to the ED for SOB that has been worsening over the last 2 weeks.    #Left nare epistaxis s/p balloon placement  - uncontrolled with surgicel + bacitracin  - ENT consulted  - will follow up outpatient ENT for removal on thursday    #R sided loculated pleural effusion  #Acute on chronic hypercapnic respiratory failure   #Trapped Lung  -Missed one HD session s/p emergent HD in the ED for volume overload w/ 4 liters removed  -CXR 8/17/2020: Worsening right-sided pleural effusion with increased left lower lung airspace opacities  -CT 8/18/2020: Large right pleural effusion with near complete collapse of the right lung. Large component of this effusion is subpulmonic. Partially loculated anteriorly at the apex.  - s/p pigtail catheter placement by IR on 08/19 > Exudative. Suspicion of underlying mass/malignancy.  - chest tube removed by IR  - Patient is stable on NC 3 L   - completed meropenem abx course  - c/w midodrine ; if needed low dose pressor   - Pleural fluid : cultures negative cell count 994 N56% L 36%  - Cleared by pulmonology for discharge    #Abdominal distension #Constipation # vomiting  - senna  - miralax  - KUB today ameliorated  - CT abdomen w/ contrast negative for obstruction    #ESRD, dialysis dependent   - HD MWF, nephro following  - Phospho low increased    #HFrEF  -TTE 8/19/2020: EF 35-40%, severe Pulm HTN  Anuric    #CAD s/p PCI with 7 stents  #Elevated Troponins last one 0.3  - trop elevated on admission  - EKG: no ST changes noted  - Patient not c/o chest pain  - likely from ESRD  - hold AC due to epistaxis    #Paroxysmal Afib  -Diagnosed in February  - Hold Eliquis due to epistaxis  - Currently normal rate and regular rhythm  - Holding metoprolol due to HoTN    #DM II- uncontrolled  -A1c 7.8  -c/w basal/bolus insulin regimen    #HTN: holding home losartan    #DLD: c/w statin    #BPH: c/w Flomax    DVT ppx: eliquis 2.5 q 12  GI ppx: Protonix  Diet: DASH  DNR/DNI

## 2020-08-31 NOTE — PROGRESS NOTE ADULT - ASSESSMENT
69M, PMH of ESRD on HD MWF, COPD, HFrEF, admitted for SOB.      #ESRD on HD  -hd in am   - IP noted, on phoslo 2/2/2  - hgb noted,  check iron stores   - BP noted , on midodrine continue / has severe valvular disease and pulm HTN   #SOB - sp CT of chest with loculated effusion sp  thoracentesis/ s/p pig tail / sp Pneumothorax/ CTS and pulm on case  # abdominal distension / ct noted , no obstruction   # palliative care notes appreciated   # will follow

## 2020-08-31 NOTE — PROGRESS NOTE ADULT - SUBJECTIVE AND OBJECTIVE BOX
PROGRESS NOTE  Chief Complaint:  Patient is a 70y old  Male who presents with a chief complaint of SOB (31 Aug 2020 12:20)    Today - Patient had epistaxis lading to nasal packing placement with ENT     HPI:  68 y/o M with PMH of ESRD on HD MWF, CAD s/p pci with 7 stents and AICD placement in 2018, Heart failure with reduced EF (26% in 2018), DLD, HTN, COPD? on 2L home O2, DM II (well controlled with HbA1C in the 6-7 range), and BPH presents to the ED for SOB that has been worsening over the last 2 weeks. As per patient's daughter patient has had multiple friends pass away in the last few months and since then has complained that he needs home O2 (Daughter thinks this is anxiety driven). Patient's daughter reports that patient saturates in the low 90s at home without O2 and saturates at 100% on 2L. Two week ago he developed hypotension while at dialysis. He refused to go the hospital at that time. Since then he reports that his breathing has gotten worse and he requires more O2. A few days ago he requested that his daughter raise his home O2 to 3L. Before 2 week sago he was able to walk around the house without O2 but over the last two weeks he has required O2 to move around his house. He reports that his SOB is worse with walking and better when he sits down. He reports a dry cough at baseline but denies any chest pain, sore throat, wheezing, weight changes, fatigue, dizziness, or changes in urination. No travel history and no one at home is sick. At baseline patient walks with a cane. He lives with his daughter and she takes care of his medication for him. He sleeps on a recliner due to the HF and wears compression stockings. He is oliguric and urinates "a few drops" approximately 2 times a week.    In the ED vitals were stable and patient is breathing well on 3L NC. Labs were significant for elevated potassium and troponins likely secondary to ESRD. Chest xray reveals worsening right-sided pleural effusion with increased left lower lung airspace opacities. Nephrology was consulted and patient was dialyzed in the ED (17 Aug 2020 14:27)      ALLERGIES:  No Known Allergies      HOSPITAL MEDICATIONS:  MEDICATIONS  (STANDING):  atorvastatin 80 milliGRAM(s) Oral at bedtime  BACItracin   Ointment 1 Application(s) Topical daily  calcium acetate 1334 milliGRAM(s) Oral three times a day with meals  chlorhexidine 4% Liquid 1 Application(s) Topical <User Schedule>  dextrose 5%. 1000 milliLiter(s) (50 mL/Hr) IV Continuous <Continuous>  dextrose 50% Injectable 12.5 Gram(s) IV Push once  dextrose 50% Injectable 25 Gram(s) IV Push once  dextrose 50% Injectable 25 Gram(s) IV Push once  insulin glargine Injectable (LANTUS) 10 Unit(s) SubCutaneous at bedtime  insulin lispro (HumaLOG) corrective regimen sliding scale   SubCutaneous three times a day before meals  midodrine 10 milliGRAM(s) Oral every 8 hours  multivitamin/minerals 1 Tablet(s) Oral daily  pantoprazole    Tablet 40 milliGRAM(s) Oral before breakfast  polyethylene glycol 3350 17 Gram(s) Oral two times a day  senna 2 Tablet(s) Oral at bedtime  simethicone 80 milliGRAM(s) Chew four times a day  tamsulosin 0.4 milliGRAM(s) Oral at bedtime    MEDICATIONS  (PRN):  acetaminophen   Tablet .. 650 milliGRAM(s) Oral every 6 hours PRN Mild Pain (1 - 3)  dextrose 40% Gel 15 Gram(s) Oral once PRN Blood Glucose LESS THAN 70 milliGRAM(s)/deciliter  glucagon  Injectable 1 milliGRAM(s) IntraMuscular once PRN Glucose LESS THAN 70 milligrams/deciliter  melatonin 1 milliGRAM(s) Oral at bedtime PRN Insomnia      PMHX/PSHX:  Heart failure with reduced ejection fraction  CAD (coronary artery disease)  BPH (benign prostatic hyperplasia)  Type 2 diabetes mellitus  End stage renal disease  Dyslipidemia  Hypertension  No significant past surgical history      FAMILY HISTORY:  FH: type 2 diabetes  FH: HTN (hypertension)      SOCIAL HISTORY:    REVIEW OF SYSTEMS:     General:  No wt loss, fevers, chills, night sweats, fatigue,   Eyes:  Good vision, no reported pain  ENT:  No sore throat, pain, runny nose, dysphagia  CV:  No pain, palpitations, hypo/hypertension  Resp:  No dyspnea, cough, tachypnea, wheezing  GI:  No pain, No nausea, No vomiting, No diarrhea, No constipation, No weight loss, No fever, No pruritis, No rectal bleeding, No tarry stools, No dysphagia,  :  No pain, bleeding, incontinence, nocturia  Muscle:  No pain, weakness  Neuro:  No weakness, tingling, memory problems  Psych:  No fatigue, insomnia, mood problems, depression  Endocrine:  No polyuria, polydipsia, cold/heat intolerance  Heme:  No petechiae, ecchymosis, easy bruisability  Skin:  No rash, tattoos, scars, edema      PHYSICAL EXAM:   Vital Signs:  Vital Signs Last 24 Hrs  T(C): 36.7 (31 Aug 2020 00:00), Max: 36.7 (31 Aug 2020 00:00)  T(F): 98 (31 Aug 2020 00:00), Max: 98 (31 Aug 2020 00:00)  HR: 60 (31 Aug 2020 00:00) (60 - 60)  BP: 158/67 (31 Aug 2020 00:00) (121/58 - 158/67)  RR: 18 (31 Aug 2020 00:00) (18 - 18)  SpO2: 98% (30 Aug 2020 15:45) (98% - 98%)      GENERAL:  Appears stated age, well-groomed, well-nourished, no distress  HEENT:  NC/AT,  conjunctivae clear and pink, no thyromegaly, nodules, adenopathy, no JVD, sclera -anicteric, Nasal packing present   CHEST:  Full & symmetric excursion, no increased effort, breath sounds clear  HEART:  Regular rhythm, S1, S2, no murmur/rub/S3/S4, no abdominal bruit, no edema  ABDOMEN:  Soft, non-tender, non-distended, normoactive bowel sounds,  no masses ,no hepato-splenomegaly, no signs of chronic liver disease  EXTREMITIES  no cyanosis, clubbing or edema  SKIN:  No rash/erythema/ecchymoses/petechiae/wounds/abscess/warm/dry  NEURO:  Alert, oriented, no asterixis, no tremor, no encephalopathy    LABS:                        8.8    6.72  )-----------( 149      ( 31 Aug 2020 06:04 )             29.6     08-31    134<L>  |  95<L>  |  37<H>  ----------------------------<  163<H>  4.9   |  27  |  6.3<HH>    Ca    8.6      31 Aug 2020 06:04  Phos  3.2     08-31  Mg     1.9     08-31    TPro  6.3  /  Alb  3.6  /  TBili  0.8  /  DBili  x   /  AST  21  /  ALT  13  /  AlkPhos  121<H>  08-31    LIVER FUNCTIONS - ( 31 Aug 2020 06:04 )  Alb: 3.6 g/dL / Pro: 6.3 g/dL / ALK PHOS: 121 U/L / ALT: 13 U/L / AST: 21 U/L / GGT: x               ASSESSMENT & PLAN:

## 2020-08-31 NOTE — CONSULT NOTE ADULT - CONSULT REQUESTED DATE/TIME
18-Aug-2020 16:23
17-Aug-2020 11:34
20-Aug-2020 19:40
26-Aug-2020 15:53
27-Aug-2020 17:30
31-Aug-2020 12:20
17-Aug-2020 16:47

## 2020-08-31 NOTE — CONSULT NOTE ADULT - ATTENDING COMMENTS
no active bleeding, preventive measures discussed no active bleeding, preventive measures discussed as well as outpatient follow up. we discussed possible readmission if patients fails.

## 2020-08-31 NOTE — CONSULT NOTE ADULT - ASSESSMENT
68y/o M on eliquis with mild Left nare epistaxis     - Placed surgicel + bacitracin to left nare   - Cont humidified O2   - No nose picking/blowing  - Bacitracin to b/l nares BID  - Will d/w attending. 70y/o M on eliquis with mild Left nare epistaxis     - Placed surgicel + bacitracin to left nare   - Cont humidified O2   - No nose picking/blowing  - Bacitracin to b/l nares BID    UPDATED:  - Pt required placement of 5.5cm rhino rocket to left nare due to increased bleeding   - Seen at bedside with Dr. Calderón during rounds  - Appt made for Thurs 9/3 at 12:30PM to d/c packing.  - Plan d/w son at bedside and medical resident.

## 2020-09-01 NOTE — PROGRESS NOTE ADULT - PROVIDER SPECIALTY LIST ADULT
CT Surgery
CT Surgery
Critical Care
Critical Care
Hospitalist
Internal Medicine
Intervent Radiology
Intervent Radiology
MICU
Nephrology
Palliative Care
Pulmonology
Thoracic Surgery
Thoracic Surgery
Nephrology
Nephrology
Thoracic Surgery
Palliative Care
Critical Care

## 2020-09-01 NOTE — PROGRESS NOTE ADULT - ASSESSMENT
69M, PMH of ESRD on HD MWF, COPD, HFrEF, admitted for SOB.      #ESRD on HD  -HD today 3h opti 160 2k UF 2l as tolerated   - IP noted, on phoslo 2/2/2  - hgb noted,  check iron stores   - BP noted , on midodrine continue / has severe valvular disease and pulm HTN   #SOB - sp CT of chest with loculated effusion sp  thoracentesis/ s/p pig tail / sp Pneumothorax/ CTS and pulm on case  # abdominal distension / ct noted , no obstruction   # episatxis sp balloon nasal tube / to be removed on thursday   # will follow  / if discharged to follow in AM in HD unit

## 2020-09-01 NOTE — PROGRESS NOTE ADULT - SUBJECTIVE AND OBJECTIVE BOX
Nephrology progress note  Patient is seen and examined, events over the last 24 h noted .  lying in bed comfortable  still has nasal packing     Allergies:  No Known Allergies    Hospital Medications:   MEDICATIONS  (STANDING):    atorvastatin 80 milliGRAM(s) Oral at bedtime  BACItracin   Ointment 1 Application(s) Topical daily  calcium acetate 1334 milliGRAM(s) Oral three times a day with meals  insulin glargine Injectable (LANTUS) 10 Unit(s) SubCutaneous at bedtime  insulin lispro (HumaLOG) corrective regimen sliding scale   SubCutaneous three times a day before meals  melatonin 5 milliGRAM(s) Oral at bedtime  midodrine 10 milliGRAM(s) Oral every 8 hours  multivitamin 1 Tablet(s) Oral daily  pantoprazole    Tablet 40 milliGRAM(s) Oral before breakfast  polyethylene glycol 3350 17 Gram(s) Oral two times a day  senna 2 Tablet(s) Oral at bedtime  simethicone 80 milliGRAM(s) Chew four times a day  tamsulosin 0.4 milliGRAM(s) Oral at bedtime        VITALS:  T(F): 97.2 (09-01-20 @ 07:30), Max: 98.9 (08-31-20 @ 15:30)  HR: 60 (09-01-20 @ 08:20)  BP: 138/65 (09-01-20 @ 08:20)  RR: 18 (09-01-20 @ 08:20)      08-30 @ 07:01  -  08-31 @ 07:00  --------------------------------------------------------  IN: 1560 mL / OUT: 0 mL / NET: 1560 mL          PHYSICAL EXAM:  Constitutional: NAD  HEENT: nasal packing   Respiratory: CTAB, no wheezes, rales or rhonchi  Cardiovascular: S1, S2, RRR  Gastrointestinal: BS+, soft, NT/ND  Extremities: No cyanosis or clubbing. No peripheral edema  :  No crook.   Skin: No rashes    LABS:  09-01    134<L>  |  95<L>  |  49<H>  ----------------------------<  147<H>  5.4<H>   |  26  |  7.5<HH>    Ca    9.0      01 Sep 2020 06:29  Phos  3.2     08-31  Mg     1.9     09-01    TPro  6.3  /  Alb  3.6  /  TBili  0.8  /  DBili      /  AST  21  /  ALT  13  /  AlkPhos  121<H>  08-31                          8.8    6.58  )-----------( 145      ( 01 Sep 2020 06:29 )             29.2       Urine Studies:      RADIOLOGY & ADDITIONAL STUDIES:

## 2020-09-01 NOTE — PROGRESS NOTE ADULT - REASON FOR ADMISSION
SOB
SOB now constipation and epistaxis

## 2020-09-03 NOTE — ED PROVIDER NOTE - NSFOLLOWUPINSTRUCTIONS_ED_ALL_ED_FT
Shortness of breath    Shortness of breath (dyspnea) means you have trouble breathing and could indicate a medical problem. Causes include lung disease, heart disease, low amount of red blood cells (anemia), poor physical fitness, being overweight, smoking, etc. Your health care provider today may not be able to find a cause for your shortness of breath after your exam. In this case, it is important to have a follow-up exam with your primary care physician as instructed. If medicines were prescribed, take them as directed for the full length of time directed. Refrain from tobacco products.    SEEK IMMEDIATE MEDICAL CARE IF YOU HAVE ANY OF THE FOLLOWING SYMPTOMS: worsening shortness of breath, chest pain, back pain, abdominal pain, fever, coughing up blood, lightheadedness/dizziness.    Please follow up with your pulmonologist in 1 to 2 weeks.

## 2020-09-03 NOTE — CONSULT NOTE ADULT - ASSESSMENT
70 y.o M with PMH of heart failure with reduced ejection fraction, CAD (coronary artery disease): s/p PCI and AICD placement, BPH (benign prostatic hyperplasia), Type 2 diabetes mellitus, ESRD on HD, HLD, HTN, COPD on 3 L on home O2 , right nare epistaxis s/p Placement of 5.5 cm rhino rocket during last admission on 8/31/20 with hemostasis achieved after placement. Pt. had an appointment with ENT office today for nasal packing removal while awaiting for an appointment run out of O2 presented to ED with SOB, improved after O2 administration. ENT called to remove nasal packing. Pt. seen and examined at bedside in NAD, right nare packing in place , balloon deflated, packing removed, a drop of blood noted at the anterior right septum mixed with mucus, mustache dressing placed. Pt. tolerated procedure well, left in NAD, no evidence of active bleeding.     1. Right Epistaxis 8/31/20 s/p nasal packing placement ENT called for nasal packing removal.    5.5ccm right sided nasal packing removed, no active bleeding.     Plan:  Monitor/observe for rebleeding, recall ENT prn.   Monitor and control BP  No strenuous exercise   Do not touch or blow nose  Apply topical Bacitracin to B/L nares  BID to keep nose well hydrated.   F/U with Dr. Calderón as an outpatient, please call [phone]

## 2020-09-03 NOTE — ED ADULT NURSE NOTE - NSIMPLEMENTINTERV_GEN_ALL_ED
Implemented All Fall with Harm Risk Interventions:  [place] to call system. Call bell, personal items and telephone within reach. Instruct patient to call for assistance. Room bathroom lighting operational. Non-slip footwear when patient is off stretcher. Physically safe environment: no spills, clutter or unnecessary equipment. Stretcher in lowest position, wheels locked, appropriate side rails in place. Provide visual cue, wrist band, yellow gown, etc. Monitor gait and stability. Monitor for mental status changes and reorient to person, place, and time. Review medications for side effects contributing to fall risk. Reinforce activity limits and safety measures with patient and family. Provide visual clues: red socks.

## 2020-09-03 NOTE — ED ADULT NURSE NOTE - PMH
BPH (benign prostatic hyperplasia)    CAD (coronary artery disease)  s/p PCI and AICD placement  Dyslipidemia    End stage renal disease    Heart failure with reduced ejection fraction    Hypertension    Type 2 diabetes mellitus

## 2020-09-03 NOTE — ED PROVIDER NOTE - ATTENDING CONTRIBUTION TO CARE
70 yr old m w/ a pmh significant for COPD (on 3L O2), CAD s/p pacemaker, HTN, HLD, DM who presents with SOB. Pt was on his way to ENT clinic for removal of packing (R nostril) when he ran out of O2 in his car (pt is on O2 at baseline). Pt then become SOB and was brought to the ED for evaluation. Pt denies any chest pain, vomiting, fevers, chills or any other complaints.     Review of Systems    Constitutional: (-) fever  Cardiovascular: (-) chest pain, (-) syncope  Respiratory: (-) cough, (+) shortness of breath  Gastrointestinal: (-) vomiting, (-) diarrhea, (-) abdominal pain  Musculoskeletal: (-) neck pain, (-) back pain, (-) joint pain  Integumentary: (-) rash, (-) edema  Neurological: (-) headache, (-) altered mental status    Except as documented in the HPI, all other systems are negative.    VITAL SIGNS: I have reviewed nursing notes and confirm.  CONSTITUTIONAL: non-toxic, well appearing  SKIN: no rash, no petechiae.  EYES: PERRL, EOMI, pink conjunctiva, anicteric  ENT: tongue midline, no exudates, MMM  NECK: Supple; no meningismus, no JVD  CARD: RRR, no murmurs, equal radial pulses bilaterally 2+  RESP: on non rebreather, tachypneic, decreased breath sounds throughout.   ABD: Soft, non-tender, non-distended, no peritoneal signs, no HSM, no CVA tenderness  EXT: Normal ROM x4. No edema. No calves tenderness  NEURO: Alert, oriented. CN2-12 intact, equal strength bilaterally, nl gait.  PSYCH: Cooperative, appropriate.    a/p  pt presents s/p SOB due to running out of 02  -placed pt on non rebreather  -labs  -cxr  -ekg, unchanged  -dispo as per above

## 2020-09-03 NOTE — ED PROVIDER NOTE - PHYSICAL EXAMINATION
CONSTITUTIONAL: Well-developed; well-nourished. Tachypneic.  SKIN: warm, dry  HEAD: Normocephalic; atraumatic.  EYES: no conjunctival injection. PERRL.   ENT: No nasal discharge; airway clear.  NECK: Supple; non tender.  CARD: S1, S2 normal; no murmurs, gallops, or rubs. Regular rate and rhythm.   RESP: Tachypneic. No wheezes, rales or rhonchi.  ABD: soft ntnd, no rebound tenderness or guarding.  EXT: Normal ROM.  No clubbing, cyanosis or edema.   LYMPH: No acute cervical adenopathy.  NEURO: Alert, oriented, grossly unremarkable  PSYCH: Cooperative, appropriate.

## 2020-09-03 NOTE — ED PROVIDER NOTE - CARE PROVIDERS DIRECT ADDRESSES
,DirectAddress_Unknown ,DirectAddress_Unknown,[email or web address]

## 2020-09-03 NOTE — ED ADULT NURSE REASSESSMENT NOTE - NS ED NURSE REASSESS COMMENT FT1
pt assessed. no complaints of pain or discomfort at this time. pt denies any SOB- pt o2 sat 97% on 6L venti mask with spontaneous breathing. family at bedside. will continue to monitor.

## 2020-09-03 NOTE — ED PROVIDER NOTE - PATIENT PORTAL LINK FT
You can access the FollowMyHealth Patient Portal offered by [facility] by registering at the following website: [email or web address]. By joining [employer]’s FollowMyHealth portal, you will also be able to view your health information using other applications (apps) compatible with our system.

## 2020-09-03 NOTE — ED PROVIDER NOTE - CARE PROVIDER_API CALL
Deepak Santana)  Critical Care Medicine; Internal Medicine; Pulmonary Disease; Sleep Medicine  [address] [place] [address]  Phone: [phone]  Fax: [phone]  Follow Up Time: 4-6 Days Deepak Santana)  Critical Care Medicine; Internal Medicine; Pulmonary Disease; Sleep Medicine  [address] [place] [address], [place] [address]  Phone: [phone]  Fax: [phone]  Follow Up Time: 4-6 Days    Satya Calderón  OTOLARYNGOLOGY  [address] [facility], [address] [place] [address]  Phone: [phone]  Fax: [phone]  Follow Up Time: 7-10 Days

## 2020-09-03 NOTE — ED PROVIDER NOTE - OBJECTIVE STATEMENT
Patient is a 71 yo male with PMHx of COPD on 2 to 3 L at home O2, ESRD on HD, CAD s/p 7 stents and AICD, CHF, HTN, HLD, DM, BPH c/o SOB. Patient BIBEMS because patient was hypoxic in the 80s. Patient had nasal packing in right nare, was going to ENT office, but was with son and had brought the wrong oxygen tank, the oxygen tank ran out, and patient was without oxygen for a while, called EMS because of SOB and patient had O2 in 80s. Patient was put on nonbreather with improvement in O2 in 90s. No fever, chills, chest pain, cough, abdominal pain, n/v.

## 2020-09-03 NOTE — ED PROVIDER NOTE - PROVIDER TOKENS
PROVIDER:[TOKEN:[[id number]],FOLLOWUP:[4-6 Days]] PROVIDER:[TOKEN:[[id number]],FOLLOWUP:[4-6 Days]],PROVIDER:[TOKEN:[[id number]],FOLLOWUP:[7-10 Days]]

## 2020-09-03 NOTE — ED PROVIDER NOTE - PROGRESS NOTE DETAILS
Jabari: pt improving on O2 not in any distress. CXR unchanged. Labs pending, will repeat trop at 6 pm. Will continue to monitor. Jabari: pt stable not in any distress. ph not acidotic, CO2 at baseline for pt (usually in the 60s). Will continue to monitor. Jabari: ENT was consulted for removal of the nasal packaging. Jabari: Pt signed out to Dr. Murrell MQ: Trop unchanged, patient and daughter comfortable with discharge and taking home on ambulance with oxygen, has oxygen at home, will f/u with pulmonologist

## 2020-09-03 NOTE — ED ADULT NURSE REASSESSMENT NOTE - NS ED NURSE REASSESS COMMENT FT1
Pt reassessed. Nasal packing removed by ENT PA. Pt nose is not bleeding and does not have any discharge. VSS. POC discussed with pt, pt will be DC home via ambulance.

## 2020-09-03 NOTE — CONSULT NOTE ADULT - SUBJECTIVE AND OBJECTIVE BOX
Patient is a 70y old  Male who presents with a chief complaint of     HPI:  70 y.o M with PMH of heart failure with reduced ejection fraction, CAD (coronary artery disease): s/p PCI and AICD placement, BPH (benign prostatic hyperplasia), Type 2 diabetes mellitus, ESRD on HD, HLD, HTN, COPD on 3 L on home O2 , right nare epistaxis s/p Placement of 5.5 cm rhino rocket during last admission on 8/31/20 with hemostasis achieved after placement. Pt. had an appointment with ENT office today for nasal packing removal while awaiting for an appointment run out of O2 presented to ED with SOB, improved after O2 administration. ENT called to remove nasal packing. Pt. seen and examined at bedside in NAD, right nare packing in place , balloon deflated, packing removed, a drop of blood noted at the anterior right septum mixed with mucus, mustache dressing placed. Pt. tolerated procedure well, left in NAD, no evidence of active bleeding.      PAST MEDICAL & SURGICAL HISTORY:  Heart failure with reduced ejection fraction  CAD (coronary artery disease): s/p PCI and AICD placement  BPH (benign prostatic hyperplasia)  Type 2 diabetes mellitus  End stage renal disease  Dyslipidemia  Hypertension  No significant past surgical history  Epistaxis     REVIEW OF SYSTEMS:    CONSTITUTIONAL:  fevers or chills  HEENT: No visual changes  ENDO: No sweating  NECK: No pain or stiffness  MUSCULOSKELETAL: No back pain, no joint pain  RESPIRATORY: No shortness of breath  CARDIOVASCULAR: No chest pain  GASTROINTESTINAL: No abdominal or epigastric pain. No nausea, vomiting,  No diarrhea or constipation.   NEUROLOGICAL: No mental status changes  PSYCH: No depression, no mood changes  SKIN: No itching       Home Medications:  alfuzosin 10 mg oral tablet, extended release: 1 tab(s) orally once a day on non-dialysis days (17 Aug 2020 15:02)  aspirin 81 mg oral tablet, chewable: 1 tab(s) orally once a day (17 Aug 2020 15:02)  calcium acetate 667 mg oral tablet: 1 tab(s) orally 3 times a day (17 Aug 2020 15:02)  cefdinir 300 mg oral capsule: 1 tab(s) orally 3 times a day after dialysis (17 Aug 2020 15:02)  Centrum Silver oral tablet: 1 tab(s) orally once a day (17 Aug 2020 15:02)  Crestor 20 mg oral tablet: 1 tab(s) orally once a day (17 Aug 2020 15:02)  Eliquis 5 mg oral tablet: 1 tab(s) orally 2 times a day (17 Aug 2020 15:02)  furosemide 40 mg oral tablet: 1 tab(s) orally once a day on non-dialysis days (17 Aug 2020 15:02)  isosorbide mononitrate 30 mg oral tablet, extended release: 1 tab(s) orally once a day on non-dialysis days (17 Aug 2020 15:02)  pantoprazole 40 mg oral delayed release tablet: 1 tab(s) orally once a day (17 Aug 2020 15:02)        Allergies:   No Known Allergies      SOCIAL HISTORY: No illicit drug use    FAMILY HISTORY:  FH: type 2 diabetes: sister  FH: HTN (hypertension): Sister      Vital Signs Last 24 Hrs  T(C): 36.9 (03 Sep 2020 13:41), Max: 36.9 (03 Sep 2020 13:25)  T(F): 98.4 (03 Sep 2020 13:41), Max: 98.4 (03 Sep 2020 13:25)  HR: 65 (03 Sep 2020 13:41) (60 - 65)  BP: 101/55 (03 Sep 2020 13:41) (86/48 - 101/55)  RR: 18 (03 Sep 2020 13:41) (18 - 18)  SpO2: 98% (03 Sep 2020 13:41) (89% - 98%)    PHYSICAL EXAM:    Constitutional: NAD, age appropriate development,  HEENT: NC/AT, EOMI  Nose: right nare packing with balloon minimally inflated in place. +mucous d/c by right nare. left nare patent no d/c. on face mask O2  Mouth: mucosa pink and soft, uvula midline, tongue midline , no evidence of bleeding over posterior oropharynx.   Neck: no cervical LAD  Back: No CVA tenderness  Respiratory: No accessory respiratory muscle use  Abd: Soft, NT/ND    Extremities: no edema  Neurological: A/O x 3  Psychiatric: Normal mood, normal affect  Skin: No rashes      LABS:                        8.6    7.11  )-----------( 146      ( 03 Sep 2020 14:06 )             29.6     09-03    141  |  99  |  30<H>  ----------------------------<  133<H>  4.9   |  30  |  4.6<HH>    Ca    8.9      03 Sep 2020 14:06    TPro  6.1  /  Alb  3.6  /  TBili  0.3  /  DBili  x   /  AST  19  /  ALT  13  /  AlkPhos  122<H>  09-03       Troponin T, Serum in AM (08.26.20 @ 06:24)    Troponin T, Serum: 0.36: Critical value: ng/mL    Troponin T, Serum (09.03.20 @ 14:06)    Troponin T, Serum: 0.21: TYPE:(C=Critical, N=Notification, A=Abnormal) n  TESTS: md malachi  DATE/TIME CALLED: 09/03/20 14:42  CALLED TO: md malachi  READ BACK (2 Patient Identifiers)(Y/N): y  READ BACK VALUES (Y/N): y  CALLED BY: nikole park  Critical value: ng/mL    RADIOLOGY & ADDITIONAL STUDIES:     < from: Xray Chest 1 View-PORTABLE IMMEDIATE (09.03.20 @ 14:41) >  Impression:    Grossly stable peripheral right midlung opacities in right hydropneumothorax.    < end of copied text >

## 2020-09-03 NOTE — ED ADULT TRIAGE NOTE - CHIEF COMPLAINT QUOTE
patient was getting nasal packing removed at dr when his ox tank ran out patient hypoxic and hypotensive  upon triage

## 2020-09-03 NOTE — ED PROVIDER NOTE - CLINICAL SUMMARY MEDICAL DECISION MAKING FREE TEXT BOX
s/o from Dr. Barboza,   labs reviewed  consulted ENT and packing removed  pt has 02 tank at home, no longer SOB, wants to go home  aware of risk/alt/benefits

## 2020-09-03 NOTE — ED ADULT NURSE NOTE - OBJECTIVE STATEMENT
Followed protocol
Patient with COPD on home O2 states he's was out when he ran out of O2 and became SOB. Patient arrived to ED was A&O x 4 and able to follow verbal commands and speak. Nasal packing noted to right nostril

## 2020-09-03 NOTE — ED ADULT NURSE NOTE - CAS DISC DELAYS
Patient tolerated blood transfusion well; no adverse reactions noted. Avs printed and reviewed with patient. Verbalizes understanding.   Waiting ambulance service

## 2020-09-08 PROBLEM — I48.0 PAROXYSMAL ATRIAL FIBRILLATION: Status: ACTIVE | Noted: 2018-11-21

## 2020-09-08 PROBLEM — I50.42 CHRONIC COMBINED SYSTOLIC AND DIASTOLIC HF (HEART FAILURE), NYHA CLASS 1: Status: ACTIVE | Noted: 2019-06-17

## 2020-09-08 NOTE — REVIEW OF SYSTEMS
[Fever] : no fever [Chills] : no chills [Feeling Fatigued] : feeling fatigued [Blurry Vision] : no blurred vision [Sore Throat] : no sore throat [Sinus Pressure] : no sinus pressure [Shortness Of Breath] : shortness of breath [Dyspnea on exertion] : dyspnea during exertion [Chest Pain] : no chest pain [Lower Ext Edema] : lower extremity edema [Cough] : cough [Wheezing] : no wheezing [Coughing Up Blood] : no hemoptysis [Abdominal Pain] : no abdominal pain [Nausea] : no nausea [Vomiting] : no vomiting [Urinary Frequency] : urinary frequency [Hematuria] : no hematuria [Nocturia] : nocturia [Dizziness] : no dizziness [Joint Swelling] : no joint swelling [Joint Pain] : joint pain [Negative] : Integumentary [Easy Bleeding] : no tendency for easy bleeding [Confusion] : no confusion was observed

## 2020-09-08 NOTE — PHYSICAL EXAM
[Normal Appearance] : normal appearance [Well Groomed] : well groomed [No Deformities] : no deformities [Normal Conjunctiva] : the conjunctiva exhibited no abnormalities [General Appearance - In No Acute Distress] : no acute distress [Eyelids - No Xanthelasma] : the eyelids demonstrated no xanthelasmas [] : no respiratory distress [Respiration, Rhythm And Depth] : normal respiratory rhythm and effort [Auscultation Breath Sounds / Voice Sounds] : lungs were clear to auscultation bilaterally [Heart Sounds] : normal S1 and S2 [Heart Rate And Rhythm] : heart rate and rhythm were normal [Systolic grade ___/6] : A grade [unfilled]/6 systolic murmur was heard. [Arterial Pulses Normal] : the arterial pulses were normal [Bowel Sounds] : normal bowel sounds [Abdomen Soft] : soft [Abdomen Tenderness] : non-tender [Nail Clubbing] : no clubbing of the fingernails [Cyanosis, Localized] : no localized cyanosis [FreeTextEntry1] : erythema b/l LE, + venostasis [Oriented To Time, Place, And Person] : oriented to person, place, and time [Affect] : the affect was normal

## 2020-09-08 NOTE — HISTORY OF PRESENT ILLNESS
[FreeTextEntry1] : 71 y/o male with history of CAD, s/p PCI of RCA and LAD , reduced EF by echo, advanced AV block s/p AICD/PPM, unable to cannulate CS, so not LV paced. No chest pain. Was recently in the [facility] with dyspnea, low BP. On midodrine now. Losartan was stopped. Still with episodes of dyspnea, no LE edema. Remains on HD. He reports compliance with medical therapy. EP follow-up appreciated. 2D echo with EF  at 30-35%. The patient reports L[place]IS, feels better with oxygen. Had 5% burden of AF on the device.

## 2020-09-08 NOTE — ASSESSMENT
[FreeTextEntry1] : Systolic CHF. S/p AICD\par AV block, s/p PPM\par C/w diuretics, HD. Consider more aggressive fluid removal. F/u with Dr. Schreiber.\par On midodrine for BP support. Off losartan and metoprolol\par F/u with EP - follows with Dr. Landrum.\par Vascular f/u - Dr. Brewer. Venous insufficiency.\par In addition the option of epicardial lead was discussed, should his CHF symptoms progress.\par S/p PCI - c/w DAPT. 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 F/u with pulmonary for CLIFTON, COPD.\par Secondary prevention discussed.\par Diet discussed.\par \par F/u in 3 months.\par \par

## 2020-11-09 NOTE — ED PROVIDER NOTE - CARE PLAN
Principal Discharge DX:	Fluid overload  Secondary Diagnosis:	Abdominal pain  Secondary Diagnosis:	Shortness of breath  Secondary Diagnosis:	Elevated troponin  Secondary Diagnosis:	End stage renal disease  Secondary Diagnosis:	Pleural effusion  Secondary Diagnosis:	Pneumonia   Principal Discharge DX:	Abdominal pain  Secondary Diagnosis:	Shortness of breath  Secondary Diagnosis:	Elevated troponin  Secondary Diagnosis:	End stage renal disease  Secondary Diagnosis:	Pleural effusion  Secondary Diagnosis:	UTI (urinary tract infection)  Secondary Diagnosis:	Pneumonia

## 2020-11-09 NOTE — ED PROVIDER NOTE - PROGRESS NOTE DETAILS
Spoke to Dr. Berrios.  Patient will need dialysis today. as per icu ramen, pt cleared for floor. Signed out to Dr. Bob. CT abdo pending > admit to floor. Patient awaiting CT Dr Munoz aware and accepts admission. Consult placed to Renal Dr Schreiber

## 2020-11-09 NOTE — ED PROVIDER NOTE - CLINICAL SUMMARY MEDICAL DECISION MAKING FREE TEXT BOX
Labs noted for WBC 16k, UA +WBC and bacteria. CXR right pleural effusion/RLL pneumonia. Given cefepime, vancomycin and azithromycin. Will admit. Labs noted for WBC 16k, UA +WBC and bacteria, lactate normal. CXR right pleural effusion/RLL pneumonia. Given cefepime, vancomycin and azithromycin. Will admit. Labs noted for WBC 16k, UA +WBC and bacteria, lactate normal. CXR right pleural effusion/RLL pneumonia. Given cefepime, vancomycin and azithromycin. Will admit for further management at this time

## 2020-11-09 NOTE — H&P ADULT - NSHPPHYSICALEXAM_GEN_ALL_CORE
VITALS:     ICU Vital Signs Last 24 Hrs  T(C): 36.2 (09 Nov 2020 08:58), Max: 38.3 (09 Nov 2020 01:40)  T(F): 97.1 (09 Nov 2020 08:58), Max: 100.9 (09 Nov 2020 01:40)  HR: 63 (09 Nov 2020 08:58) (58 - 63)  BP: 126/60 (09 Nov 2020 08:58) (89/53 - 126/60)  RR: 20 (09 Nov 2020 08:58) (20 - 21)  SpO2: 98% (09 Nov 2020 08:58) (83% - 100%)      GENERAL: NAD, lying in bed comfortably  HEAD:  Atraumatic, Normocephalic  EYES: EOMI, PERRLA, conjunctiva and sclera clear  ENT: Moist mucous membranes  NECK: Supple, No JVD  CHEST/LUNG:  right side AICD on place, on venti mask  50 saturating 98 %,   Breath sound present bilaterally less on the right side. no rales, rhonchi, wheezing,   HEART: irregular heart rate,  No murmurs, rubs, or gallops  ABDOMEN: distended, Soft, Nontender, no guarding or tenderness   Back: no CVA tenderness bilaterally   EXTREMITIES:  bilateral LE wrapped, chronic edema with skin darkening   NERVOUS SYSTEM:  Alert & Oriented X3, speech clear. No deficits   MSK: FROM all 4 extremities, full and equal strength  SKIN: No rashes or lesions

## 2020-11-09 NOTE — ED ADULT NURSE NOTE - ISOLATION TYPE:
Problem: Patient Care Overview  Goal: Plan of Care/Patient Progress Review  Outcome: No Change  Afebrile. VSS. Stable on RA. LSC on RA. Pt did vomit a small amount x1 during cares with midnight VS check. Appeared to sleep between cares and has had no S/S of nausea or pain since. TPN and Lipids running and has tolerated them well. CSA running with no changes. Pt due to void. Mom sleeping at bedside and attentive to pt. Hourly rounding completed. Continue with POC and notify staff of changes.        None

## 2020-11-09 NOTE — ED PROVIDER NOTE - OBJECTIVE STATEMENT
69 yo male, pmh of chf, copd, on home o2 2L, hypotension take 10 mg midodrine tid, esrd HD mwf, presents to ed for right flank pain, started last night, mild, aching, no radiation, a/w sob. has missed last two midodrine dose as per daughter at bedside. Denies fever, chills, cp, sob, le swelling, nv, dysuria.

## 2020-11-09 NOTE — ED PROVIDER NOTE - SECONDARY DIAGNOSIS.
Abdominal pain Shortness of breath Elevated troponin Pleural effusion Pneumonia UTI (urinary tract infection) End stage renal disease

## 2020-11-09 NOTE — ED PROVIDER NOTE - PHYSICAL EXAMINATION
Physical Exam    Vital Signs: I have reviewed the initial vital signs.  Constitutional: well-nourished, appears stated age, no acute distress  Eyes: Conjunctiva pink, Sclera clear  Cardiovascular: S1 and S2, regular rate, regular rhythm, well-perfused extremities, radial pulses equal and 2+, pedal pulses 2+ and equal   Respiratory: mild labored respiratory effort, scattered rales  Gastrointestinal: soft, non-tender abdomen, no pulsatile mass, normal bowl sounds  Musculoskeletal: supple neck, no lower extremity edema, no midline tenderness  Integumentary: warm, dry, no rash  Neurologic: awake, alert, nvi

## 2020-11-09 NOTE — H&P ADULT - HISTORY OF PRESENT ILLNESS
69 y/o M with PMH of ESRD on HD MWF last session on Friday due today , CAD s/p pci with 7 stents and AICD placement in 2018, Heart failure with reduced EF (26% in 2018), A-fib on Eiquis DLD, HTN, COPD on 2L home O2, DM II and BPH presents to the ED  with Right sided flank pain since this morning . PT reports pain on the right side, sharp constant, non radiating. Pt reports similar pain as on the last admission 9/28/20. PT on HD , does not urinate, only dripping, denies burning or blood in urine. Pt reports shortness of breath has increasing, was placed on 100 % oxygen in ED, evaluated by critical care and was decided pt stable to go to floor. In ed pt found with temp 100.9 and UTI positive. PT denies nausea, vomiting, diarrhea, constipation, chest pain, leg tenderness. Pt follows up with electrophysiologist  , cardiologist Dr. Acosta, nephrologist Dr. Schreiber and pulmonary Dr. Santana.

## 2020-11-09 NOTE — H&P ADULT - ATTENDING COMMENTS
Patient with multiple comorbid condition seen and evaluated with daughter at bedside and with ACP    1. Acute R pleuritic chest pain suspect due to chronic pleural effusion, pneumonia unlikely, per daughter he had no fever at home, low grade fever in ER.  - pain control, consult pulmonologist  -Per daughter Pleural effusion is chronic and they were informed that it will show on radiologic exam    2. UTI unknown etiology  - Received antibiotics in ER  - ID consult for antibiotic and dosing due to his ESRD  - Check urine culture  -Monitor WBC    3. Acute on chronic hypoxic resp failure suspect COPD and/or Acute on chronic systolic HF  - ventimask, monitor O 2 sat  - Pulm consult and cardiology consult  - Home medications  -HD today    3. Elevated troponin- has h/o elevated troponin, suspect due to ESRD but will r/o AMI based on risk factors  -telem monitoring for 24 hrs  - Serila trop  - cardiology consult    4. ESRD on HD with elevated K level  - Nephrology consulted  - HD today  - Monitor labs    5. DM2  - hold oral hypoglycemic agents  - SSI and monitor POCT glucose    6. CAD/DLD/Defibrilator  - Home med and monitor  DVT prophylaxis  GOC and plan of care discussed with daughter at bedside.

## 2020-11-09 NOTE — ED PROVIDER NOTE - ATTENDING CONTRIBUTION TO CARE
I personally evaluated the patient. I reviewed the Resident’s or Physician Assistant’s note (as assigned above), and agree with the findings and plan except as documented in my note.  Chart reviewed. H/O ESRD on HD, DM, HTN, CAD, PPM, presents with right flank pain tonight. Daughter states urine is foul-smelling. Exam shows alert patient in no distress, HEENT NCAT, decreased breath sound right base, RR S1S2, abdomen soft +BS, right flank tenderness, no rebound or guarding, no CCE, AV fistula left upper arm.

## 2020-11-09 NOTE — GOALS OF CARE CONVERSATION - ADVANCED CARE PLANNING - CONVERSATION DETAILS
Patient is an elderly male with multiple co morbid conditions ie COPD on home O2, CHF, ESRD on HD. Goals of care regarding his comorbid conditions and treatment options discussed with patient's healthcare proxy. Patient per his ACP does NOT want to be intubated or resuscitated. According to his healthcare proxy, patient already has a defibrillator and in event it does not work he does not want to be resuscitated.

## 2020-11-09 NOTE — CONSULT NOTE ADULT - ASSESSMENT
70M PMH ESRD on HD, CAD/PCI, HFrEF/AICD, Afib, HLD, HTN, COPD on home O2, DM, BPH admitted with right flank pain.      # ESRD on HD  - HD today, 3hrs, Opti 160, 2K, 2L UF.  Continue Midodrine   - check phos level, on binder   - d/c cholecalciferol  # Normocytic anemia - no YASMINE, check iron stores

## 2020-11-09 NOTE — H&P ADULT - ASSESSMENT
69 y/o M with PMH of ESRD on HD MWF last session on Friday due today , CAD s/p pci with 7 stents and AICD placement in 2018, Heart failure with reduced EF (26% in 2018), A-fib on Eiquis DLD, HTN, COPD on 2L home O2, DM II and BPH presents to the ED  with Right sided flank pain since this morning admitted for UTI, abdominal pain, SOB, fluid overload

## 2020-11-09 NOTE — CONSULT NOTE ADULT - SUBJECTIVE AND OBJECTIVE BOX
Patient is a 70y old  Male who presents with a chief complaint of rt flank pain started today.  pt w/ PMH as noted below.  CXR- rt pleural effusion,  t max-101, wbc ct-15 K. Pt noted to have desaturated in mid 80s , However pt was saturating @ 100% on venti mask.  CT abd. was done to r/o other pathology results still pnd.         PAST MEDICAL & SURGICAL HISTORY:    COPD w/ chronic right pleural effusion requiring thoracentesis     Heart failure with reduced ejection fraction    CAD (coronary artery disease)  s/p PCI (7 stents)  and AICD placement    BPH (benign prostatic hyperplasia)    Type 2 diabetes mellitus    End stage renal disease    chronic UTI    Dyslipidemia    Hypotension                Allergies    No Known Allergies    Intolerances      FAMILY HISTORY:  FH: type 2 diabetes  sister    FH: HTN (hypertension)  Sister      Home Medications:  alfuzosin 10 mg oral tablet, extended release: 1 tab(s) orally once a day on non-dialysis days (2020 05:51)  aspirin 81 mg oral tablet, chewable: 1 tab(s) orally once a day (:51)  calcium acetate 667 mg oral tablet: 1 tab(s) orally 3 times a day (:51)  Centrum Silver oral tablet: 1 tab(s) orally once a day (:51)  citalopram 10 mg oral tablet: 1 tab(s) orally once a day (:51)  Colace 100 mg oral capsule: 1 cap(s) orally 2 times a day (2020 05:51)  Crestor 20 mg oral tablet: 1 tab(s) orally once a day (:51)  Eliquis 5 mg oral tablet: 1 tab(s) orally 2 times a day (:51)  finasteride 5 mg oral tablet: 1 tab(s) orally once a day (2020 05:51)  isosorbide mononitrate 30 mg oral tablet, extended release: 1 tab(s) orally once a day on non-dialysis days (:51)  midodrine 10 mg oral tablet: 1 tab(s) orally 3 times a day (:51)  pantoprazole 40 mg oral delayed release tablet: 1 tab(s) orally once a day (2020 05:51)  Vitamin D3 1000 intl units (25 mcg) oral capsule:  (2020 05:51)    Occupation:  Alochol: Denied  Smoking: Denied  Drug Use: Denied  Marital Status:         ROS: as in HPI; All other systems reviewed are negative    ICU Vital Signs Last 24 Hrs  T(C): 37.6 (2020 05:10), Max: 38.3 (2020 01:40)  T(F): 99.7 (2020 05:10), Max: 100.9 (2020 01:40)  HR: 60 (2020 05:10) (58 - 60)  BP: 89/53 (2020 06:09) (89/53 - 114/57)  BP(mean): --  ABP: --  ABP(mean): --  RR: 20 (2020 05:10) (20 - 21)  SpO2: 98% (2020 05:10) (83% - 98%)        Physical Examination:    General: elderly W male, pt lethargic but arousable in no acute respiratory distress on supplemental O2    HEENT: Pupils equal, reactive to light.  Symmetric.    PULM: diminished breath so rt base    CVS: Regular rate and rhythm, no murmurs, rubs, or gallops    ABD: Soft, w/ truncal obesity NT . + bowel so    EXT: No edema, nontender    SKIN: Warm and well perfused, no rashes noted.              I&O's Detail        LABS:                        9.8    15.74 )-----------( 163      ( 2020 02:30 )             33.4     2020 02:30    135    |  96     |  60     ----------------------------<  235    5.4     |  26     |  5.9      Ca    9.3        2020 02:30    TPro  6.8    /  Alb  3.9    /  TBili  0.3    /  DBili  <0.2   /  AST  19     /  ALT  16     /  AlkPhos  169    2020 02:30  Amylase x     lipase 14         CARDIAC MARKERS ( 2020 02:30 )  x     / 0.15 ng/mL / x     / x     / x          CAPILLARY BLOOD GLUCOSE          Urinalysis Basic - ( 2020 03:50 )    Color: Brown / Appearance: Turbid / S.025 / pH: x  Gluc: x / Ketone: Trace  / Bili: Moderate / Urobili: 0.2 mg/dL   Blood: x / Protein: >=300 mg/dL / Nitrite: Negative   Leuk Esterase: Large / RBC: 11-25 /HPF / WBC >50 /HPF   Sq Epi: x / Non Sq Epi: Few /HPF / Bacteria: TNTC /HPF      Culture    Lactate, Blood: 1.6 mmol/L (20 @ 02:30)      MEDICATIONS  (STANDING):  midodrine. 10 milliGRAM(s) Oral once    MEDICATIONS  (PRN):        RADIOLOGY: ***     CXR: rt pleural effusion              IMPRESSION:  1.  sepsis secondary to UTI  2. acute on chronic rt pleural effusion   3.  Px- esrd on hd, CHF (w/ ICD), COPD, a fib, DM, CAD ( s/p pci , 7 stent placement), hypotension        PLAN:  case discussed w/ ICU MD Alonzo  pt VSS in no resp. distress,   no need for ICU adm @ this time. pt can go to med floors  stared on zithro, maxipime, vanco  pulmonary consult (pt sees Dr Deepak Santana)  f/u- cultures  f/u CT A/P  renal eval for HD  cont maintainance meds            CRITICAL CARE TIME SPENT: ***

## 2020-11-09 NOTE — CONSULT NOTE ADULT - SUBJECTIVE AND OBJECTIVE BOX
NEPHROLOGY CONSULTATION NOTE    ADRIANNA GIANES  70y  Male  MRN-[id number]    CC:   Patient is a 70y old  Male who presents with a chief complaint of Right flank  pain, UTI, fluid overload (2020 10:51)      HPI:  71 y/o M with PMH of ESRD on HD MWF last session on Friday due today , CAD s/p pci with 7 stents and AICD placement in 2018, Heart failure with reduced EF (26% in 2018), A-fib on Eiquis DLD, HTN, COPD on 2L home O2, DM II and BPH presents to the ED  with Right sided flank pain since this morning . PT reports pain on the right side, sharp constant, non radiating. Pt reports similar pain as on the last admission 20. PT on HD , does not urinate, only dripping, denies burning or blood in urine. Pt reports shortness of breath has increasing, was placed on 100 % oxygen in ED, evaluated by critical care and was decided pt stable to go to floor. In ed pt found with temp 100.9 and UTI positive. PT denies nausea, vomiting, diarrhea, constipation, chest pain, leg tenderness. Pt follows up with electrophysiologist  , cardiologist Dr. Acosta, nephrologist Dr. Schreiber and pulmonary Dr. Santana.  (2020 10:51)      PAST MEDICAL & SURGICAL HISTORY:  Heart failure with reduced ejection fraction    CAD (coronary artery disease)  s/p PCI and AICD placement    BPH (benign prostatic hyperplasia)    Type 2 diabetes mellitus    End stage renal disease    Dyslipidemia    Hypertension    No significant past surgical history      Allergies:  No Known Allergies    Home Medications Reviewed  Hospital Medications:   MEDICATIONS  (STANDING):  albuterol/ipratropium for Nebulization 3 milliLiter(s) Nebulizer every 6 hours  apixaban 5 milliGRAM(s) Oral two times a day  aspirin  chewable 81 milliGRAM(s) Oral daily  atorvastatin 80 milliGRAM(s) Oral at bedtime  calcium acetate 667 milliGRAM(s) Oral three times a day with meals  chlorhexidine 4% Liquid 1 Application(s) Topical <User Schedule>  cholecalciferol 2000 Unit(s) Oral daily  citalopram 10 milliGRAM(s) Oral daily  dextrose 5%. 1000 milliLiter(s) (50 mL/Hr) IV Continuous <Continuous>  dextrose 50% Injectable 12.5 Gram(s) IV Push once  dextrose 50% Injectable 25 Gram(s) IV Push once  dextrose 50% Injectable 25 Gram(s) IV Push once  finasteride 5 milliGRAM(s) Oral daily  insulin glargine Injectable (LANTUS) 16 Unit(s) SubCutaneous at bedtime  insulin lispro (ADMELOG) corrective regimen sliding scale   SubCutaneous three times a day before meals  insulin lispro Injectable (ADMELOG) 6 Unit(s) SubCutaneous three times a day before meals  isosorbide   mononitrate ER Tablet (IMDUR) 30 milliGRAM(s) Oral daily  metoprolol tartrate 25 milliGRAM(s) Oral two times a day  midodrine. 10 milliGRAM(s) Oral three times a day  multivitamin/minerals 1 Tablet(s) Oral daily  pantoprazole    Tablet 40 milliGRAM(s) Oral before breakfast  senna 1 Tablet(s) Oral at bedtime  tamsulosin 0.8 milliGRAM(s) Oral at bedtime  tiotropium 18 MICROgram(s) Capsule 1 Capsule(s) Inhalation daily    MEDICATIONS  (PRN):  dextrose 40% Gel 15 Gram(s) Oral once PRN Blood Glucose LESS THAN 70 milliGRAM(s)/deciliter  glucagon  Injectable 1 milliGRAM(s) IntraMuscular once PRN Glucose LESS THAN 70 milligrams/deciliter    Home medications:  Home Medications:  alfuzosin 10 mg oral tablet, extended release: 1 tab(s) orally once a day on non-dialysis days (2020 05:51)  aspirin 81 mg oral tablet, chewable: 1 tab(s) orally once a day (2020 05:51)  calcium acetate 667 mg oral tablet: 1 tab(s) orally 3 times a day (2020 05:51)  Centrum Silver oral tablet: 1 tab(s) orally once a day (2020 05:51)  citalopram 10 mg oral tablet: 1 tab(s) orally once a day (2020 05:51)  Colace 100 mg oral capsule: 1 cap(s) orally 2 times a day (2020 05:51)  Crestor 20 mg oral tablet: 1 tab(s) orally once a day (2020 05:51)  Eliquis 5 mg oral tablet: 1 tab(s) orally 2 times a day (2020 05:51)  finasteride 5 mg oral tablet: 1 tab(s) orally once a day (2020 05:51)  isosorbide mononitrate 30 mg oral tablet, extended release: 1 tab(s) orally once a day on non-dialysis days (2020 05:51)  midodrine 10 mg oral tablet: 1 tab(s) orally 3 times a day (2020 05:51)  pantoprazole 40 mg oral delayed release tablet: 1 tab(s) orally once a day (2020 05:51)  Vitamin D3 1000 intl units (25 mcg) oral capsule:  (2020 05:51)      SOCIAL HISTORY:  Social History:  Tobacco use:  former smoker   EtOH use: denies    Illicit drug use: denies (2020 10:51)      FAMILY HISTORY:  FH: type 2 diabetes  sister    FH: HTN (hypertension)  Sister        REVIEW OF SYSTEMS:     All other review of systems is negative unless indicated above.    VITALS:  T(F): 97.2 (20 @ 16:20), Max: 100.9 (20 @ 01:40)  HR: 60 (20 @ 16:20)  BP: 117/57 (20 @ 16:20)  RR: 16 (20 @ 16:20)  SpO2: 96% (20 @ 21:37)    Height (cm): 175.3 ( @ :19)  Weight (kg): 84.9 ( @ 11:19)  BMI (kg/m2): 27.6 (:19)  BSA (m2): 2.01 (:)  I&O's Detail    2020 07:  -  2020 21:56  --------------------------------------------------------  IN:  Total IN: 0 mL    OUT:    Other (mL): 2000 mL  Total OUT: 2000 mL    Total NET: -2000 mL        Creatine Kinase, Serum: 54 U/L (20 @ 15:49)    I&O's Summary    2020 07:01  -  2020 21:56  --------------------------------------------------------  IN: 0 mL / OUT: 2000 mL / NET: -2000 mL        PHYSICAL EXAM:  Gen: lethargic on venti mask  resp: decreased bs at the bases  card: S1/S2  abd: distneded  vascular access: AV    LABS:  Daily Height in cm: 175.26 (2020 11:19)    Daily         135  |  96<L>  |  60<H>  ----------------------------<  235<H>  5.4<H>   |  26  |  5.9<HH>    Ca    9.3      2020 02:30    TPro  6.8  /  Alb  3.9  /  TBili  0.3  /  DBili  <0.2  /  AST  19  /  ALT  16  /  AlkPhos  169<H>                          9.8    15.74 )-----------( 163      ( 2020 02:30 )             33.4     Mean Cell Volume: 97.7 fL (20 @ 02:30)    Urine Studies:  Urinalysis Basic - ( 2020 03:50 )    Color: Brown / Appearance: Turbid / S.025 / pH:   Gluc:  / Ketone: Trace  / Bili: Moderate / Urobili: 0.2 mg/dL   Blood:  / Protein: >=300 mg/dL / Nitrite: Negative   Leuk Esterase: Large / RBC: 11-25 /HPF / WBC >50 /HPF   Sq Epi:  / Non Sq Epi: Few /HPF / Bacteria: TNTC /HPF    RADIOLOGY & ADDITIONAL STUDIES:  < from: CT Abdomen and Pelvis w/ IV Cont (20 @ 05:07) >    EXAM:  CT ABDOMEN AND PELVIS IC            PROCEDURE DATE:  2020            INTERPRETATION:  CLINICAL STATEMENT: Abdominal pain.    TECHNIQUE: Contiguous axial CT images were obtained from the lower chest to the pubic symphysis following administration of 100cc Optiray 320 intravenous contrast.  Oral contrast was not administered.  Reformatted images in the coronal and sagittal planes were acquired.    COMPARISON: CT abdomen pelvis 2020.      FINDINGS:    LOWER CHEST: No significant change in right lung base consolidated opacity with increased size moderate thick-walled pleural effusion with associated subpleural nodular density.    HEPATOBILIARY: Unremarkable.    SPLEEN: Unremarkable.    PANCREAS: Unremarkable.    ADRENAL GLANDS: Unremarkable.    KIDNEYS: Symmetric renal enhancement. No hydronephrosis.    ABDOMINOPELVIC NODES: Unremarkable.    PELVIC ORGANS: Moderately thick-walled urinary bladder, despite underdistention, possibly reflecting underlying cystitis.    PERITONEUM/MESENTERY/BOWEL: Increased small volume abdominopelvic ascites. Moderate rectal stool load. No evidence of bowel obstruction or pneumoperitoneum.    BONES/SOFT TISSUES: Osteopenia. No acute osseous abnormalities.    OTHER: Atherosclerotic vascular calcifications.      IMPRESSION:    1.  Moderately thick-walled urinary bladder, despite underdistention, possibly reflecting underlying cystitis; correlation with urinalysis may be of use.  2.  Otherwise, no evidence of acute/inflammatory process within the abdomen or pelvis.  3.   Since 2020, no significant change in right lung base consolidated opacity with increased size moderate thick-walled pleural effusion with associated subpleural nodular density, possibly reflecting an infectious process, however underlying neoplasm is a possibility. Follow-up to resolution is suggested.            UZMA COHEN M.D., RESIDENT RADIOLOGIST  This document has been electronically signed.  ELIUD ADAM MD; Attending Radiologist  This document has been electronically signed. 2020  9:33AM    < end of copied text >

## 2020-11-09 NOTE — H&P ADULT - NSHPLABSRESULTS_GEN_ALL_CORE
9.8    15.74 )-----------( 163      ( 2020 02:30 )             33.4           135  |  96<L>  |  60<H>  ----------------------------<  235<H>  5.4<H>   |  26  |  5.9<HH>    Ca    9.3      2020 02:30    TPro  6.8  /  Alb  3.9  /  TBili  0.3  /  DBili  <0.2  /  AST  19  /  ALT  16  /  AlkPhos  169<H>                    Urinalysis Basic - ( 2020 03:50 )    Color: Brown / Appearance: Turbid / S.025 / pH: x  Gluc: x / Ketone: Trace  / Bili: Moderate / Urobili: 0.2 mg/dL   Blood: x / Protein: >=300 mg/dL / Nitrite: Negative   Leuk Esterase: Large / RBC: 11-25 /HPF / WBC >50 /HPF   Sq Epi: x / Non Sq Epi: Few /HPF / Bacteria: TNTC /HPF            Lactate Trend   @ 02:30 Lactate:1.6       CARDIAC MARKERS ( 2020 02:30 )  x     / 0.15 ng/mL / x     / x     / x            < from: Xray Chest 1 View- PORTABLE-Urgent (20 @ 03:24) >    Impression:    Bibasilar opacities/effusions, right greater than left.          VERONICA PEREZ MD; Attending Interventional Radiologist  This document has been electronically signed. 2020  9:18AM    < end of copied text >    < from: CT Abdomen and Pelvis w/ IV Cont (20 @ 05:07) >    IMPRESSION:    1.  Moderately thick-walled urinary bladder, despite underdistention, possibly reflecting underlying cystitis; correlation with urinalysis may be of use.  2.  Otherwise, no evidence of acute/inflammatory process within the abdomen or pelvis.  3.   Since 2020, no significant change in right lung base consolidated opacity with increased size moderate thick-walled pleural effusion with associated subpleural nodular density, possibly reflecting an infectious process, however underlying neoplasm is a possibility. Follow-up to resolution is suggested.            UZMA COHEN M.D., RESIDENT RADIOLOGIST  This document has been electronically signed.  ELIUD ADAM MD; Attending Radiologist  This document has been electronically signed. 2020  9:33AM    < end of copied text >

## 2020-11-09 NOTE — ED PROVIDER NOTE - NS ED ROS FT
Constitutional: (-) fever, (-) chills  Eyes: (-) visual changes  ENT: (-) nasal congestions  Cardiovascular: (-) chest pain, (-) syncope  Respiratory: (-) cough, (+) shortness of breath, (-) dyspnea,   Gastrointestinal: (-) vomiting, (-) diarrhea, (-)nausea, (+) abd pain  Musculoskeletal: (-) neck pain, (-) back pain, (-) joint pain,  Integumentary: (-) rash, (-) edema, (-) bruises  Neurological: (-) headache, (-) loc, (-) dizziness, (-) tingling, (-)numbness,  Peripheral Vascular: (-) leg swelling  :  (-)dysuria,  (-) hematuria  Allergic/Immunologic: (-) pruritus

## 2020-11-10 NOTE — CONSULT NOTE ADULT - SUBJECTIVE AND OBJECTIVE BOX
CARDIOLOGY CONSULT NOTE     CHIEF COMPLAINT/REASON FOR CONSULT:    HPI:  71 y/o M with PMH of ESRD on HD MWF last session on Friday due today , CAD s/p pci with 7 stents and AICD placement in 2018, Heart failure with reduced EF (26% in 2018), A-fib on Eiquis DLD, HTN, COPD on 2L home O2, DM II and BPH presents to the ED  with Right sided flank pain since this morning . PT reports pain on the right side, sharp constant, non radiating. Pt reports similar pain as on the last admission 9/28/20. PT on HD , does not urinate, only dripping, denies burning or blood in urine. Pt reports shortness of breath has increasing, was placed on 100 % oxygen in ED, evaluated by critical care and was decided pt stable to go to floor. In ed pt found with temp 100.9 and UTI positive. PT denies nausea, vomiting, diarrhea, constipation, chest pain, leg tenderness. Pt follows up with electrophysiologist  , cardiologist Dr. Acosta, nephrologist Dr. Schreiber and pulmonary Dr. Santana.  (09 Nov 2020 10:51)      PAST MEDICAL & SURGICAL HISTORY:  Heart failure with reduced ejection fraction    CAD (coronary artery disease)  s/p PCI and AICD placement    BPH (benign prostatic hyperplasia)    Type 2 diabetes mellitus    End stage renal disease    Dyslipidemia    Hypertension    No significant past surgical history        Cardiac Risks:   [x ]HTN, [x ] DM, [ ] Smoking, [ ] FH,  [x ] Lipids        MEDICATIONS:  MEDICATIONS  (STANDING):  albuterol/ipratropium for Nebulization 3 milliLiter(s) Nebulizer every 6 hours  apixaban 5 milliGRAM(s) Oral two times a day  aspirin  chewable 81 milliGRAM(s) Oral daily  atorvastatin 80 milliGRAM(s) Oral at bedtime  azithromycin  IVPB 500 milliGRAM(s) IV Intermittent daily  calcium acetate 667 milliGRAM(s) Oral three times a day with meals  cefepime   IVPB 1000 milliGRAM(s) IV Intermittent every 24 hours  chlorhexidine 4% Liquid 1 Application(s) Topical <User Schedule>  cholecalciferol 2000 Unit(s) Oral daily  citalopram 10 milliGRAM(s) Oral daily  dextrose 5%. 1000 milliLiter(s) (50 mL/Hr) IV Continuous <Continuous>  dextrose 50% Injectable 12.5 Gram(s) IV Push once  dextrose 50% Injectable 25 Gram(s) IV Push once  dextrose 50% Injectable 25 Gram(s) IV Push once  finasteride 5 milliGRAM(s) Oral daily  insulin glargine Injectable (LANTUS) 16 Unit(s) SubCutaneous at bedtime  insulin lispro (ADMELOG) corrective regimen sliding scale   SubCutaneous three times a day before meals  insulin lispro Injectable (ADMELOG) 6 Unit(s) SubCutaneous three times a day before meals  isosorbide   mononitrate ER Tablet (IMDUR) 30 milliGRAM(s) Oral daily  metoprolol tartrate 25 milliGRAM(s) Oral two times a day  midodrine. 10 milliGRAM(s) Oral three times a day  multivitamin/minerals 1 Tablet(s) Oral daily  pantoprazole    Tablet 40 milliGRAM(s) Oral before breakfast  senna 1 Tablet(s) Oral at bedtime  tamsulosin 0.8 milliGRAM(s) Oral at bedtime  tiotropium 18 MICROgram(s) Capsule 1 Capsule(s) Inhalation daily      FAMILY HISTORY:  FH: type 2 diabetes  sister    FH: HTN (hypertension)  Sister        SOCIAL HISTORY:      [ ] Marital status    Allergies    No Known Allergies        	    REVIEW OF SYSTEMS:  CONSTITUTIONAL: No fever, weight loss, or fatigue  EYES: No eye pain, visual disturbances, or discharge  ENMT:  No difficulty hearing, tinnitus, vertigo; No sinus or throat pain  NECK: No pain or stiffness  RESPIRATORY: No cough, wheezing, chills or hemoptysis; No Shortness of Breath  CARDIOVASCULAR: No chest pain, palpitations, passing out, dizziness, or leg swelling  GASTROINTESTINAL: No abdominal or epigastric pain. No nausea, vomiting, or hematemesis; No diarrhea or constipation. No melena or hematochezia.  GENITOURINARY: No dysuria, frequency, hematuria, or incontinence  NEUROLOGICAL: No headaches, memory loss, loss of strength, numbness, or tremors  SKIN: No itching, burning, rashes, or lesions   	      PHYSICAL EXAM:  T(C): 37.2 (11-10-20 @ 05:28), Max: 37.2 (11-10-20 @ 05:28)  HR: 60 (11-10-20 @ 05:28) (60 - 63)  BP: 117/56 (11-10-20 @ 05:28) (98/56 - 131/58)  RR: 25 (11-10-20 @ 08:09) (14 - 25)  SpO2: 96% (11-10-20 @ 08:09) (96% - 100%)  Wt(kg): --  I&O's Summary    09 Nov 2020 07:01  -  10 Nov 2020 07:00  --------------------------------------------------------  IN: 0 mL / OUT: 2000 mL / NET: -2000 mL        Appearance: Normal	  Psychiatry: A & O x 3, Mood & affect appropriate  HEENT:   Normal oral mucosa, PERRL, EOMI	  Lymphatic: No lymphadenopathy  Cardiovascular: Normal S1 S2,RRR, No JVD, No murmurs  Respiratory: Lungs clear to auscultation	  Gastrointestinal:  Soft, Non-tender, + BS	  Skin: No rashes, No ecchymoses, No cyanosis	  Neurologic: Non-focal  Extremities: Normal range of motion, No clubbing, cyanosis or edema  Vascular: Peripheral pulses palpable 2+ bilaterally      ECG:  av paced	    	  LABS:	 	    CARDIAC MARKERS:                                    9.8    15.74 )-----------( 163      ( 09 Nov 2020 02:30 )             33.4     11-09    135  |  96<L>  |  60<H>  ----------------------------<  235<H>  5.4<H>   |  26  |  5.9<HH>    Ca    9.3      09 Nov 2020 02:30    TPro  6.8  /  Alb  3.9  /  TBili  0.3  /  DBili  <0.2  /  AST  19  /  ALT  16  /  AlkPhos  169<H>  11-09

## 2020-11-10 NOTE — CONSULT NOTE ADULT - ASSESSMENT
Patient with above extensive history. He denies chest pain. Troponin chronically up. No evidence mi. Would echo repeat enzymes ekg. Continue meds.

## 2020-11-10 NOTE — CONSULT NOTE ADULT - ASSESSMENT
IMPRESSION:  large R loculated effusion   tapped in 8/20 and the lung was trapped  would not benefit from repeat tap as the results would be the same  possible pneumonia      SUGGEST:  cont abx  monitor WBC  full cultures  cont Eliquis   IMPRESSION:  large R loculated effusion   tapped in 8/20 and the lung was trapped had very stormy course after  possible pneumonia      SUGGEST:  cont abx  monitor WBC  full cultures  cont Eliquis  repeat tap is contraindicated as the results would be the same and it is hemodynamically dangerous

## 2020-11-10 NOTE — CONSULT NOTE ADULT - REASON FOR ADMISSION
as above
Right flank  pain, UTI, fluid overload

## 2020-11-10 NOTE — PROGRESS NOTE ADULT - SUBJECTIVE AND OBJECTIVE BOX
Felicia Pacheco MD  Hospitalist   Spectra: [phone]    Patient is a 70y old  Male who presents with a chief complaint of Right flank  pain, UTI, fluid overload (10 Nov 2020 09:33)      INTERVAL HPI/OVERNIGHT EVENTS: no acute overnight events noted. reports feeling weak.     REVIEW OF SYSTEMS: negative  Vital Signs Last 24 Hrs  T(C): 37.2 (10 Nov 2020 05:28), Max: 37.2 (10 Nov 2020 05:28)  T(F): 98.9 (10 Nov 2020 05:28), Max: 98.9 (10 Nov 2020 05:28)  HR: 60 (10 Nov 2020 11:) (60 - 61)  BP: 116/58 (10 Nov 2020 11:28) (104/56 - 131/58)  BP(mean): --  RR: 25 (10 Nov 2020 08:09) (14 - 25)  SpO2: 96% (10 Nov 2020 08:09) (96% - 100%)    PHYSICAL EXAM:   NAD; Normocephalic;   LUNGS - no wheezing  HEART: S1 S2+   ABDOMEN: Soft, Nontender, non distended  EXTREMITIES: 2+ pitting edema upto thigh, left arm av fistula - clean   NERVOUS SYSTEM:  Awake and alert; no focal neuro deficits appreciated    LABS:                        9.4    14.62 )-----------( 127      ( 10 Nov 2020 07:06 )             33.1     11-10    137  |  97<L>  |  46<H>  ----------------------------<  132<H>  5.3<H>   |  26  |  4.9<HH>    Ca    8.7      10 Nov 2020 07:06    TPro  6.5  /  Alb  3.6  /  TBili  0.3  /  DBili  x   /  AST  21  /  ALT  15  /  AlkPhos  155<H>  11-10      Urinalysis Basic - ( 2020 03:50 )    Color: Brown / Appearance: Turbid / S.025 / pH: x  Gluc: x / Ketone: Trace  / Bili: Moderate / Urobili: 0.2 mg/dL   Blood: x / Protein: >=300 mg/dL / Nitrite: Negative   Leuk Esterase: Large / RBC: 11-25 /HPF / WBC >50 /HPF   Sq Epi: x / Non Sq Epi: Few /HPF / Bacteria: TNTC /HPF      CAPILLARY BLOOD GLUCOSE      POCT Blood Glucose.: 99 mg/dL (10 Nov 2020 11:29)  POCT Blood Glucose.: 134 mg/dL (10 Nov 2020 07:32)  POCT Blood Glucose.: 212 mg/dL (2020 20:36)  POCT Blood Glucose.: 155 mg/dL (2020 16:31)

## 2020-11-10 NOTE — CONSULT NOTE ADULT - SUBJECTIVE AND OBJECTIVE BOX
HPI:  69 y/o M with PMH of ESRD on HD MWF last session on Friday due today , CAD s/p pci with 7 stents and AICD placement in 2018, Heart failure with reduced EF (26% in 2018), A-fib on Eiquis DLD, HTN, COPD on 2L home O2, DM II and BPH presents to the ED  with Right sided flank pain since this morning . PT reports pain on the right side, sharp constant, non radiating. Pt reports similar pain as on the last admission 20. PT on HD , does not urinate, only dripping, denies burning or blood in urine. Pt reports shortness of breath has increasing, was placed on 100 % oxygen in ED, evaluated by critical care and was decided pt stable to go to floor. In ed pt found with temp 100.9 and UTI positive. PT denies nausea, vomiting, diarrhea, constipation, chest pain, leg tenderness. Pt follows up with electrophysiologist  , cardiologist Dr. Acosta, nephrologist Dr. Schreiber and pulmonary Dr. Santana.  (2020 10:51)      CC/ HPI Patient is a 70y old  Male who presents with a chief complaint of Right flank  pain, UTI, fluid overload (2020 10:55)      ABDOMINAL PAIN;SHORTNESS OF BREATH;ELEVATED TROPONIN;END STAGE RENAL DISEA          FH: type 2 diabetes    FH: HTN (hypertension)      Heart failure with reduced ejection fraction    CAD (coronary artery disease)    BPH (benign prostatic hyperplasia)    Type 2 diabetes mellitus    End stage renal disease    Dyslipidemia    Hypertension    Abdominal pain    Fluid overload    Hypotension    Dyslipidemia    Type 2 diabetes mellitus    BPH (benign prostatic hyperplasia)    CAD (coronary artery disease)    End stage renal disease    Pneumonia    Pleural effusion    UTI (urinary tract infection)    Abdominal pain    No significant past surgical history    UTI (urinary tract infection)    Elevated troponin    Shortness of breath    Abdominal pain    Sy        FAMILY HISTORY:  FH: type 2 diabetes  sister    FH: HTN (hypertension)  Sister        No Known Allergies        Marital Status:  (   )    (   ) Single    (   )    (  )   Occupation:   Lives with: (  ) alone  (  ) children   (  ) spouse   (  ) parents  (  ) other  Recent Travel:     Substance Use (street drugs): (  ) never used  (  ) other:  Tobacco Usage:  (   ) never smoked   (   ) former smoker   (   ) current smoker  (     ) pack year  Alcohol Usage:        Home prescriptions  alfuzosin 10 mg oral tablet, extended release: 1 tab(s) orally once a day on non-dialysis days  aspirin 81 mg oral tablet, chewable: 1 tab(s) orally once a day  calcium acetate 667 mg oral tablet: 1 tab(s) orally 3 times a day  Centrum Silver oral tablet: 1 tab(s) orally once a day  citalopram 10 mg oral tablet: 1 tab(s) orally once a day  Colace 100 mg oral capsule: 1 cap(s) orally 2 times a day  Crestor 20 mg oral tablet: 1 tab(s) orally once a day  Eliquis 5 mg oral tablet: 1 tab(s) orally 2 times a day  finasteride 5 mg oral tablet: 1 tab(s) orally once a day  isosorbide mononitrate 30 mg oral tablet, extended release: 1 tab(s) orally once a day on non-dialysis days  metoprolol tartrate 25 mg oral tablet: 1 tab(s) orally 2 times a day on non-dialysis days (es, thu, sat)  midodrine 10 mg oral tablet: 1 tab(s) orally 3 times a day  pantoprazole 40 mg oral delayed release tablet: 1 tab(s) orally once a day  Vitamin D3 1000 intl units (25 mcg) oral capsule:       MEDICATIONS  (STANDING):  albuterol/ipratropium for Nebulization 3 milliLiter(s) Nebulizer every 6 hours  apixaban 5 milliGRAM(s) Oral two times a day  aspirin  chewable 81 milliGRAM(s) Oral daily  atorvastatin 80 milliGRAM(s) Oral at bedtime  azithromycin  IVPB 500 milliGRAM(s) IV Intermittent daily  calcium acetate 667 milliGRAM(s) Oral three times a day with meals  cefepime   IVPB 1000 milliGRAM(s) IV Intermittent every 24 hours  chlorhexidine 4% Liquid 1 Application(s) Topical <User Schedule>  cholecalciferol 2000 Unit(s) Oral daily  citalopram 10 milliGRAM(s) Oral daily  dextrose 5%. 1000 milliLiter(s) (50 mL/Hr) IV Continuous <Continuous>  dextrose 50% Injectable 12.5 Gram(s) IV Push once  dextrose 50% Injectable 25 Gram(s) IV Push once  dextrose 50% Injectable 25 Gram(s) IV Push once  finasteride 5 milliGRAM(s) Oral daily  insulin glargine Injectable (LANTUS) 16 Unit(s) SubCutaneous at bedtime  insulin lispro (ADMELOG) corrective regimen sliding scale   SubCutaneous three times a day before meals  insulin lispro Injectable (ADMELOG) 6 Unit(s) SubCutaneous three times a day before meals  isosorbide   mononitrate ER Tablet (IMDUR) 30 milliGRAM(s) Oral daily  metoprolol tartrate 25 milliGRAM(s) Oral two times a day  midodrine. 10 milliGRAM(s) Oral three times a day  multivitamin/minerals 1 Tablet(s) Oral daily  pantoprazole    Tablet 40 milliGRAM(s) Oral before breakfast  senna 1 Tablet(s) Oral at bedtime  tamsulosin 0.8 milliGRAM(s) Oral at bedtime  tiotropium 18 MICROgram(s) Capsule 1 Capsule(s) Inhalation daily    MEDICATIONS  (PRN):  dextrose 40% Gel 15 Gram(s) Oral once PRN Blood Glucose LESS THAN 70 milliGRAM(s)/deciliter  glucagon  Injectable 1 milliGRAM(s) IntraMuscular once PRN Glucose LESS THAN 70 milligrams/deciliter      sodium chloride 0.9% Bolus:   500 milliLiter(s), IV Bolus, once, infuse over 60 Minute(s), Stop After 1 Doses  Provider's Contact #: [phone]  lactated ringers Bolus:   2900 milliLiter(s), IV Bolus, once, infuse over 60 Minute(s), Stop After 1 Doses     (Calc Info: 31 milliLiter(s)/Kg/DOSE x 93.4 Kg = 2,900 milliLiter(s)/Dose     (Requested dose was 31 milliLiter(s) per Kg)      T(C): 37.2 (11-10-20 @ 05:28), Max: 37.2 (11-10-20 @ 05:28)  HR: 60 (11-10-20 @ 05:28) (60 - 63)  BP: 117/56 (11-10-20 @ 05:28) (98/56 - 131/58)  RR: 16 (11-10-20 @ 05:28) (14 - 20)  SpO2: 96% (20 @ 21:37) (96% - 100%)  ICU Vital Signs Last 24 Hrs  T(C): 37.2 (10 Nov 2020 05:28), Max: 37.2 (10 Nov 2020 05:28)  T(F): 98.9 (10 Nov 2020 05:28), Max: 98.9 (10 Nov 2020 05:28)  HR: 60 (10 Nov 2020 05:) (60 - 63)  BP: 117/56 (10 Nov 2020 05:28) (98/56 - 131/58)  RR: 16 (10 Nov 2020 05:28) (14 - 20)  SpO2: 96% (2020 21:37) (96% - 100%)      I&O's Summary    :01  -  10 Nov 2020 07:00  --------------------------------------------------------  IN: 0 mL / OUT: 2000 mL / NET: -2000 mL        I&O's Detail    2020 07:01  -  10 Nov 2020 07:00  --------------------------------------------------------  IN:  Total IN: 0 mL    OUT:    Other (mL): 2000 mL  Total OUT: 2000 mL    Total NET: -2000 mL          Drug Dosing Weight  Height (cm): 175.3 (2020 11:19)  Weight (kg): 84.9 (2020 11:19)  BMI (kg/m2): 27.6 (2020 11:19)  BSA (m2): 2.01 (2020 11:19)    LABS:                          9.8    15.74 )-----------( 163      ( 2020 02:30 )             33.4       11-    135  |  96<L>  |  60<H>  ----------------------------<  235<H>  5.4<H>   |  26  |  5.9<HH>    Ca    9.3      2020 02:30    TPro  6.8  /  Alb  3.9  /  TBili  0.3  /  DBili  <0.2  /  AST  19  /  ALT  16  /  AlkPhos  169<H>  09      LIVER FUNCTIONS - ( 2020 02:30 )  Alb: 3.9 g/dL / Pro: 6.8 g/dL / ALK PHOS: 169 U/L / ALT: 16 U/L / AST: 19 U/L / GGT: x             CARDIAC MARKERS ( 2020 15:49 )  x     / 0.15 ng/mL / 54 U/L / x     / 4.5 ng/mL  CARDIAC MARKERS ( 2020 02:30 )  x     / 0.15 ng/mL / x     / x     / x            Urinalysis Basic - ( 2020 03:50 )    Color: Brown / Appearance: Turbid / S.025 / pH: x  Gluc: x / Ketone: Trace  / Bili: Moderate / Urobili: 0.2 mg/dL   Blood: x / Protein: >=300 mg/dL / Nitrite: Negative   Leuk Esterase: Large / RBC: 11-25 /HPF / WBC >50 /HPF   Sq Epi: x / Non Sq Epi: Few /HPF / Bacteria: TNTC /HPF        azithromycin  IVPB 500 milliGRAM(s) IV Intermittent daily  cefepime   IVPB 1000 milliGRAM(s) IV Intermittent every 24 hours        RADIOLOGY:  I have personally reviewed all chest and other pertinent radiology films.      REVIEW OF SYSTEMS:     · CONSTITUTIONAL:   +fever   no chills.      · EYES:   no discharge,   no irritation,    no visual changes.    · ENMT:   Ears: no ear pain and no hearing problems.  Nose: no nasal congestion and no nasal drainage.      · CARDIOVASCULAR:   no chest pain,   no swelling  no palpitations      · RESPIRATORY:  +SOB,  no wheezing ,  no respiratory difficulty  no sputum production    · GASTROINTESTINAL:   no abdominal pain,    no nausea   no vomiting.        · MUSCULOSKELETAL:   no back pain,   no neck pain,   no weakness.    · SKIN:   no pruritis,   no rashes.    · NEURO:   no loss of consciousness,   no headache,   no weakness.    · PSYCHIATRIC:   no known mental health issues  no anxiety  no depression        ALLERGIC/IMMUNOLOGIC:   No active allergic or immunologic issues    all other systems are negative        PHYSICAL EXAM:     · CONSTITUTIONAL:   not septic appearing,   well nourished,   NAD    · ENMT:   Airway patent,   Nasal mucosa clear.  Mouth with normal mucosa.   No thrush    · EYES:   Clear bilaterally,   pupils equal,   round and reactive to light.    · CARDIAC:   Normal rate,   regular rhythm.    Heart sounds S1, S2.   no thrills or bruits on palpitation  normal  cardiac impulse  No murmurs, no rubs or gallops on auscultation  no edema        CAROTID:   normal systolic impulse  no bruits    · RESPIRATORY:   no w/r/r/,   normal chest expansion  not tachypneic,  no retractions or use of accessory muscles  palpation of chest is normal with no fremitus  percussion of chest demonstrates dullness on Right    · GASTROINTESTINAL:  Abdomen soft,   non-tender,   no guarding,   + BS  liver spleen not palpable      · MUSCULOSKELETAL:   range of motion is not limited,  no clubbing, cyanosis  no petechiae    · NEUROLOGICAL:   Alert and oriented   no obvious focal deficits in cranial nerve areas  no motor or sensory deficits.      · SKIN:   Skin normal color for race,   warm,   dry and intact.       · PSYCHIATRIC:   Alert and oriented to person,   place, time/situation.   normal mood and affect.   no apparent risk to self or others.    · HEME LYMPH:   no splenomegaly.  No cervical  lymphadenopathy.  no inguinal lymphadenopathy

## 2020-11-10 NOTE — PROGRESS NOTE ADULT - SUBJECTIVE AND OBJECTIVE BOX
Nephrology progress note    Patient was seen and examined, events over the last 24 h noted .  HD yesterday  Allergies:  No Known Allergies    Hospital Medications:   MEDICATIONS  (STANDING):  albuterol/ipratropium for Nebulization 3 milliLiter(s) Nebulizer every 6 hours  apixaban 5 milliGRAM(s) Oral two times a day  aspirin  chewable 81 milliGRAM(s) Oral daily  atorvastatin 80 milliGRAM(s) Oral at bedtime  azithromycin  IVPB 500 milliGRAM(s) IV Intermittent daily  calcium acetate 667 milliGRAM(s) Oral three times a day with meals  cefepime   IVPB 1000 milliGRAM(s) IV Intermittent every 24 hours  chlorhexidine 4% Liquid 1 Application(s) Topical <User Schedule>  cholecalciferol 2000 Unit(s) Oral daily  citalopram 10 milliGRAM(s) Oral daily  finasteride 5 milliGRAM(s) Oral daily  insulin glargine Injectable (LANTUS) 16 Unit(s) SubCutaneous at bedtime  insulin lispro (ADMELOG) corrective regimen sliding scale   SubCutaneous three times a day before meals  insulin lispro Injectable (ADMELOG) 6 Unit(s) SubCutaneous three times a day before meals  isosorbide   mononitrate ER Tablet (IMDUR) 30 milliGRAM(s) Oral daily  metoprolol tartrate 25 milliGRAM(s) Oral two times a day  midodrine. 10 milliGRAM(s) Oral three times a day  multivitamin/minerals 1 Tablet(s) Oral daily  pantoprazole    Tablet 40 milliGRAM(s) Oral before breakfast  senna 1 Tablet(s) Oral at bedtime  tamsulosin 0.8 milliGRAM(s) Oral at bedtime  tiotropium 18 MICROgram(s) Capsule 1 Capsule(s) Inhalation daily        VITALS:  T(F): 98.9 (11-10-20 @ 05:28), Max: 98.9 (11-10-20 @ 05:28)  HR: 60 (11-10-20 @ :)  BP: 116/58 (11-10-20 @ 11:28)  RR: 25 (11-10-20 @ 08:09)  SpO2: 96% (11-10-20 @ 08:09)  Wt(kg): --     @ 07:01  -  11-10 @ 07:00  --------------------------------------------------------  IN: 0 mL / OUT: 2000 mL / NET: -2000 mL          PHYSICAL EXAM:  Gen: lethargic on venti mask  resp: decreased bs at the bases  card: S1/S2  abd: distneded  vascular access: AV      LABS:  11-10    137  |  97<L>  |  46<H>  ----------------------------<  132<H>  5.3<H>   |  26  |  4.9<HH>    Ca    8.7      10 Nov 2020 07:06    TPro  6.5  /  Alb  3.6  /  TBili  0.3  /  DBili      /  AST  21  /  ALT  15  /  AlkPhos  155<H>  11-10                          9.4    14.62 )-----------( 127      ( 10 Nov 2020 07:06 )             33.1       Urine Studies:  Urinalysis Basic - ( 2020 03:50 )    Color: Brown / Appearance: Turbid / S.025 / pH:   Gluc:  / Ketone: Trace  / Bili: Moderate / Urobili: 0.2 mg/dL   Blood:  / Protein: >=300 mg/dL / Nitrite: Negative   Leuk Esterase: Large / RBC: 11-25 /HPF / WBC >50 /HPF   Sq Epi:  / Non Sq Epi: Few /HPF / Bacteria: TNTC /HPF        RADIOLOGY & ADDITIONAL STUDIES:

## 2020-11-10 NOTE — CONSULT NOTE ADULT - ASSESSMENT
ASSESSMENT  69 y/o M with PMH of ESRD on HD MWF last session on Friday due today , CAD s/p pci with 7 stents and AICD placement in 2018, Heart failure with reduced EF (26% in 2018), A-fib on Eiquis DLD, HTN, COPD on 2L home O2, DM II and BPH presents to the ED  with Right sided flank pain    IMPRESSION  #Sepsis on admission (Fevers, WBC>12, Tachycardia) secondary to pneumonia with chronic loculated right sided effusion; symptoms not consistent with lower UTI  - S/p pigtail cathter on 8/19/2020 with exudative effusion, Complicated with pnuemothorax  - Thoracentesis Cx 8/2020: NG  - CT Abd/Pelvis 11/9:  Moderately thick-walled urinary bladder, despite underdistention, possibly reflecting underlying cystitis; correlation with urinalysis may be of use. Otherwise, no evidence of acute/inflammatory process within the abdomen or pelvis.  Since August 30, 2020, no significant change in right lung base consolidated opacity with increased size moderate thick-walled pleural effusion with associated subpleural nodular density, possibly reflecting an infectious process, however underlying neoplasm is a possibility. Follow-up to resolution is suggested.    #ESRD on HD  #CAD   #CHF  #Obesity BMI (kg/m2): 27.6  #DM   #Abx allergy: NKDA      RECOMMENDATIONS  - continue cefepime 1g q 24 hours and azithromycin for now  - follow-up blood cultures  - monitor WBC count  - evaluated by pulm; no plans for tap given complicated course from previous Pigtail in 9/2020    Please call or message on [employer] Teams if with any questions.  Spectra [phone]

## 2020-11-10 NOTE — PROGRESS NOTE ADULT - ASSESSMENT
A/P:-  Pt is seen and evaluated by bedside.   1. Sepsis - now resolved   2. pneumonia with chronic pleural effusion   3. UTI   4. chronic respiratory failure secondary to chronic systolic HF and esrd   5. elevated troponin   6. ESRD   7. CAD   8. HLD   9. BPH  10. chronic afib       - presented with sepsis secondary to pneumonia and uti   - history of pleural effusion with recent tap complicated by pneumo and pigtail   - started on cefepime as per ID recs   - follow blood and urine culture   - follow echo   - currently satting fine on nasal canula  - troponin level stable - chronically elevated in setting of esrd   - continue intermittent HD as per nephro   - continue basal bolus insulin regimen with sliding scale achs   - oral hypoglycemics on hold while in hospital   - target -180  - s/p defibrilator    - continue atorvastatin, imdur, metoprolol, aspirin, eliquis   - continue tamsulosin and finesteride     Plan of care was discussed with patient and daughter at bedside ; all questions and concerns were addressed and care was aligned with patient's wishes.

## 2020-11-11 NOTE — PROGRESS NOTE ADULT - SUBJECTIVE AND OBJECTIVE BOX
Patient is a 70y old  Male who presents with a chief complaint of Right flank  pain, UTI, fluid overload (11 Nov 2020 07:49)        HPI:  71 y/o M with PMH of ESRD on HD MWF last session on Friday due today , CAD s/p pci with 7 stents and AICD placement in 2018, Heart failure with reduced EF (26% in 2018), A-fib on Eiquis DLD, HTN, COPD on 2L home O2, DM II and BPH presents to the ED  with Right sided flank pain since this morning . PT reports pain on the right side, sharp constant, non radiating. Pt reports similar pain as on the last admission 9/28/20. PT on HD , does not urinate, only dripping, denies burning or blood in urine. Pt reports shortness of breath has increasing, was placed on 100 % oxygen in ED, evaluated by critical care and was decided pt stable to go to floor. In ed pt found with temp 100.9 and UTI positive. PT denies nausea, vomiting, diarrhea, constipation, chest pain, leg tenderness. Pt follows up with electrophysiologist  , cardiologist Dr. Acosta, nephrologist Dr. Schreiber and pulmonary Dr. Santana.  (09 Nov 2020 10:51)      Interval Events: No overnight events.    REVIEW OF SYSTEMS:   see HPI      OBJECTIVE:  ICU Vital Signs Last 24 Hrs  T(C): 36.8 (11 Nov 2020 02:09), Max: 36.9 (11 Nov 2020 01:16)  T(F): 98.3 (11 Nov 2020 02:09), Max: 98.4 (11 Nov 2020 01:16)  HR: 64 (11 Nov 2020 05:10) (59 - 89)  BP: 85/43 (11 Nov 2020 05:10) (84/48 - 116/58)  BP(mean): --  ABP: --  ABP(mean): --  RR: 16 (11 Nov 2020 05:10) (16 - 18)  SpO2: 95% (11 Nov 2020 03:05) (82% - 98%)        CAPILLARY BLOOD GLUCOSE      POCT Blood Glucose.: 140 mg/dL (11 Nov 2020 07:26)        PHYSICAL EXAM:     · CONSTITUTIONAL:   not septic appearing,   well nourished,   NAD    · ENMT:   Airway patent,   Nasal mucosa clear.  Mouth with normal mucosa.   No thrush    · EYES:   Clear bilaterally,   pupils equal,   round and reactive to light.    · CARDIAC:   Normal rate,   regular rhythm.    Heart sounds S1, S2.   No murmurs, no rubs or gallops on auscultation  no edema        CAROTID:   normal systolic impulse  no bruits    · RESPIRATORY:     normal chest expansion  +tachypnea,  no retractions or use of accessory muscles  palpation of chest is normal with no fremitus  percussion of chest demonstrates dullness right    · GASTROINTESTINAL:  Abdomen soft,   non-tender,   + BS  liver/spleen not palpable    · MUSCULOSKELETAL:   no clubbing, cyanosis        · SKIN:   Skin normal color for race,   warm, dry   No evidence of rash.        · HEME LYMPH:   no splenomegaly.  No cervical  lymphadenopathy.  no inguinal lymphadenopathy    HOSPITAL MEDICATIONS:  MEDICATIONS  (STANDING):  albuterol/ipratropium for Nebulization 3 milliLiter(s) Nebulizer every 6 hours  apixaban 5 milliGRAM(s) Oral two times a day  aspirin  chewable 81 milliGRAM(s) Oral daily  atorvastatin 80 milliGRAM(s) Oral at bedtime  calcium acetate 667 milliGRAM(s) Oral three times a day with meals  chlorhexidine 4% Liquid 1 Application(s) Topical <User Schedule>  cholecalciferol 2000 Unit(s) Oral daily  citalopram 10 milliGRAM(s) Oral daily  finasteride 5 milliGRAM(s) Oral daily  insulin glargine Injectable (LANTUS) 16 Unit(s) SubCutaneous at bedtime  insulin lispro (ADMELOG) corrective regimen sliding scale   SubCutaneous three times a day before meals  insulin lispro Injectable (ADMELOG) 6 Unit(s) SubCutaneous three times a day before meals  isosorbide   mononitrate ER Tablet (IMDUR) 30 milliGRAM(s) Oral daily  metoprolol tartrate 25 milliGRAM(s) Oral two times a day  midodrine. 10 milliGRAM(s) Oral three times a day  multivitamin/minerals 1 Tablet(s) Oral daily  norepinephrine Infusion 0.2 MICROgram(s)/kG/Min (31.8 mL/Hr) IV Continuous <Continuous>  pantoprazole    Tablet 40 milliGRAM(s) Oral before breakfast  senna 1 Tablet(s) Oral at bedtime  sodium zirconium cyclosilicate 10 Gram(s) Oral every 12 hours  tamsulosin 0.8 milliGRAM(s) Oral at bedtime  tiotropium 18 MICROgram(s) Capsule 1 Capsule(s) Inhalation daily    MEDICATIONS  (PRN):    sodium chloride 0.9% Bolus:   500 milliLiter(s), IV Bolus, once, infuse over 60 Minute(s), Stop After 1 Doses  Provider's Contact #: [phone]  sodium chloride 0.9% Bolus:   500 milliLiter(s), IV Bolus, once, infuse over 60 Minute(s), Stop After 1 Doses  Provider's Contact #: [phone]  lactated ringers Bolus:   2900 milliLiter(s), IV Bolus, once, infuse over 60 Minute(s), Stop After 1 Doses     (Calc Info: 31 milliLiter(s)/Kg/DOSE x 93.4 Kg = 2,900 milliLiter(s)/Dose     (Requested dose was 31 milliLiter(s) per Kg)      LABS:                        9.7    16.50 )-----------( 127      ( 11 Nov 2020 05:40 )             34.8     11-11    129<L>  |  92<L>  |  59<H>  ----------------------------<  121<H>  5.2<H>   |  26  |  5.6<HH>    Ca    8.3<L>      11 Nov 2020 05:40  Phos  4.3     11-11  Mg     2.1     11-11    TPro  6.5  /  Alb  3.6  /  TBili  0.3  /  DBili  x   /  AST  21  /  ALT  15  /  AlkPhos  155<H>  11-10          Venous Blood Gas:  11-11 @ 05:36  --/--/--/--/--  VBG Lactate: 1.8    CARDIAC MARKERS ( 11 Nov 2020 05:40 )  x     / 0.13 ng/mL / 128 U/L / x     / 4.7 ng/mL  CARDIAC MARKERS ( 10 Nov 2020 07:06 )  x     / 0.15 ng/mL / 79 U/L / x     / 4.1 ng/mL  CARDIAC MARKERS ( 09 Nov 2020 15:49 )  x     / 0.15 ng/mL / 54 U/L / x     / 4.5 ng/mL                RADIOLOGY: I personally reviewed latest CXR and other pertinent films.           Patient is a 70y old  Male who presents with a chief complaint of Right flank  pain, UTI, fluid overload (11 Nov 2020 07:49)        HPI:  71 y/o M with PMH of ESRD on HD MWF last session on Friday due today , CAD s/p pci with 7 stents and AICD placement in 2018, Heart failure with reduced EF (26% in 2018), A-fib on Eiquis DLD, HTN, COPD on 2L home O2, DM II and BPH presents to the ED  with Right sided flank pain since this morning . PT reports pain on the right side, sharp constant, non radiating. Pt reports similar pain as on the last admission 9/28/20. PT on HD , does not urinate, only dripping, denies burning or blood in urine. Pt reports shortness of breath has increasing, was placed on 100 % oxygen in ED, evaluated by critical care and was decided pt stable to go to floor. In ed pt found with temp 100.9 and UTI positive. PT denies nausea, vomiting, diarrhea, constipation, chest pain, leg tenderness. Pt follows up with electrophysiologist  , cardiologist Dr. Acosta, nephrologist Dr. Schreiber and pulmonary Dr. Santana.  (09 Nov 2020 10:51)      Interval Events: Rapid response overnight .    REVIEW OF SYSTEMS:   see HPI      OBJECTIVE:  ICU Vital Signs Last 24 Hrs  T(C): 36.8 (11 Nov 2020 02:09), Max: 36.9 (11 Nov 2020 01:16)  T(F): 98.3 (11 Nov 2020 02:09), Max: 98.4 (11 Nov 2020 01:16)  HR: 64 (11 Nov 2020 05:10) (59 - 89)  BP: 85/43 (11 Nov 2020 05:10) (84/48 - 116/58)    RR: 16 (11 Nov 2020 05:10) (16 - 18)  SpO2: 95% (11 Nov 2020 03:05) (82% - 98%)        CAPILLARY BLOOD GLUCOSE      POCT Blood Glucose.: 140 mg/dL (11 Nov 2020 07:26)        PHYSICAL EXAM:     · CONSTITUTIONAL:   not septic appearing,   well nourished,   NAD    · ENMT:   Airway patent,   Nasal mucosa clear.  Mouth with normal mucosa.   No thrush    · EYES:   Clear bilaterally,   pupils equal,   round and reactive to light.    · CARDIAC:   Normal rate,   regular rhythm.    Heart sounds S1, S2.   No murmurs, no rubs or gallops on auscultation  no edema        CAROTID:   normal systolic impulse  no bruits    · RESPIRATORY:     normal chest expansion  +tachypnea,  no retractions or use of accessory muscles  palpation of chest is normal with no fremitus  percussion of chest demonstrates dullness right    · GASTROINTESTINAL:  Abdomen soft,   non-tender,   + BS  liver/spleen not palpable    · MUSCULOSKELETAL:   no clubbing, cyanosis        · SKIN:   Skin normal color for race,   warm, dry   No evidence of rash.        · HEME LYMPH:   no splenomegaly.  No cervical  lymphadenopathy.  no inguinal lymphadenopathy    HOSPITAL MEDICATIONS:  MEDICATIONS  (STANDING):  albuterol/ipratropium for Nebulization 3 milliLiter(s) Nebulizer every 6 hours  apixaban 5 milliGRAM(s) Oral two times a day  aspirin  chewable 81 milliGRAM(s) Oral daily  atorvastatin 80 milliGRAM(s) Oral at bedtime  calcium acetate 667 milliGRAM(s) Oral three times a day with meals  chlorhexidine 4% Liquid 1 Application(s) Topical <User Schedule>  cholecalciferol 2000 Unit(s) Oral daily  citalopram 10 milliGRAM(s) Oral daily  finasteride 5 milliGRAM(s) Oral daily  insulin glargine Injectable (LANTUS) 16 Unit(s) SubCutaneous at bedtime  insulin lispro (ADMELOG) corrective regimen sliding scale   SubCutaneous three times a day before meals  insulin lispro Injectable (ADMELOG) 6 Unit(s) SubCutaneous three times a day before meals  isosorbide   mononitrate ER Tablet (IMDUR) 30 milliGRAM(s) Oral daily  metoprolol tartrate 25 milliGRAM(s) Oral two times a day  midodrine. 10 milliGRAM(s) Oral three times a day  multivitamin/minerals 1 Tablet(s) Oral daily  norepinephrine Infusion 0.2 MICROgram(s)/kG/Min (31.8 mL/Hr) IV Continuous <Continuous>  pantoprazole    Tablet 40 milliGRAM(s) Oral before breakfast  senna 1 Tablet(s) Oral at bedtime  sodium zirconium cyclosilicate 10 Gram(s) Oral every 12 hours  tamsulosin 0.8 milliGRAM(s) Oral at bedtime  tiotropium 18 MICROgram(s) Capsule 1 Capsule(s) Inhalation daily    MEDICATIONS  (PRN):    sodium chloride 0.9% Bolus:   500 milliLiter(s), IV Bolus, once, infuse over 60 Minute(s), Stop After 1 Doses  Provider's Contact #: [phone]  sodium chloride 0.9% Bolus:   500 milliLiter(s), IV Bolus, once, infuse over 60 Minute(s), Stop After 1 Doses  Provider's Contact #: [phone]  lactated ringers Bolus:   2900 milliLiter(s), IV Bolus, once, infuse over 60 Minute(s), Stop After 1 Doses     (Calc Info: 31 milliLiter(s)/Kg/DOSE x 93.4 Kg = 2,900 milliLiter(s)/Dose     (Requested dose was 31 milliLiter(s) per Kg)      LABS:                        9.7    16.50 )-----------( 127      ( 11 Nov 2020 05:40 )             34.8     11-11    129<L>  |  92<L>  |  59<H>  ----------------------------<  121<H>  5.2<H>   |  26  |  5.6<HH>    Ca    8.3<L>      11 Nov 2020 05:40  Phos  4.3     11-11  Mg     2.1     11-11    TPro  6.5  /  Alb  3.6  /  TBili  0.3  /  DBili  x   /  AST  21  /  ALT  15  /  AlkPhos  155<H>  11-10          Venous Blood Gas:  11-11 @ 05:36  --/--/--/--/--  VBG Lactate: 1.8    CARDIAC MARKERS ( 11 Nov 2020 05:40 )  x     / 0.13 ng/mL / 128 U/L / x     / 4.7 ng/mL  CARDIAC MARKERS ( 10 Nov 2020 07:06 )  x     / 0.15 ng/mL / 79 U/L / x     / 4.1 ng/mL  CARDIAC MARKERS ( 09 Nov 2020 15:49 )  x     / 0.15 ng/mL / 54 U/L / x     / 4.5 ng/mL                RADIOLOGY: I personally reviewed latest CXR and other pertinent films.

## 2020-11-11 NOTE — PROGRESS NOTE ADULT - ASSESSMENT
IMPRESSION:  large R loculated effusion   tapped in 8/20 and the lung was trapped had very stormy course after  possible pneumonia      SUGGEST:  cont abx  monitor WBC  full cultures  cont Eliquis  repeat tap is contraindicated as the results would be the same and it is hemodynamically dangerous    will sign off case   IMPRESSION:  large R loculated effusion   tapped in 8/20 and the lung was trapped had very stormy course after  possible pneumonia      SUGGEST:  nothing further to be done given end stage lung scenario  consider hospice care  cont abx  monitor WBC  full cultures  cont Eliquis  repeat tap is contraindicated as the results would be the same and it is hemodynamically dangerous

## 2020-11-11 NOTE — PROGRESS NOTE ADULT - SUBJECTIVE AND OBJECTIVE BOX
Felicia Pacheco MD  Hospitalist   Spectra: [phone]    Patient is a 70y old  Male who presents with a chief complaint of Right flank  pain, UTI, fluid overload (11 Nov 2020 08:36)      INTERVAL HPI/OVERNIGHT EVENTS: RRT was called earlier this AM for encephalopathy and hypotension - after which patient was started on levophed.     REVIEW OF SYSTEMS: negative  Vital Signs Last 24 Hrs  T(C): 36.8 (11 Nov 2020 02:09), Max: 36.9 (11 Nov 2020 01:16)  T(F): 98.3 (11 Nov 2020 02:09), Max: 98.4 (11 Nov 2020 01:16)  HR: 104 (11 Nov 2020 09:02) (59 - 104)  BP: 119/61 (11 Nov 2020 10:02) (84/48 - 119/61)  BP(mean): --  RR: 16 (11 Nov 2020 05:10) (16 - 18)  SpO2: 99% (11 Nov 2020 10:02) (82% - 99%)    PHYSICAL EXAM:   NAD; Normocephalic;   LUNGS - no wheezing  HEART: S1 S2+   ABDOMEN: Soft, Nontender, non distended  EXTREMITIES: no cyanosis; no edema  NERVOUS SYSTEM:  arousable to tactile stimuli - falls back asleep.     LABS:                        9.7    16.50 )-----------( 127      ( 11 Nov 2020 05:40 )             34.8     11-11    129<L>  |  92<L>  |  59<H>  ----------------------------<  121<H>  5.2<H>   |  26  |  5.6<HH>    Ca    8.3<L>      11 Nov 2020 05:40  Phos  4.3     11-11  Mg     2.1     11-11    TPro  6.5  /  Alb  3.6  /  TBili  0.3  /  DBili  x   /  AST  21  /  ALT  15  /  AlkPhos  155<H>  11-10        CAPILLARY BLOOD GLUCOSE      POCT Blood Glucose.: 140 mg/dL (11 Nov 2020 07:26)  POCT Blood Glucose.: 119 mg/dL (11 Nov 2020 05:21)  POCT Blood Glucose.: 105 mg/dL (11 Nov 2020 01:05)  POCT Blood Glucose.: 89 mg/dL (10 Nov 2020 23:29)  POCT Blood Glucose.: 102 mg/dL (10 Nov 2020 20:47)  POCT Blood Glucose.: 156 mg/dL (10 Nov 2020 16:37)  POCT Blood Glucose.: 99 mg/dL (10 Nov 2020 11:29)

## 2020-11-11 NOTE — CHART NOTE - NSCHARTNOTEFT_GEN_A_CORE
Pt is a 68 yo W male w/ extensive PMH ( Copd, w/ rt pleural effusions requiring thoracentesis,  chf, esrd on hd)  pt is DNR/DNI   ,pt was adm 2 days ago w/ rt flank pain.  pt also w/ worsening pleural effusion. & was seen by ICU and pulmonary.  pt is high risk for cardiopulmonary arrest for thoracentesis due to his chronic hypotension, as noted by pulmonary.  Now rapid response was called due to pt being found unresponsive and hypotensive.  pt started on  low dose levophed @ 0.02 mck/kg/min.  bp now 90 systolic.  IV antibx ( vanco, merro ) ordered.   case discussed w/ ICU adelso HUNTER. plan is to stop , metoprolol. nitates for now continue low dose levophed,  Pt's condition & prognosis is poor.

## 2020-11-11 NOTE — PROGRESS NOTE ADULT - ASSESSMENT
A/P:-  Pt is seen and evaluated by bedside.   1. Septic shock 2/2 UTI and  pneumonia  2. pneumonia with chronic pleural effusion   3. UTI   4. chronic respiratory failure secondary to chronic systolic HF and esrd   5. elevated troponin   6. ESRD   7. CAD   8. HLD   9. BPH  10. chronic afib       - presented with sepsis secondary to pneumonia and uti   - overnight patient was noted lethargic with hypotension   - patient was on cefepime which was switched to rao and vanco   - started on levophed at 0.2 mg/kg - to keep MAP above 65  - will start on bipap given pleural effusions - have used in past   - icu was consulted for septic shock requiring pressors - No need of icu level care at this time as per eval   - history of pleural effusion with recent tap complicated by pneumo and pigtail   - blood culturex2 negative, urine culture Ecoli - follow sensitivity   - follow echo   - troponin level stable - chronically elevated in setting of esrd   - continue intermittent HD as per nephro - was scheduled for today which was held at this time as BP is low   - sliding scale q6  - oral hypoglycemics on hold while in hospital   - target -180  - s/p defibrilator    - will hold metoprolol at this time due to shock   - continue atorvastatin, imdur aspirin, eliquis   - continue tamsulosin and finesteride     Plan of care was discussed with patient and daughter at bedside ; all questions and concerns were addressed and care was aligned with patient's wishes.   A/P:-  Pt is seen and evaluated by bedside.   1. Septic shock 2/2 UTI and  pneumonia  2. pneumonia with chronic pleural effusion   3. UTI   4. chronic respiratory failure secondary to chronic systolic HF and esrd   5. elevated troponin   6. ESRD   7. CAD   8. HLD   9. BPH  10. chronic afib       - presented with sepsis secondary to pneumonia and uti   - overnight patient was noted lethargic with hypotension   - patient was on cefepime which was switched to rao and vanco   - started on levophed at 0.2 mg/kg - to keep MAP above 65  - will start on bipap given pleural effusions - have used in past   - worsening leukocytosis 15-> 16  - icu was consulted for septic shock requiring pressors - No need of icu level care at this time as per eval   - history of pleural effusion with recent tap complicated by pneumo and pigtail   - blood culturex2 negative, urine culture Ecoli - follow sensitivity   - follow echo   - troponin level stable - chronically elevated in setting of esrd   - continue intermittent HD as per nephro - was scheduled for today which was held at this time as BP is low   - sliding scale q6  - oral hypoglycemics on hold while in hospital   - target -180  - s/p defibrilator    - will hold metoprolol at this time due to shock   - continue atorvastatin, imdur aspirin, eliquis   - continue tamsulosin and finesteride     Plan of care was discussed with patient and daughter at bedside ; all questions and concerns were addressed and care was aligned with patient's wishes.

## 2020-11-11 NOTE — CHART NOTE - NSCHARTNOTEFT_GEN_A_CORE
Asked by DR Johann Horvath to start pt  meropenen and Vanco  1st dose ordered .  f/u with Id carol lira d/w Alcon at pharmacy.  Pt may need to have standing dose at dialysis

## 2020-11-11 NOTE — PROGRESS NOTE ADULT - SUBJECTIVE AND OBJECTIVE BOX
Nephrology progress note    Patient is seen and examined, events over the last 24 h noted .  Events noted, RR this am for AMS. Hypotensive, received bolus 500 cc, started on Levophed and ABx  ICU - not accepted, Pt is DNR/DNI  Allergies:  No Known Allergies    Hospital Medications:   MEDICATIONS  (STANDING):  albuterol/ipratropium for Nebulization 3 milliLiter(s) Nebulizer every 6 hours  apixaban 5 milliGRAM(s) Oral two times a day  aspirin  chewable 81 milliGRAM(s) Oral daily  atorvastatin 80 milliGRAM(s) Oral at bedtime  calcium acetate 667 milliGRAM(s) Oral three times a day with meals  chlorhexidine 4% Liquid 1 Application(s) Topical <User Schedule>  cholecalciferol 2000 Unit(s) Oral daily  citalopram 10 milliGRAM(s) Oral daily  finasteride 5 milliGRAM(s) Oral daily  insulin glargine Injectable (LANTUS) 16 Unit(s) SubCutaneous at bedtime  insulin lispro (ADMELOG) corrective regimen sliding scale   SubCutaneous three times a day before meals  insulin lispro Injectable (ADMELOG) 6 Unit(s) SubCutaneous three times a day before meals  isosorbide   mononitrate ER Tablet (IMDUR) 30 milliGRAM(s) Oral daily  metoprolol tartrate 25 milliGRAM(s) Oral two times a day  midodrine. 10 milliGRAM(s) Oral three times a day  multivitamin/minerals 1 Tablet(s) Oral daily  norepinephrine Infusion 0.2 MICROgram(s)/kG/Min (31.8 mL/Hr) IV Continuous <Continuous>  pantoprazole    Tablet 40 milliGRAM(s) Oral before breakfast  senna 1 Tablet(s) Oral at bedtime  tamsulosin 0.8 milliGRAM(s) Oral at bedtime  tiotropium 18 MICROgram(s) Capsule 1 Capsule(s) Inhalation daily        VITALS:  T(F): 98.3 (20 @ 02:09), Max: 98.4 (20 @ 01:16)  HR: 64 (20 @ 05:10)  BP: 85/43 (20 @ 05:10)  RR: 16 (20 @ 05:10)  SpO2: 95% (11-11-20 @ 03:05)  Wt(kg): --     @ 07:01  -  11-10 @ 07:00  --------------------------------------------------------  IN: 0 mL / OUT: 2000 mL / NET: -2000 mL          PHYSICAL EXAM:  Constitutional: On FM  Respiratory: decreased BS b/l  Cardiovascular: S1, S2, RRR  Gastrointestinal: BS+, soft, NT/ND  Extremities:  No peripheral edema  Neurological: Lethargic arousable   : No CVA tenderness. No crook.   Skin: No rashes  Vascular Access: Lt AVF    LABS:      129<L>  |  92<L>  |  59<H>  ----------------------------<  121<H>  5.2<H>   |  26  |  5.6<HH>    Ca    8.3<L>      2020 05:40  Phos  4.3       Mg     2.1         TPro  6.5  /  Alb  3.6  /  TBili  0.3  /  DBili      /  AST  21  /  ALT  15  /  AlkPhos  155<H>  11-10                          9.7    16.50 )-----------( 127      ( 2020 05:40 )             34.8       Urine Studies:  Urinalysis Basic - ( 2020 03:50 )    Color: Brown / Appearance: Turbid / S.025 / pH:   Gluc:  / Ketone: Trace  / Bili: Moderate / Urobili: 0.2 mg/dL   Blood:  / Protein: >=300 mg/dL / Nitrite: Negative   Leuk Esterase: Large / RBC: 11-25 /HPF / WBC >50 /HPF   Sq Epi:  / Non Sq Epi: Few /HPF / Bacteria: TNTC /HPF        RADIOLOGY & ADDITIONAL STUDIES:  < from: CT Abdomen and Pelvis w/ IV Cont (20 @ 05:07) >  1.  Moderately thick-walled urinary bladder, despite underdistention, possibly reflecting underlying cystitis; correlation with urinalysis may be of use.  2.  Otherwise, no evidence of acute/inflammatory process within the abdomen or pelvis.  3.   Since 2020, no significant change in right lung base consolidated opacity with increased size moderate thick-walled pleural effusion with associated subpleural nodular density, possibly reflecting an infectious process, however underlying neoplasm is a possibility. Follow-up to resolution is suggested.    < end of copied text >

## 2020-11-11 NOTE — RAPID RESPONSE TEAM SUMMARY - NSSITUATIONBACKGROUNDRRT_GEN_ALL_CORE
Rapid response called by RN. Rapid response was called as pt was AMS.     Overnight patient MAP 60-65 likely secondary to sepsis. Pt was given 500cc bolus NS as pt is ESRD on HD (anuric) and CHF.  At encounter, pt was lethargic but accusable, able to follow simple commands. V/S 75/41 (MAP: 53), HR 60, , Sat 90% on NRB at 50%. Stat EKG showed V paced rhythm, no new ST changes compared to old. Stat CXR: showed worsening right side congestion. VBG: pH 7.13 with elevated pCO2 and HCO3 26, lactate 1.6. Patient was started on levophed and started on broad spectrum abx.      Rapid response called by RN. Rapid response was called as pt was AMS.     Overnight patient MAP 60-65 likely secondary to sepsis. Pt was given 500cc bolus NS as pt is ESRD on HD (anuric) and CHF.  At encounter, pt was lethargic but accusable, able to follow simple commands. V/S 75/41 (MAP: 53), HR 60, , Sat 90% on NRB at 50%. Stat EKG showed V paced rhythm, no new ST changes compared to old. Stat CXR: showed worsening right side congestion. VBG: pH 7.13 with elevated pCO2 and HCO3 26, lactate 1.6. Patient was started on levophed and started on broad spectrum abx.     Family Notified  Overall poor prognosis.      Rapid response called by RN. Rapid response was called as pt was AMS.       Overnight patient MAP 60-65 likely secondary to sepsis. Pt was given 500cc bolus NS as pt is ESRD on HD (anuric) and CHF.    At encounter, pt was lethargic but accusable, able to follow simple commands. V/S 75/41 (MAP: 53), HR 60, , Sat 90% on NRB at 50%. Stat EKG showed V paced rhythm, no new ST changes compared to old. Stat CXR: showed worsening right side congestion. VBG: pH 7.13 with elevated pCO2 and HCO3 26, lactate 1.6. Patient was started on levophed and started on broad spectrum abx. Stat labs and blood cultures drawn. Critical care consult placed.     Family Notified  Overall poor prognosis.

## 2020-11-11 NOTE — PROGRESS NOTE ADULT - ASSESSMENT
70M PMH ESRD on HD, CAD/PCI, HFrEF/AICD, Afib, HLD, HTN, COPD on home O2, DM, BPH admitted with right flank pain.    RR this am for AMS, hypotension sevre. IVF and Levophed staretd, on FM, No ICU, pt is DNI/DNR.    #Septic shock most likely - agree with pressors, no more IVF, BCX, UCx, started on VAnco and Cefepime  CT noted - RT pleural effusion - infectious possible cystitis  - ID consilt  # ESRD on HD - UNSTABLE FOR HD TODAY  - start LOKELMA 10 grams po q12h, first dose now  - Last HD 11/9 with low BP, 2 L UF  Continue Midodrine + Levophed  - check phos level, on binder   # Normocytic anemia - no YASMINE, check iron stores    Prognosis guarded

## 2020-11-12 NOTE — PROGRESS NOTE ADULT - ASSESSMENT
69 y/o M with PMH of ESRD on HD MWF last session on Friday due today , CAD s/p pci with 7 stents and AICD placement in 2018, Heart failure with reduced EF (26% in 2018), A-fib on Eiquis DLD, HTN, COPD on 2L home O2, DM II and BPH presents to the ED  with Right sided flank pain    IMPRESSION  #Sepsis on admission (Fevers, WBC>12, Tachycardia) secondary to pneumonia with chronic loculated right sided effusion; symptoms not consistent with lower UTI  - S/p pigtail cathter on 8/19/2020 with exudative effusion, Complicated with pnuemothorax  - Thoracentesis Cx 8/2020: NG, - evaluated by pulm; no plans for tap given complicated course from previous Pigtail in 9/2020    #ESRD on HD  #Abx allergy: NKDA    would recommend:  1. Please obtain MRSA PCR and if negative then please discontinue Vancomycin   2. Continue Meropenem for now  3. Supplemental oxygenation and bronchodilator as needed  4. Dialysis as tolerated         Attending Attestation:     45 minutes spent on total encounter; more than 50% of the visit was spent counseling and/or coordinating care by the attending physician.

## 2020-11-12 NOTE — PROGRESS NOTE ADULT - SUBJECTIVE AND OBJECTIVE BOX
Patient is seen and examined at the bed side, is afebrile. The Leukocytosis is trending down to normal. The blood cultures are negative to date and urine culture grew E.coli.        REVIEW OF SYSTEMS: All other review systems are negative        ALLERGIES: No Known Allergies        Vital Signs Last 24 Hrs  T(C): 36.2 (12 Nov 2020 05:06), Max: 36.4 (11 Nov 2020 21:32)  T(F): 97.1 (12 Nov 2020 05:06), Max: 97.6 (11 Nov 2020 21:32)  HR: 60 (12 Nov 2020 12:42) (47 - 79)  BP: 129/62 (12 Nov 2020 12:42) (94/47 - 130/62)  BP(mean): --  RR: 18 (12 Nov 2020 12:42) (16 - 20)  SpO2: 98% (12 Nov 2020 12:14) (94% - 100%)        PHYSICAL EXAM:  GENERAL: Not in distress   CHEST/LUNG: Not using accessory muscles  HEART: s1 and s2 present  ABDOMEN:  Nontender and  Nondistended  EXTREMITIES: No pedal  edema  CNS: Awake and Alert        LABS:                        9.6    10.98 )-----------( 166      ( 12 Nov 2020 06:15 )             33.8         11-12    133<L>  |  94<L>  |  70<HH>  ----------------------------<  132<H>  6.6<HH>   |  25  |  6.3<HH>    Ca    8.3<L>      12 Nov 2020 06:15  Phos  4.3     11-11  Mg     2.1     11-11    TPro  6.3  /  Alb  3.5  /  TBili  0.5  /  DBili  x   /  AST  32  /  ALT  21  /  AlkPhos  193<H>  11-12        CAPILLARY BLOOD GLUCOSE  POCT Blood Glucose.: 116 mg/dL (12 Nov 2020 12:43)  POCT Blood Glucose.: 155 mg/dL (12 Nov 2020 03:05)  POCT Blood Glucose.: 141 mg/dL (11 Nov 2020 21:19)  POCT Blood Glucose.: 153 mg/dL (11 Nov 2020 16:26)        MEDICATIONS  (STANDING):  albuterol/ipratropium for Nebulization 3 milliLiter(s) Nebulizer every 6 hours  apixaban 5 milliGRAM(s) Oral two times a day  aspirin  chewable 81 milliGRAM(s) Oral daily  atorvastatin 80 milliGRAM(s) Oral at bedtime  calcium acetate 667 milliGRAM(s) Oral three times a day with meals  chlorhexidine 4% Liquid 1 Application(s) Topical <User Schedule>  cholecalciferol 2000 Unit(s) Oral daily  citalopram 10 milliGRAM(s) Oral daily  finasteride 5 milliGRAM(s) Oral daily  insulin lispro (ADMELOG) corrective regimen sliding scale   SubCutaneous every 6 hours  meropenem  IVPB 500 milliGRAM(s) IV Intermittent every 24 hours  midodrine. 10 milliGRAM(s) Oral three times a day  multivitamin/minerals 1 Tablet(s) Oral daily  norepinephrine Infusion 0.16 MICROgram(s)/kG/Min (25.5 mL/Hr) IV Continuous <Continuous>  pantoprazole    Tablet 40 milliGRAM(s) Oral before breakfast  senna 1 Tablet(s) Oral at bedtime  sodium zirconium cyclosilicate 10 Gram(s) Oral every 12 hours  tamsulosin 0.8 milliGRAM(s) Oral at bedtime  tiotropium 18 MICROgram(s) Capsule 1 Capsule(s) Inhalation daily  vancomycin  IVPB 1250 milliGRAM(s) IV Intermittent <User Schedule>    MEDICATIONS  (PRN):        RADIOLOGY & ADDITIONAL TESTS:    r< from: Xray Chest 1 View-PORTABLE IMMEDIATE (Xray Chest 1 View-PORTABLE IMMEDIATE .) (11.11.20 @ 06:04) >  Right lower lung field opacity/effusion. Trace left basilar opacity. Increased pulmonary vascular markings. No pneumothorax.      < from: CT Abdomen and Pelvis w/ IV Cont (11.09.20 @ 05:07) >  1.  Moderately thick-walled urinary bladder, despite underdistention, possibly reflecting underlying cystitis; correlation with urinalysis may be of use.  2.  Otherwise, no evidence of acute/inflammatory process within the abdomen or pelvis.  3.   Since August 30, 2020, no significant change in right lung base consolidated opacity with increased size moderate thick-walled pleural effusion with associated subpleural nodular density, possibly reflecting an infectious process, however underlying neoplasm is a possibility. Follow-up to resolution is suggested.        MICROBIOLOGY DATA:    Urine Microscopic-Add On (NC) (11.09.20 @ 03:50)   Red Blood Cell - Urine: 11-25 /HPF   White Blood Cell - Urine: >50 /HPF   Bacteria: TNTC /HPF   Epithelial Cells: Few /HPF     Culture - Urine (11.09.20 @ 03:50)   - Piperacillin/Tazobactam: S <=8   - Tigecycline: S <=2   - Tobramycin: S <=2   - Trimethoprim/Sulfamethoxazole: R >2/38   - Ceftriaxone: S <=1 Enterobacter, Citrobacter, and Serratia may develop resistance during prolonged therapy   - Ciprofloxacin: R 2   - Ertapenem: S <=0.5   - Gentamicin: S <=2   - Imipenem: S <=1   - Levofloxacin: I 1   - Meropenem: S <=1   - Nitrofurantoin: S <=32 Should not be used to treat pyelonephritis   - Amikacin: S <=16   - Amoxicillin/Clavulanic Acid: I 16/8   - Ampicillin: R >16 These ampicillin results predict results for amoxicillin   - Ampicillin/Sulbactam: I 16/8 Enterobacter, Citrobacter, and Serratia may develop resistance during prolonged therapy (3-4 days)   - Aztreonam: S <=4   - Cefazolin: S <=2 (MIC_CL_COM_[id number]) For uncomplicated UTI with K. pneumoniae, E. coli, or P. mirablis: JULIA <=16 is sensitive and JULIA >=32 is resistant. This also predicts results for oral agents cefaclor, cefdinir, cefpodoxime, cefprozil, cefuroxime axetil, cephalexin and locarbef for uncomplicated UTI. Note that some isolates may be susceptible to these agents while testing resistant to cefazolin.   - Cefepime: S <=2   - Cefoxitin: S <=8   Specimen Source: .Urine Clean Catch (Midstream)   Culture Results:   >100,000 CFU/ml Escherichia coli Culture - Blood (11.09.20 @ 02:30)     Specimen Source: .Blood Blood-Peripheral   Culture Results: No growth to date.     Culture - Blood (11.09.20 @ 02:30)   Specimen Source: .Blood Blood-Peripheral   Culture Results: No growth to date.     Respiratory Viral Panel with COVID-19 by LEONOR (11.09.20 @ 02:10)   Rapid RVP Result: NotDetec   SARS-CoV-2: NotDetec:

## 2020-11-12 NOTE — PROGRESS NOTE ADULT - ASSESSMENT
70M PMH ESRD on HD, CAD/PCI, HFrEF/AICD, Afib, HLD, HTN, COPD on home O2, DM, BPH admitted with right flank pain.    RR this am for AMS, hypotension sevre. IVF and Levophed staretd, on FM, No ICU, pt is DNI/DNR.    #Septic shock - not ICU candidate, on Levophed and midodrine, avoid IV fluids boluses, f/u cxs, on Meropenem and Vancomycin post HD.  Appreciate ID f/u, PNA with loculated effusion, previously drained and c/b pneumothorax, appreciate pulm evel, repeat tap is contraindicated, risk outweighs benefit, trapped lung  # ESRD on HD   - HD today on pressor, needs clearance urgently, Opti 160, 3hrs, 2K, 1-1.5L UF as tolerated by hemodynamics   - continue Lokelma 5mg BID if K level > 5.5 after HD  - phos level noted, hold binder if pt is NPO, otherwise continue with meals   # Normocytic anemia - hgb stable, start YASMINE if down trending, check iron stores  Prognosis guarded

## 2020-11-12 NOTE — PROGRESS NOTE ADULT - SUBJECTIVE AND OBJECTIVE BOX
I met with family again and they relayed to me that they want to remove Levophed and NIPPV and only provide comfort measures after some family comes to visit him today     - new MOLST filled out documenting pts/family wishes   - morphine IVP prn ordered, may start IV drip if necessary for comfort   - remove Levo and NIPPV when family is ready   -  I discussed plan with nursing and medical team

## 2020-11-12 NOTE — CHART NOTE - NSCHARTNOTEFT_GEN_A_CORE
MAP just hit 64, will resume levophed (the fact that MAP decreased when off levophed further supports the fact that infusion route was patent) MAP just hit 64, will resume levophed (the fact that MAP decreased when off levophed further supports the fact that infusion route was patent). In addition family member just confirmed to RN that arm swelling is not new

## 2020-11-12 NOTE — PROGRESS NOTE ADULT - ASSESSMENT
69 y/o M with PMH of ESRD on HD MWF, CAD s/p pci with 7 stents and AICD placement in 2018, Heart failure with reduced EF (26% in 2018), A-fib on Eiquis DLD, HTN, COPD on 2L home O2, DM II and BPH, large R loculated effusion   tapped on 8/20 and the lung was trapped had very stormy course after, presents to the ED  with Right sided flank pain, pt found with temp  and e-coli UTI. Hospital course has been complicated by hypotension requiring Levophed and respiratory failure requiring AVAPS.     Acute respiratory failure /  Sepsis / ESRD - hyperkalemia / Acute on chronic HFrEF / R pleural effusion     - s/p rapid response while pt on HD, pt became hypotensive and unresponsive   - pt improved s/p increasing Levophed dose and placing pt on NIPPV   - I discussed plan and options with pt, 3 children and pulmonary attending    - pt does not want any central line, wants DNR and DNI stating "I just want to die"   - family / children understand that the pt has end stage disease and are considering hospice care   - for now :    - continue NIPPV and Levo via peripheral line   - continue HD and remove as much fluid as hemodynamically able   - continue broad spectrum antibiotics and home meds   - morphine IV prn for any discomfort   - maintain DNR DNI   - grave prognosis, family aware   - hospice consult

## 2020-11-12 NOTE — PROGRESS NOTE ADULT - SUBJECTIVE AND OBJECTIVE BOX
Nephrology Progress Note    ADRIANNA GAINES  MRN-[id number]  70y  Male    S:  Patient is seen and examined, events over the last 24h noted.    O:  Allergies:  No Known Allergies    Hospital Medications:   MaleMEDICATIONS  (STANDING):  albuterol/ipratropium for Nebulization 3 milliLiter(s) Nebulizer every 6 hours  apixaban 5 milliGRAM(s) Oral two times a day  aspirin  chewable 81 milliGRAM(s) Oral daily  atorvastatin 80 milliGRAM(s) Oral at bedtime  calcium acetate 667 milliGRAM(s) Oral three times a day with meals  chlorhexidine 4% Liquid 1 Application(s) Topical <User Schedule>  cholecalciferol 2000 Unit(s) Oral daily  citalopram 10 milliGRAM(s) Oral daily  finasteride 5 milliGRAM(s) Oral daily  insulin lispro (ADMELOG) corrective regimen sliding scale   SubCutaneous every 6 hours  meropenem  IVPB 500 milliGRAM(s) IV Intermittent every 24 hours  midodrine. 10 milliGRAM(s) Oral three times a day  multivitamin/minerals 1 Tablet(s) Oral daily  norepinephrine Infusion 0.16 MICROgram(s)/kG/Min (25.5 mL/Hr) IV Continuous <Continuous>  pantoprazole    Tablet 40 milliGRAM(s) Oral before breakfast  senna 1 Tablet(s) Oral at bedtime  sodium zirconium cyclosilicate 10 Gram(s) Oral every 12 hours  tamsulosin 0.8 milliGRAM(s) Oral at bedtime  tiotropium 18 MICROgram(s) Capsule 1 Capsule(s) Inhalation daily  vancomycin  IVPB 1250 milliGRAM(s) IV Intermittent <User Schedule>    MEDICATIONS  (PRN):    Home Medications:  Home Medications:  alfuzosin 10 mg oral tablet, extended release: 1 tab(s) orally once a day on non-dialysis days (2020 05:51)  aspirin 81 mg oral tablet, chewable: 1 tab(s) orally once a day (2020 05:51)  calcium acetate 667 mg oral tablet: 1 tab(s) orally 3 times a day (2020 05:51)  Centrum Silver oral tablet: 1 tab(s) orally once a day (2020 05:51)  citalopram 10 mg oral tablet: 1 tab(s) orally once a day (2020 05:51)  Colace 100 mg oral capsule: 1 cap(s) orally 2 times a day (2020 05:51)  Crestor 20 mg oral tablet: 1 tab(s) orally once a day (:51)  Eliquis 5 mg oral tablet: 1 tab(s) orally 2 times a day (:51)  finasteride 5 mg oral tablet: 1 tab(s) orally once a day (:)  isosorbide mononitrate 30 mg oral tablet, extended release: 1 tab(s) orally once a day on non-dialysis days (:51)  midodrine 10 mg oral tablet: 1 tab(s) orally 3 times a day (:)  pantoprazole 40 mg oral delayed release tablet: 1 tab(s) orally once a day (:)  Vitamin D3 1000 intl units (25 mcg) oral capsule:  (:)      VITALS:  Daily     Daily   T(F): 97.1 (20 @ 05:06), Max: 98.1 (20 @ 14:18)  HR: 79 (20 @ 09:10)  BP: 130/62 (20 @ 09:10)  RR: 20 (20 @ 06:03)  SpO2: 94% (20 @ 04:31)  Wt(kg): --  I&O's Detail    :  -  2020 07:00  --------------------------------------------------------  IN:  Total IN: 0 mL    OUT:    Oral Fluid: 0 mL    Voided (mL): 0 mL  Total OUT: 0 mL    Total NET: 0 mL        I&O's Summary    2020 07:  -  2020 07:00  --------------------------------------------------------  IN: 0 mL / OUT: 0 mL / NET: 0 mL          PHYSICAL EXAM:  Gen: NAD  Resp: decreased bs at the bases  Card: S1/S2  Abd: distended  Vascular access: AV    LABS:        133<L>  |  94<L>  |  70<HH>  ----------------------------<  132<H>  6.6<HH>   |  25  |  6.3<HH>    Ca    8.3<L>      2020 06:15  Phos  4.3       Mg     2.1         TPro  6.3  /  Alb  3.5  /  TBili  0.5  /  DBili      /  AST  32  /  ALT  21  /  AlkPhos  193<H>      Phosphorus Level, Serum: 4.3 mg/dL (20 @ 05:40)  Phosphorus Level, Serum: 3.2 mg/dL (20 @ 06:04)  Phosphorus Level, Serum: 2.9 mg/dL (20 @ 06:20)                          9.6    10.98 )-----------( 166      ( 2020 06:15 )             33.8     Mean Cell Volume: 100.3 fL (20 @ 06:15)  Mean Cell Volume: 102.4 fL (20 @ 05:40)  Mean Cell Volume: 99.1 fL (11-10-20 @ 07:06)    % Saturation, Iron: 26 % (10-23-18 @ 07:41)  Ferritin, Serum: 1827 ng/mL (10-23-18 @ 07:41)      Urine Studies:  Urinalysis Basic - ( 2020 03:50 )    Color: Brown / Appearance: Turbid / S.025 / pH:   Gluc:  / Ketone: Trace  / Bili: Moderate / Urobili: 0.2 mg/dL   Blood:  / Protein: >=300 mg/dL / Nitrite: Negative   Leuk Esterase: Large / RBC: 11-25 /HPF / WBC >50 /HPF   Sq Epi:  / Non Sq Epi: Few /HPF / Bacteria: TNTC /HPF       Nephrology Progress Note    ADRIANNA GAINES  MRN-[id number]  70y  Male    S:  Patient is seen and examined, events over the last 24h noted.    O:  Allergies:  No Known Allergies    Hospital Medications:   MaleMEDICATIONS  (STANDING):  albuterol/ipratropium for Nebulization 3 milliLiter(s) Nebulizer every 6 hours  apixaban 5 milliGRAM(s) Oral two times a day  aspirin  chewable 81 milliGRAM(s) Oral daily  atorvastatin 80 milliGRAM(s) Oral at bedtime  calcium acetate 667 milliGRAM(s) Oral three times a day with meals  chlorhexidine 4% Liquid 1 Application(s) Topical <User Schedule>  cholecalciferol 2000 Unit(s) Oral daily  citalopram 10 milliGRAM(s) Oral daily  finasteride 5 milliGRAM(s) Oral daily  insulin lispro (ADMELOG) corrective regimen sliding scale   SubCutaneous every 6 hours  meropenem  IVPB 500 milliGRAM(s) IV Intermittent every 24 hours  midodrine. 10 milliGRAM(s) Oral three times a day  multivitamin/minerals 1 Tablet(s) Oral daily  norepinephrine Infusion 0.16 MICROgram(s)/kG/Min (25.5 mL/Hr) IV Continuous <Continuous>  pantoprazole    Tablet 40 milliGRAM(s) Oral before breakfast  senna 1 Tablet(s) Oral at bedtime  sodium zirconium cyclosilicate 10 Gram(s) Oral every 12 hours  tamsulosin 0.8 milliGRAM(s) Oral at bedtime  tiotropium 18 MICROgram(s) Capsule 1 Capsule(s) Inhalation daily  vancomycin  IVPB 1250 milliGRAM(s) IV Intermittent <User Schedule>    MEDICATIONS  (PRN):    Home Medications:  Home Medications:  alfuzosin 10 mg oral tablet, extended release: 1 tab(s) orally once a day on non-dialysis days (2020 05:51)  aspirin 81 mg oral tablet, chewable: 1 tab(s) orally once a day (2020 05:51)  calcium acetate 667 mg oral tablet: 1 tab(s) orally 3 times a day (2020 05:51)  Centrum Silver oral tablet: 1 tab(s) orally once a day (2020 05:51)  citalopram 10 mg oral tablet: 1 tab(s) orally once a day (2020 05:51)  Colace 100 mg oral capsule: 1 cap(s) orally 2 times a day (2020 05:51)  Crestor 20 mg oral tablet: 1 tab(s) orally once a day (:51)  Eliquis 5 mg oral tablet: 1 tab(s) orally 2 times a day (:51)  finasteride 5 mg oral tablet: 1 tab(s) orally once a day (:)  isosorbide mononitrate 30 mg oral tablet, extended release: 1 tab(s) orally once a day on non-dialysis days (:51)  midodrine 10 mg oral tablet: 1 tab(s) orally 3 times a day (:)  pantoprazole 40 mg oral delayed release tablet: 1 tab(s) orally once a day (:)  Vitamin D3 1000 intl units (25 mcg) oral capsule:  (:)      VITALS:  Daily     Daily   T(F): 97.1 (20 @ 05:06), Max: 98.1 (20 @ 14:18)  HR: 79 (20 @ 09:10)  BP: 130/62 (20 @ 09:10)  RR: 20 (20 @ 06:03)  SpO2: 94% (20 @ 04:31)  Wt(kg): --  I&O's Detail    :  -  2020 07:00  --------------------------------------------------------  IN:  Total IN: 0 mL    OUT:    Oral Fluid: 0 mL    Voided (mL): 0 mL  Total OUT: 0 mL    Total NET: 0 mL        I&O's Summary    2020 07:  -  2020 07:00  --------------------------------------------------------  IN: 0 mL / OUT: 0 mL / NET: 0 mL          PHYSICAL EXAM:  Gen: on bipap, NAD, lethargic  Resp: decreased bs at the bases  Card: S1/S2  Abd: distended  Vascular access: AV    LABS:        133<L>  |  94<L>  |  70<HH>  ----------------------------<  132<H>  6.6<HH>   |  25  |  6.3<HH>    Ca    8.3<L>      2020 06:15  Phos  4.3       Mg     2.1         TPro  6.3  /  Alb  3.5  /  TBili  0.5  /  DBili      /  AST  32  /  ALT  21  /  AlkPhos  193<H>      Phosphorus Level, Serum: 4.3 mg/dL (20 @ 05:40)  Phosphorus Level, Serum: 3.2 mg/dL (20 @ 06:04)  Phosphorus Level, Serum: 2.9 mg/dL (20 @ 06:20)                          9.6    10.98 )-----------( 166      ( 2020 06:15 )             33.8     Mean Cell Volume: 100.3 fL (20 @ 06:15)  Mean Cell Volume: 102.4 fL (20 @ 05:40)  Mean Cell Volume: 99.1 fL (11-10-20 @ 07:06)    % Saturation, Iron: 26 % (10-23-18 @ 07:41)  Ferritin, Serum: 1827 ng/mL (10-23-18 @ 07:41)      Urine Studies:  Urinalysis Basic - ( 2020 03:50 )    Color: Brown / Appearance: Turbid / S.025 / pH:   Gluc:  / Ketone: Trace  / Bili: Moderate / Urobili: 0.2 mg/dL   Blood:  / Protein: >=300 mg/dL / Nitrite: Negative   Leuk Esterase: Large / RBC: 11-25 /HPF / WBC >50 /HPF   Sq Epi:  / Non Sq Epi: Few /HPF / Bacteria: TNTC /HPF

## 2020-11-12 NOTE — PROGRESS NOTE ADULT - SUBJECTIVE AND OBJECTIVE BOX
chart reviewed, pt seen and evaluated this am with family at bedside,       T(F): 97.1 (11-12-20 @ 05:06), Max: 98.1 (11-11-20 @ 14:18)  HR: 58 (11-12-20 @ 10:47)  BP: 130/62 (11-12-20 @ 09:10)  RR: 20 (11-12-20 @ 06:03)  SpO2: 100% (11-12-20 @ 10:47) (94% - 100%)    PHYSICAL EXAM:  GENERAL: mild respiratory distress   HEAD:  Atraumatic, Normocephalic  NERVOUS SYSTEM:  no focal deficit  CHEST/LUNG:  bilateral rales  HEART: Regular rate and rhythm; No murmurs, rubs, or gallops  ABDOMEN: mildly distended; mild tenderness, no rebound  EXTREMITIES:  b/l  edema      LABS  11-12    133<L>  |  94<L>  |  70<HH>  ----------------------------<  132<H>  6.6<HH>   |  25  |  6.3<HH>    Ca    8.3<L>      12 Nov 2020 06:15  Phos  4.3     11-11  Mg     2.1     11-11    TPro  6.3  /  Alb  3.5  /  TBili  0.5  /  DBili  x   /  AST  32  /  ALT  21  /  AlkPhos  193<H>  11-12                          9.6    10.98 )-----------( 166      ( 12 Nov 2020 06:15 )             33.8         CARDIAC ENZYMES  Creatine Kinase, Serum: 128 (11-11 @ 05:40)  Creatine Kinase, Serum: 79 (11-10 @ 07:06)  Creatine Kinase, Serum: 54 (11-09 @ 15:49)    CKMB Units: 4.7 (11-11 @ 05:40)  CKMB Units: 4.1 (11-10 @ 07:06)  CKMB Units: 4.5 (11-09 @ 15:49)    Troponin T, Serum: 0.13 ng/mL (11-11-20 @ 05:40)  Troponin T, Serum: 0.15 ng/mL (11-10-20 @ 07:06)  Troponin T, Serum: 0.15 ng/mL (11-09-20 @ 15:49)      Culture Results:   >100,000 CFU/ml Escherichia coli (11-09-20)  Culture Results:   No growth to date. (11-09-20)  Culture Results:   No growth to date. (11-09-20)    RADIOLOGY  < from: Xray Chest 1 View-PORTABLE IMMEDIATE (Xray Chest 1 View-PORTABLE IMMEDIATE .) (11.11.20 @ 06:04) >  Impression:    Right lower lung field opacity/effusion. Trace left basilar opacity. Increased pulmonary vascular markings. No pneumothorax.    < end of copied text >  < from: CT Abdomen and Pelvis w/ IV Cont (11.09.20 @ 05:07) >  IMPRESSION:    1.  Moderately thick-walled urinary bladder, despite underdistention, possibly reflecting underlying cystitis; correlation with urinalysis may be of use.  2.  Otherwise, no evidence of acute/inflammatory process within the abdomen or pelvis.  3.   Since August 30, 2020, no significant change in right lung base consolidated opacity with increased size moderate thick-walled pleural effusion with associated subpleural nodular density, possibly reflecting an infectious process, however underlying neoplasm is a possibility. Follow-up to resolution is suggested.      < end of copied text >    MEDICATIONS  (STANDING):  albuterol/ipratropium for Nebulization 3 milliLiter(s) Nebulizer every 6 hours  apixaban 5 milliGRAM(s) Oral two times a day  aspirin  chewable 81 milliGRAM(s) Oral daily  atorvastatin 80 milliGRAM(s) Oral at bedtime  calcium acetate 667 milliGRAM(s) Oral three times a day with meals  chlorhexidine 4% Liquid 1 Application(s) Topical <User Schedule>  cholecalciferol 2000 Unit(s) Oral daily  citalopram 10 milliGRAM(s) Oral daily  finasteride 5 milliGRAM(s) Oral daily  insulin lispro (ADMELOG) corrective regimen sliding scale   SubCutaneous every 6 hours  meropenem  IVPB 500 milliGRAM(s) IV Intermittent every 24 hours  midodrine. 10 milliGRAM(s) Oral three times a day  multivitamin/minerals 1 Tablet(s) Oral daily  norepinephrine Infusion 0.16 MICROgram(s)/kG/Min (25.5 mL/Hr) IV Continuous <Continuous>  pantoprazole    Tablet 40 milliGRAM(s) Oral before breakfast  senna 1 Tablet(s) Oral at bedtime  sodium zirconium cyclosilicate 10 Gram(s) Oral every 12 hours  tamsulosin 0.8 milliGRAM(s) Oral at bedtime  tiotropium 18 MICROgram(s) Capsule 1 Capsule(s) Inhalation daily  vancomycin  IVPB 1250 milliGRAM(s) IV Intermittent <User Schedule>    MEDICATIONS  (PRN):

## 2020-11-12 NOTE — CHART NOTE - NSCHARTNOTEFT_GEN_A_CORE
Asked to assess patient due to concern about Asked to assess patient due to concern about possible infiltrate of levophed infusion as some increase in swelling to right hand area is seen. On exam swelling is seen diffusely to right upper extremity including hand. Radial pulse is palpable and capillary refill intact. There is good blood return noted to IV cath. BP is 103/61. Although I strongly doubt infiltrate I feel that a trial off pressors may show sustained good blood pressure. Will therefore hold levophed for now pending repeat Blood pressures measurements.

## 2020-11-13 NOTE — PROGRESS NOTE ADULT - ASSESSMENT
71 y/o M with PMH of ESRD on HD MWF, CAD s/p pci with 7 stents and AICD placement in 2018, Heart failure with reduced EF (26% in 2018), A-fib on Eiquis DLD, HTN, COPD on 2L home O2, DM II and BPH, large R loculated effusion admitted for sepsis.     # Comfort care  - family at bed side  - patient on morphine drip  - agonal breaths   - NPO   - d/c abx  - no further work up    # DNR/DNI

## 2020-11-13 NOTE — PROGRESS NOTE ADULT - ASSESSMENT
71 y/o M with PMH of ESRD on HD [facility] last session on Friday due today , CAD s/p pci with 7 stents and AICD placement in 2018, Heart failure with reduced EF (26% in 2018), A-fib on Eiquis DLD, HTN, COPD on 2L home O2, DM II and BPH presents to the ED  with Right sided flank pain    IMPRESSION  #Sepsis on admission (Fevers, WBC>12, Tachycardia) secondary to pneumonia with chronic loculated right sided effusion; symptoms not consistent with lower UTI  - S/p pigtail cathter on 8/19/2020 with exudative effusion, Complicated with pnuemothorax  - Thoracentesis Cx 8/2020: NG, - evaluated by pulm; no plans for tap given complicated course from previous Pigtail in 9/2020    #ESRD on HD  #Abx allergy: NKDA    Recommendations  - hold vancomycin due to elevated Cr  - can continue meropenem, but would stop antibiotics if consistent with GOC  - Please obtain MRSA PCR and if negative then please discontinue Vancomycin altogether  - Supplemental oxygenation and bronchodilator as needed  - discussed with daughter    Please call or message on Microsoft Teams if with any questions.  Spectra [phone]

## 2020-11-13 NOTE — PROGRESS NOTE ADULT - SUBJECTIVE AND OBJECTIVE BOX
ADRIANNA GAINES  70y, Male  Allergy: No Known Allergies      LOS  4d    CHIEF COMPLAINT: Right flank  pain, UTI, fluid overload (12 Nov 2020 14:31)      INTERVAL EVENTS/HPI  - events noted overnight, planned for comfort care. on morphine drip, levophed stopped  - T(F): , Max: 96.9 (11-12-20 @ 20:36)  - WBC Count: 10.98 (11-12-20 @ 06:15)  WBC Count: 16.50 (11-11-20 @ 05:40)     - Creatinine, Serum: 6.3 (11-12-20 @ 06:15)       ROS  unable to obtain history secondary to patient's mental status and/or sedation      VITALS:  T(F): 96.9, Max: 96.9 (11-12-20 @ 20:36)  HR: 60  BP: 82/41  RR: 18Vital Signs Last 24 Hrs  T(C): 36.1 (12 Nov 2020 20:36), Max: 36.1 (12 Nov 2020 20:36)  T(F): 96.9 (12 Nov 2020 20:36), Max: 96.9 (12 Nov 2020 20:36)  HR: 60 (13 Nov 2020 08:18) (47 - 79)  BP: 82/41 (13 Nov 2020 08:18) (82/41 - 137/61)  BP(mean): --  RR: 18 (13 Nov 2020 08:18) (18 - 18)  SpO2: 99% (13 Nov 2020 08:18) (98% - 100%)    PHYSICAL EXAM:  Gen: NAD, resting in bed  HEENT: Normocephalic, atraumatic  Neck: supple, no lymphadenopathy  CV: Regular rate & regular rhythm  Lungs: decreased BS at bases, no fremitus  Abdomen: Soft, BS present  Ext: Warm, well perfused  Neuro: non focal, awake  Skin: no rash, no erythema  Lines: no phlebitis    FH: Non-contributory  Social Hx: Non-contributory    TESTS & MEASUREMENTS:                        9.6    10.98 )-----------( 166      ( 12 Nov 2020 06:15 )             33.8     11-12    133<L>  |  94<L>  |  70<HH>  ----------------------------<  132<H>  6.6<HH>   |  25  |  6.3<HH>    Ca    8.3<L>      12 Nov 2020 06:15    TPro  6.3  /  Alb  3.5  /  TBili  0.5  /  DBili  x   /  AST  32  /  ALT  21  /  AlkPhos  193<H>  11-12      LIVER FUNCTIONS - ( 12 Nov 2020 06:15 )  Alb: 3.5 g/dL / Pro: 6.3 g/dL / ALK PHOS: 193 U/L / ALT: 21 U/L / AST: 32 U/L / GGT: x               Culture - Blood (collected 11-11-20 @ 05:50)  Source: .Blood Blood  Preliminary Report (11-12-20 @ 18:02):    No growth to date.    Culture - Urine (collected 11-09-20 @ 03:50)  Source: .Urine Clean Catch (Midstream)  Final Report (11-12-20 @ 11:07):    >100,000 CFU/ml Escherichia coli  Organism: Escherichia coli (11-12-20 @ 11:07)  Organism: Escherichia coli (11-12-20 @ 11:07)      -  Amikacin: S <=16      -  Amoxicillin/Clavulanic Acid: I 16/8      -  Ampicillin: R >16 These ampicillin results predict results for amoxicillin      -  Ampicillin/Sulbactam: I 16/8 Enterobacter, Citrobacter, and Serratia may develop resistance during prolonged therapy (3-4 days)      -  Aztreonam: S <=4      -  Cefazolin: S <=2 (MIC_CL_COM_ENTERIC_CEFAZU) For uncomplicated UTI with K. pneumoniae, E. coli, or P. mirablis: JULIA <=16 is sensitive and JULIA >=32 is resistant. This also predicts results for oral agents cefaclor, cefdinir, cefpodoxime, cefprozil, cefuroxime axetil, cephalexin and locarbef for uncomplicated UTI. Note that some isolates may be susceptible to these agents while testing resistant to cefazolin.      -  Cefepime: S <=2      -  Cefoxitin: S <=8      -  Ceftriaxone: S <=1 Enterobacter, Citrobacter, and Serratia may develop resistance during prolonged therapy      -  Ciprofloxacin: R 2      -  Ertapenem: S <=0.5      -  Gentamicin: S <=2      -  Imipenem: S <=1      -  Levofloxacin: I 1      -  Meropenem: S <=1      -  Nitrofurantoin: S <=32 Should not be used to treat pyelonephritis      -  Piperacillin/Tazobactam: S <=8      -  Tigecycline: S <=2      -  Tobramycin: S <=2      -  Trimethoprim/Sulfamethoxazole: R >2/38      Method Type: JULIA    Culture - Blood (collected 11-09-20 @ 02:30)  Source: .Blood Blood-Peripheral  Preliminary Report (11-10-20 @ 23:01):    No growth to date.    Culture - Blood (collected 11-09-20 @ 02:30)  Source: .Blood Blood-Peripheral  Preliminary Report (11-10-20 @ 23:01):    No growth to date.        Blood Gas Venous - Lactate: 1.8 mmoL/L (11-11-20 @ 05:36)  Lactate, Blood: 1.6 mmol/L (11-09-20 @ 02:30)  Blood Gas Venous - Lactate: 1.6 mmoL/L (11-09-20 @ 02:12)      INFECTIOUS DISEASES TESTING  Rapid RVP Result: NotDetec (11-09-20 @ 02:10)  Procalcitonin, Serum: 0.62 (08-25-20 @ 16:10)  COVID-19 PCR: NotDetec (08-17-20 @ 12:30)      INFLAMMATORY MARKERS      RADIOLOGY & ADDITIONAL TESTS:  I have personally reviewed the last available Chest xray  CXR      CT      CARDIOLOGY TESTING  12 Lead ECG:   Ventricular Rate 58 BPM    Atrial Rate 45 BPM    QRS Duration 198 ms    Q-T Interval 516 ms    QTC Calculation(Bazett) 506 ms    R Axis 235 degrees    T Axis 70 degrees    Diagnosis Line Ventricular-paced rhythm with occasional AV dual-paced complexes  Abnormal ECG    Confirmed by MONICA HUFFMAN MD [id number]) on 11/12/2020 12:03:15 PM (11-09-20 @ 02:12)  12 Lead ECG:   Ventricular Rate 58 BPM    Atrial Rate 58 BPM    P-R Interval 8 ms    QRS Duration 198 ms    Q-T Interval 514 ms    QTC Calculation(Bazett) 504 ms    R Axis 223 degrees    T Axis 108 degrees    Diagnosis Line AV dual-paced rhythm in a pattern of bigeminy  Abnormal ECG    Confirmed by MONICA HUFFMAN MD [id number]) on 11/10/2020 10:29:15 AM (11-09-20 @ 02:11)      MEDICATIONS  albuterol/ipratropium for Nebulization 3 Nebulizer every 6 hours  apixaban 5 Oral two times a day  aspirin  chewable 81 Oral daily  atorvastatin 80 Oral at bedtime  calcium acetate 667 Oral three times a day with meals  chlorhexidine 4% Liquid 1 Topical <User Schedule>  cholecalciferol 2000 Oral daily  citalopram 10 Oral daily  finasteride 5 Oral daily  insulin lispro (ADMELOG) corrective regimen sliding scale  SubCutaneous every 6 hours  meropenem  IVPB 500 IV Intermittent every 24 hours  midodrine. 10 Oral three times a day  morphine  Infusion. 2 IV Continuous <Continuous>  multivitamin/minerals 1 Oral daily  pantoprazole    Tablet 40 Oral before breakfast  senna 1 Oral at bedtime  sodium zirconium cyclosilicate 10 Oral every 12 hours  tamsulosin 0.8 Oral at bedtime  tiotropium 18 MICROgram(s) Capsule 1 Inhalation daily  vancomycin  IVPB 1250 IV Intermittent <User Schedule>      WEIGHT  Weight (kg): 84.9 (11-09-20 @ 11:19)      ANTIBIOTICS:  meropenem  IVPB 500 milliGRAM(s) IV Intermittent every 24 hours  vancomycin  IVPB 1250 milliGRAM(s) IV Intermittent <User Schedule>      All available historical records have been reviewed

## 2020-11-13 NOTE — PROGRESS NOTE ADULT - SUBJECTIVE AND OBJECTIVE BOX
SUBJECTIVE:    Patient is a 70y old Male who presents with a chief complaint of Right flank  pain, UTI, fluid overload (13 Nov 2020 11:41)    Currently admitted to medicine with the primary diagnosis of Abdominal pain     Today is hospital day 4d. This morning he is resting comfortably in bed and reports no new issues or overnight events.     PAST MEDICAL & SURGICAL HISTORY  Heart failure with reduced ejection fraction    CAD (coronary artery disease)  s/p PCI and AICD placement    BPH (benign prostatic hyperplasia)    Type 2 diabetes mellitus    End stage renal disease    Dyslipidemia    Hypertension    No significant past surgical history      SOCIAL HISTORY:  Negative for smoking/alcohol/drug use.     ALLERGIES:  No Known Allergies    MEDICATIONS:  STANDING MEDICATIONS  albuterol/ipratropium for Nebulization 3 milliLiter(s) Nebulizer every 6 hours  apixaban 5 milliGRAM(s) Oral two times a day  aspirin  chewable 81 milliGRAM(s) Oral daily  atorvastatin 80 milliGRAM(s) Oral at bedtime  calcium acetate 667 milliGRAM(s) Oral three times a day with meals  chlorhexidine 4% Liquid 1 Application(s) Topical <User Schedule>  cholecalciferol 2000 Unit(s) Oral daily  citalopram 10 milliGRAM(s) Oral daily  finasteride 5 milliGRAM(s) Oral daily  insulin lispro (ADMELOG) corrective regimen sliding scale   SubCutaneous every 6 hours  meropenem  IVPB 500 milliGRAM(s) IV Intermittent every 24 hours  midodrine. 10 milliGRAM(s) Oral three times a day  morphine  Infusion. 2 mG/Hr IV Continuous <Continuous>  multivitamin/minerals 1 Tablet(s) Oral daily  pantoprazole    Tablet 40 milliGRAM(s) Oral before breakfast  senna 1 Tablet(s) Oral at bedtime  sodium zirconium cyclosilicate 10 Gram(s) Oral every 12 hours  tamsulosin 0.8 milliGRAM(s) Oral at bedtime  tiotropium 18 MICROgram(s) Capsule 1 Capsule(s) Inhalation daily  vancomycin  IVPB 1250 milliGRAM(s) IV Intermittent <User Schedule>    PRN MEDICATIONS  morphine  - Injectable 2 milliGRAM(s) IV Push every 6 hours PRN    VITALS:   T(F): 96.9  HR: 60  BP: 82/41  RR: 18  SpO2: 99%    LABS:                        9.6    10.98 )-----------( 166      ( 12 Nov 2020 06:15 )             33.8     11-12    133<L>  |  94<L>  |  70<HH>  ----------------------------<  132<H>  6.6<HH>   |  25  |  6.3<HH>    Ca    8.3<L>      12 Nov 2020 06:15    TPro  6.3  /  Alb  3.5  /  TBili  0.5  /  DBili  x   /  AST  32  /  ALT  21  /  AlkPhos  193<H>  11-12    Culture - Blood (collected 11 Nov 2020 05:50)  Source: .Blood Blood  Preliminary Report (12 Nov 2020 18:02):    No growth to date.      PHYSICAL EXAM:    Deferred given pt on hospice

## 2020-11-13 NOTE — PROGRESS NOTE ADULT - SUBJECTIVE AND OBJECTIVE BOX
Nephrology Progress Note    ADRIANNA GAINES  MRN-[id number]  70y  Male    S:  Patient is seen and examined, events over the last 24h noted.    O:  Allergies:  No Known Allergies    Hospital Medications:   MaleMEDICATIONS  (STANDING):  albuterol/ipratropium for Nebulization 3 milliLiter(s) Nebulizer every 6 hours  apixaban 5 milliGRAM(s) Oral two times a day  aspirin  chewable 81 milliGRAM(s) Oral daily  atorvastatin 80 milliGRAM(s) Oral at bedtime  calcium acetate 667 milliGRAM(s) Oral three times a day with meals  chlorhexidine 4% Liquid 1 Application(s) Topical <User Schedule>  cholecalciferol 2000 Unit(s) Oral daily  citalopram 10 milliGRAM(s) Oral daily  finasteride 5 milliGRAM(s) Oral daily  insulin lispro (ADMELOG) corrective regimen sliding scale   SubCutaneous every 6 hours  meropenem  IVPB 500 milliGRAM(s) IV Intermittent every 24 hours  midodrine. 10 milliGRAM(s) Oral three times a day  morphine  Infusion. 2 mG/Hr (2 mL/Hr) IV Continuous <Continuous>  multivitamin/minerals 1 Tablet(s) Oral daily  pantoprazole    Tablet 40 milliGRAM(s) Oral before breakfast  senna 1 Tablet(s) Oral at bedtime  sodium zirconium cyclosilicate 10 Gram(s) Oral every 12 hours  tamsulosin 0.8 milliGRAM(s) Oral at bedtime  tiotropium 18 MICROgram(s) Capsule 1 Capsule(s) Inhalation daily  vancomycin  IVPB 1250 milliGRAM(s) IV Intermittent <User Schedule>    MEDICATIONS  (PRN):  morphine  - Injectable 2 milliGRAM(s) IV Push every 6 hours PRN Moderate Pain (4 - 6)    Home Medications:  Home Medications:  alfuzosin 10 mg oral tablet, extended release: 1 tab(s) orally once a day on non-dialysis days (2020 05:51)  aspirin 81 mg oral tablet, chewable: 1 tab(s) orally once a day (2020 05:51)  calcium acetate 667 mg oral tablet: 1 tab(s) orally 3 times a day (2020 05:51)  Centrum Silver oral tablet: 1 tab(s) orally once a day (2020 05:51)  citalopram 10 mg oral tablet: 1 tab(s) orally once a day (2020 05:51)  Colace 100 mg oral capsule: 1 cap(s) orally 2 times a day (:51)  Crestor 20 mg oral tablet: 1 tab(s) orally once a day (:51)  Eliquis 5 mg oral tablet: 1 tab(s) orally 2 times a day (:51)  finasteride 5 mg oral tablet: 1 tab(s) orally once a day (:)  isosorbide mononitrate 30 mg oral tablet, extended release: 1 tab(s) orally once a day on non-dialysis days (:51)  midodrine 10 mg oral tablet: 1 tab(s) orally 3 times a day (:)  pantoprazole 40 mg oral delayed release tablet: 1 tab(s) orally once a day (:)  Vitamin D3 1000 intl units (25 mcg) oral capsule:  (:)      VITALS:  Daily     Daily   T(F): 96.9 (20 @ 20:36), Max: 96.9 (20 @ 20:36)  HR: 60 (20 @ 08:18)  BP: 82/41 (20 @ 08:18)  RR: 18 (20 @ 08:18)  SpO2: 99% (20 @ 08:18)  Wt(kg): --  I&O's Detail    2020 07:  -  2020 07:00  --------------------------------------------------------  IN:  Total IN: 0 mL    OUT:    Other (mL): 500 mL  Total OUT: 500 mL    Total NET: -500 mL        I&O's Summary    2020 07:  -  2020 07:00  --------------------------------------------------------  IN: 0 mL / OUT: 500 mL / NET: -500 mL          PHYSICAL EXAM:  Gen: lethargic    LABS:        133<L>  |  94<L>  |  70<HH>  ----------------------------<  132<H>  6.6<HH>   |  25  |  6.3<HH>    Ca    8.3<L>      2020 06:15    TPro  6.3  /  Alb  3.5  /  TBili  0.5  /  DBili      /  AST  32  /  ALT  21  /  AlkPhos  193<H>      Phosphorus Level, Serum: 4.3 mg/dL (20 @ 05:40)  Phosphorus Level, Serum: 3.2 mg/dL (20 @ 06:04)  Phosphorus Level, Serum: 2.9 mg/dL (20 @ 06:20)                          9.6    10.98 )-----------( 166      ( 2020 06:15 )             33.8     Mean Cell Volume: 100.3 fL (20 @ 06:15)  Mean Cell Volume: 102.4 fL (20 @ 05:40)  Mean Cell Volume: 99.1 fL (11-10-20 @ 07:06)    % Saturation, Iron: 26 % (10-23-18 @ 07:41)  Ferritin, Serum: 1827 ng/mL (10-23-18 @ 07:41)      Urine Studies:  Urinalysis Basic - ( 2020 03:50 )    Color: Brown / Appearance: Turbid / S.025 / pH:   Gluc:  / Ketone: Trace  / Bili: Moderate / Urobili: 0.2 mg/dL   Blood:  / Protein: >=300 mg/dL / Nitrite: Negative   Leuk Esterase: Large / RBC: 11-25 /HPF / WBC >50 /HPF   Sq Epi:  / Non Sq Epi: Few /HPF / Bacteria: TNTC /HPF

## 2020-11-13 NOTE — PROGRESS NOTE ADULT - REASON FOR ADMISSION
Right flank  pain, UTI, fluid overload

## 2020-11-13 NOTE — PROGRESS NOTE ADULT - ASSESSMENT
Discussed with family, will not continue hemodialysis, opt for comfort care.    Will sign off.  Recall nephrology as needed.

## 2020-11-14 NOTE — DISCHARGE NOTE FOR THE EXPIRED PATIENT - HOSPITAL COURSE
69yo male whose extensive PMH included Atrial Fibrillation, ESRD on dialysis, CAD with history of stent placements, HFrEF, and type 2 diabetes and who had a pigtail catheter placed on 8/19/20 to treat a pleural effusion but sustained pneumothorax afterwards, presented to the ER on 11/09/2020 due to right sided pain. Initial work up included findings of elevated troponin, right sided pneumonia with effusion, suspicion for UTI and he met criteria for sepsis (fever, tachycardia, hypotension). Treatments included IV antibiotics (meropenem and vancomycin), insulin for hyperglycemia and levophed infusion for BP support. Dialysis was continued but limited at times due to low blood pressures. Thoracentesis was declined due to problems connected to prior pleural effusion drainage. Clinically there was no significant improvement.  Patient had DNR status while here but on Nov 11, family requested that only comfort measures be given so levophed was discontinues, antibiotics were gradually stopped completed and a morphine infusion was started. I was called on 11/14/2020 to see patient as he was found without vital signs. I pronounced patient at 0312 with family at bedside.

## 2020-11-14 NOTE — CHART NOTE - NSCHARTNOTEFT_GEN_A_CORE
I just received a call from unit clerk saying that Chelsie from [facility] asked me to call her regarding this patient. I dialed [phone] but only reached a voice mail. I left message to call back

## 2020-11-14 NOTE — DISCHARGE NOTE FOR THE EXPIRED PATIENT - SECONDARY DIAGNOSIS.
End stage renal disease Type 2 diabetes mellitus UTI (urinary tract infection) CAD (coronary artery disease) Heart failure with reduced ejection fraction Atrial fibrillation

## 2020-11-16 LAB
CULTURE RESULTS: SIGNIFICANT CHANGE UP
SPECIMEN SOURCE: SIGNIFICANT CHANGE UP

## 2020-12-08 ENCOUNTER — APPOINTMENT (OUTPATIENT)
Dept: CARDIOLOGY | Facility: CLINIC | Age: 70
End: 2020-12-08

## 2020-12-21 PROBLEM — Z87.440 HISTORY OF URINARY TRACT INFECTION: Status: RESOLVED | Noted: 2019-03-14 | Resolved: 2020-12-21

## 2021-01-11 NOTE — OCCUPATIONAL THERAPY INITIAL EVALUATION ADULT - BATHING, PREVIOUS LEVEL OF FUNCTION, OT EVAL
As long as his BP continues to hang around the 130s/70s, I'm fine if he just takes the Amlodipine 5 mg daily    However, if it starts increasing, I would like him to add the Losartan back on- but he should call us if this is the case +grab bars/needs device/independent

## 2021-01-15 ENCOUNTER — APPOINTMENT (OUTPATIENT)
Dept: CARDIOLOGY | Facility: CLINIC | Age: 71
End: 2021-01-15

## 2021-03-01 NOTE — DIETITIAN INITIAL EVALUATION ADULT. - RD TO REMAIN AVAILABLE
yes/Nutrition Intervention: Meals & Snacks, Medical Food Supplements, Nutrition Related Medication Management, Coordination of Care. Monitor: Diet order, energy intake, glucose profile, renal profile, nutrition focused physical findings, body composition. 73.3

## 2021-05-21 NOTE — PROGRESS NOTE ADULT - ASSESSMENT
Surgery Consult Note      Reason for consultation    I was asked to see Mr. Hubbard by Dr. Espinal regarding increasing pneumothorax    history of present illness    This is a 57 year old male patient was admitted secondary to COVID pneumonia.  On the 17th.  Patient slowly decompensated requiring BiPAP which resulted in a right pneumothorax.  Was intubated and chest tube placed last night.  This morning repeat chest x-ray showed increasing pneumothorax on the right.    Medical History      GERD (gastroesophageal reflux disease)                        Leg fracture, right                                             Comment: age 11    Chronic back pain                                             Obesity                                                       Unspecified constipation                        9/13/2013     Esophageal reflux                               9/13/2013     Bone spur                                                     IFG (impaired fasting glucose)                  10/10/2016    Dyslipidemia                                    10/10/2016    Benign essential hypertension                   4/24/2019     COVID-19 virus infection                        5/18/2021       Comment: Dx 5/12/2021    Obesity (BMI 30-39.9)                           5/18/2021     Surgical history      COLONOSCOPY                                     10/08/2012      Comment: Normal exam    CHEILECTOMY                                     08/11/2011      Comment: Right foot, 1st metatarsophalangeal joint    ESOPHAGOGASTRODUODENOSCOPY                      10/8/2012       Comment: GERD - normal anatomy    CYST REMOVAL                                                  Medications  Current Facility-Administered Medications   Medication   • fentaNYL (SUBLIMAZE) injection  mcg   • docusate sodium-sennosides (SENOKOT S) 50-8.6 MG 2 tablet   • bisacodyl (DULCOLAX) suppository 10 mg   • chlorhexidine gluconate (PERIDEX) 0.12 % solution 15 mL     And   • chlorhexidine gluconate (PERIDEX) 0.12 % solution 15 mL   • petrolatum (white)-mineral oil ophthalmic ointment 1 application   • fentaNYL (SUBLIMAZE) injection  mcg   • propofol (DIPRIVAN) infusion   • fentaNYL (SUBLIMAZE) injection 25-50 mcg   • pantoprazole (PROTONIX INJECT) injection 40 mg   • melatonin tablet 6 mg   • LORazepam (ATIVAN) injection 0.26-0.5 mg   • [START ON 5/23/2021] dexamethasone (PF) (DECADRON) injection 10 mg   • dextrose 50 % injection 25 g   • dextrose 50 % injection 12.5 g   • glucagon (GLUCAGEN) injection 1 mg   • dextrose (GLUTOSE) 40 % gel 15 g   • dextrose (GLUTOSE) 40 % gel 30 g   • insulin lispro (ADMELOG,HumaLOG) scheduled AND correction dose   • insulin lispro (ADMELOG,HumaLOG) scheduled AND correction dose   • cholecalciferol (VITAMIN D) tablet 25 mcg   • ascorbic acid (VITAMIN C) tablet 500 mg   • aspirin (ECOTRIN) enteric coated tablet 81 mg   • atorvastatin (LIPITOR) tablet 40 mg   • sodium chloride (NORMAL SALINE) 0.9 % bolus 500 mL   • dexMEDETomidine (PRECEDEX) 400 mcg/100 mL in sodium chloride 0.9 % infusion   • amLODIPine (NORVASC) tablet 5 mg   • escitalopram (LEXAPRO) tablet 20 mg   • sodium chloride 0.9 % flush bag 25 mL   • sodium chloride (PF) 0.9 % injection 2 mL   • remdesivir 100 mg in sodium chloride 0.9 % 250 mL total volume infusion   • enoxaparin (LOVENOX) injection 40 mg   • ondansetron (ZOFRAN) injection 4 mg   • acetaminophen (TYLENOL) tablet 650 mg   • sodium chloride 0.9% infusion   • dexamethasone (DECADRON) 20 mg in sodium chloride 0.9 % 100 mL IVPB   • albuterol inhaler 2 puff       Allergies    ALLERGIES:  No Known Allergies    Family history    Family History   Problem Relation Age of Onset   • Cataracts Mother    • Glaucoma Mother    • Macular degeneration Mother        Social history      Tobacco Use: Quit          Packs/Day:       Years:           Alcohol Use: Yes                Comment: Few per day        Review of Systems     GENERAL:  Vent dependent respiratory failure secondary to COVID    Physical exam    Vital Signs:  Blood pressure 115/70, pulse 62, temperature 98.2 °F (36.8 °C), temperature source Temporal, resp. rate (!) 22, height 6' 3\" (1.905 m), weight 133.2 kg, SpO2 (!) 86 %.  Constitutional:  Patient appears as stated age. No apparent distress.  Sedated on vent  Cardiovascular:  Normal heart rate.   Respiratory:  Chest tube in place on the right decreased breath sounds in the right  Abdomen:  Soft, nontender, nondistended, no rebound, rigidity or guarding  Integument:  Warm. Dry. No erythema. No rash.    Neurologic:  Sedated on the vent    labs    Lab Results   Component Value Date    SODIUM 138 05/21/2021    POTASSIUM 4.2 05/21/2021    BUN 38 (H) 05/21/2021    CREATININE 0.74 05/21/2021    WBC 11.4 (H) 05/21/2021    HCT 47.5 05/21/2021    HGB 16.2 05/21/2021     05/21/2021    GLUCOSE 144 (H) 05/21/2021    TSH 2.184 10/19/2020    BILIRUBIN 0.6 05/21/2021    DBIL 0.1 01/25/2013    AST 87 (H) 05/21/2021    CRP 2.3 (H) 05/20/2021    LACTA 1.6 05/17/2021    ALBUMIN 2.9 (L) 05/21/2021     Dressing was removed from the right-sided axilla.  Area was prepped and draped.  Suture was cut and tube was withdrawn approximately 3 cm.  Recent cured with 2-0 silk.  Sterile dressing placed.  Patient tolerated procedure well.      Imaging  Chest x-ray 05/21/2021  IMPRESSION:       Right-sided chest tube projecting over the right lung base may be slightly  advanced from previous. Large right pneumothorax is increased from  radiograph 5/21/2021 at 0018. No definitive mediastinal shift although  continued close clinical follow-up is recommended.    Impression    Increasing right pneumothorax    RecoMMENDATIONS    Would just chest tube at bedside if used to be coiled at the base of the long and will try to retract chest tube and we secured.  If this is not successful may benefit from anterior chest tube.  -repeat chest x-ray       70M PMH ESRD on HD, CAD/PCI, HFrEF/AICD, Afib, HLD, HTN, COPD on home O2, DM, BPH admitted with right flank pain.      # ESRD on HD  - HD yesterday, next tomorrow  Continue Midodrine   - check phos level, on binder   - d/c cholecalciferol  # Normocytic anemia - no YASMINE, check iron stores

## 2021-11-15 NOTE — DISCHARGE NOTE PROVIDER - NSDCHHPTASSISTHOME_GEN_ALL_CORE
COVID-19 PCR test completed. Patient handout For Patients Who Have Been Tested for Covid-19 (Coronavirus) was given to the patient, which includes test result notification process.    
Patient Needs Assistance to Leave Residence...

## 2022-05-17 NOTE — ED ADULT NURSE NOTE - NS TRANSFER PATIENT BELONGINGS
Patient scheduled 06/02/22 Wrist Watch/Jewelry/Money (specify)/all belongings was given to wife of patient/Clothing

## 2022-06-30 NOTE — PATIENT PROFILE ADULT - VISION (WITH CORRECTIVE LENSES IF THE PATIENT USUALLY WEARS THEM):
Normal vision: sees adequately in most situations; can see medication labels, newsprint Detail Level: Simple General Sunscreen Counseling: I recommended a broad spectrum sunscreen with a SPF of 30 or higher.  I explained that SPF 30 sunscreens block approximately 97 percent of the sun's harmful rays.  Sunscreens should be applied at least 15 minutes prior to expected sun exposure and then every 2 hours after that as long as sun exposure continues. If swimming or exercising sunscreen should be reapplied every 45 minutes to an hour after getting wet or sweating.  One ounce, or the equivalent of a shot glass full of sunscreen, is adequate to protect the skin not covered by a bathing suit. I also recommended a lip balm with a sunscreen as well. Sun protective clothing can be used in lieu of sunscreen but must be worn the entire time you are exposed to the sun's rays.

## 2023-05-30 NOTE — PROGRESS NOTE ADULT - SUBJECTIVE AND OBJECTIVE BOX
5/30/2023  10:12 AM    CM met with MOB to complete initial assessment and begin discharge planning. MOB verified and confirmed demographics. MOB lives with FOB, at the address on file. MOB plans to breast feed baby and has pump to use at home. [facility], will provide follow up care for infant. EARNEST has car seat, bassinet/crib, clothing, bottles and all necessary supplies for baby. 05/30/23 1011   Service Assessment   Patient Orientation Alert and Oriented   Cognition Alert   History Provided By Patient   Primary Caregiver Self   Accompanied By/Relationship FOB   Support Systems Spouse/Significant Other   PCP Verified by CM Yes   Last Visit to PCP Within last 3 months   Prior Functional Level Independent in ADLs/IADLs   Current Functional Level Independent in ADLs/IADLs   Can patient return to prior living arrangement Yes   Ability to make needs known: Good   Family able to assist with home care needs: Yes   Would you like for me to discuss the discharge plan with any other family members/significant others, and if so, who?  No   Social/Functional History   Lives With Significant other   Type of [facility] Bhavana Tinoco seen and examined  lying comfortable  no distress         PAST HISTORY  --------------------------------------------------------------------------------  No significant changes to PMH, PSH, FHx, SHx, unless otherwise noted    ALLERGIES & MEDICATIONS  --------------------------------------------------------------------------------  Allergies    No Known Allergies    Intolerances      Standing Inpatient Medications  apixaban 2.5 milliGRAM(s) Oral every 12 hours  aspirin  chewable 81 milliGRAM(s) Oral daily  atorvastatin 80 milliGRAM(s) Oral at bedtime  BACItracin   Ointment 1 Application(s) Topical daily  calcium acetate 667 milliGRAM(s) Oral three times a day with meals  chlorhexidine 4% Liquid 1 Application(s) Topical <User Schedule>  dextrose 5%. 1000 milliLiter(s) IV Continuous <Continuous>  dextrose 50% Injectable 12.5 Gram(s) IV Push once  dextrose 50% Injectable 25 Gram(s) IV Push once  dextrose 50% Injectable 25 Gram(s) IV Push once  insulin lispro (HumaLOG) corrective regimen sliding scale   SubCutaneous three times a day before meals  meropenem  IVPB      meropenem  IVPB 500 milliGRAM(s) IV Intermittent every 24 hours  midodrine 10 milliGRAM(s) Oral every 8 hours  multivitamin/minerals 1 Tablet(s) Oral daily  pantoprazole    Tablet 40 milliGRAM(s) Oral before breakfast  polyethylene glycol 3350 17 Gram(s) Oral daily  senna 2 Tablet(s) Oral at bedtime  simethicone 80 milliGRAM(s) Chew four times a day  tamsulosin 0.8 milliGRAM(s) Oral at bedtime    PRN Inpatient Medications  acetaminophen   Tablet .. 650 milliGRAM(s) Oral every 6 hours PRN  dextrose 40% Gel 15 Gram(s) Oral once PRN  glucagon  Injectable 1 milliGRAM(s) IntraMuscular once PRN  melatonin 1 milliGRAM(s) Oral at bedtime PRN          VITALS/PHYSICAL EXAM  --------------------------------------------------------------------------------  T(C): 36.7 (08-27-20 @ 05:32), Max: 36.7 (08-27-20 @ 05:32)  HR: 58 (08-27-20 @ 06:05) (58 - 65)  BP: 94/56 (08-27-20 @ 06:05) (87/52 - 119/58)  RR: 18 (08-27-20 @ 05:32) (18 - 18)  SpO2: 99% (08-27-20 @ 05:32) (97% - 99%)  Wt(kg): --        Physical Exam:  	Gen: NAD  	Pulm: decrease BS  B/L  	CV: S1S2; no rub  	Abd: +distended  	Vascular access:    LABS/STUDIES  --------------------------------------------------------------------------------              9.7    7.75  >-----------<  152      [08-26-20 @ 06:24]              32.6     140  |  100  |  39  ----------------------------<  161      [08-26-20 @ 06:24]  3.8   |  29  |  6.2        Ca     8.6     [08-26-20 @ 06:24]      Phos  7.0     [08-26-20 @ 06:24]    TPro  x   /  Alb  3.6  /  TBili  x   /  DBili  x   /  AST  x   /  ALT  x   /  AlkPhos  x   [08-26-20 @ 06:24]        Troponin 0.36      [08-26-20 @ 06:24]    Creatinine Trend:  SCr 6.2 [08-26 @ 06:24]  SCr 9.3 [08-25 @ 06:18]  SCr 7.2 [08-24 @ 07:33]  SCr 5.9 [08-23 @ 04:30]  SCr 8.6 [08-22 @ 04:30]        PTH -- (Ca 8.9)      [08-18-20 @ 05:24]   237  PTH -- (Ca 8.7)      [08-17-20 @ 15:04]   203  HbA1c 7.1      [10-13-18 @ 05:46]  TSH 3.93      [08-25-20 @ 08:36]

## 2023-10-31 NOTE — CONSULT NOTE ADULT - NSHPATTENDINGPLANDISCUSS_GEN_ALL_CORE
Writer called to expedite echocardiogram for discharge.    Melchor Murcia PA-C,   Internal Medicine ACP   In house pager #[phone]
[facility] Staff
team
RN, Primary team and patient's family

## 2024-02-14 NOTE — ED PROVIDER NOTE - NSDCPRINTRESULTS_ED_ALL_ED
Patient requests all Lab and Radiology Results on their Discharge Instructions Valtrex Pregnancy And Lactation Text: this medication is Pregnancy Category B and is considered safe during pregnancy. This medication is not directly found in breast milk but it's metabolite acyclovir is present.

## 2024-04-01 NOTE — CONSULT NOTE ADULT - SUBJECTIVE AND OBJECTIVE BOX
Addended by: LO CARDENAS on: 4/1/2024 02:21 PM     Modules accepted: Orders     ADRIANNA GAINES  70y, Male  Allergy: No Known Allergies      CHIEF COMPLAINT: Right flank  pain, UTI, fluid overload (10 Nov 2020 08:23)      LOS  1d    HPI:  71 y/o M with PMH of ESRD on HD MWF last session on Friday due today , CAD s/p pci with 7 stents and AICD placement in 2018, Heart failure with reduced EF (26% in 2018), A-fib on [place] DLD, HTN, COPD on 2L home O2, DM II and BPH presents to the ED  with Right sided flank pain since this morning . PT reports pain on the right side, sharp constant, non radiating. Pt reports similar pain as on the last admission 20. PT on HD , does not urinate, only dripping, denies burning or blood in urine. Pt reports shortness of breath has increasing, was placed on 100 % oxygen in ED, evaluated by critical care and was decided pt stable to go to floor. In ed pt found with temp 100.9 and UTI positive. PT denies nausea, vomiting, diarrhea, constipation, chest pain, leg tenderness. Pt follows up with electrophysiologist  , cardiologist Dr. Acosta, nephrologist Dr. Schreiber and pulmonary Dr. Santana.  (2020 10:51)      INFECTIOUS DISEASE HISTORY:  Previous right sided plueral effusion in   S/p pigtail cathter on 2020 with exudative   Complicated with pnuemothorax and hypercapnic respiratory failure with shock   Completed 7 day course of meropenem, empirically as thoracentesis culture with NG.     For the past week, has been feeling unwell and uncomfortable with pain worsening over the past 2 days which prompted him to come to ED.   Denies any fevers, chills, nausea, vomiting, abdominal pain, coughing, headaches.   Has pain on right side, pleuritic component.   Found tohave temp to 100.9 in ED.   CT Abd/Pelvis with moderately thickened urinary bladder, possibly cystitis  He does not urinate, with occaisional dripping; no dysuria         PAST MEDICAL & SURGICAL HISTORY:  Heart failure with reduced ejection fraction  CAD (coronary artery disease)  s/p PCI and AICD placement  BPH (benign prostatic hyperplasia)  Type 2 diabetes mellitus  End stage renal disease  Dyslipidemia  Hypertension  No significant past surgical history        FAMILY HISTORY  FH: type 2 diabetes    FH: HTN (hypertension)        SOCIAL HISTORY  Social History:  Tobacco use:  former smoker   EtOH use: denies    Illicit drug use: denies (2020 10:51)        ROS  General: Denies rigors, nightsweats  HEENT: Denies headache, rhinorrhea, sore throat, eye pain  CV: Denies CP, palpitations  PULM: Denies wheezing, hemoptysis  GI: Denies hematemesis, hematochezia, melena  : Denies discharge, hematuria  MSK: Denies arthralgias, myalgias  SKIN: Denies rash, lesions  NEURO: Denies paresthesias, weakness  PSYCH: Denies depression, anxiety    VITALS:  T(F): 98.9, Max: 98.9 (11-10-20 @ 05:28)  HR: 60  BP: 117/56  RR: 25Vital Signs Last 24 Hrs  T(C): 37.2 (10 Nov 2020 05:28), Max: 37.2 (10 Nov 2020 05:28)  T(F): 98.9 (10 Nov 2020 05:28), Max: 98.9 (10 Nov 2020 05:28)  HR: 60 (10 Nov 2020 05:28) (60 - 61)  BP: 117/56 (10 Nov 2020 05:28) (98/56 - 131/58)  BP(mean): --  RR: 25 (10 Nov 2020 08:09) (14 - 25)  SpO2: 96% (10 Nov 2020 08:09) (96% - 100%)    PHYSICAL EXAM:  Gen: NAD, resting in bed  HEENT: Normocephalic, atraumatic  Neck: supple, no lymphadenopathy  CV: Regular rate & regular rhythm  Lungs: decreased BS at bases, no fremitus  Abdomen: Soft, BS present  Ext: Warm, well perfused  Neuro: non focal, awake  Skin: no rash, no erythema  Lines: no phlebitis    TESTS & MEASUREMENTS:                        9.8    15.74 )-----------( 163      ( 2020 02:30 )             33.4     11-    135  |  96<L>  |  60<H>  ----------------------------<  235<H>  5.4<H>   |  26  |  5.9<HH>    Ca    9.3      2020 02:30    TPro  6.8  /  Alb  3.9  /  TBili  0.3  /  DBili  <0.2  /  AST  19  /  ALT  16  /  AlkPhos  169<H>        LIVER FUNCTIONS - ( 2020 02:30 )  Alb: 3.9 g/dL / Pro: 6.8 g/dL / ALK PHOS: 169 U/L / ALT: 16 U/L / AST: 19 U/L / GGT: x           Urinalysis Basic - ( 2020 03:50 )    Color: Brown / Appearance: Turbid / S.025 / pH: x  Gluc: x / Ketone: Trace  / Bili: Moderate / Urobili: 0.2 mg/dL   Blood: x / Protein: >=300 mg/dL / Nitrite: Negative   Leuk Esterase: Large / RBC: 11-25 /HPF / WBC >50 /HPF   Sq Epi: x / Non Sq Epi: Few /HPF / Bacteria: TNTC /HPF        Culture - Blood (collected 20 @ 16:10)  Source: .Blood None  Final Report (20 @ 01:01):    No Growth Final    Culture - Fungal, Body Fluid (collected 20 @ 16:50)  Source: .Body Fluid Pleural Fluid  Final Report (20 @ 15:01):    No fungus isolated after 4 weeks.    Culture - Body Fluid with Gram Stain (collected 20 @ 16:50)  Source: .Body Fluid Pleural Fluid  Gram Stain (20 @ 03:07):    polymorphonuclear leukocytes seen    No organisms seen    by cytocentrifuge  Final Report (20 @ 18:17):    No growth at 5 days        Lactate, Blood: 1.6 mmol/L (20 @ 02:30)  Blood Gas Venous - Lactate: 1.6 mmoL/L (20 @ 02:12)      INFECTIOUS DISEASES TESTING  Procalcitonin, Serum: 0.62 ng/mL (20 @ 16:10)  COVID-19 PCR: NotDetec (20 @ 12:30)      RADIOLOGY & ADDITIONAL TESTS:  I have personally reviewed the last Chest xray  CXR  Xray Chest 1 View- PORTABLE-Urgent:   EXAM:  XR CHEST PORTABLE URGENT 1V            PROCEDURE DATE:  2020            INTERPRETATION:  Clinical History / Reason for exam: Shortness of breath    Comparison : Chest radiograph 2020.    Technique/Positioning: AP.    Findings:    Support devices: Right-sided intraventricular AICD    Cardiac/mediastinum/hilum: Stable.    Lung parenchyma/Pleura: Bibasilar opacities, right greater than left. No pneumothorax.    Skeleton/soft tissues: Stable.    Impression:    Bibasilar opacities/effusions, right greater than left.                  VERONICA PEREZ MD; Attending Interventional Radiologist  This document has been electronically signed. 2020  9:18AM (20 @ 03:24)      CT  CT Abdomen and Pelvis w/ IV Cont:   EXAM:  CT ABDOMEN AND PELVIS IC            PROCEDURE DATE:  2020            INTERPRETATION:  CLINICAL STATEMENT: Abdominal pain.    TECHNIQUE: Contiguous axial CT images were obtained from the lower chest to the pubic symphysis following administration of 100cc Optiray 320 intravenous contrast.  Oral contrast was not administered.  Reformatted images in the coronal and sagittal planes were acquired.    COMPARISON: CT abdomen pelvis 2020.      FINDINGS:    LOWER CHEST: No significant change in right lung base consolidated opacity with increased size moderate thick-walled pleural effusion with associated subpleural nodular density.    HEPATOBILIARY: Unremarkable.    SPLEEN: Unremarkable.    PANCREAS: Unremarkable.    ADRENAL GLANDS: Unremarkable.    KIDNEYS: Symmetric renal enhancement. No hydronephrosis.    ABDOMINOPELVIC NODES: Unremarkable.    PELVIC ORGANS: Moderately thick-walled urinary bladder, despite underdistention, possibly reflecting underlying cystitis.    PERITONEUM/MESENTERY/BOWEL: Increased small volume abdominopelvic ascites. Moderate rectal stool load. No evidence of bowel obstruction or pneumoperitoneum.    BONES/SOFT TISSUES: Osteopenia. No acute osseous abnormalities.    OTHER: Atherosclerotic vascular calcifications.      IMPRESSION:    1.  Moderately thick-walled urinary bladder, despite underdistention, possibly reflecting underlying cystitis; correlation with urinalysis may be of use.  2.  Otherwise, no evidence of acute/inflammatory process within the abdomen or pelvis.  3.   Since 2020, no significant change in right lung base consolidated opacity with increased size moderate thick-walled pleural effusion with associated subpleural nodular density, possibly reflecting an infectious process, however underlying neoplasm is a possibility. Follow-up to resolution is suggested.            UZMA COHEN M.D., RESIDENT RADIOLOGIST  This document has been electronically signed.  ELIUD ADAM MD; Attending Radiologist  This document has been electronically signed. 2020  9:33AM (20 @ 05:07)      CARDIOLOGY TESTING      MEDICATIONS  albuterol/ipratropium for Nebulization 3 Nebulizer every 6 hours  apixaban 5 Oral two times a day  aspirin  chewable 81 Oral daily  atorvastatin 80 Oral at bedtime  azithromycin  IVPB 500 IV Intermittent daily  calcium acetate 667 Oral three times a day with meals  cefepime   IVPB 1000 IV Intermittent every 24 hours  chlorhexidine 4% Liquid 1 Topical <User Schedule>  cholecalciferol 2000 Oral daily  citalopram 10 Oral daily  dextrose 5%. 1000 IV Continuous <Continuous>  dextrose 50% Injectable 12.5 IV Push once  dextrose 50% Injectable 25 IV Push once  dextrose 50% Injectable 25 IV Push once  finasteride 5 Oral daily  insulin glargine Injectable (LANTUS) 16 SubCutaneous at bedtime  insulin lispro (ADMELOG) corrective regimen sliding scale  SubCutaneous three times a day before meals  insulin lispro Injectable (ADMELOG) 6 SubCutaneous three times a day before meals  isosorbide   mononitrate ER Tablet (IMDUR) 30 Oral daily  metoprolol tartrate 25 Oral two times a day  midodrine. 10 Oral three times a day  multivitamin/minerals 1 Oral daily  pantoprazole    Tablet 40 Oral before breakfast  senna 1 Oral at bedtime  tamsulosin 0.8 Oral at bedtime  tiotropium 18 MICROgram(s) Capsule 1 Inhalation daily      Weight  Weight (kg): 84.9 (20 @ 11:19)    ANTIBIOTICS:  azithromycin  IVPB 500 milliGRAM(s) IV Intermittent daily  cefepime   IVPB 1000 milliGRAM(s) IV Intermittent every 24 hours      ALLERGIES:  No Known Allergies

## 2024-08-23 NOTE — CONSULT NOTE ADULT - SUBJECTIVE AND OBJECTIVE BOX
Well Visit, Over 65: Care Instructions  Well visits can help you stay healthy. Your doctor has checked your overall health and may have suggested ways to take good care of yourself. Your doctor also may have recommended tests. You can help prevent illness with healthy eating, good sleep, vaccinations, regular exercise, and other steps.    Get the tests that you and your doctor decide on. Depending on your age and risks, examples might include hearing tests as well as screening for colon, breast, and lung cancer. Screening helps find diseases before any symptoms appear.   Eat healthy foods. Choose fruits, vegetables, whole grains, lean protein, and low-fat dairy foods. Limit saturated fat, and reduce salt.     Limit alcohol. Men should have no more than 2 drinks a day. Women should have no more than 1. For some people, no alcohol is the best choice.   Exercise. It can help prevent falls. Get at least 30 minutes of exercise on most days of the week. Walking, yoga, and [employer]elina chi can be good choices.     Reach and stay at your healthy weight. This will lower your risk for many health problems.   Take care of your mental health. Try to stay connected with friends, family, and community, and find ways to manage stress.     If you're feeling depressed or hopeless, talk to someone. A counselor can help. If you don't have a counselor, talk to your doctor.   Talk to your doctor if you think you may have a problem with alcohol or drug use. This includes prescription medicines and illegal drugs.     Avoid tobacco and nicotine: Don't smoke, vape, or chew. If you need help quitting, talk to your doctor.   Practice safer sex. Getting tested, using condoms or dental dams, and limiting sex partners can help prevent STIs.     Make an advance directive. This is a legal way to tell your family and doctor what you want to happen at the end of your life or when you can't speak for yourself.   Prevent problems where you can. Protect  NEPHROLOGY CONSULTATION NOTE    Patient is a 68y Male whom presented to the hospital with     PAST MEDICAL & SURGICAL HISTORY:  Heart failure with reduced ejection fraction  CAD (coronary artery disease)  BPH (benign prostatic hyperplasia)  Type 2 diabetes mellitus  End stage renal disease  Dyslipidemia  Hypertension    Allergies:  No Known Allergies    Home Medications Reviewed  Hospital Medications:   MEDICATIONS  (STANDING):  aspirin  chewable 81 milliGRAM(s) Oral daily  atorvastatin 80 milliGRAM(s) Oral at bedtime  clopidogrel Tablet 75 milliGRAM(s) Oral daily  dextrose 50% Injectable 25 Gram(s) IV Push once  dextrose 50% Injectable 25 Gram(s) IV Push once  docusate sodium 100 milliGRAM(s) Oral two times a day  finasteride 5 milliGRAM(s) Oral daily  furosemide    Tablet 80 milliGRAM(s) Oral every 12 hours  heparin  Injectable 5000 Unit(s) SubCutaneous every 8 hours  influenza   Vaccine 0.5 milliLiter(s) IntraMuscular once  insulin glargine Injectable (LANTUS) 16 Unit(s) SubCutaneous at bedtime  insulin lispro (HumaLOG) corrective regimen sliding scale   SubCutaneous three times a day before meals  insulin lispro Injectable (HumaLOG) 6 Unit(s) SubCutaneous three times a day before meals  isosorbide   mononitrate ER Tablet (IMDUR) 30 milliGRAM(s) Oral daily  metolazone 5 milliGRAM(s) Oral daily  metoprolol tartrate 25 milliGRAM(s) Oral two times a day  pantoprazole    Tablet 40 milliGRAM(s) Oral before breakfast  tamsulosin 0.4 milliGRAM(s) Oral at bedtime      SOCIAL HISTORY:  Denies ETOH,Smoking,   FAMILY HISTORY:        REVIEW OF SYSTEMS:  CONSTITUTIONAL: No weakness, fevers or chills  EYES/ENT: No visual changes;  No vertigo or throat pain   NECK: No pain or stiffness  RESPIRATORY: No cough, wheezing, hemoptysis; No shortness of breath  CARDIOVASCULAR: No chest pain or palpitations.  GASTROINTESTINAL: No abdominal or epigastric pain. No nausea, vomiting, or hematemesis; No diarrhea or constipation. No melena or hematochezia.  GENITOURINARY: No dysuria, frequency, foamy urine, urinary urgency, incontinence or hematuria  NEUROLOGICAL: No numbness or weakness  SKIN: No itching, burning, rashes, or lesions   VASCULAR: No bilateral lower extremity edema.   All other review of systems is negative unless indicated above.    VITALS:  T(F): 97.8 (10-13-18 @ 08:00), Max: 98.8 (10-12-18 @ 15:07)  HR: 80 (10-13-18 @ 08:00)  BP: 110/64 (10-13-18 @ 08:00)  RR: 28 (10-13-18 @ 08:00)  SpO2: 97% (10-13-18 @ 08:00)    10-13 @ 07:01  -  10-13 @ 10:36  --------------------------------------------------------  IN: 360 mL / OUT: 0 mL / NET: 360 mL      Height (cm): 180.34 (10-12 @ 21:20)  Weight (kg): 91.5 (10-12 @ 21:20)  BMI (kg/m2): 28.1 (10-12 @ 21:20)  BSA (m2): 2.12 (10-12 @ 21:20)      I&O's Detail    13 Oct 2018 07:01  -  13 Oct 2018 10:36  --------------------------------------------------------  IN:    Oral Fluid: 360 mL  Total IN: 360 mL    OUT:  Total OUT: 0 mL    Total NET: 360 mL        Creatine Kinase, Serum: 47 U/L (10-13-18 @ 05:46)      PHYSICAL EXAM:  Constitutional: NAD  HEENT: anicteric sclera, oropharynx clear, MMM  Neck: No JVD  Respiratory: CTAB, no wheezes, rales or rhonchi  Cardiovascular: S1, S2, RRR  Gastrointestinal: BS+, soft, NT/ND  Extremities: No cyanosis or clubbing. No peripheral edema  Neurological: A/O x 3, no focal deficits  Psychiatric: Normal mood, normal affect  : No CVA tenderness. No crook.   Skin: No rashes  Vascular Access:    LABS:  10-13    142  |  98  |  46<H>  ----------------------------<  161<H>  4.1   |  30  |  4.2<HH>    Ca    9.2      13 Oct 2018 05:46  Phos  4.3     10-13  Mg     2.6     10-13    TPro  7.2  /  Alb  4.1  /  TBili  0.4  /  DBili      /  AST  20  /  ALT  23  /  AlkPhos  140<H>  10-12    Creatinine Trend: 4.2 <--, 3.3 <--                        10.6   5.85  )-----------( 135      ( 12 Oct 2018 15:52 )             33.3     Urine Studies:              RADIOLOGY & ADDITIONAL STUDIES: NEPHROLOGY CONSULTATION NOTE    Patient is a 68y Male whom presented to the hospital with lethargy dizziness and bradycardia. History goes back to two days prior to presentation when patient began to complain of dizziness and lethargy, was found to have bradycardia at his dialysis unit and he was sent for monitoring and management at [facility]  Seen today sp IV pacemaker , denied chest pain no SOB no cough no abdominal pain no nausea no vomiting no fever  no chills no other complaints     PAST MEDICAL & SURGICAL HISTORY:  Heart failure with reduced ejection fraction  CAD (coronary artery disease)  BPH (benign prostatic hyperplasia)  Type 2 diabetes mellitus  End stage renal disease  Dyslipidemia  Hypertension    Allergies:  No Known Allergies    Home Medications Reviewed  Hospital Medications:   MEDICATIONS  (STANDING):  aspirin  chewable 81 milliGRAM(s) Oral daily  atorvastatin 80 milliGRAM(s) Oral at bedtime  clopidogrel Tablet 75 milliGRAM(s) Oral daily  docusate sodium 100 milliGRAM(s) Oral two times a day  finasteride 5 milliGRAM(s) Oral daily  furosemide    Tablet 80 milliGRAM(s) Oral every 12 hours  heparin  Injectable 5000 Unit(s) SubCutaneous every 8 hours  influenza   Vaccine 0.5 milliLiter(s) IntraMuscular once  insulin glargine Injectable (LANTUS) 16 Unit(s) SubCutaneous at bedtime  insulin lispro (HumaLOG) corrective regimen sliding scale   SubCutaneous three times a day before meals  insulin lispro Injectable (HumaLOG) 6 Unit(s) SubCutaneous three times a day before meals  isosorbide   mononitrate ER Tablet (IMDUR) 30 milliGRAM(s) Oral daily  metolazone 5 milliGRAM(s) Oral daily  metoprolol tartrate 25 milliGRAM(s) Oral two times a day  pantoprazole    Tablet 40 milliGRAM(s) Oral before breakfast  tamsulosin 0.4 milliGRAM(s) Oral at bedtime      SOCIAL HISTORY:  Denies ETOH,Smoking,   FAMILY HISTORY:        REVIEW OF SYSTEMS:   All other review of systems is negative unless indicated above.    VITALS:  T(F): 97.8 (10-13-18 @ 08:00), Max: 98.8 (10-12-18 @ 15:07)  HR: 80 (10-13-18 @ 08:00)  BP: 110/64 (10-13-18 @ 08:00)  RR: 28 (10-13-18 @ 08:00)  SpO2: 97% (10-13-18 @ 08:00)    10-13 @ 07:01  -  10-13 @ 10:36  --------------------------------------------------------  IN: 360 mL / OUT: 0 mL / NET: 360 mL      Height (cm): 180.34 (10-12 @ 21:20)  Weight (kg): 91.5 (10-12 @ 21:20)  BMI (kg/m2): 28.1 (10-12 @ 21:20)  BSA (m2): 2.12 (10-12 @ 21:20)      I&O's Detail    13 Oct 2018 07:01  -  13 Oct 2018 10:36  --------------------------------------------------------  IN:    Oral Fluid: 360 mL  Total IN: 360 mL    OUT:  Total OUT: 0 mL    Total NET: 360 mL        Creatine Kinase, Serum: 47 U/L (10-13-18 @ 05:46)      PHYSICAL EXAM:  Constitutional: NAD  HEENT: anicteric sclera, oropharynx clear, MMM  Neck: No JVD  Respiratory: CTAB, no wheezes, rales or rhonchi  Cardiovascular: S1, S2, RRR  Gastrointestinal: BS+, soft, NT/ND  Extremities: No cyanosis or clubbing. No peripheral edema  Neurological: A/O x 3, no focal deficits  Psychiatric: Normal mood, normal affect  : No CVA tenderness. No crook.   Skin: No rashes  Vascular Access:    LABS:  10-13    142  |  98  |  46<H>  ----------------------------<  161<H>  4.1   |  30  |  4.2<HH>    Ca    9.2      13 Oct 2018 05:46  Phos  4.3     10-13  Mg     2.6     10-13    TPro  7.2  /  Alb  4.1  /  TBili  0.4  /  DBili      /  AST  20  /  ALT  23  /  AlkPhos  140<H>  10-12    Creatinine Trend: 4.2 <--, 3.3 <--                        10.6   5.85  )-----------( 135      ( 12 Oct 2018 15:52 )             33.3     Urine Studies:              RADIOLOGY & ADDITIONAL STUDIES:  < from: Xray Chest 1 View-PORTABLE IMMEDIATE (10.12.18 @ 19:01) >  Impression:      Cardiomegaly, bilateral opacities and right pleural effusion, stable.   Support device in place.    < end of copied text >
